# Patient Record
Sex: FEMALE | Race: WHITE | NOT HISPANIC OR LATINO | Employment: OTHER | URBAN - METROPOLITAN AREA
[De-identification: names, ages, dates, MRNs, and addresses within clinical notes are randomized per-mention and may not be internally consistent; named-entity substitution may affect disease eponyms.]

---

## 2017-01-12 ENCOUNTER — COMMUNICATION - HEALTHEAST (OUTPATIENT)
Dept: INTERNAL MEDICINE | Facility: CLINIC | Age: 82
End: 2017-01-12

## 2017-01-12 DIAGNOSIS — F41.9 ANXIETY: ICD-10-CM

## 2017-05-19 ENCOUNTER — COMMUNICATION - HEALTHEAST (OUTPATIENT)
Dept: INTERNAL MEDICINE | Facility: CLINIC | Age: 82
End: 2017-05-19

## 2017-05-19 DIAGNOSIS — E03.9 UNSPECIFIED HYPOTHYROIDISM: ICD-10-CM

## 2017-06-30 ENCOUNTER — COMMUNICATION - HEALTHEAST (OUTPATIENT)
Dept: INTERNAL MEDICINE | Facility: CLINIC | Age: 82
End: 2017-06-30

## 2017-06-30 ENCOUNTER — OFFICE VISIT - HEALTHEAST (OUTPATIENT)
Dept: INTERNAL MEDICINE | Facility: CLINIC | Age: 82
End: 2017-06-30

## 2017-06-30 ENCOUNTER — AMBULATORY - HEALTHEAST (OUTPATIENT)
Dept: INTERNAL MEDICINE | Facility: CLINIC | Age: 82
End: 2017-06-30

## 2017-06-30 DIAGNOSIS — E03.9 UNSPECIFIED HYPOTHYROIDISM: ICD-10-CM

## 2017-06-30 DIAGNOSIS — E53.8 VITAMIN B 12 DEFICIENCY: ICD-10-CM

## 2017-06-30 DIAGNOSIS — E55.9 VITAMIN D DEFICIENCY: ICD-10-CM

## 2017-06-30 DIAGNOSIS — E03.9 HYPOTHYROIDISM, UNSPECIFIED TYPE: ICD-10-CM

## 2017-06-30 DIAGNOSIS — I10 ESSENTIAL HYPERTENSION WITH GOAL BLOOD PRESSURE LESS THAN 140/90: ICD-10-CM

## 2017-06-30 DIAGNOSIS — R05.9 COUGH: ICD-10-CM

## 2017-07-10 ENCOUNTER — COMMUNICATION - HEALTHEAST (OUTPATIENT)
Dept: INTERNAL MEDICINE | Facility: CLINIC | Age: 82
End: 2017-07-10

## 2017-07-10 DIAGNOSIS — F41.9 ANXIETY: ICD-10-CM

## 2017-07-13 ENCOUNTER — COMMUNICATION - HEALTHEAST (OUTPATIENT)
Dept: INTERNAL MEDICINE | Facility: CLINIC | Age: 82
End: 2017-07-13

## 2017-07-13 DIAGNOSIS — I10 HTN (HYPERTENSION): ICD-10-CM

## 2018-01-03 ENCOUNTER — OFFICE VISIT - HEALTHEAST (OUTPATIENT)
Dept: INTERNAL MEDICINE | Facility: CLINIC | Age: 83
End: 2018-01-03

## 2018-01-03 DIAGNOSIS — E03.9 HYPOTHYROIDISM, UNSPECIFIED TYPE: ICD-10-CM

## 2018-01-03 DIAGNOSIS — E55.9 VITAMIN D DEFICIENCY: ICD-10-CM

## 2018-01-03 DIAGNOSIS — R10.9 ABDOMINAL PAIN: ICD-10-CM

## 2018-01-03 DIAGNOSIS — Z23 NEED FOR INFLUENZA VACCINATION: ICD-10-CM

## 2018-01-03 DIAGNOSIS — E53.8 VITAMIN B 12 DEFICIENCY: ICD-10-CM

## 2018-01-03 LAB
ANION GAP SERPL CALCULATED.3IONS-SCNC: 9 MMOL/L (ref 5–18)
BUN SERPL-MCNC: 12 MG/DL (ref 8–28)
CALCIUM SERPL-MCNC: 9.1 MG/DL (ref 8.5–10.5)
CHLORIDE BLD-SCNC: 106 MMOL/L (ref 98–107)
CO2 SERPL-SCNC: 25 MMOL/L (ref 22–31)
CREAT SERPL-MCNC: 1.23 MG/DL (ref 0.6–1.1)
ERYTHROCYTE [DISTWIDTH] IN BLOOD BY AUTOMATED COUNT: 12.5 % (ref 11–14.5)
GFR SERPL CREATININE-BSD FRML MDRD: 41 ML/MIN/1.73M2
GLUCOSE BLD-MCNC: 102 MG/DL (ref 70–125)
HCT VFR BLD AUTO: 40.6 % (ref 35–47)
HGB BLD-MCNC: 13.4 G/DL (ref 12–16)
MCH RBC QN AUTO: 29.1 PG (ref 27–34)
MCHC RBC AUTO-ENTMCNC: 33.1 G/DL (ref 32–36)
MCV RBC AUTO: 88 FL (ref 80–100)
PLATELET # BLD AUTO: 258 THOU/UL (ref 140–440)
PMV BLD AUTO: 7.4 FL (ref 7–10)
POTASSIUM BLD-SCNC: 3.9 MMOL/L (ref 3.5–5)
RBC # BLD AUTO: 4.63 MILL/UL (ref 3.8–5.4)
SODIUM SERPL-SCNC: 140 MMOL/L (ref 136–145)
TSH SERPL DL<=0.005 MIU/L-ACNC: 0.67 UIU/ML (ref 0.3–5)
VIT B12 SERPL-MCNC: 575 PG/ML (ref 213–816)
WBC: 5.6 THOU/UL (ref 4–11)

## 2018-01-04 LAB — 25(OH)D3 SERPL-MCNC: 29 NG/ML (ref 30–80)

## 2018-01-05 ENCOUNTER — COMMUNICATION - HEALTHEAST (OUTPATIENT)
Dept: INTERNAL MEDICINE | Facility: CLINIC | Age: 83
End: 2018-01-05

## 2018-01-11 ENCOUNTER — COMMUNICATION - HEALTHEAST (OUTPATIENT)
Dept: SCHEDULING | Facility: CLINIC | Age: 83
End: 2018-01-11

## 2018-03-21 ENCOUNTER — RECORDS - HEALTHEAST (OUTPATIENT)
Dept: ADMINISTRATIVE | Facility: OTHER | Age: 83
End: 2018-03-21

## 2018-03-22 ENCOUNTER — RECORDS - HEALTHEAST (OUTPATIENT)
Dept: ADMINISTRATIVE | Facility: OTHER | Age: 83
End: 2018-03-22

## 2018-04-16 ENCOUNTER — RECORDS - HEALTHEAST (OUTPATIENT)
Dept: ADMINISTRATIVE | Facility: OTHER | Age: 83
End: 2018-04-16

## 2018-06-08 ENCOUNTER — OFFICE VISIT - HEALTHEAST (OUTPATIENT)
Dept: INTERNAL MEDICINE | Facility: CLINIC | Age: 83
End: 2018-06-08

## 2018-06-08 DIAGNOSIS — R05.9 COUGH: ICD-10-CM

## 2018-06-08 DIAGNOSIS — K57.32 DIVERTICULITIS LARGE INTESTINE: ICD-10-CM

## 2018-06-28 ENCOUNTER — COMMUNICATION - HEALTHEAST (OUTPATIENT)
Dept: INTERNAL MEDICINE | Facility: CLINIC | Age: 83
End: 2018-06-28

## 2018-06-28 ENCOUNTER — HOSPITAL ENCOUNTER (OUTPATIENT)
Dept: CT IMAGING | Facility: HOSPITAL | Age: 83
Discharge: HOME | End: 2018-06-28
Attending: INTERNAL MEDICINE

## 2018-06-28 ENCOUNTER — OFFICE VISIT - HEALTHEAST (OUTPATIENT)
Dept: INTERNAL MEDICINE | Facility: CLINIC | Age: 83
End: 2018-06-28

## 2018-06-28 DIAGNOSIS — R10.12 ABDOMINAL PAIN, LEFT UPPER QUADRANT: ICD-10-CM

## 2018-06-28 DIAGNOSIS — R05.9 COUGH: ICD-10-CM

## 2018-06-28 LAB
ALBUMIN SERPL-MCNC: 3.6 G/DL (ref 3.5–5)
ALBUMIN UR-MCNC: NEGATIVE MG/DL
ALP SERPL-CCNC: 78 U/L (ref 45–120)
ALT SERPL W P-5'-P-CCNC: 11 U/L (ref 0–45)
ANION GAP SERPL CALCULATED.3IONS-SCNC: 12 MMOL/L (ref 5–18)
APPEARANCE UR: ABNORMAL
AST SERPL W P-5'-P-CCNC: 21 U/L (ref 0–40)
BILIRUB DIRECT SERPL-MCNC: 0.3 MG/DL
BILIRUB SERPL-MCNC: 0.8 MG/DL (ref 0–1)
BILIRUB UR QL STRIP: NEGATIVE
BUN SERPL-MCNC: 18 MG/DL (ref 8–28)
CALCIUM SERPL-MCNC: 9.3 MG/DL (ref 8.5–10.5)
CHLORIDE BLD-SCNC: 105 MMOL/L (ref 98–107)
CO2 SERPL-SCNC: 22 MMOL/L (ref 22–31)
COLOR UR AUTO: YELLOW
CREAT SERPL-MCNC: 1.45 MG/DL (ref 0.6–1.1)
ERYTHROCYTE [DISTWIDTH] IN BLOOD BY AUTOMATED COUNT: 12.6 % (ref 11–14.5)
GFR SERPL CREATININE-BSD FRML MDRD: 34 ML/MIN/1.73M2
GLUCOSE BLD-MCNC: 108 MG/DL (ref 70–125)
GLUCOSE UR STRIP-MCNC: NEGATIVE MG/DL
HCT VFR BLD AUTO: 39.7 % (ref 35–47)
HGB BLD-MCNC: 13.5 G/DL (ref 12–16)
HGB UR QL STRIP: ABNORMAL
KETONES UR STRIP-MCNC: NEGATIVE MG/DL
LEUKOCYTE ESTERASE UR QL STRIP: ABNORMAL
LIPASE SERPL-CCNC: 33 U/L (ref 0–52)
MCH RBC QN AUTO: 30.2 PG (ref 27–34)
MCHC RBC AUTO-ENTMCNC: 34.1 G/DL (ref 32–36)
MCV RBC AUTO: 89 FL (ref 80–100)
NITRATE UR QL: NEGATIVE
PH UR STRIP: 5.5 [PH] (ref 5–8)
PLATELET # BLD AUTO: 254 THOU/UL (ref 140–440)
PMV BLD AUTO: 7.2 FL (ref 7–10)
POTASSIUM BLD-SCNC: 4.2 MMOL/L (ref 3.5–5)
PROT SERPL-MCNC: 6.9 G/DL (ref 6–8)
RBC # BLD AUTO: 4.48 MILL/UL (ref 3.8–5.4)
SODIUM SERPL-SCNC: 139 MMOL/L (ref 136–145)
SP GR UR STRIP: 1.02 (ref 1–1.03)
UROBILINOGEN UR STRIP-ACNC: ABNORMAL
WBC: 6.3 THOU/UL (ref 4–11)

## 2018-06-30 ENCOUNTER — COMMUNICATION - HEALTHEAST (OUTPATIENT)
Dept: INTERNAL MEDICINE | Facility: CLINIC | Age: 83
End: 2018-06-30

## 2018-06-30 LAB — BACTERIA SPEC CULT: NORMAL

## 2018-09-18 ENCOUNTER — COMMUNICATION - HEALTHEAST (OUTPATIENT)
Dept: INTERNAL MEDICINE | Facility: CLINIC | Age: 83
End: 2018-09-18

## 2018-09-18 DIAGNOSIS — E03.9 HYPOTHYROIDISM: ICD-10-CM

## 2018-09-27 ENCOUNTER — COMMUNICATION - HEALTHEAST (OUTPATIENT)
Dept: INTERNAL MEDICINE | Facility: CLINIC | Age: 83
End: 2018-09-27

## 2018-09-27 DIAGNOSIS — F41.9 ANXIETY: ICD-10-CM

## 2018-10-08 ENCOUNTER — COMMUNICATION - HEALTHEAST (OUTPATIENT)
Dept: INTERNAL MEDICINE | Facility: CLINIC | Age: 83
End: 2018-10-08

## 2018-10-08 DIAGNOSIS — I10 HTN (HYPERTENSION): ICD-10-CM

## 2018-10-09 ENCOUNTER — COMMUNICATION - HEALTHEAST (OUTPATIENT)
Dept: INTERNAL MEDICINE | Facility: CLINIC | Age: 83
End: 2018-10-09

## 2018-10-09 ENCOUNTER — COMMUNICATION - HEALTHEAST (OUTPATIENT)
Dept: SCHEDULING | Facility: CLINIC | Age: 83
End: 2018-10-09

## 2018-10-26 ENCOUNTER — RECORDS - HEALTHEAST (OUTPATIENT)
Dept: ADMINISTRATIVE | Facility: OTHER | Age: 83
End: 2018-10-26

## 2018-11-29 ENCOUNTER — COMMUNICATION - HEALTHEAST (OUTPATIENT)
Dept: INTERNAL MEDICINE | Facility: CLINIC | Age: 83
End: 2018-11-29

## 2018-11-29 DIAGNOSIS — E03.9 HYPOTHYROIDISM: ICD-10-CM

## 2019-01-16 ENCOUNTER — RECORDS - HEALTHEAST (OUTPATIENT)
Dept: ADMINISTRATIVE | Facility: OTHER | Age: 84
End: 2019-01-16

## 2019-01-18 ENCOUNTER — AMBULATORY - HEALTHEAST (OUTPATIENT)
Dept: SCHEDULING | Facility: CLINIC | Age: 84
End: 2019-01-18

## 2019-01-18 ENCOUNTER — OFFICE VISIT - HEALTHEAST (OUTPATIENT)
Dept: INTERNAL MEDICINE | Facility: CLINIC | Age: 84
End: 2019-01-18

## 2019-01-18 DIAGNOSIS — Z78.0 POSTMENOPAUSAL STATUS: ICD-10-CM

## 2019-01-18 DIAGNOSIS — R00.2 PALPITATIONS: ICD-10-CM

## 2019-01-18 DIAGNOSIS — E03.9 HYPOTHYROIDISM, UNSPECIFIED TYPE: ICD-10-CM

## 2019-01-18 DIAGNOSIS — E53.8 VITAMIN B12 DEFICIENCY (NON ANEMIC): ICD-10-CM

## 2019-01-18 DIAGNOSIS — R42 DIZZINESS: ICD-10-CM

## 2019-01-18 DIAGNOSIS — I10 ESSENTIAL HYPERTENSION: ICD-10-CM

## 2019-01-18 LAB
ANION GAP SERPL CALCULATED.3IONS-SCNC: 12 MMOL/L (ref 5–18)
BASOPHILS # BLD AUTO: 0 THOU/UL (ref 0–0.2)
BASOPHILS NFR BLD AUTO: 1 % (ref 0–2)
BUN SERPL-MCNC: 20 MG/DL (ref 8–28)
CALCIUM SERPL-MCNC: 9.6 MG/DL (ref 8.5–10.5)
CHLORIDE BLD-SCNC: 102 MMOL/L (ref 98–107)
CO2 SERPL-SCNC: 25 MMOL/L (ref 22–31)
CREAT SERPL-MCNC: 1.51 MG/DL (ref 0.6–1.1)
EOSINOPHIL # BLD AUTO: 0.3 THOU/UL (ref 0–0.4)
EOSINOPHIL NFR BLD AUTO: 4 % (ref 0–6)
ERYTHROCYTE [DISTWIDTH] IN BLOOD BY AUTOMATED COUNT: 12.3 % (ref 11–14.5)
GFR SERPL CREATININE-BSD FRML MDRD: 32 ML/MIN/1.73M2
GLUCOSE BLD-MCNC: 98 MG/DL (ref 70–125)
HCT VFR BLD AUTO: 40.4 % (ref 35–47)
HGB BLD-MCNC: 13.3 G/DL (ref 12–16)
LYMPHOCYTES # BLD AUTO: 1.7 THOU/UL (ref 0.8–4.4)
LYMPHOCYTES NFR BLD AUTO: 23 % (ref 20–40)
MCH RBC QN AUTO: 29.5 PG (ref 27–34)
MCHC RBC AUTO-ENTMCNC: 33 G/DL (ref 32–36)
MCV RBC AUTO: 90 FL (ref 80–100)
MONOCYTES # BLD AUTO: 0.4 THOU/UL (ref 0–0.9)
MONOCYTES NFR BLD AUTO: 6 % (ref 2–10)
NEUTROPHILS # BLD AUTO: 5 THOU/UL (ref 2–7.7)
NEUTROPHILS NFR BLD AUTO: 67 % (ref 50–70)
PLATELET # BLD AUTO: 268 THOU/UL (ref 140–440)
PMV BLD AUTO: 6.9 FL (ref 7–10)
POTASSIUM BLD-SCNC: 4.4 MMOL/L (ref 3.5–5)
RBC # BLD AUTO: 4.51 MILL/UL (ref 3.8–5.4)
SODIUM SERPL-SCNC: 139 MMOL/L (ref 136–145)
TSH SERPL DL<=0.005 MIU/L-ACNC: 0.42 UIU/ML (ref 0.3–5)
VIT B12 SERPL-MCNC: 282 PG/ML (ref 213–816)
WBC: 7.4 THOU/UL (ref 4–11)

## 2019-01-21 LAB
ATRIAL RATE - MUSE: 64 BPM
DIASTOLIC BLOOD PRESSURE - MUSE: NORMAL MMHG
INTERPRETATION ECG - MUSE: NORMAL
P AXIS - MUSE: 35 DEGREES
PR INTERVAL - MUSE: 130 MS
QRS DURATION - MUSE: 110 MS
QT - MUSE: 422 MS
QTC - MUSE: 435 MS
R AXIS - MUSE: -58 DEGREES
SYSTOLIC BLOOD PRESSURE - MUSE: NORMAL MMHG
T AXIS - MUSE: 30 DEGREES
VENTRICULAR RATE- MUSE: 64 BPM

## 2019-01-22 ENCOUNTER — COMMUNICATION - HEALTHEAST (OUTPATIENT)
Dept: INTERNAL MEDICINE | Facility: CLINIC | Age: 84
End: 2019-01-22

## 2019-01-22 DIAGNOSIS — E03.9 HYPOTHYROIDISM, UNSPECIFIED TYPE: ICD-10-CM

## 2019-02-11 ENCOUNTER — COMMUNICATION - HEALTHEAST (OUTPATIENT)
Dept: INTERNAL MEDICINE | Facility: CLINIC | Age: 84
End: 2019-02-11

## 2019-02-12 ENCOUNTER — COMMUNICATION - HEALTHEAST (OUTPATIENT)
Dept: INTERNAL MEDICINE | Facility: CLINIC | Age: 84
End: 2019-02-12

## 2019-02-12 DIAGNOSIS — M81.0 SENILE OSTEOPOROSIS: ICD-10-CM

## 2019-04-15 ENCOUNTER — COMMUNICATION - HEALTHEAST (OUTPATIENT)
Dept: CARE COORDINATION | Facility: CLINIC | Age: 84
End: 2019-04-15

## 2019-04-19 ENCOUNTER — OFFICE VISIT - HEALTHEAST (OUTPATIENT)
Dept: PHARMACY | Facility: CLINIC | Age: 84
End: 2019-04-19

## 2019-04-19 ENCOUNTER — COMMUNICATION - HEALTHEAST (OUTPATIENT)
Dept: INTERNAL MEDICINE | Facility: CLINIC | Age: 84
End: 2019-04-19

## 2019-04-19 ENCOUNTER — OFFICE VISIT - HEALTHEAST (OUTPATIENT)
Dept: INTERNAL MEDICINE | Facility: CLINIC | Age: 84
End: 2019-04-19

## 2019-04-19 DIAGNOSIS — F41.9 ANXIETY: ICD-10-CM

## 2019-04-19 DIAGNOSIS — E03.9 HYPOTHYROIDISM, UNSPECIFIED TYPE: ICD-10-CM

## 2019-04-19 DIAGNOSIS — K44.9 DIAPHRAGMATIC HERNIA WITHOUT OBSTRUCTION AND WITHOUT GANGRENE: ICD-10-CM

## 2019-04-19 DIAGNOSIS — K21.9 GASTROESOPHAGEAL REFLUX DISEASE WITHOUT ESOPHAGITIS: ICD-10-CM

## 2019-04-19 DIAGNOSIS — E53.8 VITAMIN B12 DEFICIENCY (NON ANEMIC): ICD-10-CM

## 2019-04-19 DIAGNOSIS — I63.81 RIGHT THALAMIC STROKE (H): ICD-10-CM

## 2019-04-19 DIAGNOSIS — K21.9 GASTROESOPHAGEAL REFLUX DISEASE, ESOPHAGITIS PRESENCE NOT SPECIFIED: ICD-10-CM

## 2019-04-19 DIAGNOSIS — I63.81 ACUTE ISCHEMIC VBA THALAMIC STROKE, LEFT (H): ICD-10-CM

## 2019-04-19 DIAGNOSIS — R00.2 PALPITATIONS: ICD-10-CM

## 2019-04-19 DIAGNOSIS — R06.83 SNORING: ICD-10-CM

## 2019-04-19 LAB
TSH SERPL DL<=0.005 MIU/L-ACNC: 0.81 UIU/ML (ref 0.3–5)
VIT B12 SERPL-MCNC: 984 PG/ML (ref 213–816)

## 2019-04-19 ASSESSMENT — MIFFLIN-ST. JEOR: SCORE: 1207.13

## 2019-04-21 ENCOUNTER — COMMUNICATION - HEALTHEAST (OUTPATIENT)
Dept: INTERNAL MEDICINE | Facility: CLINIC | Age: 84
End: 2019-04-21

## 2019-05-23 ENCOUNTER — OFFICE VISIT - HEALTHEAST (OUTPATIENT)
Dept: INTERNAL MEDICINE | Facility: CLINIC | Age: 84
End: 2019-05-23

## 2019-05-23 DIAGNOSIS — I63.81 RIGHT THALAMIC STROKE (H): ICD-10-CM

## 2019-05-23 DIAGNOSIS — E55.9 VITAMIN D DEFICIENCY: ICD-10-CM

## 2019-05-23 DIAGNOSIS — E03.9 HYPOTHYROIDISM, UNSPECIFIED TYPE: ICD-10-CM

## 2019-05-23 DIAGNOSIS — M35.3 POLYMYALGIA (H): ICD-10-CM

## 2019-05-23 DIAGNOSIS — M25.50 MULTIPLE JOINT PAIN: ICD-10-CM

## 2019-05-23 LAB
BASOPHILS # BLD AUTO: 0 THOU/UL (ref 0–0.2)
BASOPHILS NFR BLD AUTO: 0 % (ref 0–2)
EOSINOPHIL # BLD AUTO: 0.2 THOU/UL (ref 0–0.4)
EOSINOPHIL NFR BLD AUTO: 2 % (ref 0–6)
ERYTHROCYTE [DISTWIDTH] IN BLOOD BY AUTOMATED COUNT: 11.9 % (ref 11–14.5)
HCT VFR BLD AUTO: 40 % (ref 35–47)
HGB BLD-MCNC: 13.2 G/DL (ref 12–16)
LYMPHOCYTES # BLD AUTO: 1.3 THOU/UL (ref 0.8–4.4)
LYMPHOCYTES NFR BLD AUTO: 15 % (ref 20–40)
MCH RBC QN AUTO: 29.5 PG (ref 27–34)
MCHC RBC AUTO-ENTMCNC: 33.1 G/DL (ref 32–36)
MCV RBC AUTO: 89 FL (ref 80–100)
MONOCYTES # BLD AUTO: 0.5 THOU/UL (ref 0–0.9)
MONOCYTES NFR BLD AUTO: 6 % (ref 2–10)
NEUTROPHILS # BLD AUTO: 6.6 THOU/UL (ref 2–7.7)
NEUTROPHILS NFR BLD AUTO: 76 % (ref 50–70)
PLATELET # BLD AUTO: 333 THOU/UL (ref 140–440)
PMV BLD AUTO: 7 FL (ref 7–10)
RBC # BLD AUTO: 4.48 MILL/UL (ref 3.8–5.4)
WBC: 8.7 THOU/UL (ref 4–11)

## 2019-05-23 ASSESSMENT — MIFFLIN-ST. JEOR: SCORE: 1188.36

## 2019-05-24 LAB
ALBUMIN SERPL-MCNC: 3.3 G/DL (ref 3.5–5)
ALP SERPL-CCNC: 84 U/L (ref 45–120)
ALT SERPL W P-5'-P-CCNC: <9 U/L (ref 0–45)
ANION GAP SERPL CALCULATED.3IONS-SCNC: 14 MMOL/L (ref 5–18)
AST SERPL W P-5'-P-CCNC: 15 U/L (ref 0–40)
BILIRUB SERPL-MCNC: 1 MG/DL (ref 0–1)
BUN SERPL-MCNC: 24 MG/DL (ref 8–28)
CALCIUM SERPL-MCNC: 9.5 MG/DL (ref 8.5–10.5)
CHLORIDE BLD-SCNC: 100 MMOL/L (ref 98–107)
CK SERPL-CCNC: 64 U/L (ref 30–190)
CO2 SERPL-SCNC: 21 MMOL/L (ref 22–31)
CREAT SERPL-MCNC: 1.69 MG/DL (ref 0.6–1.1)
ERYTHROCYTE [SEDIMENTATION RATE] IN BLOOD BY WESTERGREN METHOD: 55 MM/HR (ref 0–20)
GFR SERPL CREATININE-BSD FRML MDRD: 28 ML/MIN/1.73M2
GLUCOSE BLD-MCNC: 187 MG/DL (ref 70–125)
POTASSIUM BLD-SCNC: 4 MMOL/L (ref 3.5–5)
PROT SERPL-MCNC: 7 G/DL (ref 6–8)
RHEUMATOID FACT SERPL-ACNC: <15 IU/ML (ref 0–30)
SODIUM SERPL-SCNC: 135 MMOL/L (ref 136–145)
TSH SERPL DL<=0.005 MIU/L-ACNC: 1.82 UIU/ML (ref 0.3–5)

## 2019-05-27 LAB
25(OH)D3 SERPL-MCNC: 46.2 NG/ML (ref 30–80)
B BURGDOR IGG+IGM SER QL: 0.25 INDEX VALUE

## 2019-05-28 LAB
ANA SER QL: 0.6 U
CCP AB SER IA-ACNC: <0.5 U/ML

## 2019-05-29 ENCOUNTER — COMMUNICATION - HEALTHEAST (OUTPATIENT)
Dept: INTERNAL MEDICINE | Facility: CLINIC | Age: 84
End: 2019-05-29

## 2019-06-10 ENCOUNTER — COMMUNICATION - HEALTHEAST (OUTPATIENT)
Dept: INTERNAL MEDICINE | Facility: CLINIC | Age: 84
End: 2019-06-10

## 2019-06-10 ENCOUNTER — OFFICE VISIT - HEALTHEAST (OUTPATIENT)
Dept: INTERNAL MEDICINE | Facility: CLINIC | Age: 84
End: 2019-06-10

## 2019-06-10 DIAGNOSIS — I10 ESSENTIAL HYPERTENSION: ICD-10-CM

## 2019-06-10 DIAGNOSIS — N28.9 RENAL INSUFFICIENCY: ICD-10-CM

## 2019-06-10 DIAGNOSIS — F41.9 ANXIETY: ICD-10-CM

## 2019-06-10 DIAGNOSIS — R53.83 FATIGUE, UNSPECIFIED TYPE: ICD-10-CM

## 2019-06-10 DIAGNOSIS — F32.A DEPRESSION, UNSPECIFIED DEPRESSION TYPE: ICD-10-CM

## 2019-06-10 DIAGNOSIS — I95.1 ORTHOSTASIS: ICD-10-CM

## 2019-06-10 DIAGNOSIS — G47.00 INSOMNIA, UNSPECIFIED TYPE: ICD-10-CM

## 2019-06-10 LAB
ANION GAP SERPL CALCULATED.3IONS-SCNC: 11 MMOL/L (ref 5–18)
BUN SERPL-MCNC: 20 MG/DL (ref 8–28)
CALCIUM SERPL-MCNC: 9.7 MG/DL (ref 8.5–10.5)
CHLORIDE BLD-SCNC: 102 MMOL/L (ref 98–107)
CO2 SERPL-SCNC: 26 MMOL/L (ref 22–31)
CREAT SERPL-MCNC: 1.38 MG/DL (ref 0.6–1.1)
GFR SERPL CREATININE-BSD FRML MDRD: 36 ML/MIN/1.73M2
GLUCOSE BLD-MCNC: 103 MG/DL (ref 70–125)
POTASSIUM BLD-SCNC: 4.4 MMOL/L (ref 3.5–5)
SODIUM SERPL-SCNC: 139 MMOL/L (ref 136–145)

## 2019-06-10 ASSESSMENT — MIFFLIN-ST. JEOR: SCORE: 1197.49

## 2019-06-12 ENCOUNTER — COMMUNICATION - HEALTHEAST (OUTPATIENT)
Dept: INTERNAL MEDICINE | Facility: CLINIC | Age: 84
End: 2019-06-12

## 2019-06-12 DIAGNOSIS — I10 HTN (HYPERTENSION): ICD-10-CM

## 2019-06-24 ENCOUNTER — COMMUNICATION - HEALTHEAST (OUTPATIENT)
Dept: INTERNAL MEDICINE | Facility: CLINIC | Age: 84
End: 2019-06-24

## 2019-06-24 DIAGNOSIS — E03.9 HYPOTHYROIDISM: ICD-10-CM

## 2019-07-10 ENCOUNTER — COMMUNICATION - HEALTHEAST (OUTPATIENT)
Dept: SCHEDULING | Facility: CLINIC | Age: 84
End: 2019-07-10

## 2019-07-10 DIAGNOSIS — M25.50 MULTIPLE JOINT PAIN: ICD-10-CM

## 2019-07-10 DIAGNOSIS — M35.3 POLYMYALGIA (H): ICD-10-CM

## 2019-07-11 ENCOUNTER — COMMUNICATION - HEALTHEAST (OUTPATIENT)
Dept: INTERNAL MEDICINE | Facility: CLINIC | Age: 84
End: 2019-07-11

## 2019-07-23 ENCOUNTER — OFFICE VISIT - HEALTHEAST (OUTPATIENT)
Dept: INTERNAL MEDICINE | Facility: CLINIC | Age: 84
End: 2019-07-23

## 2019-07-23 DIAGNOSIS — I10 HTN (HYPERTENSION): ICD-10-CM

## 2019-07-23 DIAGNOSIS — M19.041 PRIMARY OSTEOARTHRITIS OF BOTH HANDS: ICD-10-CM

## 2019-07-23 DIAGNOSIS — M19.042 PRIMARY OSTEOARTHRITIS OF BOTH HANDS: ICD-10-CM

## 2019-07-23 ASSESSMENT — MIFFLIN-ST. JEOR: SCORE: 1201.46

## 2019-07-24 ENCOUNTER — COMMUNICATION - HEALTHEAST (OUTPATIENT)
Dept: INTERNAL MEDICINE | Facility: CLINIC | Age: 84
End: 2019-07-24

## 2019-07-25 ENCOUNTER — COMMUNICATION - HEALTHEAST (OUTPATIENT)
Dept: SCHEDULING | Facility: CLINIC | Age: 84
End: 2019-07-25

## 2019-07-25 ENCOUNTER — COMMUNICATION - HEALTHEAST (OUTPATIENT)
Dept: INTERNAL MEDICINE | Facility: CLINIC | Age: 84
End: 2019-07-25

## 2019-07-25 DIAGNOSIS — M19.91 PRIMARY OSTEOARTHRITIS, UNSPECIFIED SITE: ICD-10-CM

## 2019-08-06 ENCOUNTER — COMMUNICATION - HEALTHEAST (OUTPATIENT)
Dept: RHEUMATOLOGY | Facility: CLINIC | Age: 84
End: 2019-08-06

## 2019-08-06 ENCOUNTER — OFFICE VISIT - HEALTHEAST (OUTPATIENT)
Dept: RHEUMATOLOGY | Facility: CLINIC | Age: 84
End: 2019-08-06

## 2019-08-06 DIAGNOSIS — R70.0 ELEVATED SED RATE: ICD-10-CM

## 2019-08-06 DIAGNOSIS — M12.39 PALINDROMIC RHEUMATISM INVOLVING MULTIPLE SITES: ICD-10-CM

## 2019-08-06 DIAGNOSIS — N18.30 CHRONIC RENAL FAILURE, STAGE 3 (MODERATE) (H): ICD-10-CM

## 2019-08-06 DIAGNOSIS — M15.0 PRIMARY OSTEOARTHRITIS INVOLVING MULTIPLE JOINTS: ICD-10-CM

## 2019-08-06 DIAGNOSIS — M25.50 POLYARTHRALGIA: ICD-10-CM

## 2019-08-06 LAB
C REACTIVE PROTEIN LHE: 1.1 MG/DL (ref 0–0.8)
ERYTHROCYTE [SEDIMENTATION RATE] IN BLOOD BY WESTERGREN METHOD: 25 MM/HR (ref 0–20)

## 2019-08-06 ASSESSMENT — MIFFLIN-ST. JEOR: SCORE: 1200.6

## 2019-08-07 LAB — HCV AB SERPL QL IA: NEGATIVE

## 2019-08-08 LAB — ANCA IGG TITR SER IF: NORMAL {TITER}

## 2019-10-15 ENCOUNTER — COMMUNICATION - HEALTHEAST (OUTPATIENT)
Dept: INTERNAL MEDICINE | Facility: CLINIC | Age: 84
End: 2019-10-15

## 2019-10-15 ENCOUNTER — OFFICE VISIT - HEALTHEAST (OUTPATIENT)
Dept: RHEUMATOLOGY | Facility: CLINIC | Age: 84
End: 2019-10-15

## 2019-10-15 ENCOUNTER — COMMUNICATION - HEALTHEAST (OUTPATIENT)
Dept: SCHEDULING | Facility: CLINIC | Age: 84
End: 2019-10-15

## 2019-10-15 DIAGNOSIS — M12.30 PALINDROMIC RHEUMATISM: ICD-10-CM

## 2019-10-15 DIAGNOSIS — M15.0 PRIMARY OSTEOARTHRITIS INVOLVING MULTIPLE JOINTS: ICD-10-CM

## 2019-10-15 DIAGNOSIS — I10 ESSENTIAL HYPERTENSION: ICD-10-CM

## 2019-10-15 DIAGNOSIS — N18.30 CHRONIC RENAL FAILURE, STAGE 3 (MODERATE) (H): ICD-10-CM

## 2019-10-16 ENCOUNTER — OFFICE VISIT - HEALTHEAST (OUTPATIENT)
Dept: INTERNAL MEDICINE | Facility: CLINIC | Age: 84
End: 2019-10-16

## 2019-10-16 DIAGNOSIS — I10 ESSENTIAL HYPERTENSION: ICD-10-CM

## 2019-10-16 DIAGNOSIS — I63.81 RIGHT THALAMIC STROKE (H): ICD-10-CM

## 2019-10-16 DIAGNOSIS — R00.2 PALPITATIONS: ICD-10-CM

## 2019-10-16 DIAGNOSIS — R73.09 ABNORMAL GLUCOSE: ICD-10-CM

## 2019-10-16 DIAGNOSIS — F43.22 ADJUSTMENT DISORDER WITH ANXIETY: ICD-10-CM

## 2019-10-16 DIAGNOSIS — K21.9 GASTROESOPHAGEAL REFLUX DISEASE WITHOUT ESOPHAGITIS: ICD-10-CM

## 2019-10-16 DIAGNOSIS — E03.9 HYPOTHYROIDISM, UNSPECIFIED TYPE: ICD-10-CM

## 2019-10-16 DIAGNOSIS — N18.30 CHRONIC RENAL FAILURE, STAGE 3 (MODERATE) (H): ICD-10-CM

## 2019-10-16 DIAGNOSIS — R21 RASH: ICD-10-CM

## 2019-10-16 LAB
BASOPHILS # BLD AUTO: 0.1 THOU/UL (ref 0–0.2)
BASOPHILS NFR BLD AUTO: 1 % (ref 0–2)
EOSINOPHIL # BLD AUTO: 0.3 THOU/UL (ref 0–0.4)
EOSINOPHIL NFR BLD AUTO: 4 % (ref 0–6)
ERYTHROCYTE [DISTWIDTH] IN BLOOD BY AUTOMATED COUNT: 12.2 % (ref 11–14.5)
HBA1C MFR BLD: 6.1 % (ref 3.5–6)
HCT VFR BLD AUTO: 39.2 % (ref 35–47)
HGB BLD-MCNC: 13 G/DL (ref 12–16)
LYMPHOCYTES # BLD AUTO: 1.6 THOU/UL (ref 0.8–4.4)
LYMPHOCYTES NFR BLD AUTO: 20 % (ref 20–40)
MCH RBC QN AUTO: 30.3 PG (ref 27–34)
MCHC RBC AUTO-ENTMCNC: 33.3 G/DL (ref 32–36)
MCV RBC AUTO: 91 FL (ref 80–100)
MONOCYTES # BLD AUTO: 0.4 THOU/UL (ref 0–0.9)
MONOCYTES NFR BLD AUTO: 6 % (ref 2–10)
NEUTROPHILS # BLD AUTO: 5.4 THOU/UL (ref 2–7.7)
NEUTROPHILS NFR BLD AUTO: 70 % (ref 50–70)
PLATELET # BLD AUTO: 309 THOU/UL (ref 140–440)
PMV BLD AUTO: 7.2 FL (ref 7–10)
RBC # BLD AUTO: 4.31 MILL/UL (ref 3.8–5.4)
WBC: 7.7 THOU/UL (ref 4–11)

## 2019-10-16 RX ORDER — TRIAMCINOLONE ACETONIDE 1 MG/G
CREAM TOPICAL
Qty: 80 G | Refills: 1 | Status: SHIPPED | OUTPATIENT
Start: 2019-10-16 | End: 2022-10-05

## 2019-10-16 ASSESSMENT — MIFFLIN-ST. JEOR: SCORE: 1232.36

## 2019-10-17 ENCOUNTER — COMMUNICATION - HEALTHEAST (OUTPATIENT)
Dept: INTERNAL MEDICINE | Facility: CLINIC | Age: 84
End: 2019-10-17

## 2019-10-17 DIAGNOSIS — K21.9 GASTROESOPHAGEAL REFLUX DISEASE WITHOUT ESOPHAGITIS: ICD-10-CM

## 2019-10-17 LAB
ALBUMIN SERPL-MCNC: 3.7 G/DL (ref 3.5–5)
ALP SERPL-CCNC: 82 U/L (ref 45–120)
ALT SERPL W P-5'-P-CCNC: 10 U/L (ref 0–45)
ANION GAP SERPL CALCULATED.3IONS-SCNC: 7 MMOL/L (ref 5–18)
AST SERPL W P-5'-P-CCNC: 18 U/L (ref 0–40)
BILIRUB SERPL-MCNC: 0.6 MG/DL (ref 0–1)
BUN SERPL-MCNC: 16 MG/DL (ref 8–28)
CALCIUM SERPL-MCNC: 9.8 MG/DL (ref 8.5–10.5)
CHLORIDE BLD-SCNC: 103 MMOL/L (ref 98–107)
CO2 SERPL-SCNC: 28 MMOL/L (ref 22–31)
CREAT SERPL-MCNC: 1.31 MG/DL (ref 0.6–1.1)
GFR SERPL CREATININE-BSD FRML MDRD: 38 ML/MIN/1.73M2
GLUCOSE BLD-MCNC: 89 MG/DL (ref 70–125)
LDLC SERPL CALC-MCNC: 61 MG/DL
POTASSIUM BLD-SCNC: 4.3 MMOL/L (ref 3.5–5)
PROT SERPL-MCNC: 6.8 G/DL (ref 6–8)
SODIUM SERPL-SCNC: 138 MMOL/L (ref 136–145)
TSH SERPL DL<=0.005 MIU/L-ACNC: 1.19 UIU/ML (ref 0.3–5)

## 2019-11-13 ENCOUNTER — HOSPITAL ENCOUNTER (OUTPATIENT)
Dept: CARDIOLOGY | Facility: CLINIC | Age: 84
Discharge: HOME | End: 2019-11-13
Attending: INTERNAL MEDICINE

## 2019-11-13 DIAGNOSIS — R00.2 PALPITATIONS: ICD-10-CM

## 2019-11-13 DIAGNOSIS — I63.81 RIGHT THALAMIC STROKE (H): ICD-10-CM

## 2019-12-04 ENCOUNTER — COMMUNICATION - HEALTHEAST (OUTPATIENT)
Dept: INTERNAL MEDICINE | Facility: CLINIC | Age: 84
End: 2019-12-04

## 2020-01-21 ENCOUNTER — OFFICE VISIT - HEALTHEAST (OUTPATIENT)
Dept: RHEUMATOLOGY | Facility: CLINIC | Age: 85
End: 2020-01-21

## 2020-01-21 DIAGNOSIS — G89.29 CHRONIC RIGHT SHOULDER PAIN: ICD-10-CM

## 2020-01-21 DIAGNOSIS — M12.39 PALINDROMIC RHEUMATISM INVOLVING MULTIPLE SITES: ICD-10-CM

## 2020-01-21 DIAGNOSIS — N18.30 CHRONIC RENAL FAILURE, STAGE 3 (MODERATE) (H): ICD-10-CM

## 2020-01-21 DIAGNOSIS — M15.0 PRIMARY OSTEOARTHRITIS INVOLVING MULTIPLE JOINTS: ICD-10-CM

## 2020-01-21 DIAGNOSIS — M25.511 CHRONIC RIGHT SHOULDER PAIN: ICD-10-CM

## 2020-01-21 ASSESSMENT — MIFFLIN-ST. JEOR: SCORE: 1232.36

## 2020-01-23 ENCOUNTER — COMMUNICATION - HEALTHEAST (OUTPATIENT)
Dept: INTERNAL MEDICINE | Facility: CLINIC | Age: 85
End: 2020-01-23

## 2020-01-23 ENCOUNTER — OFFICE VISIT - HEALTHEAST (OUTPATIENT)
Dept: INTERNAL MEDICINE | Facility: CLINIC | Age: 85
End: 2020-01-23

## 2020-01-23 DIAGNOSIS — E03.9 HYPOTHYROIDISM, UNSPECIFIED TYPE: ICD-10-CM

## 2020-01-23 DIAGNOSIS — M81.0 SENILE OSTEOPOROSIS: ICD-10-CM

## 2020-01-23 DIAGNOSIS — Z92.29 PERSONAL HISTORY OF OTHER DRUG THERAPY: ICD-10-CM

## 2020-01-23 DIAGNOSIS — N28.9 RENAL INSUFFICIENCY: ICD-10-CM

## 2020-01-23 DIAGNOSIS — M81.0 OSTEOPOROSIS: ICD-10-CM

## 2020-01-23 DIAGNOSIS — I63.81 ACUTE ISCHEMIC VBA THALAMIC STROKE, LEFT (H): ICD-10-CM

## 2020-01-23 DIAGNOSIS — F43.22 ADJUSTMENT DISORDER WITH ANXIETY: ICD-10-CM

## 2020-01-23 DIAGNOSIS — I10 ESSENTIAL HYPERTENSION: ICD-10-CM

## 2020-01-23 LAB
ANION GAP SERPL CALCULATED.3IONS-SCNC: 10 MMOL/L (ref 5–18)
BUN SERPL-MCNC: 20 MG/DL (ref 8–28)
CALCIUM SERPL-MCNC: 9.2 MG/DL (ref 8.5–10.5)
CHLORIDE BLD-SCNC: 101 MMOL/L (ref 98–107)
CO2 SERPL-SCNC: 27 MMOL/L (ref 22–31)
CREAT SERPL-MCNC: 1.33 MG/DL (ref 0.6–1.1)
GFR SERPL CREATININE-BSD FRML MDRD: 37 ML/MIN/1.73M2
GLUCOSE BLD-MCNC: 98 MG/DL (ref 70–125)
POTASSIUM BLD-SCNC: 4.3 MMOL/L (ref 3.5–5)
SODIUM SERPL-SCNC: 138 MMOL/L (ref 136–145)
TSH SERPL DL<=0.005 MIU/L-ACNC: 1.16 UIU/ML (ref 0.3–5)

## 2020-01-23 ASSESSMENT — MIFFLIN-ST. JEOR: SCORE: 1236.89

## 2020-01-23 ASSESSMENT — PATIENT HEALTH QUESTIONNAIRE - PHQ9: SUM OF ALL RESPONSES TO PHQ QUESTIONS 1-9: 0

## 2020-01-24 ENCOUNTER — COMMUNICATION - HEALTHEAST (OUTPATIENT)
Dept: INTERNAL MEDICINE | Facility: CLINIC | Age: 85
End: 2020-01-24

## 2020-01-24 LAB — 25(OH)D3 SERPL-MCNC: 44.4 NG/ML (ref 30–80)

## 2020-02-12 RX ORDER — MECOBALAMIN 5000 MCG
15 TABLET,DISINTEGRATING ORAL EVERY EVENING
Status: SHIPPED | COMMUNITY
Start: 2020-02-12 | End: 2024-01-01

## 2020-04-21 ENCOUNTER — OFFICE VISIT - HEALTHEAST (OUTPATIENT)
Dept: RHEUMATOLOGY | Facility: CLINIC | Age: 85
End: 2020-04-21

## 2020-04-21 DIAGNOSIS — M12.39 PALINDROMIC RHEUMATISM INVOLVING MULTIPLE SITES: ICD-10-CM

## 2020-04-21 DIAGNOSIS — M15.0 PRIMARY OSTEOARTHRITIS INVOLVING MULTIPLE JOINTS: ICD-10-CM

## 2020-04-21 DIAGNOSIS — M12.30 PALINDROMIC RHEUMATISM: ICD-10-CM

## 2020-04-23 ENCOUNTER — COMMUNICATION - HEALTHEAST (OUTPATIENT)
Dept: INTERNAL MEDICINE | Facility: CLINIC | Age: 85
End: 2020-04-23

## 2020-06-23 ENCOUNTER — OFFICE VISIT - HEALTHEAST (OUTPATIENT)
Dept: RHEUMATOLOGY | Facility: CLINIC | Age: 85
End: 2020-06-23

## 2020-06-23 DIAGNOSIS — M15.0 PRIMARY OSTEOARTHRITIS INVOLVING MULTIPLE JOINTS: ICD-10-CM

## 2020-06-23 DIAGNOSIS — N18.30 CHRONIC RENAL FAILURE, STAGE 3 (MODERATE) (H): ICD-10-CM

## 2020-06-23 DIAGNOSIS — M12.30 PALINDROMIC RHEUMATISM: ICD-10-CM

## 2020-07-09 ENCOUNTER — COMMUNICATION - HEALTHEAST (OUTPATIENT)
Dept: INTERNAL MEDICINE | Facility: CLINIC | Age: 85
End: 2020-07-09

## 2020-07-09 DIAGNOSIS — I63.81 RIGHT THALAMIC STROKE (H): ICD-10-CM

## 2020-07-29 ENCOUNTER — COMMUNICATION - HEALTHEAST (OUTPATIENT)
Dept: INTERNAL MEDICINE | Facility: CLINIC | Age: 85
End: 2020-07-29

## 2020-07-29 ENCOUNTER — OFFICE VISIT - HEALTHEAST (OUTPATIENT)
Dept: INTERNAL MEDICINE | Facility: CLINIC | Age: 85
End: 2020-07-29

## 2020-07-29 DIAGNOSIS — M81.0 SENILE OSTEOPOROSIS: ICD-10-CM

## 2020-07-29 DIAGNOSIS — R73.09 ABNORMAL GLUCOSE: ICD-10-CM

## 2020-07-29 DIAGNOSIS — R53.83 FATIGUE, UNSPECIFIED TYPE: ICD-10-CM

## 2020-07-29 DIAGNOSIS — I10 ESSENTIAL HYPERTENSION: ICD-10-CM

## 2020-07-29 DIAGNOSIS — I47.29 VENTRICULAR TACHYCARDIA, NON-SUSTAINED (H): ICD-10-CM

## 2020-07-29 DIAGNOSIS — N18.30 CHRONIC RENAL FAILURE, STAGE 3 (MODERATE) (H): ICD-10-CM

## 2020-07-29 DIAGNOSIS — R29.6 FALLS FREQUENTLY: ICD-10-CM

## 2020-07-29 DIAGNOSIS — R00.2 PALPITATIONS: ICD-10-CM

## 2020-07-29 DIAGNOSIS — M12.30 PALINDROMIC RHEUMATISM: ICD-10-CM

## 2020-07-29 LAB
ANION GAP SERPL CALCULATED.3IONS-SCNC: 10 MMOL/L (ref 5–18)
BASOPHILS # BLD AUTO: 0.1 THOU/UL (ref 0–0.2)
BASOPHILS NFR BLD AUTO: 1 % (ref 0–2)
BUN SERPL-MCNC: 17 MG/DL (ref 8–28)
CALCIUM SERPL-MCNC: 9.1 MG/DL (ref 8.5–10.5)
CHLORIDE BLD-SCNC: 103 MMOL/L (ref 98–107)
CO2 SERPL-SCNC: 24 MMOL/L (ref 22–31)
CREAT SERPL-MCNC: 1.36 MG/DL (ref 0.6–1.1)
EOSINOPHIL # BLD AUTO: 0.2 THOU/UL (ref 0–0.4)
EOSINOPHIL NFR BLD AUTO: 4 % (ref 0–6)
ERYTHROCYTE [DISTWIDTH] IN BLOOD BY AUTOMATED COUNT: 14.4 % (ref 11–14.5)
GFR SERPL CREATININE-BSD FRML MDRD: 37 ML/MIN/1.73M2
GLUCOSE BLD-MCNC: 96 MG/DL (ref 70–125)
HBA1C MFR BLD: 6.1 % (ref 3.5–6)
HCT VFR BLD AUTO: 39.4 % (ref 35–47)
HGB BLD-MCNC: 12.4 G/DL (ref 12–16)
LYMPHOCYTES # BLD AUTO: 1.1 THOU/UL (ref 0.8–4.4)
LYMPHOCYTES NFR BLD AUTO: 17 % (ref 20–40)
MCH RBC QN AUTO: 28.6 PG (ref 27–34)
MCHC RBC AUTO-ENTMCNC: 31.5 G/DL (ref 32–36)
MCV RBC AUTO: 91 FL (ref 80–100)
MONOCYTES # BLD AUTO: 0.4 THOU/UL (ref 0–0.9)
MONOCYTES NFR BLD AUTO: 7 % (ref 2–10)
NEUTROPHILS # BLD AUTO: 4.6 THOU/UL (ref 2–7.7)
NEUTROPHILS NFR BLD AUTO: 72 % (ref 50–70)
PLATELET # BLD AUTO: 250 THOU/UL (ref 140–440)
PMV BLD AUTO: 10.6 FL (ref 8.5–12.5)
POTASSIUM BLD-SCNC: 4.2 MMOL/L (ref 3.5–5)
RBC # BLD AUTO: 4.33 MILL/UL (ref 3.8–5.4)
SODIUM SERPL-SCNC: 137 MMOL/L (ref 136–145)
TSH SERPL DL<=0.005 MIU/L-ACNC: 0.77 UIU/ML (ref 0.3–5)
WBC: 6.4 THOU/UL (ref 4–11)

## 2020-07-30 ENCOUNTER — COMMUNICATION - HEALTHEAST (OUTPATIENT)
Dept: INTERNAL MEDICINE | Facility: CLINIC | Age: 85
End: 2020-07-30

## 2020-07-30 DIAGNOSIS — I63.81 THALAMIC STROKE (H): ICD-10-CM

## 2020-07-30 DIAGNOSIS — E03.9 ACQUIRED HYPOTHYROIDISM: ICD-10-CM

## 2020-07-31 ENCOUNTER — COMMUNICATION - HEALTHEAST (OUTPATIENT)
Dept: INTERNAL MEDICINE | Facility: CLINIC | Age: 85
End: 2020-07-31

## 2020-09-09 ENCOUNTER — COMMUNICATION - HEALTHEAST (OUTPATIENT)
Dept: INTERNAL MEDICINE | Facility: CLINIC | Age: 85
End: 2020-09-09

## 2020-09-09 DIAGNOSIS — E03.9 ACQUIRED HYPOTHYROIDISM: ICD-10-CM

## 2020-09-09 DIAGNOSIS — F43.22 ADJUSTMENT DISORDER WITH ANXIETY: ICD-10-CM

## 2020-09-09 RX ORDER — ESCITALOPRAM OXALATE 5 MG/1
5 TABLET ORAL DAILY
Qty: 90 TABLET | Refills: 3 | Status: SHIPPED | OUTPATIENT
Start: 2020-09-09 | End: 2022-05-20

## 2020-09-09 RX ORDER — LEVOTHYROXINE SODIUM 75 UG/1
TABLET ORAL
Qty: 90 TABLET | Refills: 3 | Status: SHIPPED | OUTPATIENT
Start: 2020-09-09 | End: 2021-09-07

## 2020-09-14 ENCOUNTER — COMMUNICATION - HEALTHEAST (OUTPATIENT)
Dept: CARDIOLOGY | Facility: CLINIC | Age: 85
End: 2020-09-14

## 2020-09-15 ENCOUNTER — OFFICE VISIT - HEALTHEAST (OUTPATIENT)
Dept: CARDIOLOGY | Facility: CLINIC | Age: 85
End: 2020-09-15

## 2020-09-15 DIAGNOSIS — R00.2 PALPITATIONS: ICD-10-CM

## 2020-09-15 ASSESSMENT — MIFFLIN-ST. JEOR: SCORE: 1264.11

## 2020-11-04 ENCOUNTER — OFFICE VISIT - HEALTHEAST (OUTPATIENT)
Dept: INTERNAL MEDICINE | Facility: CLINIC | Age: 85
End: 2020-11-04

## 2020-11-04 DIAGNOSIS — Z13.220 LIPID SCREENING: ICD-10-CM

## 2020-11-04 DIAGNOSIS — I10 ESSENTIAL HYPERTENSION: ICD-10-CM

## 2020-11-04 DIAGNOSIS — E55.9 VITAMIN D DEFICIENCY: ICD-10-CM

## 2020-11-04 DIAGNOSIS — R35.1 NOCTURIA: ICD-10-CM

## 2020-11-04 DIAGNOSIS — R73.09 ABNORMAL GLUCOSE: ICD-10-CM

## 2020-11-04 DIAGNOSIS — M17.0 OSTEOARTHRITIS OF BOTH KNEES, UNSPECIFIED OSTEOARTHRITIS TYPE: ICD-10-CM

## 2020-11-04 DIAGNOSIS — E03.9 HYPOTHYROIDISM, UNSPECIFIED TYPE: ICD-10-CM

## 2020-11-04 DIAGNOSIS — L90.0 LICHEN SCLEROSUS: ICD-10-CM

## 2020-11-04 DIAGNOSIS — E78.5 HYPERLIPIDEMIA LDL GOAL <130: ICD-10-CM

## 2020-11-04 DIAGNOSIS — I63.81 RIGHT THALAMIC STROKE (H): ICD-10-CM

## 2020-11-04 LAB
ALBUMIN SERPL-MCNC: 3.7 G/DL (ref 3.5–5)
ALP SERPL-CCNC: 99 U/L (ref 45–120)
ALT SERPL W P-5'-P-CCNC: 10 U/L (ref 0–45)
ANION GAP SERPL CALCULATED.3IONS-SCNC: 11 MMOL/L (ref 5–18)
AST SERPL W P-5'-P-CCNC: 18 U/L (ref 0–40)
BILIRUB DIRECT SERPL-MCNC: 0.3 MG/DL
BILIRUB SERPL-MCNC: 0.7 MG/DL (ref 0–1)
BUN SERPL-MCNC: 17 MG/DL (ref 8–28)
CALCIUM SERPL-MCNC: 9.2 MG/DL (ref 8.5–10.5)
CHLORIDE BLD-SCNC: 104 MMOL/L (ref 98–107)
CO2 SERPL-SCNC: 25 MMOL/L (ref 22–31)
CREAT SERPL-MCNC: 1.41 MG/DL (ref 0.6–1.1)
GFR SERPL CREATININE-BSD FRML MDRD: 35 ML/MIN/1.73M2
GLUCOSE BLD-MCNC: 93 MG/DL (ref 70–125)
HBA1C MFR BLD: 6 %
POTASSIUM BLD-SCNC: 4.7 MMOL/L (ref 3.5–5)
PROT SERPL-MCNC: 7 G/DL (ref 6–8)
SODIUM SERPL-SCNC: 140 MMOL/L (ref 136–145)
TSH SERPL DL<=0.005 MIU/L-ACNC: 2.73 UIU/ML (ref 0.3–5)

## 2020-11-04 RX ORDER — CLOBETASOL PROPIONATE 0.5 MG/G
OINTMENT TOPICAL
Qty: 60 G | Refills: 4 | Status: SHIPPED | OUTPATIENT
Start: 2020-11-04 | End: 2022-08-25

## 2020-11-05 ENCOUNTER — COMMUNICATION - HEALTHEAST (OUTPATIENT)
Dept: INTERNAL MEDICINE | Facility: CLINIC | Age: 85
End: 2020-11-05

## 2020-11-05 LAB — 25(OH)D3 SERPL-MCNC: 36.3 NG/ML (ref 30–80)

## 2020-11-06 ENCOUNTER — COMMUNICATION - HEALTHEAST (OUTPATIENT)
Dept: INTERNAL MEDICINE | Facility: CLINIC | Age: 85
End: 2020-11-06

## 2020-11-06 DIAGNOSIS — R35.1 NOCTURIA: ICD-10-CM

## 2020-11-24 ENCOUNTER — OFFICE VISIT - HEALTHEAST (OUTPATIENT)
Dept: RHEUMATOLOGY | Facility: CLINIC | Age: 85
End: 2020-11-24

## 2020-11-24 DIAGNOSIS — M15.0 PRIMARY OSTEOARTHRITIS INVOLVING MULTIPLE JOINTS: ICD-10-CM

## 2020-11-24 DIAGNOSIS — M12.39 PALINDROMIC RHEUMATISM INVOLVING MULTIPLE SITES: ICD-10-CM

## 2020-11-24 DIAGNOSIS — M12.30 PALINDROMIC RHEUMATISM: ICD-10-CM

## 2020-11-24 RX ORDER — HYDROXYCHLOROQUINE SULFATE 200 MG/1
TABLET, FILM COATED ORAL
Qty: 90 TABLET | Refills: 1 | Status: SHIPPED | OUTPATIENT
Start: 2020-11-24 | End: 2021-10-21

## 2021-01-06 ENCOUNTER — COMMUNICATION - HEALTHEAST (OUTPATIENT)
Dept: INTERNAL MEDICINE | Facility: CLINIC | Age: 86
End: 2021-01-06

## 2021-01-06 DIAGNOSIS — I63.81 RIGHT THALAMIC STROKE (H): ICD-10-CM

## 2021-01-07 RX ORDER — ATORVASTATIN CALCIUM 40 MG/1
TABLET, FILM COATED ORAL
Qty: 90 TABLET | Refills: 2 | Status: SHIPPED | OUTPATIENT
Start: 2021-01-07 | End: 2022-03-04

## 2021-03-12 ENCOUNTER — RECORDS - HEALTHEAST (OUTPATIENT)
Dept: ADMINISTRATIVE | Facility: OTHER | Age: 86
End: 2021-03-12

## 2021-03-12 LAB
ALT SERPL W/O P-5'-P-CCNC: 8 U/L (ref 6–29)
AST SERPL-CCNC: 19 U/L (ref 10–35)
CREAT SERPL-MCNC: 1.67 MG/DL (ref 0.6–0.88)
GFR ESTIMATE EXT - HISTORICAL: 26 ML/MIN/1.73M2
GFR ESTIMATE, IF BLACK EXT - HISTORICAL: 31 ML/MIN/1.73M2

## 2021-03-26 ENCOUNTER — COMMUNICATION - HEALTHEAST (OUTPATIENT)
Dept: INTERNAL MEDICINE | Facility: CLINIC | Age: 86
End: 2021-03-26

## 2021-03-27 ENCOUNTER — AMBULATORY - HEALTHEAST (OUTPATIENT)
Dept: NURSING | Facility: CLINIC | Age: 86
End: 2021-03-27

## 2021-03-29 ENCOUNTER — RECORDS - HEALTHEAST (OUTPATIENT)
Dept: ADMINISTRATIVE | Facility: OTHER | Age: 86
End: 2021-03-29

## 2021-03-29 ENCOUNTER — RECORDS - HEALTHEAST (OUTPATIENT)
Dept: HEALTH INFORMATION MANAGEMENT | Facility: CLINIC | Age: 86
End: 2021-03-29

## 2021-04-02 ENCOUNTER — COMMUNICATION - HEALTHEAST (OUTPATIENT)
Dept: INTERNAL MEDICINE | Facility: CLINIC | Age: 86
End: 2021-04-02

## 2021-04-02 DIAGNOSIS — I10 ESSENTIAL HYPERTENSION: ICD-10-CM

## 2021-04-03 RX ORDER — LOSARTAN POTASSIUM 50 MG/1
TABLET ORAL
Qty: 180 TABLET | Refills: 1 | Status: SHIPPED | OUTPATIENT
Start: 2021-04-03 | End: 2021-09-07

## 2021-04-17 ENCOUNTER — AMBULATORY - HEALTHEAST (OUTPATIENT)
Dept: NURSING | Facility: CLINIC | Age: 86
End: 2021-04-17

## 2021-04-23 ENCOUNTER — OFFICE VISIT - HEALTHEAST (OUTPATIENT)
Dept: INTERNAL MEDICINE | Facility: CLINIC | Age: 86
End: 2021-04-23

## 2021-04-23 DIAGNOSIS — N28.9 RENAL INSUFFICIENCY: ICD-10-CM

## 2021-04-23 DIAGNOSIS — L30.9 DERMATITIS: ICD-10-CM

## 2021-04-23 DIAGNOSIS — E03.9 HYPOTHYROIDISM, UNSPECIFIED TYPE: ICD-10-CM

## 2021-04-23 DIAGNOSIS — F43.21 GRIEVING: ICD-10-CM

## 2021-04-23 DIAGNOSIS — R73.09 ABNORMAL GLUCOSE: ICD-10-CM

## 2021-04-23 DIAGNOSIS — I10 ESSENTIAL HYPERTENSION: ICD-10-CM

## 2021-04-23 LAB
ANION GAP SERPL CALCULATED.3IONS-SCNC: 10 MMOL/L (ref 5–18)
BASOPHILS # BLD AUTO: 0.1 THOU/UL (ref 0–0.2)
BASOPHILS NFR BLD AUTO: 1 % (ref 0–2)
BUN SERPL-MCNC: 20 MG/DL (ref 8–28)
CALCIUM SERPL-MCNC: 8.5 MG/DL (ref 8.5–10.5)
CHLORIDE BLD-SCNC: 105 MMOL/L (ref 98–107)
CO2 SERPL-SCNC: 25 MMOL/L (ref 22–31)
CREAT SERPL-MCNC: 1.31 MG/DL (ref 0.6–1.1)
EOSINOPHIL # BLD AUTO: 0.4 THOU/UL (ref 0–0.4)
EOSINOPHIL NFR BLD AUTO: 5 % (ref 0–6)
ERYTHROCYTE [DISTWIDTH] IN BLOOD BY AUTOMATED COUNT: 14.2 % (ref 11–14.5)
GFR SERPL CREATININE-BSD FRML MDRD: 38 ML/MIN/1.73M2
GLUCOSE BLD-MCNC: 101 MG/DL (ref 70–125)
HBA1C MFR BLD: 5.8 %
HCT VFR BLD AUTO: 38.8 % (ref 35–47)
HGB BLD-MCNC: 12.1 G/DL (ref 12–16)
IMM GRANULOCYTES # BLD: 0 THOU/UL
IMM GRANULOCYTES NFR BLD: 0 %
LYMPHOCYTES # BLD AUTO: 1.1 THOU/UL (ref 0.8–4.4)
LYMPHOCYTES NFR BLD AUTO: 13 % (ref 20–40)
MCH RBC QN AUTO: 29.1 PG (ref 27–34)
MCHC RBC AUTO-ENTMCNC: 31.2 G/DL (ref 32–36)
MCV RBC AUTO: 93 FL (ref 80–100)
MONOCYTES # BLD AUTO: 0.5 THOU/UL (ref 0–0.9)
MONOCYTES NFR BLD AUTO: 6 % (ref 2–10)
NEUTROPHILS # BLD AUTO: 6.1 THOU/UL (ref 2–7.7)
NEUTROPHILS NFR BLD AUTO: 75 % (ref 50–70)
PLATELET # BLD AUTO: 255 THOU/UL (ref 140–440)
PMV BLD AUTO: 9.6 FL (ref 7–10)
POTASSIUM BLD-SCNC: 4 MMOL/L (ref 3.5–5)
RBC # BLD AUTO: 4.16 MILL/UL (ref 3.8–5.4)
SODIUM SERPL-SCNC: 140 MMOL/L (ref 136–145)
TSH SERPL DL<=0.005 MIU/L-ACNC: 1.85 UIU/ML (ref 0.3–5)
WBC: 8.1 THOU/UL (ref 4–11)

## 2021-04-23 ASSESSMENT — PATIENT HEALTH QUESTIONNAIRE - PHQ9: SUM OF ALL RESPONSES TO PHQ QUESTIONS 1-9: 9

## 2021-04-23 ASSESSMENT — MIFFLIN-ST. JEOR: SCORE: 1255.04

## 2021-04-25 ENCOUNTER — COMMUNICATION - HEALTHEAST (OUTPATIENT)
Dept: INTERNAL MEDICINE | Facility: CLINIC | Age: 86
End: 2021-04-25

## 2021-05-25 ENCOUNTER — RECORDS - HEALTHEAST (OUTPATIENT)
Dept: ADMINISTRATIVE | Facility: CLINIC | Age: 86
End: 2021-05-25

## 2021-05-26 ENCOUNTER — RECORDS - HEALTHEAST (OUTPATIENT)
Dept: ADMINISTRATIVE | Facility: CLINIC | Age: 86
End: 2021-05-26

## 2021-05-27 ENCOUNTER — RECORDS - HEALTHEAST (OUTPATIENT)
Dept: ADMINISTRATIVE | Facility: CLINIC | Age: 86
End: 2021-05-27

## 2021-05-27 ASSESSMENT — PATIENT HEALTH QUESTIONNAIRE - PHQ9
SUM OF ALL RESPONSES TO PHQ QUESTIONS 1-9: 9
SUM OF ALL RESPONSES TO PHQ QUESTIONS 1-9: 0

## 2021-05-27 NOTE — PROGRESS NOTES
"TCM DISCHARGE FOLLOW UP CALL    Discharge Date:  4/13/2019  Reason for hospital stay (discharge diagnosis)::  Right thalamic stroke  Are you feeling better, the same or worse since your discharge?:  Patient is feeling the same (Tired.  States she's had \"a couple episodes\" of numbness in her cheek & lip, lasts about \"10 seconds,\" then goes away.)  Do you feel like you have a plan in the event of a health emergency?: Yes (Adult children, and this week her cousin is staying w/her)    As part of your discharge plan, were  home care services ordered for you?: No    Did you receive any new medications, or was there a change to your medications?: Yes    Are you taking those medications, or do you have any established regiment?:  RN reviewed discharge medications w/pt.  Pt states she started new medications \"right away,\" taking atorvastatin 40 mg once a day & clopidogrel 75 mg once a day as prescribed.  Do you have any follow up visits scheduled with your PCP or Specialist?:  Yes, with PCP (Dr. Meneses 4/19/19)  (RN) Is PCP appt scheduled soon enough (within 14 days of discharge date)?: Yes        Delia Borrego RN Care Manager, Population Health      "

## 2021-05-28 ENCOUNTER — RECORDS - HEALTHEAST (OUTPATIENT)
Dept: ADMINISTRATIVE | Facility: CLINIC | Age: 86
End: 2021-05-28

## 2021-05-28 NOTE — PROGRESS NOTES
MTM Transition of Care Encounter  Assessment & Plan                                                     Post Discharge Medication Reconciliation Status: discharge medications reconciled, continue medications without change    1. Acute ischemic VBA thalamic stroke, left (H)  Reviewed discharge summary and neurology note from hospitalization, she is appropriately taking aspirin plus Plavix for 21 more days, and statin.  Tolerating without adverse effects.  She will follow-up with neurology in approximately 1 month we will schedule this appointment today.  Already seen by PCP.  No further questions at this time.    2. Gastroesophageal reflux disease, esophagitis presence not specified  Previously treated with intermittent PPI, discussed alternative regimens which may work faster and with less potential adverse effects with Plavix.  Additionally per chart review she does have a hiatal hernia.  Reviewed current labs, due to CKD recommend trial of renally adjusted Zantac, 75 mg as needed for symptoms.  Also discussed that she could use Tums as needed at home.  If these are poorly effective may consider once daily pantoprazole, however she previously was only using PPI on an as-needed basis.  -Initiate ranitidine at 75 mg as needed    Follow Up  Return in about 1 month (around 5/19/2019) for Neurology.    Subjective & Objective                                                       Ruthy Man is a 89 y.o. female coming in for a transitions of care visit. she was discharged from [facility] on April 13, 2019 for right thalamic stroke. she just saw her PCP immediately before this visit.  Her son is accompanying her today.    Chief Complaint: Hospital follow-up    Medication Adherence/Access: Stable, no issues identified.    Acute right thalamic stroke: She has appropriately continued aspirin, and added a statin and Plavix.  She does have questions as to why her Plavix will only be for 21 more days.  She did  well with physical therapy and occupational therapy during the hospital, does not have any residual deficits that she knows of.  Denies signs or symptoms of adverse effects such as excessive bruising or bleeding, or myalgias.  Her main symptom is fatigue.    Acid reflux: She had previously used Prevacid as needed for burning in her chest, for acid.  Found that it worked well enough however it is no longer on the market where she is able to get it.  She is wondering what she could take instead.    PMH: reviewed in EPIC   Allergies/ADRs: reviewed in EPIC   Alcohol: reviewed in EPIC   Tobacco:   Social History     Tobacco Use   Smoking Status Never Smoker   Smokeless Tobacco Never Used     Recent Vitals:   BP Readings from Last 3 Encounters:   04/19/19 114/66   04/13/19 117/60   01/18/19 132/80      Wt Readings from Last 3 Encounters:   04/19/19 181 lb 7 oz (82.3 kg)   04/13/19 171 lb 12.8 oz (77.9 kg)   01/18/19 184 lb 9.6 oz (83.7 kg)     ----------------    Much or all of the text in this note was generated through the use of Dragon Dictate voice-to-text software. Errors in spelling or words which seem out of context are unintentional. Sound alike errors, in particular, may have escaped editing.    The patient declined an after visit summary    I spent 15 minutes with this patient today; An extra 15 minutes was spent creating the Medication Action Plan.. All changes were made via collaborative practice agreement with Selma Meneses MD. A copy of the visit note was provided to the patient's provider.     Ronald Loyd, PharmD, BCACP  Medication Management (MTM) Pharmacist  [facility]      Current Outpatient Medications   Medication Sig Dispense Refill     ranitidine (ZANTAC) 75 MG tablet Take  mg by mouth 2 (two) times a day as needed.       aspirin 81 mg chewable tablet Chew 81 mg daily.       atorvastatin (LIPITOR) 40 MG tablet Take 1 tablet (40 mg total) by mouth daily.  30 tablet 0     cholecalciferol, vitamin D3, (VITAMIN D3) 1,000 unit capsule Take 1,000 Units by mouth daily.       clopidogrel (PLAVIX) 75 mg tablet Take 1 tablet (75 mg total) by mouth daily for 21 days. 21 tablet 0     cyanocobalamin (VITAMIN B-12) 500 MCG tablet Take 500 mcg by mouth daily.       DOCOSAHEXANOIC ACID/EPA (FISH OIL ORAL) Take 1,200 mg by mouth daily.       escitalopram oxalate (LEXAPRO) 10 MG tablet Take 1 tablet (10 mg total) by mouth daily. 30 tablet 3     levothyroxine (SYNTHROID, LEVOTHROID) 75 MCG tablet Take 1 tablet (75 mcg total) by mouth daily. 90 tablet 1     losartan (COZAAR) 100 MG tablet Take 1 tablet (100 mg total) by mouth daily. 90 tablet 2     No current facility-administered medications for this visit.

## 2021-05-28 NOTE — PATIENT INSTRUCTIONS - HE
1. See Neurology in 1 month to discuss ongoing neurological deficits (tongue numbness, face numbness. Why stroke happen while on ASA. Duration of Plavix therapy. Also about driving.     2. I will ask our pharmacist about acid reflux medications for you since it can interact with Plavix.      3. Consider heart monitor for 2-4 weeks to rule our arrhythmia that can cause strokes.     4. Will check thyroid levels today    5. Sleep apnea can cause fatigue, arrhythmia

## 2021-05-28 NOTE — PROGRESS NOTES
[facility] Follow Up Note    Assessment/Plan:    1. Right thalamic stroke (H)  Has happened while patient was on 81 mg of aspirin.  Currently she was started on Plavix but was instructed to only take for 21 days.  I recommended that she follows up with neurologist for further recommendations whether she needs dual antiplatelet treatment lifelong or once she stops Plavix with a high dose of aspirin would be indicated.  Her blood pressure is currently at goal on losartan.  Lipitor was added to her regimen and will check her LDL on follow-up.  Prior to stroke patient has not had any elevated cholesterol, no diabetes, no smoking and her blood pressure is always been well controlled.  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  Currently her echocardiogram and carotid ultrasound are all normal.  Discussed that I am concerned was that she might have had embolic stroke although it was very small and consideration should be given to cardiac Holter monitor for 2-4 weeks.  Patient wants to defer for now.  Currently her only mild neurological residual symptom is numbness on the left side of her chin and left side of her tongue.  She did see speech pathology in the hospital and swallowing was normal.  - Ambulatory referral to Neurology    2. Gastroesophageal reflux disease without esophagitis and history of hiatal hernia  He has hiatal hernia and acid reflux.  Yesterday she has worsening symptoms of acid reflux and Prevacid has not helped.  We will have to see a pharmacist for recommendations since PPIs can interact with his Plavix    3. Palpitations  Also not frequent.  There were no reported arrhythmias while she was on cardiac monitor in the hospital.  However since all of her tests came out normal still discussed that I would like to test for arrhythmia with a heart monitor for 2-4 weeks.  Patient wants to wait for now.  Previously her TSH was borderline low and we decreased her Synthroid dose.  We  will check her TSH today    5. Hypothyroidism, unspecified type  Goal TSH would be around 3 given her age.  Previous TSH was 0.4.  Currently she takes 1 tablet of Synthroid 6 days a week and we will repeat levels.  - Thyroid Stimulating Hormone (TSH)    6. Anxiety and depression  Symptoms of fatigue and anhedonia.  There is a lot of stress at home because she also has daughter with chronic progressive muscular condition which requires a lot of help.  We will increase her Lexapro to 10 mg  - escitalopram oxalate (LEXAPRO) 10 MG tablet; Take 1 tablet (10 mg total) by mouth daily.  Dispense: 30 tablet; Refill: 3    7. Vitamin B12 deficiency (non anemic)  She is taking a supplement  - Vitamin B12    8. Snoring and fatigue  Oropharynx is very crowded.  She does snore.  Her son was diagnosed with apnea.  Currently discussed that if she has sleep apnea treating it can help with fatigue, palpitations, anxiety and depression and prevent further strokes.  Patient was not confident she wants to go through with testing but I placed the order anyway.  - Ambulatory referral to Sleep Medicine    Fill out paperwork for her daughter for intermittent FMLA and FMLA due to recent hospitalization of the patient.    Selma Meneses MD    Chief Complaint:  Chief Complaint   Patient presents with     Hospital Visit Follow Up     [facility], discharged 4/13/19       History of Present Illness:    [place] is a 89 y.o. female  history of lichen sclerosis, status post cholecystectomy, umbilical hernia, hysterectomy, diverticulosis, cystocele, spine DJD, hypothyroidism, vitamin D deficiency, ischemic optic neuropathy, anxiety, high blood pressure, hiatal hernia and chronic renal insufficiency.  Most recently she was hospitalized last month with acute right thalamic stroke and is currently here for hospital follow-up.    Patient developed acute left-sided chin numbness, left thumb numbness and left leg numbness and her son brought  her to the emergency room.  MRI and MRA showed acute right thalamic stroke which was 2 mm, no aneurysms and no carotid stenosis.  Patient was on baby aspirin at the time of the stroke and she was started on Plavix and Lipitor.  Echocardiogram showed normal ejection fraction and no valvular problems.  Carotid ultrasound was normal as well.  She was on cardiac monitor and no arrhythmias were reported.  Currently she reports that she still has some numbness in the left side of her chin and her tongue but denies any swallowing problems.  She did see speech pathologist in the hospital.  Her left upper and lower extremities are currently normal in strength and sensation.    Patient only previous ischemic events was ischemic optic neuropathy.  Overall she has no history of risk factors for stroke: Her blood pressure is well controlled, her cholesterol was not elevated, she is not a smoker or diabetic.  In the past she has complained about palpitations and EKG did not show arrhythmias however we discussed that he might be prudent to get cardiac monitor done for 2-4 weeks to make sure she does not have atrial fibrillation although usually with left hip strokes in March larger.  Recommend that she follows up with neurologist to see how long she should be on Plavix given the fact that stroke happened while she was on aspirin and if they stop Plavix for that her aspirin dose needs to be increased, if she can be driving.  Patient does request that I sign a intermittent medical leave for her daughter who would be taking her to doctor's appointments.    Patient also has history of hiatal hernia and acid reflux.  In the past she has taken Prevacid as needed.  She did have episode of severe acid reflux yesterday and took Prevacid but it did not help.  She is on aspirin and Plavix.  Discussed that if she should use PPI for symptoms.  Patient did see our pharmacist today.    Patient does have history of falls and fell in the hospital.   This most likely happened due to stroke.  She did see physical therapy there.  Currently she denies any physical therapy.  Discussed that it might be a good idea for her to get a walker to use it for longer walks but she refused.  She does have history of osteoporosis.  Due to her renal insufficiency and her acid reflux she is not a candidate for Fosamax.  We discussed Prolia shot briefly.  She would like to have it done possibly on her next follow-up but not today.    Per her son her blood pressure in the hospital was high but medication was not changed currently currently on losartan 100 mg her blood pressure is perfect.  Lipitor is a new medication and she takes 40 mg of it a day with no problems.    Because her TSH was slightly low at 0.4 and previous testing we did decrease her Synthroid dose and currently she only takes her Synthroid 6 days out of the week.  Will repeat TSH today.  Given her age and symptoms we would like her TSH to be closer to 3.    Patient also complains about fatigue.  She does have history of snoring and very crowded oropharynx.  Discussed that it could be related to sleep apnea.  Her son was diagnosed with that.  Currently she does not want to be tested.      Review of Systems:  A comprehensive review of systems was performed and was otherwise negative, no recurrent recent falls except for one in the hospital    PFSH:  Social History: Reviewed  Social History     Tobacco Use   Smoking Status Never Smoker   Smokeless Tobacco Never Used     Social History     Social History Narrative    She is .  Her   in  suddenly, but he also had dementia. They were  for 60 years. She has 3 children, 4 grandchildren and 1 great grandchild. 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  One daughter lives with her and she has corticobasal degeneration.     Occasions reviewed and reconciled  Past  "History: Viewed  Current Outpatient Medications   Medication Sig Dispense Refill     aspirin 81 mg chewable tablet Chew 81 mg daily.       atorvastatin (LIPITOR) 40 MG tablet Take 1 tablet (40 mg total) by mouth daily. 30 tablet 0     cholecalciferol, vitamin D3, (VITAMIN D3) 1,000 unit capsule Take 1,000 Units by mouth daily.       clopidogrel (PLAVIX) 75 mg tablet Take 1 tablet (75 mg total) by mouth daily for 21 days. 21 tablet 0     cyanocobalamin (VITAMIN B-12) 500 MCG tablet Take 500 mcg by mouth daily.       DOCOSAHEXANOIC ACID/EPA (FISH OIL ORAL) Take 1,200 mg by mouth daily.       levothyroxine (SYNTHROID, LEVOTHROID) 75 MCG tablet Take 1 tablet (75 mcg total) by mouth daily. 90 tablet 1     losartan (COZAAR) 100 MG tablet Take 1 tablet (100 mg total) by mouth daily. 90 tablet 2     escitalopram oxalate (LEXAPRO) 10 MG tablet Take 1 tablet (10 mg total) by mouth daily. 30 tablet 3     ranitidine (ZANTAC) 75 MG tablet Take  mg by mouth 2 (two) times a day as needed.       No current facility-administered medications for this visit.        Family History: Viewed    Physical Exam:    Vitals:    04/19/19 1054   BP: 114/66   Patient Site: Left Arm   Patient Position: Sitting   Cuff Size: Adult Regular   Pulse: 92   SpO2: 97%   Weight: 181 lb 7 oz (82.3 kg)   Height: 5' 3\" (1.6 m)     Wt Readings from Last 3 Encounters:   04/19/19 181 lb 7 oz (82.3 kg)   04/13/19 171 lb 12.8 oz (77.9 kg)   01/18/19 184 lb 9.6 oz (83.7 kg)     Body mass index is 32.14 kg/m .    Constitutional:  Reveals a pleasant elderly female here with a son.  Vitals:  Per nursing notes.  HEENT:No cervical LAD, no thyromegaly,  conjunctiva is pink, no scleral icterus, TMs are visualized and normal bl, oropharynx is clear, no exudates, very crowded  Cardiac:  Regular rate and rhythm,no murmurs, rubs, or gallops. Carotids without bruits. Legs with trace edema. Palpation of the radial pulse regular.  Lungs: Clear to auscultation bl.  " Respiratory effort normal.  No wheezes or rales.  Abdomen:positive BS, soft, nontender, nondistended.  No hepato-splenomagaly  Skin:   Without rash, bruise, or palpable lesions.  Rheumatologic: Normal joints and nails of the hands.  Neurologic:  Cranial nerves II-XII intact.   Upper and lower extremities are normal in sensation and strength.  She does have residual mild numbness in the left lower chin and left side of her tongue, uvula is midline  Psychiatric: affect appropriate, memory intact.  Mild anxiety is noted      Data Review:    Analysis and Summary of Old Records (2): yes      Records Requested (1): no      Other History Summarized (from other people in the room) (2): son    Radiology Tests Summarized (XRAY/CT/MRI/DXA) (1): mri, us    Labs Reviewed (1): yes    Medicine Tests Reviewed (EKG/ECHO/COLONOSCOPY/EGD) (1): echo    Independent Review of EKG or X-RAY (2): no

## 2021-05-29 NOTE — TELEPHONE ENCOUNTER
Refill refused. Filled on 6/10/19  #30 R-0. Receipt confirmed by pharmacy @ [phone].     Emely Martinez RN Triage Nurse Advisor [facility]

## 2021-05-29 NOTE — TELEPHONE ENCOUNTER
Please let pt know results ( if unable to reach, I also composed a letter):    Rheumatological markers are not elevated.  Thyroid levels are good.  Lime disease screen is negative.   Vitamin D level is good, continue current supplement.   Muscle enzymes are normal, no muscle inflammation.     Kidney function is slightly worse: make sure to stay hydrated.    If continue to have joint pains, let me know.

## 2021-05-29 NOTE — TELEPHONE ENCOUNTER
Refill Approved    Rx renewed per Medication Renewal Policy. Medication was last renewed on 10/9/18.    Last office visit 6/10/19    Ladarius Lo, [facility] [place] [facility] Triage/Med Refill 6/13/2019     Requested Prescriptions   Pending Prescriptions Disp Refills     losartan (COZAAR) 100 MG tablet [Pharmacy Med Name: LOSARTAN 100MG TABLETS] 90 tablet 0     Sig: TAKE 1 TABLET(100 MG) BY MOUTH DAILY       Angiotensin Receptor Blocker Protocol Passed - 6/12/2019  8:28 AM        Passed - PCP or prescribing provider visit in past 12 months       Last office visit with prescriber/PCP: 6/28/2018 Leeann Enriquez MD OR same dept: 6/10/2019 Selma Meneses MD OR same specialty: 6/10/2019 Selma Meneses MD  Last physical: Visit date not found Last MTM visit: Visit date not found   Next visit within 3 mo: Visit date not found  Next physical within 3 mo: Visit date not found  Prescriber OR PCP: Leeann Enriquez MD  Last diagnosis associated with med order: 1. HTN (hypertension)  - losartan (COZAAR) 100 MG tablet [Pharmacy Med Name: LOSARTAN 100MG TABLETS]; TAKE 1 TABLET(100 MG) BY MOUTH DAILY  Dispense: 90 tablet; Refill: 0    If protocol passes may refill for 12 months if within 3 months of last provider visit (or a total of 15 months).             Passed - Serum potassium within the past 12 months     Lab Results   Component Value Date    Potassium 4.4 06/10/2019             Passed - Blood pressure filed in past 12 months     BP Readings from Last 1 Encounters:   06/10/19 119/60             Passed - Serum creatinine within the past 12 months     Creatinine   Date Value Ref Range Status   06/10/2019 1.38 (H) 0.60 - 1.10 mg/dL Final

## 2021-05-29 NOTE — PROGRESS NOTES
[facility] Follow Up Note    Assessment/Plan:    1. Depression, unspecified depression type  PHQ 9 and briana 7 currently show worsening symptoms.  She also has had some insomnia.  We will increase her Lexapro to 10 mg a day and try a small dose of trazodone.  I encouraged her to see psychologist  - Ambulatory referral to Psychology    2. Fatigue, unspecified type  This is multifactorial.  She did have a stroke in April, her depressive symptoms have been getting worse.  Recent blood work her which showed normal CBC and thyroid function.  We will repeat her renal function today.  She denies any history of snoring but we can explore sleep apnea and if she is not feeling better on a higher dose of antidepressant    3. Insomnia, unspecified type  See #1  - traZODone (DESYREL) 50 MG tablet; Take 0.5 tablets (25 mg total) by mouth at bedtime.  Dispense: 30 tablet; Refill: 0    4. Anxiety  This is moderately severe.  She occasionally has panic attacks.  She does have a daughter with neurodegenerative chronic condition which has been failing.  - escitalopram oxalate (LEXAPRO) 10 MG tablet; Take 1 tablet (10 mg total) by mouth daily.  Dispense: 90 tablet; Refill: 2  - Ambulatory referral to Psychology    5. Renal insufficiency  Patient has increase hydration and will check her renal function today.  She is currently on losartan 100 mg a day  - Basic Metabolic Panel    6.  High blood pressure.  Patient complains of orthostasis.  She is on losartan 100 mg a day.  Discussed splitting it to 50 mg twice a day and if still feeling orthostatic then she can decrease her dose to 50 mg once a day    Selma Meneses MD    Chief Complaint:  Chief Complaint   Patient presents with     Dizziness     upon rising from the sitting position     Fatigue       History of Present Illness:  Virginia is a 89 y.o. female with  history of lichen sclerosis, status post cholecystectomy, umbilical hernia, hysterectomy, diverticulosis,  cystocele, spine DJD, hypothyroidism, vitamin D deficiency, ischemic optic neuropathy, anxiety, high blood pressure, hiatal hernia and chronic renal insufficiency, right thalamic stroke in April 2019.  She is currently here for acute visit due not feeling well and anhedonia.    I saw the patient not to long ago with complaints about joint pains.  At that time rheumatological work-up was negative, Lyme screen was negative and CPK was normal.  Currently upper extremity joint pains have all resolved after a burst of prednisone.  At that time her creatinine was slightly worse at 1.69 (baseline is 1.4).  Patient did endorse that she might of been dehydrated at that time and we will repeat it again today.    Patient did develop acute thalamic stroke in April and was hospitalized for that.  She had symptoms of numbness in her left jaw and thumb and weakness in her left leg which caused a fall.  Currently symptoms resolved except for chronic left face lower numbness.  Patient is on aspirin and Plavix.  After she stopped Plavix she developed worsening numbness and neurology recommended to go back on it.    Patient blood pressure is well controlled on losartan 100 mg a day although she complains about orthostasis.  Generally does not lost 4 to long and is better after waiting a few minutes.  Currently discussed that she could split her losartan and take half tablet in the morning and half in the evening.  If orthostasis persists that she can decrease her dose to half tablet once a day.    Patient also complains about increased anhedonia and not feeling like doing anything.  She does have history of depression and anxiety.  She has a lot of situational factors including sickness of her daughter which is chronic.  She is currently on Lexapro 5 mg a day.  Discussed increasing it to 10 mg a day.  Patient also have significant insomnia and will try trazodone small dose.  I also encouraged her to see a psychologist.    She denies  any chest pains palpitations or symptoms which would be exertional in nature.      Review of Systems:  A comprehensive review of systems was performed and was otherwise negative    PFSH:  Social History: Reviewed  Social History     Tobacco Use   Smoking Status Never Smoker   Smokeless Tobacco Never Used     Social History     Social History Narrative    She is .  Her   in  suddenly, but he also had dementia. They were  for 60 years. She has 3 children, 4 grandchildren and 1 great grandchild. 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  One daughter lives with her and she has corticobasal degeneration.       Past History: Reviewed  Current Outpatient Medications   Medication Sig Dispense Refill     aspirin 81 mg chewable tablet Chew 81 mg daily.       atorvastatin (LIPITOR) 40 MG tablet Take 1 tablet (40 mg total) by mouth daily. 90 tablet 3     cholecalciferol, vitamin D3, (VITAMIN D3) 1,000 unit capsule Take 1,000 Units by mouth daily.       cyanocobalamin (VITAMIN B-12) 500 MCG tablet Take 500 mcg by mouth daily.       DOCOSAHEXANOIC ACID/EPA (FISH OIL ORAL) Take 1,200 mg by mouth daily.       escitalopram oxalate (LEXAPRO) 10 MG tablet Take 1 tablet (10 mg total) by mouth daily. 90 tablet 2     levothyroxine (SYNTHROID, LEVOTHROID) 75 MCG tablet Take 1 tablet (75 mcg total) by mouth daily. 90 tablet 1     losartan (COZAAR) 100 MG tablet Take 1 tablet (100 mg total) by mouth daily. 90 tablet 2     ranitidine (ZANTAC) 75 MG tablet Take  mg by mouth 2 (two) times a day as needed.       clopidogrel (PLAVIX) 75 mg tablet Take 1 tablet (75 mg total) by mouth daily for 21 days. 21 tablet 0     traZODone (DESYREL) 50 MG tablet Take 0.5 tablets (25 mg total) by mouth at bedtime. 30 tablet 0     No current facility-administered medications for this visit.        Family History: Reviewed    Physical  "Exam:    Vitals:    06/10/19 1539   BP: 122/74   Patient Site: Left Arm   Patient Position: Sitting   Cuff Size: Adult Regular   Pulse: 86   Temp: 98  F (36.7  C)   TempSrc: Tympanic   SpO2: 96%   Weight: 179 lb 5 oz (81.3 kg)   Height: 5' 3\" (1.6 m)     Wt Readings from Last 3 Encounters:   06/10/19 179 lb 5 oz (81.3 kg)   05/23/19 177 lb 4.8 oz (80.4 kg)   04/19/19 181 lb 7 oz (82.3 kg)     Body mass index is 31.76 kg/m .    Constitutional:  Reveals a pleasant female.  Vitals:  Per nursing notes.  HEENT:No cervical LAD, no thyromegaly,  conjunctiva is pink, no scleral icterus, TMs are visualized and normal bl, oropharynx is clear, no exudates, somewhat crowded  Cardiac:  Regular rate and rhythm,no murmurs, rubs, or gallops.. Legs without edema. Palpation of the radial pulse regular.  Lungs: Clear to auscultation bl.  Respiratory effort normal.  Abdomen:positive BS, soft, nontender, nondistended.  No hepato-splenomagaly  Rheumatologic: Normal joints and nails of the hands.  Neurologic:  Cranial nerves II-XII intact.     Psychiatric: affect appropriate, memory intact.  Anxiety  noted    Data Review:    Analysis and Summary of Old Records (2): yes      Records Requested (1): no      Other History Summarized (from other people in the room) (2): no    Radiology Tests Summarized (XRAY/CT/MRI/DXA) (1): no    Labs Reviewed (1): yes    Medicine Tests Reviewed (EKG/ECHO/COLONOSCOPY/EGD) (1): no    Independent Review of EKG or X-RAY (2): no      "

## 2021-05-29 NOTE — PATIENT INSTRUCTIONS - HE
1. O'K to take tylenol for pain 1000 mg 2-3 times a day. If experiencing severe pain, take Prednisone.     2. We will check rheumatoid blood work today, it its abnormal, we will send you to see a rheumatologist.

## 2021-05-29 NOTE — PROGRESS NOTES
Count includes the [facility] Follow Up Note    Assessment/Plan:  1. Multiple joint muscle pains pain  Currently no synovitis on exam except for mild swelling in her right knee.  She has some migratory pains and we will screen her for Lyme disease also she denies any recent tick bites.  She also complains about pain with raising out of chair which could be seen in polymyalgia rheumatica although currently there is no neck pain and muscles are not tender to palpation.  She may have ligamental inflammation since its painful with certain movement.  We will check for rheumatoid conditions.  She should not take any NSAIDs due to renal insufficiency.  Discussed to take Tylenol and a taper of prednisone.  Rheumatological things are abnormal we will send her to see a specialist.  - Erythrocyte Sedimentation Rate  - Rheumatoid Factor Quant  - Antinuclear Antibody (GEORGI) Cascade  - Lyme Antibody Melba  - CCP Antibodies  - Thyroid Stimulating Hormone (TSH)  - HM1(CBC and Differential)  - Comprehensive Metabolic Panel  - HM1 (CBC with Diff)  - predniSONE (DELTASONE) 10 mg tablet; Take 2 tabs for 4 days, then 1 tab for 4 days, then stop.  Dispense: 12 tablet; Refill: 0    2. Right thalamic stroke (H)  Currently she is on aspirin and Plavix.  She ran out of Lipitor and that is when they pain started.  We will check CPK today.  Lipitor has been refilled.  Her blood pressure is well controlled  - atorvastatin (LIPITOR) 40 MG tablet; Take 1 tablet (40 mg total) by mouth daily.  Dispense: 90 tablet; Refill: 3    3. Polymyalgia (H)  See #1  - CK Total  - Erythrocyte Sedimentation Rate  - Rheumatoid Factor Quant  - Antinuclear Antibody (GEORGI) Cascade  - Lyme Antibody Melba  - CCP Antibodies  - Thyroid Stimulating Hormone (TSH)  - HM1(CBC and Differential)  - Comprehensive Metabolic Panel  - HM1 (CBC with Diff)  - predniSONE (DELTASONE) 10 mg tablet; Take 2 tabs for 4 days, then 1 tab for 4 days, then stop.  Dispense: 12 tablet; Refill:  0    4. Hypothyroidism, unspecified type  We will check her thyroid levels and adjust to TSH closer to 30 based on her age  - Thyroid Stimulating Hormone (TSH)    5. Vitamin D deficiency  - Vitamin D, Total (25-Hydroxy)      Selma Meneses MD    Chief Complaint:  Chief Complaint   Patient presents with     Arm Pain     Bilateral; x 4 days     Knee Pain     Right; x 4 days       History of Present Illness:    Virginia is a 89 y.o. female with  history of lichen sclerosis, status post cholecystectomy, umbilical hernia, hysterectomy, diverticulosis, cystocele, spine DJD, hypothyroidism, vitamin D deficiency, ischemic optic neuropathy, anxiety, high blood pressure, hiatal hernia and chronic renal insufficiency, right thalamic stroke in April 2019.  She is currently here for acute visit due to acute onset of upper extremity pain.    Patient denies any injury or prior similar episodes.  Symptoms started on Sunday when she was sitting down.  She experienced sharp shooting pain in her right forearm.  Then pains moved into her left base of the thumb and left forearm and left shoulder.  She also had mild swelling and pain in her right knee but not as bad as in her arms.  There was no swelling in the hand joints or elbow joints but patient continues to have pain in the muscles and sometimes difficulty getting out of a chair.  On exam her strength is fairly preserved.  She denies any neck pain.    Of note patient has chronic rash for the last several years.  She reports that she was seen for it at [facility] and by dermatology and there is no diagnosis but dermatitis.  Its EEG and she has excoriations but no evidence of cellulitis.    Due to history of right thalamic stroke in April she is currently on aspirin.  Initially her Plavix was started for a month and then she stopped it but developed recurrent numbness on the left side and neurologist recommend that she restarts it.  She was also put on Lipitor and ran out of it  couple of days ago and right at that time is when the pain in her arm started.    She is on Synthroid supplement and currently takes 75 MCG's 6 days a week and skips dose one day a week.    She also has some acid reflux which is well controlled with Zantac.    She has chronic renal insufficiency so she has not taking any ibuprofen.    She has experienced some weight loss without trying.      Review of Systems:  A comprehensive review of systems was performed and was otherwise negative    PFSH:  Social History: Reviewed  Social History     Tobacco Use   Smoking Status Never Smoker   Smokeless Tobacco Never Used     Social History     Social History Narrative    She is .  Her   in  suddenly, but he also had dementia. They were  for 60 years. She has 3 children, 4 grandchildren and 1 great grandchild. 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  One daughter lives with her and she has corticobasal degeneration.       Past History: Reviewed  Current Outpatient Medications   Medication Sig Dispense Refill     aspirin 81 mg chewable tablet Chew 81 mg daily.       cholecalciferol, vitamin D3, (VITAMIN D3) 1,000 unit capsule Take 1,000 Units by mouth daily.       cyanocobalamin (VITAMIN B-12) 500 MCG tablet Take 500 mcg by mouth daily.       DOCOSAHEXANOIC ACID/EPA (FISH OIL ORAL) Take 1,200 mg by mouth daily.       escitalopram oxalate (LEXAPRO) 10 MG tablet TAKE 1 TABLET(10 MG) BY MOUTH DAILY 90 tablet 0     levothyroxine (SYNTHROID, LEVOTHROID) 75 MCG tablet Take 1 tablet (75 mcg total) by mouth daily. 90 tablet 1     losartan (COZAAR) 100 MG tablet Take 1 tablet (100 mg total) by mouth daily. 90 tablet 2     ranitidine (ZANTAC) 75 MG tablet Take  mg by mouth 2 (two) times a day as needed.       atorvastatin (LIPITOR) 40 MG tablet Take 1 tablet (40 mg total) by mouth daily. 90 tablet 3     clopidogrel  "(PLAVIX) 75 mg tablet Take 1 tablet (75 mg total) by mouth daily for 21 days. 21 tablet 0     predniSONE (DELTASONE) 10 mg tablet Take 2 tabs for 4 days, then 1 tab for 4 days, then stop. 12 tablet 0     No current facility-administered medications for this visit.        Family History: Reviewed    Physical Exam:    Vitals:    05/23/19 1606   BP: 120/60   Patient Site: Left Arm   Patient Position: Sitting   Cuff Size: Adult Regular   Pulse: (!) 104   SpO2: 94%   Weight: 177 lb 4.8 oz (80.4 kg)   Height: 5' 3\" (1.6 m)     Wt Readings from Last 3 Encounters:   05/23/19 177 lb 4.8 oz (80.4 kg)   04/19/19 181 lb 7 oz (82.3 kg)   04/13/19 171 lb 12.8 oz (77.9 kg)     Body mass index is 31.41 kg/m .    Constitutional:  Reveals a pleasant elderly female.  Vitals:  Per nursing notes.  HEENT:No cervical LAD, no thyromegaly,  conjunctiva is pink, no scleral icterus, TMs are visualized and normal bl, oropharynx is clear, no exudates,   Cardiac:  Regular rate and rhythm,no murmurs, rubs, or gallops.  Legs without edema. Palpation of the radial pulse regular.  Lungs: Clear to auscultation bl.  Respiratory effort normal.  Abdomen:positive BS, soft, nontender, nondistended.  No hepato-splenomagaly  Skin: Diffuse dermatitis in the upper and lower extremities with excoriations but no cellulitis.  Rheumatologic: No synovitis in her hands.  She has some shooting pains in her forearm all the muscles are not tender to palpation.  I am not sure if she experiencing pain in her bone.  She experienced discomfort with lifting her arms up but still able to do that.  Deltoid strength is normal.  She denies any numbness.  No tenderness to palpation of her cervical spine.  No paraspinal muscle tenderness.  Neurologic:  Cranial nerves II-XII intact.     Psychiatric: affect appropriate, memory intact.       Data Review:    Analysis and Summary of Old Records (2): yes      Records Requested (1): no      Other History Summarized (from other people " in the room) (2): no    Radiology Tests Summarized (XRAY/CT/MRI/DXA) (1): cr brain    Labs Reviewed (1): yes    Medicine Tests Reviewed (EKG/ECHO/COLONOSCOPY/EGD) (1): no    Independent Review of EKG or X-RAY (2): no

## 2021-05-30 NOTE — TELEPHONE ENCOUNTER
Spoke with pt and relayed PCP message.  Pt understanding.  Pt c/o severe joint pain and wanted an appt.  Pt scheduled with Dr. Edmond for 7/23/19.

## 2021-05-30 NOTE — TELEPHONE ENCOUNTER
"Pt calls with status update.  Conveyed Dr Jad Edomnd's phone note as follows:     ----- Message from Jad Edmond DO sent at 7/24/2019  4:54 PM CDT -----  Please call Virginia, she has moderate to severe findings of osteoarthritis, which is the most common kind of arthritis which involves wearing away of the cartilage over time.  I suspect that this is what is causing the joint pain she has been experiencing.  If the diclofenac gel is not helping, I would suggest considering Celebrex, which is a good anti-inflammatory      _______________________________    Pt verbalizes understanding Dr Edmond's above explanation.  Would like to try the Celebrex.    Today states \"can hardly walk.\"  \"The pain moves around, and today it's in my L knee.\"  \"I can also feel it coming into my R hand.\"    Pt asks Celebrex Rx be transmitted to her pharmacy, please.  Thanks so much-    Pharmacy is verified in chart.  Detailed voicemail message okay.     Selene Hinkle RN BSBA  [facility] RN Triage     Reason for Disposition    [1] Follow-up call from patient regarding patient's clinical status AND [2] information urgent     Pt looks forward to trying Celebrex; today stating \"can hardly walk due to the arthritis pain.\"    Protocols used: PCP CALL - NO TRIAGE-A-AH      "

## 2021-05-30 NOTE — TELEPHONE ENCOUNTER
Let her know, prednisone refilled. It can make her anxiety worse.  I also ordered Voltaren gel, which she can use for hand arthritis topically if insurance covers it.    DR NIXON

## 2021-05-30 NOTE — TELEPHONE ENCOUNTER
MEDICATION APPEAL INITIATED     Faxed the letter of medical necessity along with the original denial letter, request for redetermination along with chart notes to [employer] Appeals Department. Faxed to [phone], phone [phone]

## 2021-05-30 NOTE — TELEPHONE ENCOUNTER
MEDICATION APPEAL APPROVED    Start date: 04/20/2019  End date: 07/19/2020    Reference number: [id number]    Received a phone call from Cristina from [facility] to let me know that the appeal was approved. I will update the documentation once the approval letter has been received. I have called the pharmacy and notified them of the approval and will get the medication ready for the patient.

## 2021-05-30 NOTE — PROGRESS NOTES
[facility] Note    Ruthy Man   89 y.o. female    Date of Visit: 7/23/2019  Chief Complaint   Patient presents with     Joint Pain     random joints - moves around off and on x 2 mos     Dizziness       ASSESSMENT/PLAN  1. Primary osteoarthritis of both hands  XR Hands Bilateral 2 VWS    Ambulatory referral to Rheumatology   2. HTN (hypertension)  losartan (COZAAR) 50 MG tablet     ---------------------------------------------    1.  [place] is an 89-year-old woman who presents with a description of a migratory arthritis, unclear if this is inflammatory or not, as she did not have a definite history of this improving over the course of the day.  She has not been dealing with this pattern for more than a month or 2, and she appropriately had work-up already in screening for elevated markers of inflammation (they were negative), rheumatoid arthritis (-), Lyme disease (also negative).  Osteoarthritis is as likely of an explanation as any.  She has some DIP and PIP joint deformity in both hands.  I recommended checking x-ray of the hands, and getting some additional help from the rheumatologist.  She was advised not to use NSAIDs, but Celebrex may be an option for her since she gets such a good response to prednisone, and should not be on prednisone long-term.    2.  Blood pressure adequately controlled.  She had somewhat low blood pressure this morning, but tends to be significantly higher at other times of the day.  I agree with Dr. Sharron Meneses's previous recommendation to consider losartan 50 mg twice a day, she is up for trying this, new prescription was sent in.  I did see that I saw her about 5 years ago, she had a similar complaint at that time as well.  This might not be the only thing that alleviates the problem.  I told her that if feeling lightheaded when you first stand up is a, experience with older adults and that she should certainly take her time when standing.    Return in about  1 month (around 8/23/2019) for Recheck.      SUBJECTIVE    Ruthy Man is an 89-year-old woman with history of arthritis, hypertension, who usually follows with Dr. Sharron Meneses, who I am seeing in her absence today.    She has migraiting joint paint.  This wekend she had left foot pain and could not step on it, also swelling.  Yesterday she had right hand swelling.  She has some in her left shoulder now and back.  Some days lasted all day.  Some days improved with time.  Screening for RA and lupus were negative.  Lyme was also negative.  No history of psoriasis.  She gets a papular rash on her arms, legs and they can't figure out what it is (per her account).      She was on prednisone for 8 days and improved but came back right away.     She was prescribed diclofenac gel which was $150. She is not supposed to take NSAIDs.  She has only taken 3 tylenol in her life.      She has had this pattern going on 2 months.      She had a minor stroke in April.            ROS:   Per HPI, all other systems negative     Medications, allergies, and problem list were reviewed and updated    Patient Active Problem List   Diagnosis     Ischemic optic neuropathy     Impaired Fasting Glucose     Hypothyroidism     Premature Ventricular Contractions     Vitamin D deficiency     Skin Cancer     Adjustment disorder with anxiety     Hypertension     Hiatal Hernia     Colon polyps     Senile osteoporosis     Right thalamic stroke (H)     Left knee pain     Acute ischemic VBA thalamic stroke, left (H)     Past Medical History:   Diagnosis Date     Hypertension      Senile osteoporosis 2/21/2019     Current Outpatient Medications   Medication Sig Dispense Refill     aspirin 81 mg chewable tablet Chew 81 mg daily.       atorvastatin (LIPITOR) 40 MG tablet Take 1 tablet (40 mg total) by mouth daily. 90 tablet 3     cholecalciferol, vitamin D3, (VITAMIN D3) 1,000 unit capsule Take 1,000 Units by mouth daily.       cyanocobalamin  "(VITAMIN B-12) 500 MCG tablet Take 500 mcg by mouth daily.       diclofenac sodium (VOLTAREN) 1 % Gel Apply 3 times a day 100 g 11     DOCOSAHEXANOIC ACID/EPA (FISH OIL ORAL) Take 1,200 mg by mouth daily.       escitalopram oxalate (LEXAPRO) 10 MG tablet Take 1 tablet (10 mg total) by mouth daily. 90 tablet 2     levothyroxine (SYNTHROID, LEVOTHROID) 75 MCG tablet TAKE 1 TABLET(75 MCG) BY MOUTH DAILY 90 tablet 3     ranitidine (ZANTAC) 75 MG tablet Take  mg by mouth 2 (two) times a day as needed.       traZODone (DESYREL) 50 MG tablet Take 0.5 tablets (25 mg total) by mouth at bedtime. 30 tablet 0     clopidogrel (PLAVIX) 75 mg tablet Take 1 tablet (75 mg total) by mouth daily for 21 days. 21 tablet 0     losartan (COZAAR) 50 MG tablet Take 1 tablet (50 mg total) by mouth 2 (two) times a day. 180 tablet 3     No current facility-administered medications for this visit.      Allergies   Allergen Reactions     Ace Inhibitors Cough     Amlodipine      Edema       Clarithromycin Nausea Only     Diltiazem Hcl Itching     Doxazosin Nausea Only     Penicillins Rash     Edema         EXAM  Vitals:    07/23/19 1150   BP: 108/64   Patient Site: Left Arm   Patient Position: Sitting   Cuff Size: Adult Regular   Pulse: 94   Resp: 16   SpO2: 95%   Weight: 180 lb 3 oz (81.7 kg)   Height: 5' 3\" (1.6 m)         General: Alert, no distress  Musculoskeletal: Full range of motion of shoulders in abduction, flexion, elbows with flexion and extension.  She has mild ulnar or radial deviation at the PIP and DIP joints, not involving the MCP joints on both hands.  She has a mild swelling in the left ankle.  Skin: There is a papular rash on her legs and arms with areas of excoriated skin and surrounding erythema, no drainage.  She is wearing heavy make-up on her face, so I could not adequately assess for facial rash.    Results reviewed: Reviewed last clinic note by Dr. Sharron Meneses, recommended splitting the losartan.  Reviewed my " note with her in 2014 where she talked about lightheadedness.    Negative GEORGI, rheumatoid factor, anti-CCP, inflammatory markers.    Data points: 3    Jad Edmond,   Internal Medicine  [facility]

## 2021-05-30 NOTE — TELEPHONE ENCOUNTER
Clinic team to please notify pt of Dr Edmond's Rx transmittal.  (No further triage required.)  Thank you-

## 2021-05-30 NOTE — TELEPHONE ENCOUNTER
Fax received from [facility] pharmacy requesting Prior Authorization    Medication Name:   diclofenac sodium (VOLTAREN) 1 % Gel 100 g 11 7/10/2019     Sig: Apply 3 times a day    Sent to pharmacy as: diclofenac sodium (VOLTAREN) 1 % Gel    E-Prescribing Status: Receipt confirmed by pharmacy (7/10/2019 12:30 PM CDT)          Insurance Plan: [employer] Part D   PBM:    Patient ID Number: [id number]    Please start PA process

## 2021-05-30 NOTE — PATIENT INSTRUCTIONS - HE
Change blood pressure medicine-- change to losartan 50 mg two times a day    X-ray hands    Use diclofenac gel for the hands, can also use for shoulder or knee pain    Referral to rheumatology-- joint specialist

## 2021-05-30 NOTE — TELEPHONE ENCOUNTER
Central PA team  [phone]  Pool: HA PA MED ([id number])          PA has been initiated.       PA form completed and faxed insurance via Cover My Meds     Key:  HARDIK VENEGAS Taylor: [id number]        Medication:  Diclofenac Sodium 1% gel      Insurance:  [employer] [place] Medicare Electronic PA Form (CB)          Response will be received via fax and may take up to 5-10 business days depending on plan

## 2021-05-30 NOTE — TELEPHONE ENCOUNTER
Refill Approved    Rx renewed per Medication Renewal Policy. Medication was last renewed on 5.23.19.    Irma Padron, [facility] Triage/Med Refill 6/25/2019     Requested Prescriptions   Pending Prescriptions Disp Refills     levothyroxine (SYNTHROID, LEVOTHROID) 75 MCG tablet [Pharmacy Med Name: LEVOTHYROXINE 0.075MG (75MCG) TABS] 90 tablet 0     Sig: TAKE 1 TABLET(75 MCG) BY MOUTH DAILY       Thyroid Hormones Protocol Passed - 6/24/2019  5:11 AM        Passed - Provider visit in past 12 months or next 3 months     Last office visit with prescriber/PCP: 6/28/2018 Leeann Enriquez MD OR same dept: 6/10/2019 Selma Meneses MD OR same specialty: 6/10/2019 Selma Meneses MD  Last physical: Visit date not found Last MTM visit: Visit date not found   Next visit within 3 mo: Visit date not found  Next physical within 3 mo: Visit date not found  Prescriber OR PCP: Leeann Enriquez MD  Last diagnosis associated with med order: 1. Hypothyroidism  - levothyroxine (SYNTHROID, LEVOTHROID) 75 MCG tablet [Pharmacy Med Name: LEVOTHYROXINE 0.075MG (75MCG) TABS]; TAKE 1 TABLET(75 MCG) BY MOUTH DAILY  Dispense: 90 tablet; Refill: 0    If protocol passes may refill for 12 months if within 3 months of last provider visit (or a total of 15 months).             Passed - TSH on file in past 12 months for patient age 12 & older     TSH   Date Value Ref Range Status   05/23/2019 1.82 0.30 - 5.00 uIU/mL Final

## 2021-05-30 NOTE — TELEPHONE ENCOUNTER
Pt has been informed through nurse triage   Not sure why another encounter was made, however I informed pt that we will have Dr Edmond send in a prescription

## 2021-05-30 NOTE — TELEPHONE ENCOUNTER
Was in before for this.  Arthritis? In arm and both hands.  Hands hurt and are so painful.  Patient asking for refill of prednisone like she used last time.    Was seen 5/23/19.  Cannot even turn a door knob.    Declined appointment.    Can prednisone refill be called in for hand arthritis pain?    Pharmacy confirmed.    Ok to leave detailed message      Irma Padron, RN, [facility] Nurse Triage/Med Refills RN     Reason for Disposition    SEVERE pain (e.g., excruciating, unable to use hand at all)    Protocols used: HAND AND WRIST PAIN-A-OH

## 2021-05-31 VITALS — HEIGHT: 65 IN | BODY MASS INDEX: 32.62 KG/M2

## 2021-05-31 NOTE — PROGRESS NOTES
ASSESSMENT AND PLAN:  Ruthy aMn 89 y.o. female is seen here on 08/06/19 for evaluation of polyarthralgias in the background of osteoarthritis.  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  Her serologies for autoimmunity are normal.  Her sed rate is elevated at 55.  We discussed the definition and natural history palindromic rheumatoid arthritis.  I have outlined to her the various types of arthropathies.  We will start off with hydroxychloroquine major side effects were outlined including cutaneous reaction and need for eye examination.  Prednisone 7-1/2 mg daily for the next 60 days.  Major side effects of steroids including ocular metabolic bone related reviewed.  We will meet here in 2 months.  Diagnoses and all orders for this visit:    Palindromic rheumatism involving multiple sites  -     predniSONE (DELTASONE) 2.5 MG tablet; 7.5 mg/d prednisone PO.  Dispense: 90 tablet; Refill: 1  -     Discontinue: hydroxychloroquine (PLAQUENIL) 200 mg tablet; Take 1 tablet (200 mg total) by mouth 2 (two) times a day.  Dispense: 60 tablet; Refill: 6  -     Hepatitis C Antibody (Anti-HCV)  -     Anti-Neutrophil Cytoplasmic Antibody, IgG (ANCA IFA)  -     C-Reactive Protein  -     Erythrocyte Sedimentation Rate    Primary osteoarthritis involving multiple joints    Polyarthralgia  -     Hepatitis C Antibody (Anti-HCV)  -     Anti-Neutrophil Cytoplasmic Antibody, IgG (ANCA IFA)  -     C-Reactive Protein    Elevated sed rate  -     Erythrocyte Sedimentation Rate    Chronic renal failure, stage 3 (moderate) (H)      HISTORY OF PRESENTING ILLNESS:  Ruthy Man, 89 y.o., female is here for evaluation of painful joints.  She reports that these started in May.  She first had pain and swelling in her hand and the wrist, then the opposite side was affected than the shoulder, than the knees.  This migratory pattern has repeated itself several times.  Each individual site gets to  be affected for about 2 days or so.  The whole cycle can get done in about a week or 10 days.  In between the episodes she has very little pain.  She has noted swelling.  This affects her wrists, dorsum of the hands, and the knees.  She was given prednisone that helped.  There is no personal family history of psoriasis ulcerative colitis or Crohn's disease.  Her work-up at her primary physician's office shows elevated sedimentation rate at 55, negative rheumatoid factor and anti-CCP antibody and GEORGI.  X-rays of the hands which are personally reviewed show degenerative arthropathy affecting multiple joints in the hands especially the IP joints but also in particular to be noted is the involvement of the second right MCP with destructive arthropathy.  She does not have chondrocalcinosis.  She describes herself otherwise in good health.  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  She lives by herself.  They have 2 children live on the same [address].  She does not smoke and does not take alcohol. 0 Further historical information and ADL limitations as noted in the multidimensional health assessment questionnaire attached in the EMR. Rest of the 13 system ROS is negative.     ALLERGIES:Ace inhibitors; Amlodipine; Clarithromycin; Diltiazem hcl; Doxazosin; and Penicillins    PAST MEDICAL/ACTIVE PROBLEMS/MEDICATION/ FAMILY HISTORY/SOCIAL DATA:  The patient has a family history of  Past Medical History:   Diagnosis Date     Hypertension      Senile osteoporosis 2/21/2019     Social History     Tobacco Use   Smoking Status Never Smoker   Smokeless Tobacco Never Used     Patient Active Problem List   Diagnosis     Ischemic optic neuropathy     Impaired Fasting Glucose     Hypothyroidism     Premature Ventricular Contractions     Vitamin D deficiency     Skin Cancer     Adjustment disorder with anxiety     Hypertension     Hiatal Hernia     Colon polyps     Senile osteoporosis     Right  "thalamic stroke (H)     Left knee pain     Acute ischemic VBA thalamic stroke, left (H)     Current Outpatient Medications   Medication Sig Dispense Refill     aspirin 81 mg chewable tablet Chew 81 mg daily.       atorvastatin (LIPITOR) 40 MG tablet Take 1 tablet (40 mg total) by mouth daily. 90 tablet 3     celecoxib (CELEBREX) 100 MG capsule Take 1 capsule (100 mg total) by mouth 2 (two) times a day as needed for pain. 60 capsule 1     cholecalciferol, vitamin D3, (VITAMIN D3) 1,000 unit capsule Take 1,000 Units by mouth daily.       cyanocobalamin (VITAMIN B-12) 500 MCG tablet Take 500 mcg by mouth daily.       diclofenac sodium (VOLTAREN) 1 % Gel Apply 3 times a day 100 g 11     DOCOSAHEXANOIC ACID/EPA (FISH OIL ORAL) Take 1,200 mg by mouth daily.       escitalopram oxalate (LEXAPRO) 10 MG tablet Take 1 tablet (10 mg total) by mouth daily. 90 tablet 2     levothyroxine (SYNTHROID, LEVOTHROID) 75 MCG tablet TAKE 1 TABLET(75 MCG) BY MOUTH DAILY 90 tablet 3     losartan (COZAAR) 50 MG tablet Take 1 tablet (50 mg total) by mouth 2 (two) times a day. 180 tablet 3     ranitidine (ZANTAC) 75 MG tablet Take  mg by mouth 2 (two) times a day as needed.       traZODone (DESYREL) 50 MG tablet Take 0.5 tablets (25 mg total) by mouth at bedtime. 30 tablet 0     clopidogrel (PLAVIX) 75 mg tablet Take 1 tablet (75 mg total) by mouth daily for 21 days. 21 tablet 0     No current facility-administered medications for this visit.        COMPREHENSIVE EXAMINATION:  Vitals:    08/06/19 1546   BP: 110/60   Patient Site: Right Arm   Patient Position: Sitting   Cuff Size: Adult Large   Pulse: 92   Weight: 180 lb (81.6 kg)   Height: 5' 3\" (1.6 m)     A well appearing alert oriented female. Vital data as noted above. Her eyes without inflammation/scleromalacia. ENTwithout oral mucositis, thrush, nasal deformity, external ear redness, deformity. Her neck is without lymphadenopathy and supple. Lungs normal sounds, no pleural rub. " Heart auscultation normal rate, rhythm; no pericardial rub and murmurs. Abdomen soft, non tender, no organomegaly. Skin examined for heliotrope, malar area eruption, lupus pernio, periungual erythema, sclerodactyly, papules, erythema nodosum, purpura, nail pitting, onycholysis, and obvious psoriasis lesion. Neurological examination shows normal alertness, speech, facial symmetry, tone and power in upper and lower extremities. The joint examination is performed for swelling, tenderness, warmth, erythema, and range of motion in the following joints: DIPs, PIPs, MCPs, wrists, first CMC's, elbows, shoulders, hips, knees, ankles, feet; spine for range of motion and paraspinal muscles for tenderness. The salient  findings are: There is no synovitis in any of the palpable joints of upper and lower extremities.  She has mild JLT of the knees.  She has no dactylitis of digits or toes.  She does not have tenderness across the trapezius from the paraspinal region.  She has Heberden's, Kalia's.  There is no nail pitting onycholysis.    LAB / IMAGING DATA:  ALT   Date Value Ref Range Status   05/23/2019 <9 0 - 45 U/L Final   06/28/2018 11 0 - 45 U/L Final   06/13/2016 16 12 - 78 U/L Final     Albumin   Date Value Ref Range Status   05/23/2019 3.3 (L) 3.5 - 5.0 g/dL Final   06/28/2018 3.6 3.5 - 5.0 g/dL Final   06/13/2016 3.8 3.5 - 5.0 g/dL Final     Creatinine   Date Value Ref Range Status   06/10/2019 1.38 (H) 0.60 - 1.10 mg/dL Final   05/23/2019 1.69 (H) 0.60 - 1.10 mg/dL Final   04/11/2019 1.39 (H) 0.60 - 1.10 mg/dL Final       WBC   Date Value Ref Range Status   05/23/2019 8.7 4.0 - 11.0 thou/uL Final   04/11/2019 6.8 4.0 - 11.0 thou/uL Final   02/25/2015 6.8 4.0 - 11.0 thou/uL Final   08/13/2014 5.7 4.0 - 11.0 thou/uL Final     Hemoglobin   Date Value Ref Range Status   05/23/2019 13.2 12.0 - 16.0 g/dL Final   04/11/2019 13.4 12.0 - 16.0 g/dL Final   01/18/2019 13.3 12.0 - 16.0 g/dL Final     Platelets   Date Value Ref  Range Status   05/23/2019 333 140 - 440 thou/uL Final   04/11/2019 254 140 - 440 thou/uL Final   01/18/2019 268 140 - 440 thou/uL Final       Lab Results   Component Value Date    RF <15.0 05/23/2019    SEDRATE 55 (H) 05/23/2019

## 2021-06-01 VITALS — BODY MASS INDEX: 30.95 KG/M2 | WEIGHT: 186 LBS

## 2021-06-02 VITALS — HEIGHT: 63 IN | BODY MASS INDEX: 32.15 KG/M2 | WEIGHT: 181.44 LBS

## 2021-06-02 VITALS — BODY MASS INDEX: 30.72 KG/M2 | WEIGHT: 184.6 LBS

## 2021-06-02 NOTE — PROGRESS NOTES
ASSESSMENT AND PLAN:  Ruthy Man 89 y.o. female is seen here on 10/15/19 for follow-up of palindromic rheumatoid arthritis responded very nicely to hydroxychloroquine low-dose prednisone her only concern is the cost of hydroxychloroquine, instead of 2 times daily she is going to try 1 time daily and see if that works for her.  She is no longer on prednisone for the past 2 weeks.  The management principles of osteoarthritis are reviewed particularly given the renal impairment nonsteroidals will be relatively contraindicated for her.  We will meet here in 3 months.                Diagnoses and all orders for this visit:    Palindromic rheumatism    Chronic renal failure, stage 3 (moderate) (H)    Primary osteoarthritis involving multiple joints      HISTORY OF PRESENTING ILLNESS:  Ruthy Man, 89 y.o., female is here for follow-up of palindromic rheumatism, osteoarthritis, and the background of renal impairment doing great on hydroxychloroquine, had prednisone which she finished course of 2 weeks ago.  She noted pain level to be 0.5/10, mild.  She lives by herself her family is close by in the block.  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  .  She reports that these started in May 2019.  She first had pain and swelling in her hand and the wrist, then the opposite side was affected than the shoulder, than the knees.  This migratory pattern has repeated itself several times.  Each individual site gets to be affected for about 2 days or so.  The whole cycle can get done in about a week or 10 days.  In between the episodes she has very little pain.  She has noted swelling.  This affects her wrists, dorsum of the hands, and the knees.  She was given prednisone that helped.  There is no personal family history of psoriasis ulcerative colitis or Crohn's disease.  Her work-up at her primary physician's office shows elevated sedimentation rate at 55, negative rheumatoid factor and anti-CCP antibody and GEORGI.   X-rays of the hands which are personally reviewed show degenerative arthropathy affecting multiple joints in the hands especially the IP joints but also in particular to be noted is the involvement of the second right MCP with destructive arthropathy.  She does not have chondrocalcinosis.  She describes herself otherwise in good health.  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  She lives by herself.  They have 2 children live on the same street.  She does not smoke and does not take alcohol. 0 Further historical information and ADL limitations as noted in the multidimensional health assessment questionnaire attached in the EMR.     ALLERGIES:Ace inhibitors; Amlodipine; Clarithromycin; Diltiazem hcl; Doxazosin; and Penicillins    PAST MEDICAL/ACTIVE PROBLEMS/MEDICATION/ FAMILY HISTORY/SOCIAL DATA:  The patient has a family history of  Past Medical History:   Diagnosis Date     Hypertension      Senile osteoporosis 2/21/2019     Social History     Tobacco Use   Smoking Status Never Smoker   Smokeless Tobacco Never Used     Patient Active Problem List   Diagnosis     Ischemic optic neuropathy     Impaired Fasting Glucose     Hypothyroidism     Premature Ventricular Contractions     Vitamin D deficiency     Skin Cancer     Adjustment disorder with anxiety     Hypertension     Hiatal Hernia     Colon polyps     Senile osteoporosis     Right thalamic stroke (H)     Left knee pain     Acute ischemic VBA thalamic stroke, left (H)     Chronic renal failure, stage 3 (moderate) (H)     Current Outpatient Medications   Medication Sig Dispense Refill     aspirin 81 mg chewable tablet Chew 81 mg daily.       atorvastatin (LIPITOR) 40 MG tablet Take 1 tablet (40 mg total) by mouth daily. 90 tablet 3     celecoxib (CELEBREX) 100 MG capsule Take 1 capsule (100 mg total) by mouth 2 (two) times a day as needed for pain. 60 capsule 1     cholecalciferol, vitamin D3, (VITAMIN D3) 1,000 unit  capsule Take 1,000 Units by mouth daily.       cyanocobalamin (VITAMIN B-12) 500 MCG tablet Take 500 mcg by mouth daily.       diclofenac sodium (VOLTAREN) 1 % Gel Apply 3 times a day 100 g 11     DOCOSAHEXANOIC ACID/EPA (FISH OIL ORAL) Take 1,200 mg by mouth daily.       escitalopram oxalate (LEXAPRO) 10 MG tablet Take 1 tablet (10 mg total) by mouth daily. 90 tablet 2     hydroxychloroquine (PLAQUENIL) 200 mg tablet TAKE 1 TABLET(200 MG) BY MOUTH TWICE DAILY 180 tablet 1     levothyroxine (SYNTHROID, LEVOTHROID) 75 MCG tablet TAKE 1 TABLET(75 MCG) BY MOUTH DAILY 90 tablet 3     losartan (COZAAR) 50 MG tablet Take 1 tablet (50 mg total) by mouth daily. 90 tablet 3     predniSONE (DELTASONE) 2.5 MG tablet 7.5 mg/d prednisone PO. 90 tablet 1     traZODone (DESYREL) 50 MG tablet Take 0.5 tablets (25 mg total) by mouth at bedtime. 30 tablet 0     clopidogrel (PLAVIX) 75 mg tablet Take 1 tablet (75 mg total) by mouth daily for 21 days. 21 tablet 0     ranitidine (ZANTAC) 75 MG tablet Take  mg by mouth 2 (two) times a day as needed.       No current facility-administered medications for this visit.      DETAILED EXAMINATION  10/15/19  :  Vitals:    10/15/19 1623   BP: 128/70   Patient Site: Right Arm   Patient Position: Sitting   Cuff Size: Adult Large   Pulse: 84   Weight: 188 lb (85.3 kg)     Alert oriented. Head including the face is examined for malar rash, heliotropes, scarring, lupus pernio. Eyes examined for redness such as in episcleritis/scleritis, periorbital lesions.   Neck/ Face examined for parotid gland swelling, range of motion of neck.  Left upper and lower and right upper and lower extremities examined for tenderness, swelling, warmth of the appendicular joints, range of motion, edema, rash.  Some of the important findings included: she has marked Heberden's, Kalia's.  Does not have evidence of synovitis in the palpable joints of the upper extremities.  No JLT effusion or warmth of the knees.             LAB / IMAGING DATA:  ALT   Date Value Ref Range Status   05/23/2019 <9 0 - 45 U/L Final   06/28/2018 11 0 - 45 U/L Final   06/13/2016 16 12 - 78 U/L Final     Albumin   Date Value Ref Range Status   05/23/2019 3.3 (L) 3.5 - 5.0 g/dL Final   06/28/2018 3.6 3.5 - 5.0 g/dL Final   06/13/2016 3.8 3.5 - 5.0 g/dL Final     Creatinine   Date Value Ref Range Status   06/10/2019 1.38 (H) 0.60 - 1.10 mg/dL Final   05/23/2019 1.69 (H) 0.60 - 1.10 mg/dL Final   04/11/2019 1.39 (H) 0.60 - 1.10 mg/dL Final       WBC   Date Value Ref Range Status   05/23/2019 8.7 4.0 - 11.0 thou/uL Final   04/11/2019 6.8 4.0 - 11.0 thou/uL Final   02/25/2015 6.8 4.0 - 11.0 thou/uL Final   08/13/2014 5.7 4.0 - 11.0 thou/uL Final     Hemoglobin   Date Value Ref Range Status   05/23/2019 13.2 12.0 - 16.0 g/dL Final   04/11/2019 13.4 12.0 - 16.0 g/dL Final   01/18/2019 13.3 12.0 - 16.0 g/dL Final     Platelets   Date Value Ref Range Status   05/23/2019 333 140 - 440 thou/uL Final   04/11/2019 254 140 - 440 thou/uL Final   01/18/2019 268 140 - 440 thou/uL Final       Lab Results   Component Value Date    RF <15.0 05/23/2019    SEDRATE 25 (H) 08/06/2019

## 2021-06-02 NOTE — TELEPHONE ENCOUNTER
Spoke with pt and relayed PCP message.  Pt understanding.  Pt has appt with PCP scheduled for tomorrow.

## 2021-06-02 NOTE — TELEPHONE ENCOUNTER
Medication Question or Clarification  Who is calling: Pharmacy: [facility].  What medication are you calling about? (include dose and sig)  (COZAAR) 50 MG tablet  50 mg, Oral, 2 times daily         Summary: Take 1 tablet (50 mg total) by mouth 2 (two) times a day., Starting Tue 7/23/2019      Patient asking for refill of 50 mg daily .  Who prescribed the medication?: Dr Meneses  What is your question/concern?: The patient has changed the dose of losartan to one tab daily vs twice daily reports pharmacy tech.  Patient reported to pharmacy she was having dizziness.  Writer contacted patient and transferred her to RN for assessment.  Patient also is aware she needs an appointment soon. FYI.  Pharmacy: [facility]  Okay to leave a detailed message?: No [phone]      Site CMT - Please call the pharmacy to obtain any additional needed information.

## 2021-06-02 NOTE — TELEPHONE ENCOUNTER
Please let pt know results:    Liver, kidney  function and cholesterol levels are good.  Red cell count is normal.  Kidney function is stable.  Sugars are slightly above normal: please limit sweets.

## 2021-06-02 NOTE — TELEPHONE ENCOUNTER
Was called and was told to talk to pcp nurse. Call was transferred to Triage.    Says she cut her losartan down to taking 50mg of BP medication because BP has been low.  Was on 100mg.  Pcp called in lower 50mg dose.    PCP Message given to patient:  Refilled Losartan 50 mg for once a day dosing.     Please let pt know, O'K to take once a day instead of twice a day. She can take it in the evening to minimize day time dizziness. Follow up in the office to check on b/p and wellness exam.    Transferred to scheduling for an appointment.    Irma Padron, RN, [facility] Nurse Triage/Med Refills RN

## 2021-06-02 NOTE — TELEPHONE ENCOUNTER
Refilled Losartan 50 mg for once a day dosing.    Please let pt know, O'K to take once a day instead of twice a day. She can take it in the evening to minimize day time dizziness. Follow up in the office to check on b/p and wellness exam.

## 2021-06-02 NOTE — PROGRESS NOTES
[facility] Follow Up Note    Assessment/Plan:    1. Adjustment disorder with anxiety  Mood is currently good on 5 mg of Lexapro.  Increasing it to 10 mg gave her side effects.  She will continue the lower dose  - escitalopram oxalate (LEXAPRO) 10 MG tablet; Take 0.5 tablets (5 mg total) by mouth daily.  Dispense: 90 tablet; Refill: 2    2. Hypertension  Has decreased her losartan from 100mg to 50 mg a day.  Currently her blood pressure is at goal.  Given her history of stroke I would like to remain at less than 130 systolically.  We will continue to follow.  - Comprehensive Metabolic Panel  - LDL Cholesterol, Direct    3. Gastroesophageal reflux disease without esophagitis  Patient reports that Zantac is no longer available over-the-counter.  Previously her symptoms are well controlled on 75 mg a day of it.  Her son is on omeprazole and she is wondering if she should switch.  Currently will try prescription Zantac and if she does not get relief from it we can order 20 mg of omeprazole  - ranitidine (ZANTAC) 15 mg/mL syrup; Take 5 mL (75 mg total) by mouth daily.  Dispense: 150 mL; Refill: 11    4. Chronic renal failure, stage 3 (moderate) (H)  This is chronic.  Will refresh blood work again today.  In the past she has had numerous abdominal CT scan with contrast.  In the future would like to avoid it is possible.  She is off Celebrex and at the lower dose of losartan.  I anticipate that renal function will be better  - Comprehensive Metabolic Panel  - LDL Cholesterol, Direct    5. Right thalamic stroke (H)  Etiology is unclear.  She had no arrhythmias in the hospital.  She was started on high-dose Lipitor.  Currently she is on aspirin and Plavix.  Discussed that she could stop Plavix and see how she does just on aspirin.  Because she does have intermittent palpitation I recommended cardiac monitor to make sure she does not have atrophy.  - atorvastatin (LIPITOR) 40 MG tablet; Take 1 tablet (40 mg  total) by mouth daily.  Dispense: 90 tablet; Refill: 3  - Comprehensive Metabolic Panel  - CHHAYA Monitor Hook-Up; Future    6. Hypothyroidism, unspecified type  - Thyroid Stimulating Hormone (TSH)    7. Rash  Looks like nummular dermatitis.  Will try topical steroid and if it is not better we will see dermatology  - HM1(CBC and Differential)  - HM1 (CBC with Diff)  - triamcinolone (KENALOG) 0.1 % cream; Use twice a day for 2 weeks, then take 1 week off. May repeat.  Dispense: 80 g; Refill: 1    8. Abnormal glucose  Screen for diabetes  - Glycosylated Hemoglobin A1c    9. Palpitations  I would like to evaluate for atrial fibrillation.  - CHHAYA Monitor Hook-Up; Future    In the future consider sleep apnea testing due to very crowded oropharynx and increasing weight.    Selma Meneses MD    Chief Complaint:  Chief Complaint   Patient presents with     Follow-up     Discuss med       History of Present Illness:  [place]ia is a 89 y.o. female with  history of lichen sclerosis, status post cholecystectomy, umbilical hernia, hysterectomy, diverticulosis, cystocele, spine DJD, hypothyroidism, vitamin D deficiency, ischemic optic neuropathy, anxiety, high blood pressure, hiatal hernia and chronic renal insufficiency, right thalamic stroke in April 2019.  She is currently here for for follow-up.    I last saw her in June.  At that time she has had more anhedonia and we increased her citalopram from 5 mg to 10 mg.  She reports that she did not tolerate 10 mg well and currently back to 5 mg a day.  Her mood has been good.    She also has been having more dizziness recently.  She has been on losartan 100 mg a day and has been cutting it in half.  She decreased the dose 2 weeks ago.  Blood pressure in the office today is normal.  Discussed that she can continue on this dose.    She does have significant renal insufficiency.  Recently she stopped Celebrex.  She does have palindromic rheumatism and was seen by rheumatologist.  She  has been on a prednisone taper and currently is off it.  His symptoms are very well controlled on Plaquenil she is aware that she needs to do eye exams yearly.    She did have thalamic stroke in April and was hospitalized for that.  She has been on aspirin and Plavix for 3 weeks.  After Plavix was stopped she developed worsening numbness on the left of her face and neurology recommended to go back on it.  Currently she has been on dual therapy.  She is going to run out of Plavix and is requesting whether she should stay on both of those medications.  Discussed that we can try coming off Plavix.  She still has intermittent palpitations and I recommended that she gets a heart monitor for 2 weeks to rule out atrial fibrillation.    She also has developed a rash on her chest.  It has been spreading a little bit and is intensely e.g.  Overall she has a rash all over her body and has seen dermatologist in the past with no clearing diagnosis.  Normally rash is papular and each she all over.  Current rashes on her chest and looks like a nummular dermatitis.  Will try steroids.        Review of Systems:  A comprehensive review of systems was performed and was otherwise negative.  No recent falls    PFSH:  Social History: Reviewed  Social History     Tobacco Use   Smoking Status Never Smoker   Smokeless Tobacco Never Used     Social History     Patient does not qualify to have social determinant information on file (likely too young).   Social History Narrative    She is .  Her   in  suddenly, but he also had dementia. They were  for 60 years. She has 3 children, 4 grandchildren and 1 great grandchild. 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  One daughter lives with her and she has corticobasal degeneration.       Past History: Reviewed  Current Outpatient Medications   Medication Sig Dispense Refill     aspirin 81  "mg chewable tablet Chew 81 mg daily.       atorvastatin (LIPITOR) 40 MG tablet Take 1 tablet (40 mg total) by mouth daily. 90 tablet 3     cholecalciferol, vitamin D3, (VITAMIN D3) 1,000 unit capsule Take 1,000 Units by mouth daily.       cyanocobalamin (VITAMIN B-12) 500 MCG tablet Take 500 mcg by mouth daily.       diclofenac sodium (VOLTAREN) 1 % Gel Apply 3 times a day 100 g 11     DOCOSAHEXANOIC ACID/EPA (FISH OIL ORAL) Take 1,200 mg by mouth daily.       escitalopram oxalate (LEXAPRO) 10 MG tablet Take 0.5 tablets (5 mg total) by mouth daily. 90 tablet 2     hydroxychloroquine (PLAQUENIL) 200 mg tablet TAKE 1 TABLET(200 MG) BY MOUTH TWICE DAILY 180 tablet 1     levothyroxine (SYNTHROID, LEVOTHROID) 75 MCG tablet TAKE 1 TABLET(75 MCG) BY MOUTH DAILY 90 tablet 3     losartan (COZAAR) 50 MG tablet Take 1 tablet (50 mg total) by mouth daily. 90 tablet 3     ranitidine (ZANTAC) 15 mg/mL syrup Take 5 mL (75 mg total) by mouth daily. 150 mL 11     triamcinolone (KENALOG) 0.1 % cream Use twice a day for 2 weeks, then take 1 week off. May repeat. 80 g 1     No current facility-administered medications for this visit.        Family History: Reviewed    Physical Exam:    Vitals:    10/16/19 1525   BP: 122/70   Patient Site: Left Arm   Patient Position: Sitting   Cuff Size: Adult Large   Pulse: 82   SpO2: 97%   Weight: 187 lb (84.8 kg)   Height: 5' 3\" (1.6 m)     Wt Readings from Last 3 Encounters:   10/16/19 187 lb (84.8 kg)   10/15/19 188 lb (85.3 kg)   08/06/19 180 lb (81.6 kg)     Body mass index is 33.13 kg/m .    Constitutional:  Reveals a pleasant elderly female.  Vitals:  Per nursing notes.  HEENT:No cervical LAD, no thyromegaly,  conjunctiva is pink, no scleral icterus, TMs are visualized and normal bl, oropharynx is clear, no exudates, very crowded  Cardiac:  Regular rate and rhythm,no murmurs, rubs, or gallops. Carotids without bruits. Legs without edema. Palpation of the radial pulse regular.  Lungs: Clear to " auscultation bl.  Respiratory effort normal.  Abdomen:positive BS, soft, nontender, nondistended.  No hepato-splenomagaly  Skin:   Patient has chronic macular rash throughout her body with excoriations.  On her chest however she has no malar-looking dermatitis.  Rheumatologic: Normal joints and nails of the hands.  Neurologic:  Cranial nerves II-XII intact.     Psychiatric: affect appropriate, memory intact.     Data Review:    Analysis and Summary of Old Records (2): yes      Records Requested (1): no      Other History Summarized (from other people in the room) (2): no    Radiology Tests Summarized (XRAY/CT/MRI/DXA) (1): no    Labs Reviewed (1): yes    Medicine Tests Reviewed (EKG/ECHO/COLONOSCOPY/EGD) (1): no    Independent Review of EKG or X-RAY (2): no

## 2021-06-03 VITALS
OXYGEN SATURATION: 97 % | WEIGHT: 187 LBS | BODY MASS INDEX: 33.13 KG/M2 | DIASTOLIC BLOOD PRESSURE: 70 MMHG | HEART RATE: 82 BPM | HEIGHT: 63 IN | SYSTOLIC BLOOD PRESSURE: 122 MMHG

## 2021-06-03 VITALS — WEIGHT: 177.3 LBS | BODY MASS INDEX: 31.41 KG/M2 | HEIGHT: 63 IN

## 2021-06-03 VITALS
BODY MASS INDEX: 33.3 KG/M2 | DIASTOLIC BLOOD PRESSURE: 70 MMHG | WEIGHT: 188 LBS | HEART RATE: 84 BPM | SYSTOLIC BLOOD PRESSURE: 128 MMHG

## 2021-06-03 VITALS — BODY MASS INDEX: 31.77 KG/M2 | WEIGHT: 179.31 LBS | HEIGHT: 63 IN

## 2021-06-03 VITALS — BODY MASS INDEX: 31.89 KG/M2 | HEIGHT: 63 IN | WEIGHT: 180 LBS

## 2021-06-03 VITALS — BODY MASS INDEX: 31.93 KG/M2 | HEIGHT: 63 IN | WEIGHT: 180.19 LBS

## 2021-06-04 VITALS
SYSTOLIC BLOOD PRESSURE: 122 MMHG | BODY MASS INDEX: 33.13 KG/M2 | DIASTOLIC BLOOD PRESSURE: 60 MMHG | HEIGHT: 63 IN | WEIGHT: 187 LBS

## 2021-06-04 VITALS
WEIGHT: 188 LBS | OXYGEN SATURATION: 97 % | SYSTOLIC BLOOD PRESSURE: 136 MMHG | DIASTOLIC BLOOD PRESSURE: 70 MMHG | HEIGHT: 63 IN | BODY MASS INDEX: 33.31 KG/M2 | HEART RATE: 74 BPM

## 2021-06-04 VITALS
OXYGEN SATURATION: 94 % | DIASTOLIC BLOOD PRESSURE: 74 MMHG | BODY MASS INDEX: 34.19 KG/M2 | SYSTOLIC BLOOD PRESSURE: 138 MMHG | HEART RATE: 72 BPM | WEIGHT: 193 LBS | TEMPERATURE: 98.8 F

## 2021-06-04 VITALS
WEIGHT: 194 LBS | OXYGEN SATURATION: 94 % | BODY MASS INDEX: 34.38 KG/M2 | RESPIRATION RATE: 16 BRPM | DIASTOLIC BLOOD PRESSURE: 78 MMHG | SYSTOLIC BLOOD PRESSURE: 138 MMHG | HEIGHT: 63 IN | HEART RATE: 83 BPM

## 2021-06-05 VITALS
HEART RATE: 72 BPM | HEIGHT: 63 IN | BODY MASS INDEX: 34.37 KG/M2 | DIASTOLIC BLOOD PRESSURE: 80 MMHG | SYSTOLIC BLOOD PRESSURE: 134 MMHG | OXYGEN SATURATION: 96 %

## 2021-06-05 VITALS
SYSTOLIC BLOOD PRESSURE: 130 MMHG | DIASTOLIC BLOOD PRESSURE: 72 MMHG | WEIGHT: 196 LBS | HEART RATE: 92 BPM | BODY MASS INDEX: 34.72 KG/M2

## 2021-06-05 VITALS
HEART RATE: 86 BPM | SYSTOLIC BLOOD PRESSURE: 134 MMHG | WEIGHT: 192 LBS | HEIGHT: 63 IN | OXYGEN SATURATION: 95 % | DIASTOLIC BLOOD PRESSURE: 76 MMHG | BODY MASS INDEX: 34.02 KG/M2

## 2021-06-05 NOTE — PROGRESS NOTES
"ASSESSMENT AND PLAN:  Ruthy Man 89 y.o. female is seen here on 01/21/20 for follow-up.  She has palindromic rheumatoid arthritis doing great with reduced dose of hydroxychloroquine that was on account of significant expense for her.  She wonders if she can \"cut the dose back even further such as every other day.  She is aware of the risks here.  She gets her eyes examined regularly.  She is noted discomfort in her right arm consistent with tendinopathy of the rotator cuff management principles were reviewed.  Management principles of OA were reviewed and given the past history of renal impairment nonsteroidals to be avoided.  She is to follow-up here in 3 months.               Diagnoses and all orders for this visit:    Palindromic rheumatism involving multiple sites  -     hydroxychloroquine (PLAQUENIL) 200 mg tablet; TAKE 1 TABLET(200 MG) BY MOUTH DAILY  Dispense: 90 tablet; Refill: 1    Primary osteoarthritis involving multiple joints    Chronic right shoulder pain    Chronic renal failure, stage 3 (moderate) (H)      HISTORY OF PRESENTING ILLNESS:  Ruthy Man, 89 y.o., female is here for follow-up of palindromic rheumatism, osteoarthritis, and the background of renal impairment doing great on hydroxychloroquine, at a reduced dose of 200 mg daily.  She has not had a flareup.  She has noted some discomfort in her right shoulder, upper arm.  This is worse with certain movements.  This does not stop her from day-to-day activities.  This does not wake her up from sleep at night.  She is able to do most of her day-to-day activities without difficulty there is no history of trauma.  She noted pain level to be 0.5/10, mild.  She lives by herself her family is close by in the block.  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  .  She reports that these started in May 2019.  She first had pain and swelling in her hand and the wrist, then the opposite side was affected than the shoulder, than the knees.  This " migratory pattern has repeated itself several times.  Each individual site gets to be affected for about 2 days or so.  The whole cycle can get done in about a week or 10 days.  In between the episodes she has very little pain.  She has noted swelling.  This affects her wrists, dorsum of the hands, and the knees.  She was given prednisone that helped.  There is no personal family history of psoriasis ulcerative colitis or Crohn's disease.  Her work-up at her primary physician's office shows elevated sedimentation rate at 55, negative rheumatoid factor and anti-CCP antibody and GEORGI.  X-rays of the hands which are personally reviewed show degenerative arthropathy affecting multiple joints in the hands especially the IP joints but also in particular to be noted is the involvement of the second right MCP with destructive arthropathy.  She does not have chondrocalcinosis.  She describes herself otherwise in good health.  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  She lives by herself.  They have 2 children live on the same street.  She does not smoke and does not take alcohol. 0 Further historical information and ADL limitations as noted in the multidimensional health assessment questionnaire attached in the EMR.     ALLERGIES:Ace inhibitors; Amlodipine; Clarithromycin; Diltiazem hcl; Doxazosin; and Penicillins    PAST MEDICAL/ACTIVE PROBLEMS/MEDICATION/ FAMILY HISTORY/SOCIAL DATA:  The patient has a family history of  Past Medical History:   Diagnosis Date     Hypertension      Senile osteoporosis 2/21/2019     Social History     Tobacco Use   Smoking Status Never Smoker   Smokeless Tobacco Never Used     Patient Active Problem List   Diagnosis     Ischemic optic neuropathy     Impaired Fasting Glucose     Hypothyroidism     Premature Ventricular Contractions     Vitamin D deficiency     Skin Cancer     Adjustment disorder with anxiety     Hypertension     Hiatal Hernia     Colon  "polyps     Senile osteoporosis     Right thalamic stroke (H)     Left knee pain     Acute ischemic VBA thalamic stroke, left (H)     Chronic renal failure, stage 3 (moderate) (H)     Palindromic rheumatism     Primary osteoarthritis involving multiple joints     Current Outpatient Medications   Medication Sig Dispense Refill     aspirin 81 mg chewable tablet Chew 81 mg daily.       atorvastatin (LIPITOR) 40 MG tablet Take 1 tablet (40 mg total) by mouth daily. 90 tablet 3     cholecalciferol, vitamin D3, (VITAMIN D3) 1,000 unit capsule Take 1,000 Units by mouth daily.       cyanocobalamin (VITAMIN B-12) 500 MCG tablet Take 500 mcg by mouth daily.       diclofenac sodium (VOLTAREN) 1 % Gel Apply 3 times a day 100 g 11     DOCOSAHEXANOIC ACID/EPA (FISH OIL ORAL) Take 1,200 mg by mouth daily.       escitalopram oxalate (LEXAPRO) 10 MG tablet Take 0.5 tablets (5 mg total) by mouth daily. 90 tablet 2     hydroxychloroquine (PLAQUENIL) 200 mg tablet TAKE 1 TABLET(200 MG) BY MOUTH TWICE DAILY 180 tablet 1     levothyroxine (SYNTHROID, LEVOTHROID) 75 MCG tablet TAKE 1 TABLET(75 MCG) BY MOUTH DAILY 90 tablet 3     losartan (COZAAR) 50 MG tablet Take 1 tablet (50 mg total) by mouth daily. 90 tablet 3     ranitidine (ZANTAC) 15 mg/mL syrup TAKE 5 ML(75 MG) BY MOUTH DAILY 450 mL 3     triamcinolone (KENALOG) 0.1 % cream Use twice a day for 2 weeks, then take 1 week off. May repeat. 80 g 1     No current facility-administered medications for this visit.      DETAILED EXAMINATION  01/21/20  :  Vitals:    01/21/20 1533   BP: 122/60   Patient Site: Right Arm   Patient Position: Sitting   Cuff Size: Adult Regular   Weight: 187 lb (84.8 kg)   Height: 5' 3\" (1.6 m)     Alert oriented. Head including the face is examined for malar rash, heliotropes, scarring, lupus pernio. Eyes examined for redness such as in episcleritis/scleritis, periorbital lesions.   Neck/ Face examined for parotid gland swelling, range of motion of neck.  Left " upper and lower and right upper and lower extremities examined for tenderness, swelling, warmth of the appendicular joints, range of motion, edema, rash.  Some of the important findings included: she has mild impingement of the right shoulder on abduction and internal rotation.  Marked Heberden's, Kalia's.  Does not have evidence of synovitis in the palpable joints of the upper extremities.  No JLT effusion or warmth of the knees.            LAB / IMAGING DATA:  ALT   Date Value Ref Range Status   10/16/2019 10 0 - 45 U/L Final   05/23/2019 <9 0 - 45 U/L Final   06/28/2018 11 0 - 45 U/L Final     Albumin   Date Value Ref Range Status   10/16/2019 3.7 3.5 - 5.0 g/dL Final   05/23/2019 3.3 (L) 3.5 - 5.0 g/dL Final   06/28/2018 3.6 3.5 - 5.0 g/dL Final     Creatinine   Date Value Ref Range Status   10/16/2019 1.31 (H) 0.60 - 1.10 mg/dL Final   06/10/2019 1.38 (H) 0.60 - 1.10 mg/dL Final   05/23/2019 1.69 (H) 0.60 - 1.10 mg/dL Final       WBC   Date Value Ref Range Status   10/16/2019 7.7 4.0 - 11.0 thou/uL Final   05/23/2019 8.7 4.0 - 11.0 thou/uL Final   02/25/2015 6.8 4.0 - 11.0 thou/uL Final   08/13/2014 5.7 4.0 - 11.0 thou/uL Final     Hemoglobin   Date Value Ref Range Status   10/16/2019 13.0 12.0 - 16.0 g/dL Final   05/23/2019 13.2 12.0 - 16.0 g/dL Final   04/11/2019 13.4 12.0 - 16.0 g/dL Final     Platelets   Date Value Ref Range Status   10/16/2019 309 140 - 440 thou/uL Final   05/23/2019 333 140 - 440 thou/uL Final   04/11/2019 254 140 - 440 thou/uL Final       Lab Results   Component Value Date    RF <15.0 05/23/2019    SEDRATE 25 (H) 08/06/2019

## 2021-06-05 NOTE — PROGRESS NOTES
[facility] Follow Up Note    Assessment/Plan:    1. Essential hypertension  Blood pressure today slightly above goal given recent stroke in 2019 however she is 89.  She does report that at home it fluctuates between 112-150.  When it is 150s she does take losartan extra tablet.  I asked her to record her blood pressures on a piece of paper and bring it in with her to next appointment.  We did discuss cutting back on salt.  She is overweight but overall weight has been stable  - losartan (COZAAR) 50 MG tablet; 1-2 tabs a day based on b/p readings  Dispense: 180 tablet; Refill: 3  - Thyroid Stimulating Hormone (TSH)  - Basic Metabolic Panel    2. Adjustment disorder with anxiety  She is currently on Lexapro 5 mg a day.  Intermittently she does have panic attacks when she shops and needs to get out of the store.  We discussed possibly adding BuSpar to her regimen but she wants to wait for now.  - Thyroid Stimulating Hormone (TSH)    3. Acute ischemic VBA thalamic stroke, left (H)  She had stroke in April 2019.  Since then she has not had any recurrent neurological symptoms.  She is currently on aspirin and Lipitor.  We did do Holter monitor for 2 weeks which did not show any atrial fibrillation but some premature beats and occasional short runs of V. tach.  Discussed possibility of adding beta-blocker but in the past to cause significant fatigue so we will admit for now.  I did ask you to cut back on Coke which is high on coughing and sugar and can be responsible for palpitations also she reports in the past when she created palpitations have not changed.    4. Hypothyroidism, unspecified type  We will check her levels today  - Thyroid Stimulating Hormone (TSH)    5. Senile osteoporosis  DEXA scan did show osteoporosis in her hips.  She is at a high risk for fracture.  She did have a mechanical fall 2 months ago but did not sustain any trauma.  Will check vitamin D levels.  Ask you to start taking calcium  supplement.  Due to renal insufficiency Fosamax would not be appropriate and she is interested in Prolia shot, will start preauthorization process.  - Vitamin D, Total (25-Hydroxy)    Rui to get new shingles vaccine at the pharmacy.    Selma Meneses MD    Chief Complaint:  Chief Complaint   Patient presents with     Follow-up     routine and discuss sinus issues       History of Present Illness:  [place] is a 89 y.o. female  with  history of lichen sclerosis, status post cholecystectomy, umbilical hernia, hysterectomy, diverticulosis, cystocele, spine DJD, hypothyroidism, vitamin D deficiency, ischemic optic neuropathy, anxiety, high blood pressure, hiatal hernia and chronic renal insufficiency, right thalamic stroke in April 2019.  She is currently here for for follow-up.    Since I last saw her she did have 2-week heart monitor done in follow-up of thalamic stroke that she had in April.  It did not show any atrial fibrillation but occasional PVC and 2 short runs of V. tach.  Patient was asymptomatic.  Currently she does have occasional palpitations more so at night.  In the past she was on a beta-blocker made her very tired.  Blood pressure today is 136 systolically but she reports that at home it was as low as 112-116 and when it so low it makes her dizzy and she often eats a peak goal.  She also reports sometimes when blood pressure is 150 she takes 2 of her losartan medications and would like to have enough on hand.  She does drink Coke.    She did have a fall 2 months ago because she was walking in the dark and tripped on something.  She did not sustain any significant injury.  She did have osteoporosis on bone density test last year.  She does have mild renal insufficiency and we discussed that Prolia shot would be the best treatment for her.  She is interested in that and will start preauthorization.  She does not eat much of dairy except for cheese.  We discussed starting on a calcium  supplement.    Review of Systems:  A comprehensive review of systems was performed and was otherwise negative    PFSH:  Social History: Viewed  Social History     Tobacco Use   Smoking Status Never Smoker   Smokeless Tobacco Never Used     Social History     Social History Narrative    She is .  Her   in  suddenly, but he also had dementia. They were  for 60 years. She has 3 children, 4 grandchildren and 1 great grandchild. 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  One daughter lives with her and she has corticobasal degeneration.       Past History: Reviewed  Current Outpatient Medications   Medication Sig Dispense Refill     aspirin 81 mg chewable tablet Chew 81 mg daily.       atorvastatin (LIPITOR) 40 MG tablet Take 1 tablet (40 mg total) by mouth daily. 90 tablet 3     cholecalciferol, vitamin D3, (VITAMIN D3) 1,000 unit capsule Take 1,000 Units by mouth daily.       cyanocobalamin (VITAMIN B-12) 500 MCG tablet Take 500 mcg by mouth daily.       DOCOSAHEXANOIC ACID/EPA (FISH OIL ORAL) Take 1,200 mg by mouth daily.       escitalopram oxalate (LEXAPRO) 10 MG tablet Take 0.5 tablets (5 mg total) by mouth daily. 90 tablet 2     hydroxychloroquine (PLAQUENIL) 200 mg tablet TAKE 1 TABLET(200 MG) BY MOUTH DAILY 90 tablet 1     levothyroxine (SYNTHROID, LEVOTHROID) 75 MCG tablet TAKE 1 TABLET(75 MCG) BY MOUTH DAILY 90 tablet 3     losartan (COZAAR) 50 MG tablet 1-2 tabs a day based on b/p readings 180 tablet 3     triamcinolone (KENALOG) 0.1 % cream Use twice a day for 2 weeks, then take 1 week off. May repeat. 80 g 1     diclofenac sodium (VOLTAREN) 1 % Gel Apply 3 times a day 100 g 11     No current facility-administered medications for this visit.        Family History: Reviewed    Physical Exam:    Vitals:    20 1154   BP: 136/70   Patient Site: Left Arm   Patient Position: Sitting   Cuff Size:  "Adult Large   Pulse: 74   SpO2: 97%   Weight: 188 lb (85.3 kg)   Height: 5' 3\" (1.6 m)     Wt Readings from Last 3 Encounters:   01/23/20 188 lb (85.3 kg)   01/21/20 187 lb (84.8 kg)   10/16/19 187 lb (84.8 kg)     Body mass index is 33.3 kg/m .    Constitutional:  Reveals a pleasant female.  Vitals:  Per nursing notes.  HEENT:No cervical LAD, no thyromegaly,  conjunctiva is pink, no scleral icterus, TMs are visualized and normal bl, oropharynx is clear, no exudates, crowded  Cardiac:  Regular rate and rhythm,no murmurs, rubs, or gallops. Legs without edema. Palpation of the radial pulse regular.  Lungs: Clear to auscultation bl.  Respiratory effort normal.  Abdomen:positive BS, soft, nontender, nondistended.  No hepato-splenomagaly  Skin:   Without rash, bruise, or palpable lesions.  Rheumatologic: Normal joints and nails of the hands.  Neurologic:  Cranial nerves II-XII intact.     Psychiatric: affect appropriate, memory intact.     Data Review:    Analysis and Summary of Old Records (2): yes      Records Requested (1): no      Other History Summarized (from other people in the room) (2): no    Radiology Tests Summarized (XRAY/CT/MRI/DXA) (1): dexa    Labs Reviewed (1): yes    Medicine Tests Reviewed (EKG/ECHO/COLONOSCOPY/EGD) (1): holter    Independent Review of EKG or X-RAY (2): no      "

## 2021-06-05 NOTE — PATIENT INSTRUCTIONS - HE
1. Get new shingles vaccine    2. Write b/p readings down and bring to next appointment. Goal b/p 110-130. If feeling dizzy, check b/p first , if it is <110 can take a pickle.     3. Need more calcium for bone health: start taking Calcium Citrate 600 mg a day. Try to eat more dairy in the diet.     4. Due to osteoporosis, I would treat with medication for it. Due to Renal insufficiency shot would be best: Prolia. We can do it in the office. Will start pre-authorization  for it today.    5. If having more anxiety, let me know and we can add Buspirone to Escitalopram.

## 2021-06-06 NOTE — TELEPHONE ENCOUNTER
Called pt Virginia to schedule Prolia inj. She wants to know about side affects. Told her our pharmacist can call to discuss.   She'll be gone 12:30-3 today or call her tomorrow.

## 2021-06-06 NOTE — TELEPHONE ENCOUNTER
I spoke with Virginia.  Reviewed her most recent DEXA scan from 2019.  Reviewed benefits versus risks of starting medication for osteoporosis.  Reviewed renal function.  Discussed benefits of Prolia and risks of Prolia.  Identified that she is not taking calcium on a regular basis nor getting regular calcium in her diet.  She is agreeable to initiating 5 to 600 mg of calcium twice daily.  Normal vitamin D level with daily supplementation.  She is planning to have an implant placed soon, and reviewed that we would prefer that she completes her dental work and has this heal for about 2 months prior to initiating Prolia.  She plans to have this dental work completed and will let us know when she is ready for her injection.  Will notify her PCP.    Patient was educated on safety of Prolia utilizing Patient Counseling Chart for Healthcare Providers, as outlined by the Prolia REM[facility] progam.

## 2021-06-07 NOTE — PROGRESS NOTES
"Ruthy Man is a 89 y.o. female who is being evaluated via a billable telephone visit.      The patient has been notified of following:     \"This telephone visit will be conducted via a call between you and your physician/provider. We have found that certain health care needs can be provided without the need for a physical exam.  This service lets us provide the care you need with a short phone conversation.  If a prescription is necessary we can send it directly to your pharmacy.  If lab work is needed we can place an order for that and you can then stop by our lab to have the test done at a later time.    Telephone visits are billed at different rates depending on your insurance coverage. During this emergency period, for some insurers they may be billed the same as an in-person visit.  Please reach out to your insurance provider with any questions.    If during the course of the call the physician/provider feels a telephone visit is not appropriate, you will not be charged for this service.\"    Patient has given verbal consent to a Telephone visit? Yes    Patient would like to receive their AVS by AVS Preference: Valentina. declined     Additional provider notes:     Daisy Rollins CMA    ASSESSMENT AND PLAN:    Diagnoses and all orders for this visit:    Palindromic rheumatism    Primary osteoarthritis involving multiple joints    Palindromic rheumatism involving multiple sites  -     hydroxychloroquine (PLAQUENIL) 200 mg tablet; TAKE 1 TABLET(200 MG) BY MOUTH DAILY  Dispense: 90 tablet; Refill: 1          HISTORY OF PRESENTING ILLNESS:  Ruthy Man 89 y.o. is evaluated here via phone  link.  This patient has palindromic rheumatoid arthritis, osteoarthritis, doing very well with hydroxychloroquine 200 mg once daily.  She has noted no flareups of the palindrome's.  She gets occasional joint symptoms for which she takes Tylenol.  She had eye examination last fall.  We will meet here in 2 months.  Today " we also discussed the issues related to the current pandemic, the pros and cons of the current treatment plan, the CDC guidelines such as social distancing washing the hands covering the cough.  ALLERGIES:Ace inhibitors; Amlodipine; Clarithromycin; Diltiazem hcl; Doxazosin; and Penicillins    PAST MEDICAL/ACTIVE PROBLEMS/MEDICATION/SOCIAL DATA  Past Medical History:   Diagnosis Date     Hypertension      Senile osteoporosis 2/21/2019     Social History     Tobacco Use   Smoking Status Never Smoker   Smokeless Tobacco Never Used     Patient Active Problem List   Diagnosis     Ischemic optic neuropathy     Impaired Fasting Glucose     Hypothyroidism     Premature Ventricular Contractions     Vitamin D deficiency     Skin Cancer     Adjustment disorder with anxiety     Hypertension     Hiatal Hernia     Colon polyps     Senile osteoporosis     Right thalamic stroke (H)     Left knee pain     Acute ischemic VBA thalamic stroke, left (H)     Chronic renal failure, stage 3 (moderate) (H)     Palindromic rheumatism     Primary osteoarthritis involving multiple joints     Chronic right shoulder pain     Current Outpatient Medications   Medication Sig Dispense Refill     aspirin 81 mg chewable tablet Chew 81 mg daily.       atorvastatin (LIPITOR) 40 MG tablet Take 1 tablet (40 mg total) by mouth daily. 90 tablet 3     calcium carbonate (OS-SUE) 600 mg calcium (1,500 mg) tablet Take 600 mg by mouth 2 (two) times a day with meals.       cholecalciferol, vitamin D3, (VITAMIN D3) 1,000 unit capsule Take 1,000 Units by mouth daily.       cyanocobalamin (VITAMIN B-12) 500 MCG tablet Take 500 mcg by mouth daily.       diclofenac sodium (VOLTAREN) 1 % Gel Apply 3 times a day 100 g 11     DOCOSAHEXANOIC ACID/EPA (FISH OIL ORAL) Take 1,200 mg by mouth daily.       escitalopram oxalate (LEXAPRO) 10 MG tablet Take 0.5 tablets (5 mg total) by mouth daily. 90 tablet 2     hydroxychloroquine (PLAQUENIL) 200 mg tablet TAKE 1 TABLET(200 MG)  BY MOUTH DAILY 90 tablet 1     lansoprazole (PREVACID) 15 MG capsule Take 15 mg by mouth as needed. 3-4 times per week       levothyroxine (SYNTHROID, LEVOTHROID) 75 MCG tablet TAKE 1 TABLET(75 MCG) BY MOUTH DAILY 90 tablet 3     losartan (COZAAR) 50 MG tablet 1-2 tabs a day based on b/p readings 180 tablet 3     triamcinolone (KENALOG) 0.1 % cream Use twice a day for 2 weeks, then take 1 week off. May repeat. 80 g 1     Current Facility-Administered Medications   Medication Dose Route Frequency Provider Last Rate Last Dose     denosumab 60 mg (PROLIA 60 mg/ml)  60 mg Subcutaneous Once Selma Meneses MD             EXAMINATION:  Phone visit      LAB / IMAGING DATA:  ALT   Date Value Ref Range Status   10/16/2019 10 0 - 45 U/L Final   05/23/2019 <9 0 - 45 U/L Final   06/28/2018 11 0 - 45 U/L Final     Albumin   Date Value Ref Range Status   10/16/2019 3.7 3.5 - 5.0 g/dL Final   05/23/2019 3.3 (L) 3.5 - 5.0 g/dL Final   06/28/2018 3.6 3.5 - 5.0 g/dL Final     Creatinine   Date Value Ref Range Status   01/23/2020 1.33 (H) 0.60 - 1.10 mg/dL Final   10/16/2019 1.31 (H) 0.60 - 1.10 mg/dL Final   06/10/2019 1.38 (H) 0.60 - 1.10 mg/dL Final       WBC   Date Value Ref Range Status   10/16/2019 7.7 4.0 - 11.0 thou/uL Final   05/23/2019 8.7 4.0 - 11.0 thou/uL Final   02/25/2015 6.8 4.0 - 11.0 thou/uL Final   08/13/2014 5.7 4.0 - 11.0 thou/uL Final     Hemoglobin   Date Value Ref Range Status   10/16/2019 13.0 12.0 - 16.0 g/dL Final   05/23/2019 13.2 12.0 - 16.0 g/dL Final   04/11/2019 13.4 12.0 - 16.0 g/dL Final     Platelets   Date Value Ref Range Status   10/16/2019 309 140 - 440 thou/uL Final   05/23/2019 333 140 - 440 thou/uL Final   04/11/2019 254 140 - 440 thou/uL Final       Lab Results   Component Value Date    RF <15.0 05/23/2019    SEDRATE 25 (H) 08/06/2019     Duration of the call:5  Minutes  Call start: 2.59  pm  Call end:   3.04 pm

## 2021-06-07 NOTE — TELEPHONE ENCOUNTER
"Per Marisela Loyd 1/23/20 notes:  \"Pt is planning to have an implant placed soon, and reviewed that we would prefer that she completes her dental work and has this heal for about 2 months prior to initiating Prolia.  She plans to have this dental work completed and will let us know when she is ready for her injection.  Will notify her PCP.\"    Can you call patient to see if she is ready for a Prolia injection?     Thanks!  "

## 2021-06-08 NOTE — TELEPHONE ENCOUNTER
Virginia is still waiting to have the dental work done. She will contact us when she is able to have it.

## 2021-06-09 NOTE — TELEPHONE ENCOUNTER
Refill Approved    Rx renewed per Medication Renewal Policy. Medication was last renewed on 10/16/19, last OV 1/23/20.    Viviana Mcmillan, [facility] Triage/Med Refill 7/11/2020     Requested Prescriptions   Pending Prescriptions Disp Refills     atorvastatin (LIPITOR) 40 MG tablet [Pharmacy Med Name: ATORVASTATIN 40MG TABLETS] 90 tablet 3     Sig: TAKE 1 TABLET(40 MG) BY MOUTH DAILY       Statins Refill Protocol (Hmg CoA Reductase Inhibitors) Passed - 7/9/2020  3:16 AM        Passed - PCP or prescribing provider visit in past 12 months      Last office visit with prescriber/PCP: 1/23/2020 Selma Meneses MD OR same dept: 1/23/2020 Selma Meneses MD OR same specialty: 1/23/2020 Selma Meneses MD  Last physical: Visit date not found Last MTM visit: Visit date not found   Next visit within 3 mo: Visit date not found  Next physical within 3 mo: Visit date not found  Prescriber OR PCP: Selma Meneses MD  Last diagnosis associated with med order: 1. Right thalamic stroke (H)  - atorvastatin (LIPITOR) 40 MG tablet [Pharmacy Med Name: ATORVASTATIN 40MG TABLETS]; TAKE 1 TABLET(40 MG) BY MOUTH DAILY  Dispense: 90 tablet; Refill: 3    If protocol passes may refill for 12 months if within 3 months of last provider visit (or a total of 15 months).

## 2021-06-09 NOTE — PROGRESS NOTES
"Ruthy Man is a 89 y.o. female who is being evaluated via a billable telephone visit.      The patient has been notified of following:     \"This telephone visit will be conducted via a call between you and your physician/provider. We have found that certain health care needs can be provided without the need for a physical exam.  This service lets us provide the care you need with a short phone conversation.  If a prescription is necessary we can send it directly to your pharmacy.  If lab work is needed we can place an order for that and you can then stop by our lab to have the test done at a later time.    Telephone visits are billed at different rates depending on your insurance coverage. During this emergency period, for some insurers they may be billed the same as an in-person visit.  Please reach out to your insurance provider with any questions.    If during the course of the call the physician/provider feels a telephone visit is not appropriate, you will not be charged for this service.\"    Patient has given verbal consent to a Telephone visit? Yes    What phone number would you like to be contacted at? [phone]     Patient would like to receive their AVS by AVS Preference: Valentina. declined       ASSESSMENT AND PLAN:    Diagnoses and all orders for this visit:    Primary osteoarthritis involving multiple joints    Palindromic rheumatism    Chronic renal failure, stage 3 (moderate) (H)          HISTORY OF PRESENTING ILLNESS:  Ruthy Man 89 y.o. is evaluated here via audio link.  She is doing great with the current regimen of hydroxychloroquine every other day at 200 mg had her eyes examined.  There is been no flareup.  With the background of renal impairment grade 3 she is aware not to take nonsteroidals.  She reported no pain in her knees, shoulders or swelling of the small joints of the hands.  There is no history of rash such as psoriasis.  She takes Tylenol only occasionally.  We will meet " here in the next 3 to 4 months or sooner if needed.   ROS enquiry held for fever, ocular symptoms, rash, headache,  GI issues.  Today we also discussed the issues related to the current pandemic, the pros and cons of the current treatment plan, the CDC guidelines such as social distancing washing the hands covering the cough.  ALLERGIES:Ace inhibitors; Amlodipine; Clarithromycin; Diltiazem hcl; Doxazosin; and Penicillins    PAST MEDICAL/ACTIVE PROBLEMS/MEDICATION/SOCIAL DATA  Past Medical History:   Diagnosis Date     Hypertension      Senile osteoporosis 2/21/2019     Social History     Tobacco Use   Smoking Status Never Smoker   Smokeless Tobacco Never Used     Patient Active Problem List   Diagnosis     Ischemic optic neuropathy     Impaired Fasting Glucose     Hypothyroidism     Premature Ventricular Contractions     Vitamin D deficiency     Skin Cancer     Adjustment disorder with anxiety     Hypertension     Hiatal Hernia     Colon polyps     Senile osteoporosis     Right thalamic stroke (H)     Left knee pain     Acute ischemic VBA thalamic stroke, left (H)     Chronic renal failure, stage 3 (moderate) (H)     Palindromic rheumatism     Primary osteoarthritis involving multiple joints     Chronic right shoulder pain     Current Outpatient Medications   Medication Sig Dispense Refill     aspirin 81 mg chewable tablet Chew 81 mg daily.       atorvastatin (LIPITOR) 40 MG tablet Take 1 tablet (40 mg total) by mouth daily. 90 tablet 3     calcium carbonate (OS-SUE) 600 mg calcium (1,500 mg) tablet Take 600 mg by mouth 2 (two) times a day with meals.       cholecalciferol, vitamin D3, (VITAMIN D3) 1,000 unit capsule Take 1,000 Units by mouth daily.       cyanocobalamin (VITAMIN B-12) 500 MCG tablet Take 500 mcg by mouth daily.       diclofenac sodium (VOLTAREN) 1 % Gel Apply 3 times a day 100 g 11     DOCOSAHEXANOIC ACID/EPA (FISH OIL ORAL) Take 1,200 mg by mouth daily.       escitalopram oxalate (LEXAPRO) 10 MG  tablet Take 0.5 tablets (5 mg total) by mouth daily. 90 tablet 2     hydroxychloroquine (PLAQUENIL) 200 mg tablet TAKE 1 TABLET(200 MG) BY MOUTH DAILY 90 tablet 1     lansoprazole (PREVACID) 15 MG capsule Take 15 mg by mouth as needed. 3-4 times per week       levothyroxine (SYNTHROID, LEVOTHROID) 75 MCG tablet TAKE 1 TABLET(75 MCG) BY MOUTH DAILY 90 tablet 3     losartan (COZAAR) 50 MG tablet 1-2 tabs a day based on b/p readings 180 tablet 3     triamcinolone (KENALOG) 0.1 % cream Use twice a day for 2 weeks, then take 1 week off. May repeat. 80 g 1     Current Facility-Administered Medications   Medication Dose Route Frequency Provider Last Rate Last Dose     denosumab 60 mg (PROLIA 60 mg/ml)  60 mg Subcutaneous Once Selma Meneses MD             EXAMINATION: She is alert oriented sounded comfortable, normal speech  LAB / IMAGING DATA:  ALT   Date Value Ref Range Status   10/16/2019 10 0 - 45 U/L Final   05/23/2019 <9 0 - 45 U/L Final   06/28/2018 11 0 - 45 U/L Final     Albumin   Date Value Ref Range Status   10/16/2019 3.7 3.5 - 5.0 g/dL Final   05/23/2019 3.3 (L) 3.5 - 5.0 g/dL Final   06/28/2018 3.6 3.5 - 5.0 g/dL Final     Creatinine   Date Value Ref Range Status   01/23/2020 1.33 (H) 0.60 - 1.10 mg/dL Final   10/16/2019 1.31 (H) 0.60 - 1.10 mg/dL Final   06/10/2019 1.38 (H) 0.60 - 1.10 mg/dL Final       WBC   Date Value Ref Range Status   10/16/2019 7.7 4.0 - 11.0 thou/uL Final   05/23/2019 8.7 4.0 - 11.0 thou/uL Final   02/25/2015 6.8 4.0 - 11.0 thou/uL Final   08/13/2014 5.7 4.0 - 11.0 thou/uL Final     Hemoglobin   Date Value Ref Range Status   10/16/2019 13.0 12.0 - 16.0 g/dL Final   05/23/2019 13.2 12.0 - 16.0 g/dL Final   04/11/2019 13.4 12.0 - 16.0 g/dL Final     Platelets   Date Value Ref Range Status   10/16/2019 309 140 - 440 thou/uL Final   05/23/2019 333 140 - 440 thou/uL Final   04/11/2019 254 140 - 440 thou/uL Final       Lab Results   Component Value Date    RF <15.0 05/23/2019    SEDRATE  25 (H) 08/06/2019     Duration of the call:5  Minutes  Call start: 1113  am  Call end:   1118 am

## 2021-06-10 NOTE — TELEPHONE ENCOUNTER
Please let pt know results:  Blood work looks good: sugars are pre-diabetic but not worse, Kidney function is stable, red cell count is good. Thyroid level is in good range, but for her age I still want to decrease dose of Synthroid slightly: She can take synthroid  Pill daily 6 days a week and skip 1 day a week. Repeat blood work in 2 months.    I still would like her to see cardiology to follow up on palpitations and abnormal hear monitor.

## 2021-06-10 NOTE — PATIENT INSTRUCTIONS - HE
1. Continue Losartan. I would like you to try beta blocker to help decrease arrhythmias seen on heart monitor, but would wait since you did not tolerate Atenolol (it's in the same class).    2. See cardiology( Dr Ley) to consider stress test due to arrhythmia on heart monitor.    3.  Get new shingles vaccine and tetanus booster at the pharmacy.    4. Check oxygen level at night (at home).

## 2021-06-10 NOTE — TELEPHONE ENCOUNTER
Spoke with pt and relayed pcp message.  Pt understanding.  Pt reports she has been taking 75 mcg of levothyroxine 6 days a week for the past year.

## 2021-06-10 NOTE — PROGRESS NOTES
[facility] Follow Up Note    Assessment/Plan:    1. Fatigue, unspecified type  Could be multifactorial.  In January her hemoglobin level and thyroid level was normal but will repeat.  Suspect that she might have a degree of obstructive sleep apnea.  She is amicable to try nocturnal pulse oximetry test.  - Ambulatory referral to Cardiology    2. Palpitations  This happen more frequently at night, not so I would like to check nocturnal pulse oximetry.  She had heart monitor for a week last year which showed no atrial fibrillation but several runs of nonsustained V. tach.  Discussed that I would like her to start on a beta-blocker but in the past patient has been given atenolol and had a lot of side effects from it.  Currently she is not experiencing exertional chest pain but I wonder if her fatigue could be heart related as well.  Echocardiogram last year was fairly normal.  I recommended that she sees cardiology to help manage palpitations and abnormal finding on heart monitor, question whether she might benefit from a stress test given runs of nonsustained V. tach.  Overall she is a very functional 89-year-old.  - Basic Metabolic Panel  - Thyroid Stimulating Hormone (TSH)  - Ambulatory referral to Cardiology    3. Ventricular tachycardia, non-sustained (H)  See #2  - HM1(CBC and Differential)  - Basic Metabolic Panel  - Thyroid Stimulating Hormone (TSH)  - Ambulatory referral to Cardiology  - HM1 (CBC with Diff)    4. Essential hypertension  Controlled for her age on losartan.  She will continue on it.  - HM1(CBC and Differential)  - Basic Metabolic Panel  - Thyroid Stimulating Hormone (TSH)  - Ambulatory referral to Cardiology  - HM1 (CBC with Diff)  - losartan (COZAAR) 50 MG tablet; One tab a day  Dispense: 180 tablet; Refill: 3    5. Abnormal glucose  Previous A1c was 6.1, we are monitoring it for now  - Glycosylated Hemoglobin A1c    6. Senile osteoporosis  She does have frequent falls.  She has  renal insufficiency and currently is planning to have a dental implant placed.  All that was delayed due to coronavirus.  I did order Prolia injection for her which was preauthorized.  Discussed to let us know when she is ready for it, generally 2 months after implant has healed.    7. Falls frequently  Patient declined prescription for a walker    8. Palindromic rheumatism  She is followed by rheumatologist and currently on Plaquenil.  She does have regular eye checks    9. Chronic renal failure, stage 3 (moderate) (H)  Creatinine around 1.3 and has been stable.      Selma Meneses MD    Chief Complaint:  Chief Complaint   Patient presents with     Fatigue     tired all the time- lack of energy     Irregular Heart Beat     Hypertension     blood pressure seems low lately       History of Present Illness:  Virginia is a 89 y.o. female with  history of lichen sclerosis, status post cholecystectomy, umbilical hernia, hysterectomy, diverticulosis, cystocele, spine DJD, hypothyroidism, vitamin D deficiency, ischemic optic neuropathy, anxiety, high blood pressure, hiatal hernia and chronic renal insufficiency, right thalamic stroke in April 2019.  She is currently here for for follow-up.    She is 19 now but very functional and still does a lot of her own housework but she complains about fatigue especially in the morning and palpitations at night.  She has had history of right thalamic stroke in 2019 and previously had ischemic optic neuropathy.  Previously we did do heart monitor in October of last year for 1 week, it did not show atrial fibrillation but did show short runs of nonsustained V. tach.  Patient denies any chest pains or palpitations.  In the past she has been on beta-blocker and it gave her side effects so she is hesitant to try it again.  For hypertension she is on losartan 50 mg a day and blood pressure usually at home is in 130s.  Currently she does a lot of gardening and cooks for 3 people but just  feels tired.    She does have propensity to falls.  She is followed by ophthalmologist.  She had osteoporosis on recent bone density test.  Due to renal insufficiency we did not start her on biphosphonate and Prolia is currently delayed because she still unable to finish work on her tooth implant.  She is on vitamin D supplements.    Review of Systems:  A comprehensive review of systems was performed and was otherwise negative.  She does get up several times at night to urinate.  She denies any significant lower extremity edema.  During the day she does not need to urinate as much.    PFSH:  Social History: Reviewed  Social History     Tobacco Use   Smoking Status Never Smoker   Smokeless Tobacco Never Used     Social History     Social History Narrative    She is .  Her   in  suddenly, but he also had dementia. They were  for 60 years. She has 3 children, 4 grandchildren and 1 great grandchild. 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  One daughter lives with her and she has corticobasal degeneration.       Past History: Reviewed  Current Outpatient Medications   Medication Sig Dispense Refill     aspirin 81 mg chewable tablet Chew 81 mg daily.       atorvastatin (LIPITOR) 40 MG tablet Take 1 tablet (40 mg total) by mouth daily. 90 tablet 1     cholecalciferol, vitamin D3, (VITAMIN D3) 1,000 unit capsule Take 1,000 Units by mouth daily.       cyanocobalamin (VITAMIN B-12) 500 MCG tablet Take 500 mcg by mouth daily.       DOCOSAHEXANOIC ACID/EPA (FISH OIL ORAL) Take 1,200 mg by mouth daily.       escitalopram oxalate (LEXAPRO) 10 MG tablet Take 0.5 tablets (5 mg total) by mouth daily. 90 tablet 2     hydroxychloroquine (PLAQUENIL) 200 mg tablet TAKE 1 TABLET(200 MG) BY MOUTH DAILY 90 tablet 1     lansoprazole (PREVACID) 15 MG capsule Take 15 mg by mouth as needed. 3-4 times per week       levothyroxine  (SYNTHROID, LEVOTHROID) 75 MCG tablet TAKE 1 TABLET(75 MCG) BY MOUTH DAILY 90 tablet 3     losartan (COZAAR) 50 MG tablet One tab a day 180 tablet 3     triamcinolone (KENALOG) 0.1 % cream Use twice a day for 2 weeks, then take 1 week off. May repeat. 80 g 1     calcium carbonate (OS-SUE) 600 mg calcium (1,500 mg) tablet Take 600 mg by mouth 2 (two) times a day with meals.       diclofenac sodium (VOLTAREN) 1 % Gel Apply 3 times a day 100 g 11     Current Facility-Administered Medications   Medication Dose Route Frequency Provider Last Rate Last Dose     denosumab 60 mg (PROLIA 60 mg/ml)  60 mg Subcutaneous Once Selma Meneses MD           Family History: Reviewed    Physical Exam:    Vitals:    07/29/20 1130   BP: 138/74   Patient Site: Right Arm   Patient Position: Sitting   Pulse: 72   Temp: 98.8  F (37.1  C)   SpO2: 94%   Weight: 193 lb (87.5 kg)     Wt Readings from Last 3 Encounters:   07/29/20 193 lb (87.5 kg)   01/23/20 188 lb (85.3 kg)   01/21/20 187 lb (84.8 kg)     Body mass index is 34.19 kg/m .    Constitutional:  Reveals a pleasant elderly female overall appears younger stated age.  Vitals:  Per nursing notes.  HEENT:No cervical LAD, no thyromegaly,  conjunctiva is pink, no scleral icterus, TMs are visualized and normal bl, oropharynx is clear, no exudates, but very crowded  Cardiac:  Regular rate and rhythm,no murmurs, rubs, or gallops. Legs without edema. Palpation of the radial pulse regular.  Lungs: Clear to auscultation bl.  Respiratory effort normal.  Abdomen:positive BS, soft, nontender, nondistended.  No hepato-splenomagaly  Skin:   Without rash, bruise, or palpable lesions.  Rheumatologic: Moderate arthritic changes noted in her hands  Neurologic:  Cranial nerves II-XII intact.     Psychiatric: affect appropriate, memory intact.       Data Review:    Analysis and Summary of Old Records (2): yes      Records Requested (1): no      Other History Summarized (from other people in the room)  (2): no    Radiology Tests Summarized (XRAY/CT/MRI/DXA) (1): dexa    Labs Reviewed (1): yes    Medicine Tests Reviewed (EKG/ECHO/COLONOSCOPY/EGD) (1): no    Independent Review of EKG or X-RAY (2): no

## 2021-06-11 NOTE — PROGRESS NOTES
"[facility]    Ruthy Man   87 y.o. female    Date of Visit: 2017    Chief Complaint   Patient presents with     Cough     Subjective  Virginia comes in today as she has been having a cough productive of white yellowish phlegm.  She has been having it for the past 3 months.  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  She denies any other problems but wonders why she is fatigued during the day.  She only sleeps about 4-5 hours a night and rarely takes a nap.    ROS A comprehensive review of systems was performed and was otherwise negative    Social History:   Social History     Social History Narrative    She is .  Her   in  suddenly, but he also had dementia. They were  for 60 years. She has 3 children, 4 grandchildren and 1 great grandchild. 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  One daughter lives with her and she has corticobasal degeneration.       Medications were reconciled.  Allergies, social and family history, and the problem list were all reviewed and updated.      Exam  General Appearance: Pleasant and alert  Vitals:    17 0719   BP: 138/80   Pulse: 90   Resp: 12   Height: 5' 5\" (1.651 m)      There is no height or weight on file to calculate BMI.  Wt Readings from Last 3 Encounters:   11/15/16 196 lb (88.9 kg)   10/18/16 196 lb 3.2 oz (89 kg)   02/25/15 196 lb 9.6 oz (89.2 kg)     HEENT: Sclera are clear.   Lungs: Normal respirations, upper airway congestion  Cardiac: Regular rate and rhythm.  Abdomen: Soft and nondistended  Extremities: No edema  Skin: No rashes  Neuro: Moves all extremities and has facial symmetry  Gait: Ambulates with a normal gait    Assessment/Plan  1. Cough  We will give her some a azithromycin.  Call if not improving.    2. Vitamin B 12 deficiency  She has had lower end of normal B12 levels in the past.  She " is not consistent in taking her B12 supplement.  Will check a level today.  - Vitamin B12    3. Vitamin D deficiency  Admits that she is not consistent in taking her vitamin D.  - Vitamin D, Total (25-Hydroxy)    4. Hypothyroidism, unspecified type  Check a level today.  - Thyroid Stimulating Hormone (TSH)    5. Essential hypertension with goal blood pressure less than 140/90  Blood pressures currently stable today.  - Basic Metabolic Panel  - HM2(CBC w/o Differential)  - Urinalysis-UC if Indicated          April D MD Zoe  6/30/2017    Much or all of the text in this note was generated through the use of Dragon Dictate voice-to-text software. Errors in spelling or words which seem out of context are unintentional. Sound alike errors, in particular, may have escaped editing.

## 2021-06-11 NOTE — TELEPHONE ENCOUNTER
Medication Question or Clarification  Who is calling: Patient  What medication are you calling about (include dose and sig)?:   escitalopram oxalate (LEXAPRO) 10 MG tablet  90 tablet  2  10/16/2019      Sig - Route: Take 0.5 tablets (5 mg total) by mouth daily. - Oral     Class: No Print     Notes to Pharmacy: **Patient requests 90 days supply**       levothyroxine (SYNTHROID, LEVOTHROID) 75 MCG tablet  90 tablet  3  7/31/2020      Sig: TAKE 1 TABLET(75 MCG) BY MOUTH DAILY 5 days a week, skip dose 2 days a week     Class: No Print         Who prescribed the medication?:   Selma Meneses MD    What is your question/concern?:   Please re-send above scripts to Pharmacy as they went to: No Print.  Thank you.    [facility]ed [facility]: [id number]    Okay to leave a detailed message?: Yes

## 2021-06-11 NOTE — PATIENT INSTRUCTIONS - HE
Ruthy Man,    It was a pleasure to see you today at the [facility].     My recommendations after this visit include:    Call if you develop chest pains    INESSA Lopez MD, FACC, CORY

## 2021-06-11 NOTE — TELEPHONE ENCOUNTER
Wellness Screening Tool  Symptom Screening:  Do you have one of the following NEW symptoms:    Fever (subjective or >100.0)?  No    A new cough?  No    Shortness of breath?  No     Chills? No     New loss of taste or smell? No     Generalized body aches? No     New persistent headache? No     New sore throat? No     Nausea, vomiting, or diarrhea?  No    Within the past 2 weeks, have you been exposed to someone with a known positive illness below:    COVID-19 (known or suspected)?  No    Chicken pox?  No    Mealses?  No    Pertussis?  No    Patient notified of visitor policy- They may have one person accompany them to their appointment, but they will need to wear a mask and will be screened upon arrival for symptoms:  Yes  Pt informed to wear a mask: Yes  Pt notified if they develop any symptoms listed above, prior to their appointment, they are to call the clinic directly at [phone] for further instructions.  Yes  Patient's appointment status: Patient will be seen in clinic as scheduled on tomorrow with SUMMER          56y Female presents with LEFT comminuted patella fracture s/p mechanical fall on 4/19 with dysuria, urine retention, Aerococcus uti, multiple drug allergies     Arash Duarte  Attending Physician   Division of Infectious Disease  Pager #[phone]  Available on Microsoft Teams also  After 5pm/weekend or no response, call #[phone]    D/w Dr. Duckworth

## 2021-06-12 NOTE — PATIENT INSTRUCTIONS - HE
1. Can try medication for frequent urination to help with sleep and fatigue    2. If mood is getting worse during winter, let me know and we can try higher dose of Lexapro    3. Ronnie shingles vaccine at the pharmacy    4. Let me know if palpitations happen more often and we can repeat heart monitor.

## 2021-06-12 NOTE — PROGRESS NOTES
"Prolia Injection Phone Screen      Screening questions have been asked 2-3 days prior to administration visit for Prolia. If any questions are answered with \"Yes,\" this phone encounter were will routed to ordering provider for further evaluation.     1.  When was the last injection?  NEW    2.  Has insurance for this injection been verified?  Yes    3.  Did you experience any new onset achiness or rashes that lasted for over a month with your previous Prolia injection?   NEW    4.  Do you have a fever over 101?F or a new deep cough that is unusual for you today? No    5.  Have you started any new medications in the last 6 months that you were told could affect your immune system? These may have been prescribed by oncologist, transplant, rheumatology, or dermatology.   No    6.  In the last 6 months have you have gastric bypass or parathyroid surgery?   No    7.  Do you plan dental work requiring drilling into the bone such as implants/extractions or oral surgery in the next 2-3 months?   No    8. Do you have new insurance since the last injection?    Patient informed if symptoms discussed above present prior to their administration appointment, they are to notify clinic immediately.     Georgie Ruggiero              "

## 2021-06-13 NOTE — PROGRESS NOTES
ASSESSMENT AND PLAN:  Ruthy Man 89 y.o. female is seen here on 11/24/20 for follow-up.  This is for palindromic, rheumatoid arthritis, doing great on hydroxychloroquine only.  She had eye exam recently.  She has noted pain in her knees, right side more likely osteoarthritis management principles were reviewed.  Follow-up here in 6 months.               Diagnoses and all orders for this visit:    Palindromic rheumatism    Palindromic rheumatism involving multiple sites  -     hydrOXYchloroQUINE (PLAQUENIL) 200 mg tablet; TAKE 1 TABLET(200 MG) BY MOUTH DAILY  Dispense: 90 tablet; Refill: 1    Primary osteoarthritis involving multiple joints      HISTORY OF PRESENTING ILLNESS:  Ruthy Man, 89 y.o., female is here for follow-up of palindromic rheumatism, osteoarthritis, and the background of renal impairment doing great on hydroxychloroquine, at a reduced dose of 200 mg daily.  She reports excellent control with hydroxychloroquine as far as her RA symptoms are concerned she has not had any palindrome's.  She has noted pain in the knee.  This is the right side.  Worse with activity.  On the lateral aspect.  She does not think there is swelling.  Does not wake her up from sleep.  Given the past history of renal impairment not a candidate for any nonsteroidals.  We discussed options  Following is the excerpt from a recent note: .  There is no personal family history of psoriasis ulcerative colitis or Crohn's disease.  Her work-up at her primary physician's office shows elevated sedimentation rate at 55, negative rheumatoid factor and anti-CCP antibody and GEORGI.  X-rays of the hands which are personally reviewed show degenerative arthropathy affecting multiple joints in the hands especially the IP joints but also in particular to be noted is the involvement of the second right MCP with destructive arthropathy.  She does not have chondrocalcinosis.  She describes herself otherwise in good health.  Stroke left her  left optic nerve damage to she has vision in the right eye only.  She lives by herself.  They have 2 children live on the same street.  She does not smoke and does not take alcohol. 0 Further historical information and ADL limitations as noted in the multidimensional health assessment questionnaire attached in the EMR.     ALLERGIES:Ace inhibitors, Amlodipine, Clarithromycin, Diltiazem hcl, Doxazosin, and Penicillins    PAST MEDICAL/ACTIVE PROBLEMS/MEDICATION/ FAMILY HISTORY/SOCIAL DATA:  The patient has a family history of  Past Medical History:   Diagnosis Date     Hypertension      Senile osteoporosis 2/21/2019     Social History     Tobacco Use   Smoking Status Never Smoker   Smokeless Tobacco Never Used     Patient Active Problem List   Diagnosis     Ischemic optic neuropathy     Impaired Fasting Glucose     Hypothyroidism     Premature Ventricular Contractions     Vitamin D deficiency     Skin Cancer     Adjustment disorder with anxiety     Hypertension     Hiatal Hernia     Colon polyps     Senile osteoporosis     Right thalamic stroke (H)     Left knee pain     Acute ischemic VBA thalamic stroke, left (H)     Chronic renal failure, stage 3 (moderate)     Palindromic rheumatism     Primary osteoarthritis involving multiple joints     Chronic right shoulder pain     Palpitations     Current Outpatient Medications   Medication Sig Dispense Refill     aspirin 81 mg chewable tablet Chew 81 mg daily.       atorvastatin (LIPITOR) 40 MG tablet Take 1 tablet (40 mg total) by mouth daily. 90 tablet 1     calcium carbonate (OS-SUE) 600 mg calcium (1,500 mg) tablet Take 600 mg by mouth 2 (two) times a day with meals.       cholecalciferol, vitamin D3, (VITAMIN D3) 1,000 unit capsule Take 1,000 Units by mouth daily.       clobetasoL (TEMOVATE) 0.05 % ointment Apply 0.5 mg daily as needed 60 g 4     cyanocobalamin (VITAMIN B-12) 500 MCG tablet Take 500 mcg by mouth daily.       diclofenac sodium (VOLTAREN) 1 % Gel Apply 3  times a day 100 g 11     DOCOSAHEXANOIC ACID/EPA (FISH OIL ORAL) Take 1,200 mg by mouth daily.       escitalopram oxalate (LEXAPRO) 5 MG tablet Take 1 tablet (5 mg total) by mouth daily. 90 tablet 3     hydroxychloroquine (PLAQUENIL) 200 mg tablet TAKE 1 TABLET(200 MG) BY MOUTH DAILY 90 tablet 1     lansoprazole (PREVACID) 15 MG capsule Take 15 mg by mouth as needed. 3-4 times per week       levothyroxine (SYNTHROID, LEVOTHROID) 75 MCG tablet TAKE 1 TABLET(75 MCG) BY MOUTH DAILY 5 days a week, skip dose 2 days a week 90 tablet 3     losartan (COZAAR) 50 MG tablet One tab a day 180 tablet 3     oxybutynin (DITROPAN) 5 MG tablet Take on tab by mouth at bed time 90 tablet 3     triamcinolone (KENALOG) 0.1 % cream Use twice a day for 2 weeks, then take 1 week off. May repeat. 80 g 1     No current facility-administered medications for this visit.      DETAILED EXAMINATION  11/24/20  :  Vitals:    11/24/20 1457   BP: 130/72   Patient Site: Right Arm   Patient Position: Sitting   Cuff Size: Adult Large   Pulse: 92   Weight: 196 lb (88.9 kg)     Alert oriented. Head including the face is examined for malar rash, heliotropes, scarring, lupus pernio. Eyes examined for redness such as in episcleritis/scleritis, periorbital lesions.   Neck/ Face examined for parotid gland swelling, range of motion of neck.  Left upper and lower and right upper and lower extremities examined for tenderness, swelling, warmth of the appendicular joints, range of motion, edema, rash.  Some of the important findings included: she no synovitis of palpable joints of upper extremity minimal impingement of the right shoulder.  Joint line tenderness of the right knee.      LAB / IMAGING DATA:  ALT   Date Value Ref Range Status   11/04/2020 10 0 - 45 U/L Final   10/16/2019 10 0 - 45 U/L Final   05/23/2019 <9 0 - 45 U/L Final     Albumin   Date Value Ref Range Status   11/04/2020 3.7 3.5 - 5.0 g/dL Final   10/16/2019 3.7 3.5 - 5.0 g/dL Final   05/23/2019  3.3 (L) 3.5 - 5.0 g/dL Final     Creatinine   Date Value Ref Range Status   11/04/2020 1.41 (H) 0.60 - 1.10 mg/dL Final   07/29/2020 1.36 (H) 0.60 - 1.10 mg/dL Final   01/23/2020 1.33 (H) 0.60 - 1.10 mg/dL Final       WBC   Date Value Ref Range Status   07/29/2020 6.4 4.0 - 11.0 thou/uL Final   10/16/2019 7.7 4.0 - 11.0 thou/uL Final   02/25/2015 6.8 4.0 - 11.0 thou/uL Final   08/13/2014 5.7 4.0 - 11.0 thou/uL Final     Hemoglobin   Date Value Ref Range Status   07/29/2020 12.4 12.0 - 16.0 g/dL Final   10/16/2019 13.0 12.0 - 16.0 g/dL Final   05/23/2019 13.2 12.0 - 16.0 g/dL Final     Platelets   Date Value Ref Range Status   07/29/2020 250 140 - 440 thou/uL Final   10/16/2019 309 140 - 440 thou/uL Final   05/23/2019 333 140 - 440 thou/uL Final       Lab Results   Component Value Date    RF <15.0 05/23/2019    SEDRATE 25 (H) 08/06/2019

## 2021-06-14 NOTE — TELEPHONE ENCOUNTER
Refill Approved    Rx renewed per Medication Renewal Policy. Medication was last renewed on 7/11/20.    Ana Llanes, [facility] Triage/Med Refill 1/7/2021     Requested Prescriptions   Pending Prescriptions Disp Refills     atorvastatin (LIPITOR) 40 MG tablet [Pharmacy Med Name: ATORVASTATIN 40MG TABLETS] 90 tablet 1     Sig: TAKE 1 TABLET(40 MG) BY MOUTH DAILY       Statins Refill Protocol (Hmg CoA Reductase Inhibitors) Passed - 1/6/2021  1:22 PM        Passed - PCP or prescribing provider visit in past 12 months      Last office visit with prescriber/PCP: 11/4/2020 Selma Meneses MD OR same dept: 1/23/2020 Selma Meneses MD OR same specialty: 11/4/2020 Selma Meneses MD  Last physical: Visit date not found Last MTM visit: Visit date not found   Next visit within 3 mo: Visit date not found  Next physical within 3 mo: Visit date not found  Prescriber OR PCP: Selma Mneeses MD  Last diagnosis associated with med order: 1. Right thalamic stroke (H)  - atorvastatin (LIPITOR) 40 MG tablet [Pharmacy Med Name: ATORVASTATIN 40MG TABLETS]; TAKE 1 TABLET(40 MG) BY MOUTH DAILY  Dispense: 90 tablet; Refill: 1    If protocol passes may refill for 12 months if within 3 months of last provider visit (or a total of 15 months).

## 2021-06-15 NOTE — PROGRESS NOTES
"[facility] Follow Up Note    Ruthy Man   88 y.o. female    Date of Visit: 1/3/2018    Chief Complaint   Patient presents with     Abdominal Pain     pt has pain in stomach area from right under chest to belly-button. pt says it is more of a \"funny feeling\" not pain really. pt says she thinks it is a digestion issue.      Laurie Bliss comes in today due to some abdominal discomfort and changes in her bowel habits.  She has had this off and on in the past but currently has been lasting about a month.  She feels as though she is not digesting her food properly.  She takes 2 senna a day and sometimes her food comes out undigested in the loose stool.  Sometimes she has mucousy blood mixed in with her stool.  Done a CT scan in the last couple years which was normal.  She has had a colonoscopy which was normal.  She realizes she worries a lot as her daughter lives with her and has a degenerative neurological problem.    ROS A comprehensive review of systems was performed and was otherwise negative    Social History:   Social History     Social History Narrative    She is .  Her   in  suddenly, but he also had dementia. They were  for 60 years. She has 3 children, 4 grandchildren and 1 great grandchild. 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  One daughter lives with her and she has corticobasal degeneration.       Medications were reconciled.  Allergies, social and family history, and the problem list were all reviewed and updated.      Exam  General Appearance: Pleasant and alert  Vitals:    18 1223   BP: 128/72   Patient Site: Left Arm   Patient Position: Sitting   Cuff Size: Adult Regular   Pulse: 71      There is no height or weight on file to calculate BMI.  Wt Readings from Last 3 Encounters:   11/15/16 196 lb (88.9 kg)   10/18/16 196 lb 3.2 oz (89 kg)   02/25/15 196 lb " 9.6 oz (89.2 kg)     HEENT: Sclera are clear.   Lungs: Normal respirations  Cardiac: Regular rate and rhythm  Abdomen: Soft and nondistended  Extremities: No edema  Skin: No rashes  Neuro: Moves all extremities and has facial symmetry  Gait: Ambulates with a normal gait    Assessment/Plan  1. Abdominal pain  The irritable bowel versus some microscopic colitis.  We will start her on some Pepto-Bismol to take for the next 4 weeks.  Check some labs.  - Basic Metabolic Panel  - HM2(CBC w/o Differential)    2. Need for influenza vaccination  - Influenza High Dose, Seasonal 65+ yrs; Standing    3. Vitamin D deficiency  - Vitamin D, Total (25-Hydroxy)    4. Vitamin B 12 deficiency  Tries to remember to take a supplement.  - Vitamin B12    5. Hypothyroidism, unspecified type  Has been borderline in the past order to increase her dose.  - Thyroid Stimulating Hormone (TSH)          April D MD Zoe  1/3/2018    Much or all of the text in this note was generated through the use of Dragon Dictate voice-to-text software. Errors in spelling or words which seem out of context are unintentional. Sound alike errors, in particular, may have escaped editing.

## 2021-06-16 PROBLEM — M25.511 CHRONIC RIGHT SHOULDER PAIN: Chronic | Status: ACTIVE | Noted: 2020-01-21

## 2021-06-16 PROBLEM — I63.81 RIGHT THALAMIC STROKE (H): Status: ACTIVE | Noted: 2019-04-11

## 2021-06-16 PROBLEM — G89.29 CHRONIC RIGHT SHOULDER PAIN: Chronic | Status: ACTIVE | Noted: 2020-01-21

## 2021-06-16 PROBLEM — L30.9 DERMATITIS: Status: ACTIVE | Noted: 2021-04-23

## 2021-06-16 PROBLEM — M12.30 PALINDROMIC RHEUMATISM: Status: ACTIVE | Noted: 2019-10-15

## 2021-06-16 PROBLEM — N18.30 CHRONIC RENAL FAILURE, STAGE 3 (MODERATE) (H): Status: ACTIVE | Noted: 2019-08-06

## 2021-06-16 PROBLEM — N28.9 RENAL INSUFFICIENCY: Status: ACTIVE | Noted: 2021-04-23

## 2021-06-16 PROBLEM — M81.0 SENILE OSTEOPOROSIS: Status: ACTIVE | Noted: 2019-02-21

## 2021-06-16 PROBLEM — R00.2 PALPITATIONS: Status: ACTIVE | Noted: 2020-09-15

## 2021-06-16 PROBLEM — I63.81: Status: ACTIVE | Noted: 2019-04-12

## 2021-06-16 PROBLEM — M15.0 PRIMARY OSTEOARTHRITIS INVOLVING MULTIPLE JOINTS: Status: ACTIVE | Noted: 2019-10-15

## 2021-06-16 PROBLEM — M25.562 LEFT KNEE PAIN: Status: ACTIVE | Noted: 2019-04-11

## 2021-06-16 NOTE — TELEPHONE ENCOUNTER
Okay to follow-up on April 14 as scheduled.  It looks like her last kidney function check was on March 12 so it will be a month since then and will repeat blood work.

## 2021-06-16 NOTE — TELEPHONE ENCOUNTER
Telephone Encounter by Shaun Gonzales at 7/15/2019 11:16 AM     Author: Shaun Gonzales Service: -- Author Type: --    Filed: 7/15/2019 11:22 AM Encounter Date: 7/11/2019 Status: Signed    : Shaun Gonzales       PRIOR AUTHORIZATION DENIED    Denial Rational: In order for the medication to be covered, the patient must have an FDA approved indication. The patient also has to have had a history of prescription oral NSAID, unless the patient has a documented intolerance, FDA-labeled contraindication, hypersensitivity to a prescription or NSAID.          Appeal Information: If the provider would like to appeal this denial, please provide a letter of medical necessity and once it has been completed and placed in the patient's chart, notify the [facility] ([facility] MED [id number]) and the appeal can be initiated on behalf of the patient and provider.  Please also include any therapies that the patient has tried and their outcomes.

## 2021-06-16 NOTE — PROGRESS NOTES
[facility] Follow Up Note    Assessment/Plan:     1. Grieving  Her daughter reveals neurodegenerative disease passed away suddenly 10 days ago.  Currently patient is leaning on her son and has a large family.  She denied need for psychology since she has a lot of family members she can talk to.  She does have history of depression and currently is on Lexapro 5 mg a day.  Discussed to follow-up in 6 weeks to see how she is doing.    2. Renal insufficiency  Baseline creatinine is 1.36.  In March her creatinine was 1.67 which was a jump for her.  She also increase her losartan from 50 to 75 mg a day and I wonder if that caused the change in GFR.  She plans to increase her fluid intake.  We will repeat BMP today.  - Basic Metabolic Panel  - HM1(CBC and Differential)    3. Hypertension  Appropriate for her age.  She is on losartan per dermatologist there is 1% chance that losartan can cause itchiness.  We could consider switching her to a different ARB or spironolactone.  - Basic Metabolic Panel    4. Abnormal glucose  Previous A1c was prediabetic  - Glycosylated Hemoglobin A1c    5. Hypothyroidism, unspecified type  Is on Synthroid  - Thyroid Stimulating Hormone (TSH)    6.  Excoriative dermatitis.  She has chronic itchiness and a lot of scabs on her body.  Dermatology treated with good days doxycycline recently due in with staff.  BC showed high eosinophil count.  They did mention that there is 2% of Walker due to atorvastatin, 1% with losartan and potentially with as citalopram.  Discussed that we can start by stopping atorvastatin for several weeks.  If her itchiness is better we can switch her to something else.  In the future we can switch up her losartan to another blood pressure medication to see if it helps.  She will follow-up with me in 6 weeks.    Selma Meneses MD    Chief Complaint:  Chief Complaint   Patient presents with     Follow-up       History of Present Illness:  [place]ia is a 89  y.o. female with  history of lichen sclerosis, status post cholecystectomy, umbilical hernia, hysterectomy, diverticulosis, cystocele, spine DJD, hypothyroidism, vitamin D deficiency, ischemic optic neuropathy, anxiety, high blood pressure, hiatal hernia and chronic renal insufficiency, right thalamic stroke in 2019.  She is currently here for for follow-up.    Unfortunately since I last saw her her daughter passed away a week ago suddenly.  She did have degenerative brain disorder and did require extra help but was not acutely ill at the time of death.  They had a  a week ago.  Currently patient reports that she has large family and has somebody to talk to.  She has a very supportive son.  She is on Lexapro 5 mg a day.    She is also followed by dermatology due to chronic itchiness and dermatitis due to excoriations.  She was treated with doxycycline due to colonization with staph on top of her scabs.  Dermatology also checked her blood work which showed worsening creatinine at 1.67 and eosinophils at 613.  They did discussed that on Lipitor, losartan, and Lexapro can cause itchiness and they wanted her to try to stay off of those medications one at a time to see if that helps.    For hypertension patient is currently taking 75 mg of losartan.  Unclear when she increase the dose but I suspect worsening renal function could be due to that.  In 2020 her creatinine was 1.36 and in  0.67.  She only drinks 2 glasses of water a day.    Review of Systems:  A comprehensive review of systems was performed and was otherwise negative    PFSH:  Social History: Reviewed  Social History     Tobacco Use   Smoking Status Never Smoker   Smokeless Tobacco Never Used     Social History     Social History Narrative    She is .  Her   in  suddenly, but he also had dementia. They were  for 60 years. She has 3 children, 4 grandchildren and 1 great grandchild. 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  One daughter lives with her and she has corticobasal degeneration.       Past History: Reviewed  Current Outpatient Medications   Medication Sig Dispense Refill     aspirin 81 mg chewable tablet Chew 81 mg daily.       atorvastatin (LIPITOR) 40 MG tablet TAKE 1 TABLET(40 MG) BY MOUTH DAILY 90 tablet 2     calcium carbonate (OS-SUE) 600 mg calcium (1,500 mg) tablet Take 600 mg by mouth 2 (two) times a day with meals.       cholecalciferol, vitamin D3, (VITAMIN D3) 1,000 unit capsule Take 1,000 Units by mouth daily.       clobetasoL (TEMOVATE) 0.05 % ointment Apply 0.5 mg daily as needed 60 g 4     cyanocobalamin (VITAMIN B-12) 500 MCG tablet Take 500 mcg by mouth daily.       diclofenac sodium (VOLTAREN) 1 % Gel Apply 3 times a day 100 g 11     DOCOSAHEXANOIC ACID/EPA (FISH OIL ORAL) Take 1,200 mg by mouth daily.       escitalopram oxalate (LEXAPRO) 5 MG tablet Take 1 tablet (5 mg total) by mouth daily. 90 tablet 3     hydrOXYchloroQUINE (PLAQUENIL) 200 mg tablet TAKE 1 TABLET(200 MG) BY MOUTH DAILY 90 tablet 1     lansoprazole (PREVACID) 15 MG capsule Take 15 mg by mouth as needed. 3-4 times per week       levothyroxine (SYNTHROID, LEVOTHROID) 75 MCG tablet TAKE 1 TABLET(75 MCG) BY MOUTH DAILY 5 days a week, skip dose 2 days a week 90 tablet 3     losartan (COZAAR) 50 MG tablet TAKE 1-2 TABLETS BY MOUTH DAILY BASED ON BLOOD PRESSURE READING 180 tablet 1     oxybutynin (DITROPAN) 5 MG tablet Take on tab by mouth at bed time 90 tablet 3     triamcinolone (KENALOG) 0.1 % cream Use twice a day for 2 weeks, then take 1 week off. May repeat. 80 g 1     No current facility-administered medications for this visit.        Family History: Reviewed    Physical Exam:    Vitals:    04/23/21 1237   BP: 134/76   Patient Site: Left Arm   Patient Position: Sitting   Cuff Size: Adult Regular   Pulse: 86   SpO2: 95%   Weight: 192 lb  "(87.1 kg)   Height: 5' 3\" (1.6 m)     Wt Readings from Last 3 Encounters:   04/23/21 192 lb (87.1 kg)   11/24/20 196 lb (88.9 kg)   09/15/20 194 lb (88 kg)     Body mass index is 34.01 kg/m .    Constitutional:  Reveals a pleasant elderly female.  Vitals:  Per nursing notes.  HEENT:No cervical LAD, no thyromegaly,  conjunctiva is pink, no scleral icterus, TMs are visualized and normal bl, oropharynx is clear, no exudates, oropharynx is crowded  Cardiac:  Regular rate and rhythm,no murmurs, rubs, or gallops.. Legs without edema. Palpation of the radial pulse regular.  Lungs: Clear to auscultation bl.  Respiratory effort normal.  Abdomen:positive BS, soft, nontender, nondistended.  No hepato-splenomagaly  Skin: She has severe diffuse excoriations in different stages of healing due to itchiness and dermatitis.  Rheumatologic: Arthritic changes in her hands noted  Neurologic:  Cranial nerves II-XII intact.     Psychiatric: affect is slightly down, memory intact.     Data Review:    Analysis and Summary of Old Records (2): yes      Records Requested (1): no      Other History Summarized (from other people in the room) (2): no    Radiology Tests Summarized (XRAY/CT/MRI/DXA) (1): no    Labs Reviewed (1): yes    Medicine Tests Reviewed (EKG/ECHO/COLONOSCOPY/EGD) (1): no    Independent Review of EKG or X-RAY (2): no      "

## 2021-06-16 NOTE — TELEPHONE ENCOUNTER
Refill Approved    Rx renewed per Medication Renewal Policy. Medication was last renewed on 7/29/20, last OV 11/4/20    Viviana Mcmillan, [facility] Triage/Med Refill 4/3/2021     Requested Prescriptions   Pending Prescriptions Disp Refills     losartan (COZAAR) 50 MG tablet [Pharmacy Med Name: LOSARTAN 50MG TABLETS] 180 tablet 3     Sig: TAKE 1-2 TABLETS BY MOUTH DAILY BASED ON BLOOD PRESSURE READING       Angiotensin Receptor Blocker Protocol Passed - 4/2/2021  7:04 AM        Passed - PCP or prescribing provider visit in past 12 months       Last office visit with prescriber/PCP: 11/4/2020 Selma Meneses MD OR same dept: Visit date not found OR same specialty: 11/4/2020 Selma Meneses MD  Last physical: Visit date not found Last MTM visit: Visit date not found   Next visit within 3 mo: Visit date not found  Next physical within 3 mo: Visit date not found  Prescriber OR PCP: Selma Meneses MD  Last diagnosis associated with med order: 1. Essential hypertension  - losartan (COZAAR) 50 MG tablet [Pharmacy Med Name: LOSARTAN 50MG TABLETS]; TAKE 1-2 TABLETS BY MOUTH DAILY BASED ON BLOOD PRESSURE READING  Dispense: 180 tablet; Refill: 3    If protocol passes may refill for 12 months if within 3 months of last provider visit (or a total of 15 months).             Passed - Serum potassium within the past 12 months     Lab Results   Component Value Date    Potassium 4.7 11/04/2020             Passed - Blood pressure filed in past 12 months     BP Readings from Last 1 Encounters:   11/24/20 130/72             Passed - Serum creatinine within the past 12 months     Creatinine   Date Value Ref Range Status   03/12/2021 1.67 (H) 0.60 - 0.88 mg/dL Final

## 2021-06-16 NOTE — PATIENT INSTRUCTIONS - HE
1. Change soap from dial to dove soap, no frangrance    2. Will check lab work today for kidney function.    3. Get shingles vaccine through pharmacy.    4. Can stop Atorvastatin for 2 weeks and see if itchiness is better, if it is, we can try a different medication for cholesterol.

## 2021-06-16 NOTE — TELEPHONE ENCOUNTER
Pt has a 6 month med check scheduled 4/14/21. Do you want to see her sooner regarding her bone density?

## 2021-06-16 NOTE — TELEPHONE ENCOUNTER
Please let pt know,   I received a copy of the blood work that was done through dermatology office and I am concerned about her worsening kidney function.  I would like to follow-up with her in the office, will need to repeat blood work in order renal ultrasound.

## 2021-06-18 ENCOUNTER — OFFICE VISIT - HEALTHEAST (OUTPATIENT)
Dept: INTERNAL MEDICINE | Facility: CLINIC | Age: 86
End: 2021-06-18

## 2021-06-18 DIAGNOSIS — F43.22 ADJUSTMENT DISORDER WITH ANXIETY: ICD-10-CM

## 2021-06-18 RX ORDER — ESCITALOPRAM OXALATE 5 MG/1
10 TABLET ORAL DAILY
Qty: 90 TABLET | Refills: 3 | Status: SHIPPED
Start: 2021-06-18 | End: 2022-05-20

## 2021-06-18 ASSESSMENT — MIFFLIN-ST. JEOR: SCORE: 1236.89

## 2021-06-18 ASSESSMENT — PATIENT HEALTH QUESTIONNAIRE - PHQ9: SUM OF ALL RESPONSES TO PHQ QUESTIONS 1-9: 9

## 2021-06-18 NOTE — PROGRESS NOTES
"[facility] Follow Up Note    Ruthy Man   88 y.o. female    Date of Visit: 2018    Chief Complaint   Patient presents with     Pain     X 3 weeks of pain on left side \" when i marvin din, jake bonilla bit better\"     Subjective  [place]ia comes in today due to pain on her left side off and on for the past 3 weeks.  It seems to be a little bit better today.  She tells me she ate 12 half-pints of raspberries in under a week and her symptoms seemed to start thereafter.  She also had a cough productive of some yellowish phlegm.    ROS A comprehensive review of systems was performed and was otherwise negative.    Social History     Social History Narrative    She is .  Her   in  suddenly, but he also had dementia. They were  for 60 years. She has 3 children, 4 grandchildren and 1 great grandchild. 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  One daughter lives with her and she has corticobasal degeneration.       Medications were reconciled.  Allergies, social and family history, and the problem list were all reviewed and updated.    Exam  General Appearance: Pleasant and alert   Vitals:    18 1320   BP: 120/70   Patient Site: Left Arm   Patient Position: Sitting   Cuff Size: Adult Large   Pulse: 78   Temp: 98.8  F (37.1  C)   TempSrc: Tympanic   SpO2: 97%   Weight: 186 lb (84.4 kg)      Body mass index is 30.95 kg/(m^2).  Wt Readings from Last 3 Encounters:   18 186 lb (84.4 kg)   11/15/16 196 lb (88.9 kg)   10/18/16 196 lb 3.2 oz (89 kg)     HEENT: Sclera are clear.   Lungs: Normal respirations  Abdomen: Soft and nondistended, left lower quadrant pain to palpation  Extremities: No edema  Skin: No rashes  Neuro: Moves all extremities and has facial symmetry  Gait: Ambulates with a normal gait    Assessment/Plan  1. Diverticulitis large intestine  We will treated with levofloxacin " which should also cover bronchitis which she is prone to.  She will let us know if things are not improving soon.    2. Cough  As above.          Leeann Enriquez MD  Internal and Geriatric Medicine  [facility]    Much or all of the text in this note was generated through the use of Dragon Dictate voice-to-text software. Errors in spelling or words which seem out of context are unintentional. Sound alike errors, in particular, may have escaped editing.

## 2021-06-18 NOTE — LETTER
Letter by Selma Meneses MD at      Author: Selma Meneses MD Service: -- Author Type: --    Filed:  Encounter Date: 1/22/2019 Status: (Other)       Ruthy Man  [address] [place] [id number]             January 22, 2019         Dear Ms. Man,    Below are the results from your recent visit:    Kidney function is slow, but stable, no change. Potassium level is good.    Sugar and red cell count are normal.    B12 level is on the low normal range. You should start B12 supplement 500 mcg a day (can get it over the counter). It's important for brain function.    Due to palpitations, we can decrease thyroid medication: Take Synthroid 75 mcg 6 days a week and skip dose once a week. We can repeat thyroid levels in 2 months.    Resulted Orders   Basic Metabolic Panel   Result Value Ref Range    Sodium 139 136 - 145 mmol/L    Potassium 4.4 3.5 - 5.0 mmol/L    Chloride 102 98 - 107 mmol/L    CO2 25 22 - 31 mmol/L    Anion Gap, Calculation 12 5 - 18 mmol/L    Glucose 98 70 - 125 mg/dL    Calcium 9.6 8.5 - 10.5 mg/dL    BUN 20 8 - 28 mg/dL    Creatinine 1.51 (H) 0.60 - 1.10 mg/dL    GFR MDRD Af Amer 39 (L) >60 mL/min/1.73m2    GFR MDRD Non Af Amer 32 (L) >60 mL/min/1.73m2    Narrative    Fasting Glucose reference range is 70-99 mg/dL per  American Diabetes Association (ADA) guidelines.   Thyroid Stimulating Hormone (TSH)   Result Value Ref Range    TSH 0.42 0.30 - 5.00 uIU/mL   Vitamin B12   Result Value Ref Range    Vitamin B-12 282 213 - 816 pg/mL   HM1 (CBC with Diff)   Result Value Ref Range    WBC 7.4 4.0 - 11.0 thou/uL    RBC 4.51 3.80 - 5.40 mill/uL    Hemoglobin 13.3 12.0 - 16.0 g/dL    Hematocrit 40.4 35.0 - 47.0 %    MCV 90 80 - 100 fL    MCH 29.5 27.0 - 34.0 pg    MCHC 33.0 32.0 - 36.0 g/dL    RDW 12.3 11.0 - 14.5 %    Platelets 268 140 - 440 thou/uL    MPV 6.9 (L) 7.0 - 10.0 fL    Neutrophils % 67 50 - 70 %    Lymphocytes % 23 20 - 40 %    Monocytes % 6 2 - 10 %    Eosinophils % 4 0 - 6 %     Basophils % 1 0 - 2 %    Neutrophils Absolute 5.0 2.0 - 7.7 thou/uL    Lymphocytes Absolute 1.7 0.8 - 4.4 thou/uL    Monocytes Absolute 0.4 0.0 - 0.9 thou/uL    Eosinophils Absolute 0.3 0.0 - 0.4 thou/uL    Basophils Absolute 0.0 0.0 - 0.2 thou/uL       Please call with questions or contact us using [employer].    Sincerely,        Electronically signed by Selma Meneses MD

## 2021-06-19 NOTE — LETTER
Letter by Selma Meneses MD at      Author: Selma Meneses MD Service: -- Author Type: --    Filed:  Encounter Date: 7/11/2019 Status: (Other)       To whom it may concern,    Ruthy Man has medical history of diffuse osteoarthritis, most severe at her knees. She also suffers from renal insufficiency stage IV, HTN and h/o stroke. Since oral NSAIDs can make those conditions worse, I recommended that she does not use them. It is my medical opinion that topical NSAID therapy would be most beneficial for her pain relief with fewest side effects then other oral medications.    Sincerely,  Dr Meneses

## 2021-06-19 NOTE — LETTER
Letter by Selma Meneses MD at      Author: Selma Meneses MD Service: -- Author Type: --    Filed:  Encounter Date: 4/21/2019 Status: (Other)         Ruthy Man  [address] [place] [id number]             April 21, 2019         Dear Ms. Man,    Below are the results from your recent visit:    B12 levels are good now. Continue current b12 supplement.    Thyroid levels are good, continue current dose of synthroid.    Dr NIXON    Resulted Orders   Thyroid Stimulating Hormone (TSH)   Result Value Ref Range    TSH 0.81 0.30 - 5.00 uIU/mL   Vitamin B12   Result Value Ref Range    Vitamin B-12 984 (H) 213 - 816 pg/mL       Please call with questions or contact us using [employer].    Sincerely,        Electronically signed by Selma Meneses MD

## 2021-06-19 NOTE — LETTER
Letter by Ronald Loyd, Gonzalo at      Author: Ronald Loyd, Gonzalo Service: -- Author Type: --    Filed:  Encounter Date: 2019 Status: (Other)             2019      Ruthy Man  [address] [place] [id number]            Dear Ruthy Man     Thank you for talking with me on 2019 about your health and medications. [facility] MTM (Medication Therapy Management) program helps you understand your medications and use them safely.    Along with this letter are an action plan (Medication Action Plan) and a medication list (Personal Medication List). The action plan has steps you should take to help you get the best results from your medications. The medication list will help you keep track of your medications and how to use them the right way.       Have your action plan and medication list with you when you talk with your doctors, pharmacists, and other health care providers.    Ask your doctors, pharmacists, and other healthcare providers to update them at every visit.     Take your medication list with you if you go to the hospital or emergency room.     Give a copy of the action plan and medication list to your family or caregivers.     If you want to talk about this letter or any of the papers with it, please call [phone]. We look forward to working with you, your doctors, and other healthcare providers to help you stay healthy.    Sincerely,     Ronald Loyd    _  Medication Action Plan For Ruthy Man, : 1930     This action plan will help you get the best results from your medications if you:     1. Read What we talked about.   2. Take the steps listed in the What I need to do boxes.   3. Fill in What I did and when I did it.   4. Fill in My follow-up plan and Questions I want to ask.     Have this action plan with you when you talk with your doctors, pharmacists, and other healthcare providers in your care team. Share this with your family or caregivers too.     Date Prepared: 4/19/2019      What we talked about:  Thalamic stroke     What I need to do:  Continue current regimen of aspirin, Plavix, statin and follow-up with neurology in 1 month     What I did and when I did it:          What we talked about:  Acid reflux     What I need to do:  Switch pantoprazole to as needed ranitidine and Tums     What I did and when I did it:          What we talked about:       What I need to do:       What I did and when I did it:         My follow-up plan (add notes about next steps):            Questions I want to ask (include topics about medications or therapy):          If you have any questions about your action plan, call Ronald JESSIKA Evert at [phone], Monday-Friday 8:00-4:30pm  _  This medication list was made for you after we talked. We also used information from your doctors chart.      Use blank rows to add new medications. Then fill in the dates you started using them.     Cross out medications when you no longer use them. Then write the date and why you stopped using them.     Ask your doctors, pharmacists, and other healthcare providers to update this list at every visit.  Keep this list up-to-date with:  ? prescription medications  ? over the counter drugs  ? herbals  ? vitamins  ? minerals          If you go to the hospital or emergency room, take this list with you. Share this with your family or caregivers too.    Date Prepared: 4/19/2019      Allergies or side effects: ace inhibitors; amlodipine; clarithromycin; diltiazem hcl; doxazosin; penicillins      Medication: aspirin 81 mg chewable tablet      How I use it: Chew 81 mg daily.      Why I use it:  Right thalamic stroke    Prescriber: Selma Meneses MD      Date I started using it:     Date I stopped using it:       Why I stopped using it:          Medication: atorvastatin (LIPITOR) 40 MG tablet      How I use it: Take 1 tablet (40 mg total) by mouth daily.      Why I use it: Right thalamic stroke (H)     Prescriber: TAMMI Williamson      Date I started using it:     Date I stopped using it:       Why I stopped using it:          Medication: cholecalciferol, vitamin D3, (VITAMIN D3) 1,000 unit capsule      How I use it: Take 1,000 Units by mouth daily.      Why I use it:  Vitamin D deficiency    Prescriber: Leeann Enriquez MD      Date I started using it:     Date I stopped using it:       Why I stopped using it:          Medication: clopidogrel (PLAVIX) 75 mg tablet      How I use it: Take 1 tablet (75 mg total) by mouth daily for 21 days.      Why I use it: Right thalamic stroke (H)    Prescriber: TAMMI Williamson      Date I started using it:     Date I stopped using it:       Why I stopped using it:          Medication: cyanocobalamin (VITAMIN B-12) 500 MCG tablet      How I use it: Take 500 mcg by mouth daily.      Why I use it:  B12 deficiency    Prescriber: Selma Meneses MD      Date I started using it:     Date I stopped using it:       Why I stopped using it:          Medication: DOCOSAHEXANOIC ACID/EPA (FISH OIL ORAL)      How I use it: Take 1,200 mg by mouth daily.      Why I use it:  Primary prevention    Prescriber: Selma Meneses MD      Date I started using it:     Date I stopped using it:       Why I stopped using it:          Medication: escitalopram oxalate (LEXAPRO) 10 MG tablet      How I use it: Take 1 tablet (10 mg total) by mouth daily.      Why I use it: Anxiety    Prescriber: Selma eMneses MD      Date I started using it:     Date I stopped using it:       Why I stopped using it:          Medication: levothyroxine (SYNTHROID, LEVOTHROID) 75 MCG tablet      How I use it: Take 1 tablet (75 mcg total) by mouth daily.      Why I use it: Hypothyroidism    Prescriber: Leeann Enriquez MD      Date I started using it:     Date I stopped using it:       Why I stopped using it:          Medication: losartan (COZAAR) 100 MG tablet      How I use it: Take 1 tablet (100  mg total) by mouth daily.      Why I use it: HTN (Hypertension)    Prescriber: Leeann Enriquez MD      Date I started using it:     Date I stopped using it:       Why I stopped using it:          Medication: ranitidine (ZANTAC) 75 MG tablet      How I use it: Take  mg by mouth 2 (two) times a day as needed.      Why I use it:  Esophageal reflux    Prescriber: Selma Meneses MD      Date I started using it:     Date I stopped using it:       Why I stopped using it:          Medication:       How I use it:       Why I use it:     Prescriber:       Date I started using it:     Date I stopped using it:       Why I stopped using it:          Medication:       How I use it:       Why I use it:     Prescriber:       Date I started using it:     Date I stopped using it:       Why I stopped using it:          Medication:       How I use it:       Why I use it:     Prescriber:       Date I started using it:     Date I stopped using it:       Why I stopped using it:          Other Information:      If you have any questions about your medication list, call [phone]    According to the Paperwork Reduction Act of 1995, no persons are required to respond to a collection of information unless it displays a valid OMB control number. The valid OMB number for this information collection is [id number]. The time required to complete this information collection is estimated to average 37.76 minutes per response, including the time to review instructions, searching existing data resources, gather the data needed, and complete and review the information collection. If you have any comments concerning the accuracy of the time estimate(s) or suggestions for improving this form, please write to: CMS, Attn: NAHOMY Reports Clearance Officer, [address] [place] [phone].

## 2021-06-19 NOTE — LETTER
Letter by Jad Edmond DO at      Author: Jad Edmond DO Service: -- Author Type: --    Filed:  Encounter Date: 7/24/2019 Status: (Other)         Ruthy Man  [address] [facility] [id number]             July 24, 2019         Dear Ms. Man,    Below are the results from your recent visit:    Resulted Orders   XR Hands Bilateral 2 VWS    Narrative    EXAM: XR HANDS BILATERAL 2 VWS  LOCATION: [facility]  DATE/TIME: 7/23/2019 12:54 PM    INDICATION: arthritis hands, pain.  COMPARISON: None.    FINDINGS: Bones are demineralized. Radiocarpal and intercarpal joint spaces are relatively well preserved given age except for moderate narrowing at the STT bilaterally.    At the MCP joints, moderate degenerative narrowing of the right second MCP joint with minimal spur formation mild narrowing at the elbow joint spaces.    In the fingers, moderate to severe degenerative osteoarthritic changes are noted. These are greatest involving the IP joint of the right, right and left DIP joint of the middle fingers and the DIP joint of the left ring finger. Seagull deformities are   seen at the sites.    The tuft of the left index finger has been remodeled suggestive of prior injury.           We will call to discuss results of this.  You have moderate to severe arthritis in the fingers of your hands.  It looks like it is from the common wear and tear sort of arthritis we call osteoarthritis.  Diclofenac can be very helpful for this (the gel you are prescribed), but if it is not, we can find an alternative for you.    Please call with questions or contact us using [employer].    Sincerely,        Electronically signed by Jad Edmond DO

## 2021-06-19 NOTE — LETTER
Letter by Selma Meneses MD at      Author: Selma Meneses MD Service: -- Author Type: --    Filed:  Encounter Date: 5/29/2019 Status: (Other)         Ruthy Man  [address] [place] [id number]             May 29, 2019    Vj Bliss,     Rheumatological markers are not elevated.    Thyroid levels are good.    Lime disease screen is negative.     Vitamin D level is good, continue current supplement.     Muscle enzymes are normal, no muscle inflammation. O'k to restart Lipitor.     Kidney function is slightly worse: make sure to stay hydrated.    Liver functions and red cell count is normal.    If you continue to have joint pains, let me know.       Resulted Orders   CK Total   Result Value Ref Range    CK, Total 64 30 - 190 U/L   Erythrocyte Sedimentation Rate   Result Value Ref Range    Sed Rate 55 (H) 0 - 20 mm/hr   Rheumatoid Factor Quant   Result Value Ref Range    Rheumatoid Factor Quantitative <15.0 0 - 30 IU/mL   Antinuclear Antibody (GEORGI) Cascade   Result Value Ref Range    GEORGI Screen Cascade 0.6 <=2.9 U    Narrative    <1.0 negative  1.1-2.9 weakly positive  3.0-5.9 positive ( reflex)  > or=6.0 strongly positive   Lyme Antibody Cascade   Result Value Ref Range    Lyme Antibody Cascade 0.25 <0.90 Index Value    Narrative    Interpretation of Lyme Disease Total Antibody (IgG/IgM)  <0.90 Test Value=Negative  No detectable antibodies to B. burgdorferi. Patients  in early stages of infection may not produce  detectable levels of antibody. Antibiotic therapy  in early disease may prevent antibody production  from reaching detectable levels. Patients with  clinical history and/or symptoms suggestive of Lyme  disease but with negative test results should be  retested in 2-4 weeks.  0.90-<1.10 Test Value=Borderline  Suggests the presence of antibodies to B.  burgdorferi. Recommend repeat collection in 2-4  weeks.  >=1.10 Test Value=Positive  Indicates the presence of antibodies to  B.  burgdorferi. False positive results can occur with  sera from syphilis patients. Cross-reactivity may  occur with relapsing fever, [place] Spotted  fever, other spirochetal diseases, erythematosus,  EBV infection, or CMV infection. Clinical symptoms,  epidemiology of the case and other laboratory tests  should allow for distinction of these conditions from  Lyme disease.   CCP Antibodies   Result Value Ref Range    CCP IgG Antibodies <0.5 <=4.9 U/mL   Thyroid Stimulating Hormone (TSH)   Result Value Ref Range    TSH 1.82 0.30 - 5.00 uIU/mL   Comprehensive Metabolic Panel   Result Value Ref Range    Sodium 135 (L) 136 - 145 mmol/L    Potassium 4.0 3.5 - 5.0 mmol/L    Chloride 100 98 - 107 mmol/L    CO2 21 (L) 22 - 31 mmol/L    Anion Gap, Calculation 14 5 - 18 mmol/L    Glucose 187 (H) 70 - 125 mg/dL    BUN 24 8 - 28 mg/dL    Creatinine 1.69 (H) 0.60 - 1.10 mg/dL    GFR MDRD Af Amer 35 (L) >60 mL/min/1.73m2    GFR MDRD Non Af Amer 28 (L) >60 mL/min/1.73m2    Bilirubin, Total 1.0 0.0 - 1.0 mg/dL    Calcium 9.5 8.5 - 10.5 mg/dL    Protein, Total 7.0 6.0 - 8.0 g/dL    Albumin 3.3 (L) 3.5 - 5.0 g/dL    Alkaline Phosphatase 84 45 - 120 U/L    AST 15 0 - 40 U/L    ALT <9 0 - 45 U/L    Narrative    Fasting Glucose reference range is 70-99 mg/dL per  American Diabetes Association (ADA) guidelines.   HM1 (CBC with Diff)   Result Value Ref Range    WBC 8.7 4.0 - 11.0 thou/uL    RBC 4.48 3.80 - 5.40 mill/uL    Hemoglobin 13.2 12.0 - 16.0 g/dL    Hematocrit 40.0 35.0 - 47.0 %    MCV 89 80 - 100 fL    MCH 29.5 27.0 - 34.0 pg    MCHC 33.1 32.0 - 36.0 g/dL    RDW 11.9 11.0 - 14.5 %    Platelets 333 140 - 440 thou/uL    MPV 7.0 7.0 - 10.0 fL    Neutrophils % 76 (H) 50 - 70 %    Lymphocytes % 15 (L) 20 - 40 %    Monocytes % 6 2 - 10 %    Eosinophils % 2 0 - 6 %    Basophils % 0 0 - 2 %    Neutrophils Absolute 6.6 2.0 - 7.7 thou/uL    Lymphocytes Absolute 1.3 0.8 - 4.4 thou/uL    Monocytes Absolute 0.5 0.0 - 0.9 thou/uL     Eosinophils Absolute 0.2 0.0 - 0.4 thou/uL    Basophils Absolute 0.0 0.0 - 0.2 thou/uL   Vitamin D, Total (25-Hydroxy)   Result Value Ref Range    Vitamin D, Total (25-Hydroxy) 46.2 30.0 - 80.0 ng/mL    Narrative    Deficiency <10.0 ng/mL  Insufficiency 10.0-29.9 ng/mL  Sufficiency 30.0-80.0 ng/mL  Toxicity (possible) >100.0 ng/mL       Please call with questions or contact us using [email or web address].    Sincerely,        Electronically signed by Selma Meneses MD

## 2021-06-19 NOTE — PROGRESS NOTES
[facility] Follow Up Note    Ruthy Man   88 y.o. female    Date of Visit: 2018    Chief Complaint   Patient presents with     Pain     rib pain under left breast     Subjective  Virginia comes in today she has been having pain underneath her left breast which is constant.  She is also been feeling nauseated off and on.  I saw her at the beginning of the month and diagnosed her with probable bronchitis but also diverticulitis and she was given Levaquin to try to treat both problems and she took 1 dose and felt irritable and could not sleep and she called her pharmacist and he told her to stop taking the medication but she did not call in here to get an alternative.  She still is coughing up yellow phlegm.  She has heartburn and takes Prevacid 15 mg once in a while and has not been taking it regularly.  Her cough seems to be worse at night.  She does not have the sensation that food gets stuck when she eats.    ROS A comprehensive review of systems was performed and was otherwise negative.    Social History     Social History Narrative    She is .  Her   in  suddenly, but he also had dementia. They were  for 60 years. She has 3 children, 4 grandchildren and 1 great grandchild. 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  One daughter lives with her and she has corticobasal degeneration.       Medications were reconciled.  Allergies, social and family history, and the problem list were all reviewed and updated.      Exam  General Appearance: Pleasant and alert   Vitals:    18 1139   BP: 136/74   Patient Site: Left Arm   Patient Position: Sitting   Cuff Size: Adult Regular   Pulse: 78   SpO2: 96%      There is no height or weight on file to calculate BMI.  Wt Readings from Last 3 Encounters:   18 186 lb (84.4 kg)   11/15/16 196 lb (88.9 kg)   10/18/16 196 lb 3.2 oz (89 kg)      HEENT: Sclera are clear.   Lungs: Normal respirations, upper airway noise  Cardiac: Regular rate and rhythm   Abdomen: Soft and nondistended, tenderness in the left upper quadrant to palpation  Extremities: No edema  Skin: No rashes  Neuro: Moves all extremities and has facial symmetry  Gait: Ambulates with a normal gait    Assessment/Plan  1. Abdominal pain, left upper quadrant  Concerned she could have nephrolithiasis versus a pancreatic lesion versus hiatal hernia that may be incarcerated or something more ominous causing her symptoms.  Will check a CT scan and labs as noted below.  - Hepatic Profile  - Basic Metabolic Panel  - HM2(CBC w/o Differential)  - Lipase  - CT Abdomen Pelvis With Oral With IV Contrast; Future  - Urinalysis-UC if Indicated    2. Cough  We will treat with azithromycin.          Leeann Enriquez MD  Internal and Geriatric Medicine  [facility]    Much or all of the text in this note was generated through the use of Dragon Dictate voice-to-text software. Errors in spelling or words which seem out of context are unintentional. Sound alike errors, in particular, may have escaped editing.

## 2021-06-20 NOTE — LETTER
Letter by Selma Meneses MD at      Author: Selma Meneses MD Service: -- Author Type: --    Filed:  Encounter Date: 1/24/2020 Status: (Other)         Ruthy Man  [address] [place] [id number]             January 24, 2020         Dear Ms. Man,    Below are the results from your recent visit:    Thyroid level is good.    Vitamin d level is good, continue current supplement. Add calcium supplement (calcium Citrate 600 mg). Let me know if it causes constipation.    We will start working on pre-authorization of the Prolia shot with your insurance. It is to help slow down bone thinning/osteoporosis worsening.    Kidney function is stable.    Resulted Orders   Thyroid Stimulating Hormone (TSH)   Result Value Ref Range    TSH 1.16 0.30 - 5.00 uIU/mL   Basic Metabolic Panel   Result Value Ref Range    Sodium 138 136 - 145 mmol/L    Potassium 4.3 3.5 - 5.0 mmol/L    Chloride 101 98 - 107 mmol/L    CO2 27 22 - 31 mmol/L    Anion Gap, Calculation 10 5 - 18 mmol/L    Glucose 98 70 - 125 mg/dL    Calcium 9.2 8.5 - 10.5 mg/dL    BUN 20 8 - 28 mg/dL    Creatinine 1.33 (H) 0.60 - 1.10 mg/dL    GFR MDRD Af Amer 45 (L) >60 mL/min/1.73m2    GFR MDRD Non Af Amer 37 (L) >60 mL/min/1.73m2    Narrative    Fasting Glucose reference range is 70-99 mg/dL per  American Diabetes Association (ADA) guidelines.   Vitamin D, Total (25-Hydroxy)   Result Value Ref Range    Vitamin D, Total (25-Hydroxy) 44.4 30.0 - 80.0 ng/mL    Narrative    Deficiency <10.0 ng/mL  Insufficiency 10.0-29.9 ng/mL  Sufficiency 30.0-80.0 ng/mL  Toxicity (possible) >100.0 ng/mL     Please call with questions or contact us using [employer].    Sincerely,        Electronically signed by Selma Meneses MD

## 2021-06-21 NOTE — LETTER
Letter by Selma Meneses MD at      Author: Selma Meneses MD Service: -- Author Type: --    Filed:  Encounter Date: 11/5/2020 Status: (Other)         Ruthy Man  [address] [place] [id number]             November 5, 2020         Dear Ms. Man,    Below are the results from your recent visit:    Thyroid level is good. Continue current Synthroid dose.  Liver functions are normal.  Vitamin D level is good, continue current supplement.  Kidney function is normal.  Sugars are slightly pre-diabetic, not worse.    Resulted Orders   Thyroid Stimulating Hormone (TSH)   Result Value Ref Range    TSH 2.73 0.30 - 5.00 uIU/mL   Basic Metabolic Panel   Result Value Ref Range    Sodium 140 136 - 145 mmol/L    Potassium 4.7 3.5 - 5.0 mmol/L    Chloride 104 98 - 107 mmol/L    CO2 25 22 - 31 mmol/L    Anion Gap, Calculation 11 5 - 18 mmol/L    Glucose 93 70 - 125 mg/dL    Calcium 9.2 8.5 - 10.5 mg/dL    BUN 17 8 - 28 mg/dL    Creatinine 1.41 (H) 0.60 - 1.10 mg/dL    GFR MDRD Af Amer 42 (L) >60 mL/min/1.73m2    GFR MDRD Non Af Amer 35 (L) >60 mL/min/1.73m2    Narrative    Fasting Glucose reference range is 70-99 mg/dL per  American Diabetes Association (ADA) guidelines.   Glycosylated Hemoglobin A1c   Result Value Ref Range    Hemoglobin A1c 6.0 (H) <=5.6 %      Comment:      Normal <5.7% Prediabete 5.7-6.4% Diabletes 6.5% or higher - adopted from ADA consensus guidelines   Hepatic Profile   Result Value Ref Range    Bilirubin, Total 0.7 0.0 - 1.0 mg/dL    Bilirubin, Direct 0.3 <=0.5 mg/dL    Protein, Total 7.0 6.0 - 8.0 g/dL    Albumin 3.7 3.5 - 5.0 g/dL    Alkaline Phosphatase 99 45 - 120 U/L    AST 18 0 - 40 U/L    ALT 10 0 - 45 U/L   Vitamin D, Total (25-Hydroxy)   Result Value Ref Range    Vitamin D, Total (25-Hydroxy) 36.3 30.0 - 80.0 ng/mL    Narrative    Deficiency <10.0 ng/mL  Insufficiency 10.0-29.9 ng/mL  Sufficiency 30.0-80.0 ng/mL  Toxicity (possible) >100.0 ng/mL       Please call with  questions or contact us using [employer].    Sincerely,        Electronically signed by Selma Meneses MD

## 2021-06-21 NOTE — LETTER
Letter by Selma Meneses MD at      Author: Selma Meneses MD Service: -- Author Type: --    Filed:  Encounter Date: 4/25/2021 Status: (Other)         Ruthy Man  [address] [place] [id number]             April 25, 2021         Dear Ms. Man,    Below are the results from your recent visit:    Kidney function is at baseline and improved since March. Continue with hydration.    Sugars are slightly above normal, but improved. Continueto  avoid sweets.    Red cell count and thyroid levels are normal.    Resulted Orders   Basic Metabolic Panel   Result Value Ref Range    Sodium 140 136 - 145 mmol/L    Potassium 4.0 3.5 - 5.0 mmol/L    Chloride 105 98 - 107 mmol/L    CO2 25 22 - 31 mmol/L    Anion Gap, Calculation 10 5 - 18 mmol/L    Glucose 101 70 - 125 mg/dL    Calcium 8.5 8.5 - 10.5 mg/dL    BUN 20 8 - 28 mg/dL    Creatinine 1.31 (H) 0.60 - 1.10 mg/dL    GFR MDRD Af Amer 46 (L) >60 mL/min/1.73m2    GFR MDRD Non Af Amer 38 (L) >60 mL/min/1.73m2    Narrative    Fasting Glucose reference range is 70-99 mg/dL per  American Diabetes Association (ADA) guidelines.   Glycosylated Hemoglobin A1c   Result Value Ref Range    Hemoglobin A1c 5.8 (H) <=5.6 %   HM1 (CBC with Diff)   Result Value Ref Range    WBC 8.1 4.0 - 11.0 thou/uL    RBC 4.16 3.80 - 5.40 mill/uL    Hemoglobin 12.1 12.0 - 16.0 g/dL    Hematocrit 38.8 35.0 - 47.0 %    MCV 93 80 - 100 fL    MCH 29.1 27.0 - 34.0 pg    MCHC 31.2 (L) 32.0 - 36.0 g/dL    RDW 14.2 11.0 - 14.5 %    Platelets 255 140 - 440 thou/uL    MPV 9.6 7.0 - 10.0 fL    Neutrophils % 75 (H) 50 - 70 %    Lymphocytes % 13 (L) 20 - 40 %    Monocytes % 6 2 - 10 %    Eosinophils % 5 0 - 6 %    Basophils % 1 0 - 2 %    Immature Granulocyte % 0 <=0 %    Neutrophils Absolute 6.1 2.0 - 7.7 thou/uL    Lymphocytes Absolute 1.1 0.8 - 4.4 thou/uL    Monocytes Absolute 0.5 0.0 - 0.9 thou/uL    Eosinophils Absolute 0.4 0.0 - 0.4 thou/uL    Basophils Absolute 0.1 0.0 - 0.2 thou/uL     Immature Granulocyte Absolute 0.0 <=0.0 thou/uL   Thyroid Stimulating Hormone (TSH)   Result Value Ref Range    TSH 1.85 0.30 - 5.00 uIU/mL       Please call with questions or contact us using [employer].    Sincerely,        Electronically signed by Selma Meneses MD

## 2021-06-23 NOTE — PROGRESS NOTES
[facility] Follow Up Note    Assessment/Plan:    1. Dizziness  On exam today she is not orthostatic.  Symptoms are infrequent and happens once to twice a month.  She also complains about intermittent palpitations.  Unclear if both happened together.  There is no murmurs on exam.  EKG did not show any arrhythmia.  Discussed that if symptoms are happening more often I would consider doing a cardiac monitor to rule out arrhythmia.  She will continue on losartan for blood pressure control.  We will check for anemia.  - HM1(CBC and Differential)  - HM1 (CBC with Diff)    2. Vitamin B12 deficiency (non anemic)  Quit B12 supplements a year ago  - Vitamin B12    3. Hypertension  Currently is doing well on losartan 100 mg a day.  Of note she does have chronic renal insufficiency with creatinine between 1.2 and 1.4  - Basic Metabolic Panel    4. Hypothyroidism, unspecified type  Is on Synthroid.  Recent TSH was 0.6.  We can consider decreasing her Synthroid replacement given her age and palpitations.  - Thyroid Stimulating Hormone (TSH)    5. Palpitations  Also very intermittent.  Will check thyroid function.  In the future can consider Holter monitor.  She does have very crowded oropharynx and has history of snoring.  Discussed sleep apnea but patient refused  - Electrocardiogram Perform - Clinic    6. Postmenopausal status  - DXA Bone Density Scan; Future    Selma Meneses MD    Chief Complaint:  Chief Complaint   Patient presents with     Dizziness     Fatigue       History of Present Illness:  [place] is a 89 y.o. female, prior patient of Dr. Enriquez's who is currently here to establish care and meet me for the first time.  She has history of lichen sclerosis, status post cholecystectomy, umbilical hernia, hysterectomy, diverticulosis, cystocele, spine DJD, hypothyroidism, vitamin D deficiency, ischemic optic neuropathy, anxiety, high blood pressure, hiatal hernia and chronic renal  insufficiency.    Today patient's biggest concern is intermittent dizziness and orthostatic.  She denies any vertigo.  Sometimes she feels dizzy when she walks and needs to sit down.  She does drink a lot of water.  Her blood pressure today is good.  She is on losartan 100 mg a day.  There is no orthostatic changes with checking her blood pressure sitting and standing.  She does report that occasionally has extra beats.  That does not happen very often.  Overall she gets dizzy maybe once or twice a months and not more often.  Discussed that I would be concerned about arrhythmia causing dizziness and extra beats.  EKG today was obtained for the baseline it was a sinus rhythm.  Discussed if symptoms become more frequent we can do cardiac monitor.  Patient denies any lower extremity swelling.  No murmurs on exam.  She denies any chest pains.    Patient is on Synthroid medication and takes 75 MCG's a day.  Previous TSH was 0.65.  If she is having premature beats discussed that it might be helpful to decrease Synthroid dose a little bit.  We will check a baseline TSH today.    She has not had any bone density test done recently and we discussed it would be good thing for her to do.    Patient is very anxious individual.  She does admit to a lot of anxiety, she has a daughter who has Parkinson's type disease for which she cares.  She is a .  She does have other children in the area who can help.    For constipation she takes MiraLAX every other day.    Review of Systems:  A comprehensive review of systems was performed and was otherwise negative.  No recent falls    PFSH:  Social History: Reviewed  Social History     Tobacco Use   Smoking Status Never Smoker   Smokeless Tobacco Never Used     Social History     Social History Narrative    She is .  Her   in  suddenly, but he also had dementia. They were  for 60 years. She has 3 children, 4 grandchildren and 1 great grandchild. 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  One daughter lives with her and she has corticobasal degeneration.       Past History: Reviewed  Current Outpatient Medications   Medication Sig Dispense Refill     aspirin 81 mg chewable tablet Chew 81 mg daily.       cholecalciferol, vitamin D3, (VITAMIN D3) 1,000 unit capsule Take 1,000 Units by mouth daily.       clobetasol (TEMOVATE) 0.05 % ointment Apply topically to vulva in a thin layer every other day.       DOCOSAHEXANOIC ACID/EPA (FISH OIL ORAL) Take 1,200 mg by mouth daily.       escitalopram oxalate (LEXAPRO) 5 MG tablet TAKE 1 TABLET BY MOUTH EVERY DAY 90 tablet 2     lansoprazole (PREVACID) 15 MG capsule Take 15 mg by mouth 2 (two) times a day.       levothyroxine (SYNTHROID, LEVOTHROID) 75 MCG tablet Take 1 tablet (75 mcg total) by mouth daily. 90 tablet 1     losartan (COZAAR) 100 MG tablet Take 1 tablet (100 mg total) by mouth daily. 90 tablet 2     No current facility-administered medications for this visit.        Family History: Reviewed    Physical Exam:    Vitals:    01/18/19 1314   BP: 132/80   Patient Site: Left Arm   Patient Position: Sitting   Cuff Size: Adult Regular   Pulse: 68   SpO2: 95%   Weight: 184 lb 9.6 oz (83.7 kg)     Wt Readings from Last 3 Encounters:   01/18/19 184 lb 9.6 oz (83.7 kg)   06/08/18 186 lb (84.4 kg)   11/15/16 196 lb (88.9 kg)     Body mass index is 30.72 kg/m .    Constitutional:  Reveals a pleasant elderly female, moderately anxious .  Vitals:  Per nursing notes.  HEENT:No cervical LAD, no thyromegaly,  conjunctiva is pink, no scleral icterus, TMs are visualized and normal bl, oropharynx is clear, no exudates, very crowded oropharynx  Cardiac:  Regular rate and rhythm,no murmurs, rubs, or gallops.  Legs without edema. Palpation of the radial pulse regular.  Lungs: Clear to auscultation bl.  Respiratory effort normal.  Abdomen:positive BS, soft,  nontender, nondistended.  No hepato-splenomagaly  Skin: Patient has scabs all over her lower and upper extremities.  She complains of chronic itchiness.  She sees a dermatologist and had biopsy done recently  Rheumatologic: Mild hand arthritis noted  Neurologic:  Cranial nerves II-XII intact.     Psychiatric: affect appropriate, memory intact.  Moderate anxiety noted    Data Review:    Analysis and Summary of Old Records (2): yes      Records Requested (1): no      Other History Summarized (from other people in the room) (2): no    Radiology Tests Summarized (XRAY/CT/MRI/DXA) (1): ct    Labs Reviewed (1): yes    Medicine Tests Reviewed (EKG/ECHO/COLONOSCOPY/EGD) (1): ekg    Independent Review of EKG or X-RAY (2): no

## 2021-06-23 NOTE — TELEPHONE ENCOUNTER
Please let pt know results:    B12 level is on the low normal range. You should start B12 supplement 500 mcg a day (can get it over the counter). It's important for brain function.    Due to palpitations, we can decrease thyroid medication: Take Synthroid 75 mcg 6 days a week and skip dose one day a week. We can repeat thyroid levels in 2 months.      (I also composed a letter with results, please mail it to her as well).

## 2021-06-23 NOTE — TELEPHONE ENCOUNTER
Please let pt know,Bone density test showed osteoporosis.     Due to her h/o renal insufficiency and acid reflux, I would recommend that we terat her with Prolia to help decrease bone loss.   It is a simple SQ injection in the office every 6 months. If she is interested we can pre-authorize it.    Dr NIXON

## 2021-06-23 NOTE — TELEPHONE ENCOUNTER
Please let her know,  EKG showed no arrhythmia, but possible problem with conduction. If dizziness episodes are getting worse, I would recommend that we get her to wear heart monitor for 2 weeks. (can order now, if she is in agreement).    Dr NIXON

## 2021-06-24 NOTE — TELEPHONE ENCOUNTER
Spoke with pt and relayed PCP message.  Pt understanding and agreeable to Prolia. Will create separate encounter to send to registrar to check insurance coverage of Prolia.

## 2021-06-24 NOTE — TELEPHONE ENCOUNTER
Current diagnosis: M81.0  Last Prolia Injection Date: New  Requested action for [place]:  Insurance verification and schedule appt

## 2021-06-26 NOTE — PROGRESS NOTES
Office Visit - Follow Up   Ruthy Man   89 y.o. female    Date of Visit: 2021    Chief Complaint   Patient presents with     Fatigue     fatigue & lack energy - possibly due to grieving/depression         Assessment and Plan   1. Adjustment disorder with anxiety  We discussed that her symptoms are most consistent with grief reaction/adjustment disorder/depression.  I recommended support groups and she has previously done this and will consider it.  She did not want individual cognitive behavioral therapy.  Increase Lexapro to 10 mg and follow-up in the next 2 to 4 weeks.  - escitalopram oxalate (LEXAPRO) 5 MG tablet; Take 2 tablets (10 mg total) by mouth daily.  Dispense: 90 tablet; Refill: 3    Return in about 4 weeks (around 2021) for recheck.     History of Present Illness   This 89 y.o. old woman who looks much younger than her stated age, comes in because she is feeling down.  Her daughter  recently.  She had a neuromuscular disorder.  She had lived with her daughter.  Her son also has been having some cardiovascular issues.  She finds that she does not want to get out of bed in the morning.  She will get up and have lunch with her son at 1230 who comes to visit.  She is not motivated to make lunch but usually does.  She has a garden and does not want to go out and work in the garden although she usually loves to do this.  She finds that she just wants to lie in bed.  She does feel sad.  No significant anxiety.  She will get together with her son again at 630 and her daughter also frequently comes over.  She recently saw her primary internist and numerous labs were checked and all looked okay.  She really is not having any other symptoms including no chest pain shortness of breath fever chills night sweats weight changes nausea vomiting diarrhea etc.    Review of Systems: A comprehensive review of systems was negative except as noted.     Medications, Allergies and Problem List   Reviewed,  "reconciled and updated  Post Discharge Medication Reconciliation Status:      Physical Exam   General Appearance:   Younger than stated age    /82 (Patient Site: Left Arm, Patient Position: Sitting, Cuff Size: Adult Regular)   Pulse 82   Temp 97.7  F (36.5  C)   Ht 5' 3\" (1.6 m)   Wt 188 lb (85.3 kg)   SpO2 95%   BMI 33.30 kg/m      HEENT exam is unremarkable  Neck supple no thyromegaly or nodule palpable  Lymphatic no cervical lymphadenopathy  Cardiovascular regular rate and rhythm no murmur gallop or rub  Pulmonary lungs are clear to auscultation bilaterally  Gastrointestinal abdomen soft nontender nondistended no organomegaly  Neurologic exam is non focal  Psychiatric pleasant, no confusion or agitation        Additional Information   Current Outpatient Medications   Medication Sig Dispense Refill     aspirin 81 mg chewable tablet Chew 81 mg daily.       atorvastatin (LIPITOR) 40 MG tablet TAKE 1 TABLET(40 MG) BY MOUTH DAILY 90 tablet 2     calcium carbonate (OS-SUE) 600 mg calcium (1,500 mg) tablet Take 600 mg by mouth 2 (two) times a day with meals.       cholecalciferol, vitamin D3, (VITAMIN D3) 1,000 unit capsule Take 1,000 Units by mouth daily.       clobetasoL (TEMOVATE) 0.05 % ointment Apply 0.5 mg daily as needed 60 g 4     cyanocobalamin (VITAMIN B-12) 500 MCG tablet Take 500 mcg by mouth daily.       diclofenac sodium (VOLTAREN) 1 % Gel Apply 3 times a day 100 g 11     DOCOSAHEXANOIC ACID/EPA (FISH OIL ORAL) Take 1,200 mg by mouth daily.       escitalopram oxalate (LEXAPRO) 5 MG tablet Take 2 tablets (10 mg total) by mouth daily. 90 tablet 3     hydrOXYchloroQUINE (PLAQUENIL) 200 mg tablet TAKE 1 TABLET(200 MG) BY MOUTH DAILY 90 tablet 1     lansoprazole (PREVACID) 15 MG capsule Take 15 mg by mouth as needed. 3-4 times per week       levothyroxine (SYNTHROID, LEVOTHROID) 75 MCG tablet TAKE 1 TABLET(75 MCG) BY MOUTH DAILY 5 days a week, skip dose 2 days a week 90 tablet 3     losartan " (COZAAR) 50 MG tablet TAKE 1-2 TABLETS BY MOUTH DAILY BASED ON BLOOD PRESSURE READING 180 tablet 1     oxybutynin (DITROPAN) 5 MG tablet Take on tab by mouth at bed time 90 tablet 3     triamcinolone (KENALOG) 0.1 % cream Use twice a day for 2 weeks, then take 1 week off. May repeat. 80 g 1     No current facility-administered medications for this visit.      Allergies   Allergen Reactions     Ace Inhibitors Cough     Amlodipine      Edema       Clarithromycin Nausea Only     Diltiazem Hcl Itching     Doxazosin Nausea Only     Penicillins Rash     Edema       Social History     Tobacco Use     Smoking status: Never Smoker     Smokeless tobacco: Never Used   Substance Use Topics     Alcohol use: Never     Frequency: Never     Drug use: Never       Review and/or order of clinical lab tests:  Review and/or order of radiology tests:  Review and/or order of medicine tests:  Discussion of test results with performing physician:  Decision to obtain old records and/or obtain history from someone other than the patient:  Review and summarization of old records and/or obtaining history from someone other than the patient and.or discussion of case with another health care provider:  Independent visualization of image, tracing or specimen itself:    Time:      Wojciech Lincoln MD

## 2021-06-29 NOTE — PROGRESS NOTES
"Progress Notes by Darien Lopez MD (Ted) at 9/15/2020  3:30 PM     Author: Darien Lopez MD (Ted) Service: -- Author Type: Physician    Filed: 9/15/2020  4:11 PM Encounter Date: 9/15/2020 Status: Signed    : Darien Lopez MD (Ted) (Physician)             Thank you, Dr. Meneses, for asking [facility] to evaluate Ms. Ruthy aMn.      Assessment/Recommendations   Assessment:    Heart palpitations due to isolated atrial ectopies and nonsustained ventricular dysrhythmias, chronic, minimally symptomatic  Hypertension, controlled  Chronic kidney disease  Arthritis    Plan:  I do not think that we need to treat atrial or ventricular ectopies at this point time.  In the presence of normal LV systolic function the risk of sustained ventricular arrhythmias is very low.  She does have a history of intolerance of beta-blocker and does not want to try it again.  It appears that she has had some form of palpitations for at least 30 years.  Reassuring factors that she has not sustained syncope or near syncope.    Her exercise tolerance is remarkably good for lady her age.  She does not give any symptoms to suggest classical angina.  I offered her a stress test but she was not enthusiastic about  doing \"unnecessary \"tests.         History of Present Illness    Ms. Ruthy Man is a 89 y.o. female who comes in for cardiac evaluation.  She was seen by her primary physician earlier this summer.  She was complaining of fatigue.  Because of history of abnormal event monitor she was referred for cardiology evaluation.  The patient denies exertional chest pain.  She does admit that she has gained some weight lately.  She blames it on different diet.  She has not had PND, orthopnea, pedal edema.  She says she can walk 2 city blocks without stopping.  She has 3 flights of stairs at home that she climbs every day.  She has not noticed any particular change to her " exercise tolerance.  She reports that she has had irregular heart beating since 1988.  This was originally detected during routine screening before colonoscopy.  Last year she underwent event monitor.  She had rare ectopies and 2 brief runs of ventricular tachycardia which were asymptomatic.  She does experience irregular heart beating which consist primarily of heart skipping.  She has never had prolonged heart racing or syncope.  She did have a echocardiogram in 2019.  She had normal LV systolic function and no significant valvular heart abnormalities.  She does not have a history of coronary artery disease or valvular heart disease.    ECG: Personally reviewed.  2019 normal sinus rhythm right bundle branch block    Echocardiogram: 2019    Normal left ventricular size and systolic function.    Left ventricle ejection fraction is normal. The estimated left ventricular ejection fraction is 60%.    Normal right ventricular size and systolic function.    No hemodynamically significant valvular heart abnormalities.    Event monitor: 2019  No symptomatic episodes were reported and no atrial fibrillation  detected.  Patient has an underlying IVCD present throughout the recording period  with occasional atrial premature beats commonly noted.  Two short runs of  nonsustained VT were noted on auto transmissions       Physical Examination Review of Systems   Vitals:    09/15/20 1541   BP: 138/78   Pulse: 83   Resp: 16   SpO2: 94%     Body mass index is 34.37 kg/m .  Wt Readings from Last 3 Encounters:   09/15/20 194 lb (88 kg)   07/29/20 193 lb (87.5 kg)   01/23/20 188 lb (85.3 kg)     General Appearance:   Alert, cooperative, no distress, appears stated age   Head/ENT: Normocephalic, without obvious abnormality. Membranes moist      EYES:  no scleral icterus, normal conjunctivae   Neck: Supple, symmetrical, trachea midline, no adenopathy, thyroid: not enlarged, symmetric, no carotid bruit or JVD   Chest/Lungs:   Lungs are  clear to auscultation, respirations unlabored. No tenderness or deformity    Cardiovascular:   Regular rhythm, S1, S2 normal, no murmur, rub or gallop.   Abdomen:  Soft, non-tender, bowel sounds active all four quadrants,  no masses, no organomegaly   Extremities: no cyanosis or clubbing. No edema   Skin: Skin color, texture, turgor normal, no rashes or lesions.    Psychiatric: Normal affect, calm   Neurologic: Alert and oriented x 3, moving all four extremities.     General: Weight Gain  Eyes: WNL  Ears/Nose/Throat: Hearing Loss  Lungs: WNL  Heart: Shortness of Breath with activity, Irregular Heartbeat  Stomach: Constipation, Heartburn  Bladder: Frequent Urination at Night  Muscle/Joints: WNL  Skin: Rash  Nervous System: WNL  Mental Health: Anxiety     Blood: WNL     Medical History  Surgical History Family History Social History   Past Medical History:   Diagnosis Date   ? Hypertension    ? 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 [place] REMOVAL GALLBLADDER      Description: Cholecystectomy;  Recorded: 11/30/2008;  Comments: stone   ? WI TOTAL ABDOM HYSTERECTOMY      Description: Hysterectomy;  Recorded: 11/30/2008;  Comments: benign disease    no family history of premature coronary artery disease or sudden cardiac death Social History     Socioeconomic History   ? Marital status:      Spouse name: Not on file   ? Number of children: Not on file   ? Years of education: Not on file   ? Highest education level: Not on file   Occupational History   ? Not on file   Social Needs   ? Financial resource strain: Not on file   ? Food insecurity     Worry: Not on file     Inability: Not on file   ? Transportation needs     Medical: Not on file     Non-medical: Not on file   Tobacco Use   ? Smoking status: Never Smoker   ? Smokeless tobacco: Never Used   Substance and Sexual Activity   ? Alcohol use: Never     Frequency: Never   ? Drug use: Never   ? Sexual activity: Not on  file   Lifestyle   ? Physical activity     Days per week: Not on file     Minutes per session: Not on file   ? Stress: Not on file   Relationships   ? Social connections     Talks on phone: Not on file     Gets together: Not on file     Attends Taoism service: Not on file     Active member of club or organization: Not on file     Attends meetings of clubs or organizations: Not on file     Relationship status: Not on file   ? Intimate partner violence     Fear of current or ex partner: Not on file     Emotionally abused: Not on file     Physically abused: Not on file     Forced sexual activity: Not on file   Other Topics Concern   ? Not on file   Social History Narrative    She is .  Her   in  suddenly, but he also had dementia. They were  for 60 years. She has 3 children, 4 grandchildren and 1 great grandchild. 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  One daughter lives with her and she has corticobasal degeneration.          Medications  Allergies   Current Outpatient Medications   Medication Sig Dispense Refill   ? aspirin 81 mg chewable tablet Chew 81 mg daily.     ? atorvastatin (LIPITOR) 40 MG tablet Take 1 tablet (40 mg total) by mouth daily. 90 tablet 1   ? cholecalciferol, vitamin D3, (VITAMIN D3) 1,000 unit capsule Take 1,000 Units by mouth daily.     ? cyanocobalamin (VITAMIN B-12) 500 MCG tablet Take 500 mcg by mouth daily.     ? diclofenac sodium (VOLTAREN) 1 % Gel Apply 3 times a day 100 g 11   ? DOCOSAHEXANOIC ACID/EPA (FISH OIL ORAL) Take 1,200 mg by mouth daily.     ? escitalopram oxalate (LEXAPRO) 5 MG tablet Take 1 tablet (5 mg total) by mouth daily. 90 tablet 3   ? hydroxychloroquine (PLAQUENIL) 200 mg tablet TAKE 1 TABLET(200 MG) BY MOUTH DAILY 90 tablet 1   ? lansoprazole (PREVACID) 15 MG capsule Take 15 mg by mouth as needed. 3-4 times per week     ? levothyroxine (SYNTHROID,  LEVOTHROID) 75 MCG tablet TAKE 1 TABLET(75 MCG) BY MOUTH DAILY 5 days a week, skip dose 2 days a week 90 tablet 3   ? losartan (COZAAR) 50 MG tablet One tab a day 180 tablet 3   ? triamcinolone (KENALOG) 0.1 % cream Use twice a day for 2 weeks, then take 1 week off. May repeat. 80 g 1   ? calcium carbonate (OS-SUE) 600 mg calcium (1,500 mg) tablet Take 600 mg by mouth 2 (two) times a day with meals.       Current Facility-Administered Medications   Medication Dose Route Frequency Provider Last Rate Last Dose   ? denosumab 60 mg (PROLIA 60 mg/ml)  60 mg Subcutaneous Once Selma Meneses MD          Allergies   Allergen Reactions   ? Ace Inhibitors Cough   ? Amlodipine      Edema     ? Clarithromycin Nausea Only   ? Diltiazem Hcl Itching   ? Doxazosin Nausea Only   ? Penicillins Rash     Edema           Lab Results    Chemistry/lipid CBC Cardiac Enzymes/BNP/TSH/INR   Lab Results   Component Value Date    CHOL 166 04/12/2019    HDL 49 (L) 04/12/2019    LDLCALC 102 04/12/2019    TRIG 77 04/12/2019    CREATININE 1.36 (H) 07/29/2020    BUN 17 07/29/2020    K 4.2 07/29/2020     07/29/2020     07/29/2020    CO2 24 07/29/2020    Lab Results   Component Value Date    WBC 6.4 07/29/2020    HGB 12.4 07/29/2020    HCT 39.4 07/29/2020    MCV 91 07/29/2020     07/29/2020    Lab Results   Component Value Date    CKTOTAL 64 05/23/2019    TROPONINI <0.01 04/11/2019    TSH 0.77 07/29/2020

## 2021-07-03 NOTE — ADDENDUM NOTE
Addendum Note by Chuckie Curry CMA at 1/3/2018 12:52 PM     Author: Chuckie Curry CMA Service: -- Author Type: Certified Medical Assistant    Filed: 1/3/2018 12:52 PM Encounter Date: 1/3/2018 Status: Signed    : Chuckie Curry CMA (Certified Medical Assistant)    Addended by: CHUCKIE CURRY on: 1/3/2018 12:52 PM        Modules accepted: Orders

## 2021-07-03 NOTE — ADDENDUM NOTE
Addendum Note by Devon Meneses MD at 1/24/2020  8:53 AM     Author: Devon Meneses MD Service: -- Author Type: Physician    Filed: 1/24/2020  8:53 AM Encounter Date: 1/23/2020 Status: Signed    : Devon Meneses MD (Physician)    Addended by: DEVON MENESES on: 1/24/2020 08:53 AM        Modules accepted: Orders

## 2021-07-03 NOTE — ADDENDUM NOTE
Addendum Note by Devon Meneses MD at 7/31/2020  9:26 AM     Author: Devon Meneses MD Service: -- Author Type: Physician    Filed: 7/31/2020  9:26 AM Encounter Date: 7/30/2020 Status: Signed    : Devon Meneses MD (Physician)    Addended by: DEVON MENESES on: 7/31/2020 09:26 AM        Modules accepted: Orders

## 2021-07-03 NOTE — ADDENDUM NOTE
Addendum Note by Alexander Ventura LPN at 7/31/2020  9:08 AM     Author: Alexander Ventura LPN Service: -- Author Type: Licensed Nurse    Filed: 7/31/2020  9:08 AM Encounter Date: 7/30/2020 Status: Signed    : Alexander Ventura LPN (Licensed Nurse)    Addended by: ALEXANDER VENTURA on: 7/31/2020 09:08 AM        Modules accepted: Orders

## 2021-07-03 NOTE — ADDENDUM NOTE
Addendum Note by Devon Meneses MD at 7/31/2020 12:42 PM     Author: Devon Meneses MD Service: -- Author Type: Physician    Filed: 7/31/2020 12:42 PM Encounter Date: 7/30/2020 Status: Signed    : Devon Meneses MD (Physician)    Addended by: DEVON MENESES on: 7/31/2020 12:42 PM        Modules accepted: Orders

## 2021-07-03 NOTE — ADDENDUM NOTE
Addendum Note by Chuckie Curry CMA at 1/24/2020  6:51 AM     Author: Chuckie Curry CMA Service: -- Author Type: Certified Medical Assistant    Filed: 1/24/2020  6:51 AM Encounter Date: 1/23/2020 Status: Signed    : Chuckie Curry CMA (Certified Medical Assistant)    Addended by: CHUCKIE CURRY on: 1/24/2020 06:51 AM        Modules accepted: Orders

## 2021-07-06 VITALS
HEART RATE: 82 BPM | WEIGHT: 188 LBS | OXYGEN SATURATION: 95 % | BODY MASS INDEX: 33.31 KG/M2 | DIASTOLIC BLOOD PRESSURE: 82 MMHG | TEMPERATURE: 97.7 F | HEIGHT: 63 IN | SYSTOLIC BLOOD PRESSURE: 130 MMHG

## 2021-07-06 ASSESSMENT — PATIENT HEALTH QUESTIONNAIRE - PHQ9: SUM OF ALL RESPONSES TO PHQ QUESTIONS 1-9: 9

## 2021-08-03 PROBLEM — M35.3 POLYMYALGIA (H): Status: RESOLVED | Noted: 2020-11-04 | Resolved: 2020-11-04

## 2021-09-04 DIAGNOSIS — I10 ESSENTIAL HYPERTENSION: ICD-10-CM

## 2021-09-04 DIAGNOSIS — E03.9 ACQUIRED HYPOTHYROIDISM: ICD-10-CM

## 2021-09-04 DIAGNOSIS — F32.9 REACTIVE DEPRESSION: Primary | ICD-10-CM

## 2021-09-05 NOTE — TELEPHONE ENCOUNTER
"Routing refill request to provider for review/approval because:  Labs not current:  Cr    Last Written Prescription Date:  9/9/20  Last Fill Quantity: 90,  # refills: 3   Last office visit provider:  6/18/21     Requested Prescriptions   Pending Prescriptions Disp Refills     levothyroxine (SYNTHROID/LEVOTHROID) 75 MCG tablet [Pharmacy Med Name: LEVOTHYROXINE 0.075MG (75MCG) TABS] 90 tablet 3     Sig: TAKE 1 TABLET BY MOUTH DAILY 5 DAYS A WEEK, SKIP DOSE 2 DAYS PER WEEK       Thyroid Protocol Passed - 9/4/2021  9:12 AM        Passed - Patient is 12 years or older        Passed - Recent (12 mo) or future (30 days) visit within the authorizing provider's specialty     Patient has had an office visit with the authorizing provider or a provider within the authorizing providers department within the previous 12 mos or has a future within next 30 days. See \"Patient Info\" tab in inbasket, or \"Choose Columns\" in Meds & Orders section of the refill encounter.              Passed - Medication is active on med list        Passed - Normal TSH on file in past 12 months     Recent Labs   Lab Test 04/23/21  1319   TSH 1.85              Passed - No active pregnancy on record     If patient is pregnant or has had a positive pregnancy test, please check TSH.          Passed - No positive pregnancy test in past 12 months     If patient is pregnant or has had a positive pregnancy test, please check TSH.             losartan (COZAAR) 50 MG tablet [Pharmacy Med Name: LOSARTAN 50MG TABLETS] 180 tablet 1     Sig: TAKE 1-2 TABLETS BY MOUTH DAILY BASED ON BLOOD PRESSURE READING       Angiotensin-II Receptors Failed - 9/4/2021  9:12 AM        Failed - Normal serum creatinine on file in past 12 months     Recent Labs   Lab Test 04/23/21  1319   CR 1.31*       Ok to refill medication if creatinine is low          Passed - Last blood pressure under 140/90 in past 12 months     BP Readings from Last 3 Encounters:   06/18/21 130/82   04/23/21 " "134/76   11/24/20 130/72                 Passed - Recent (12 mo) or future (30 days) visit within the authorizing provider's specialty     Patient has had an office visit with the authorizing provider or a provider within the authorizing providers department within the previous 12 mos or has a future within next 30 days. See \"Patient Info\" tab in inbasket, or \"Choose Columns\" in Meds & Orders section of the refill encounter.              Passed - Medication is active on med list        Passed - Patient is age 18 or older        Passed - No active pregnancy on record        Passed - Normal serum potassium on file in past 12 months     Recent Labs   Lab Test 04/23/21  1319   POTASSIUM 4.0                    Passed - No positive pregnancy test in past 12 months           escitalopram (LEXAPRO) 5 MG tablet [Pharmacy Med Name: ESCITALOPRAM 5MG TABLETS] 90 tablet      Sig: TAKE 1 TABLET(5 MG) BY MOUTH DAILY       SSRIs Protocol Passed - 9/4/2021  9:12 AM        Passed - Recent (12 mo) or future (30 days) visit within the authorizing provider's specialty     Patient has had an office visit with the authorizing provider or a provider within the authorizing providers department within the previous 12 mos or has a future within next 30 days. See \"Patient Info\" tab in in[employer], or \"Choose Columns\" in Meds & Orders section of the refill encounter.              Passed - Medication is active on med list        Passed - Patient is age 18 or older        Passed - No active pregnancy on record        Passed - No positive pregnancy test in last 12 months             ben ignacio RN 09/04/21 9:17 PM  "

## 2021-09-07 RX ORDER — LOSARTAN POTASSIUM 50 MG/1
TABLET ORAL
Qty: 180 TABLET | Refills: 1 | Status: SHIPPED | OUTPATIENT
Start: 2021-09-07 | End: 2022-03-04

## 2021-09-07 RX ORDER — ESCITALOPRAM OXALATE 5 MG/1
10 TABLET ORAL DAILY
Qty: 180 TABLET | Refills: 1 | Status: SHIPPED | OUTPATIENT
Start: 2021-09-07 | End: 2022-05-20

## 2021-09-07 RX ORDER — LEVOTHYROXINE SODIUM 75 UG/1
TABLET ORAL
Qty: 90 TABLET | Refills: 3 | Status: SHIPPED | OUTPATIENT
Start: 2021-09-07 | End: 2022-12-27

## 2021-09-09 ENCOUNTER — NURSE TRIAGE (OUTPATIENT)
Dept: NURSING | Facility: CLINIC | Age: 86
End: 2021-09-09

## 2021-09-09 DIAGNOSIS — F43.22 ADJUSTMENT DISORDER WITH ANXIETY: ICD-10-CM

## 2021-09-09 DIAGNOSIS — F32.A DEPRESSION: Primary | ICD-10-CM

## 2021-09-09 RX ORDER — ESCITALOPRAM OXALATE 5 MG/1
5 TABLET ORAL DAILY
Qty: 90 TABLET | Refills: 3 | Status: SHIPPED | OUTPATIENT
Start: 2021-09-09 | End: 2022-05-20

## 2021-09-09 NOTE — TELEPHONE ENCOUNTER
Virginia is calling and is requesting a new prescription be sent to [facility] for her Escitalopram Oxalate 5 mg tablet.  Her last prescription reads to take 1 5mg tablet daily.  The new prescription sent on 09/07/2021 reads to take 2 5mg tablets daily.  Her insurance will not ok this prescription.  Please send new prescription to [facility] on [address] as patient does not have may tablets left.    COVID 19 Nurse Triage Plan/Patient Instructions    Please be aware that novel coronavirus (COVID-19) may be circulating in the community. If you develop symptoms such as fever, cough, or SOB or if you have concerns about the presence of another infection including coronavirus (COVID-19), please contact your health care provider or visit [email or web address].     Disposition/Instructions    Home care recommended. Follow home care protocol based instructions.    Thank you for taking steps to prevent the spread of this virus.  o Limit your contact with others.  o Wear a simple mask to cover your cough.  o Wash your hands well and often.    Resources    [facility]: About COVID-19: [email or web address]    CDC: What to Do If You're Sick: www.cdc.gov/coronavirus/2019-ncov/about/steps-when-sick.html    CDC: Ending Home Isolation: www.cdc.gov/coronavirus/2019-ncov/hcp/disposition-in-home-patients.html     CDC: Caring for Someone: www.cdc.gov/coronavirus/2019-ncov/if-you-are-sick/care-for-someone.html     [facility]: Interim Guidance for Hospital Discharge to Home: www.health.[facility].mn.us/diseases/coronavirus/hcp/hospdischarge.pdf    [facility] clinical trials (COVID-19 research studies): clinicalaffairs.[facility].edu/umn-clinical-trials     Below are the COVID-19 hotlines at the [place] Department of [facility] ([facility]). Interpreters are available.   o For health questions: Call 285-545-6108 or 1-684.642.1371 (7 a.m. to 7 p.m.)  o For questions about schools and childcare: Call  277.740.6039 or 1-181.950.9993 (7 a.m. to 7 p.m.)

## 2021-09-09 NOTE — TELEPHONE ENCOUNTER
Virginia does not want to take the higher dose.  She states she feels good on escitalopram 5 mg, one tablet daily.     The pharmacy needs a corrected prescription.

## 2021-09-09 NOTE — TELEPHONE ENCOUNTER
I sent script for lexapro 5 mg 2 tabs a day on 09.07.21.  In the computer it states that RX was received and  confirmed by pharmacy: [facility].    I am confused, patient does not want to take 2 tablets a day?  Does she still need a refill?

## 2021-09-09 NOTE — TELEPHONE ENCOUNTER
Patient was seen by Dr. Lincoln on 6/18/21.  Patient expressed a fair amount of grieving as her daughter passed away suddenly in April.  Patient also expressed having no interest in doing things even the things she normally enjoys. She prefers to lay in bed most of the day but will make herself get up to visit with other family members and eat.    Dr. Lincoln increased escitalopram 5 mg, one daily to two tablets daily.      Writer called Virginia to see how this dose was working for her.  She said that the pharmacy did not get Dr. Lincoln's increased prescription.  (Review of the chart indicates medication was not sent to pharmacy.) She continued taking only one tablet daily.    Abhay states she is doing better now in the grieving the loss of her daughter and does not want the increased dose.      Please send escitalopram 5 mg, one tablet daily, #90 and 3 refills to [id number]

## 2021-10-18 ENCOUNTER — NURSE TRIAGE (OUTPATIENT)
Dept: NURSING | Facility: CLINIC | Age: 86
End: 2021-10-18

## 2021-10-18 NOTE — TELEPHONE ENCOUNTER
"Pt reports a BP of 102/62 with lightheadedness.  She tells me her BP has been decreasing over the past 6 months, had to cut her Losartan down from 100mg to 50mg daily.  Pt states her BP used to be as high as 169/85, has struggled with HTN her whole life.  The last time she checked her BP, prior to today, was 2 months ago, BP was 130/75 at that time.      Pt felt the need to check her BP today because she felt lightheaded.  She feels \"funny\" in her head.  She does have a history of palpitations but states these are no different than her normal, not feeling palpitations during triage assessment.      Triage disposition:  ED or PCP Triage.  Second Level Triage required for ED disposition:  RN will route this encounter to PCP care team to address within 30 minutes.  RN will call the clinic to confirm they received this message.      Pt can be reached at # on file.        Alice Wharton RN/NNEKA    Reason for Disposition    [1] Fall in systolic BP > 20 mm Hg from normal AND [2] dizzy, lightheaded, or weak    Additional Information    Negative: Started suddenly after an allergic medicine, an allergic food, or bee sting    Negative: Shock suspected (e.g., cold/pale/clammy skin, too weak to stand, low BP, rapid pulse)    Negative: Difficult to awaken or acting confused (e.g., disoriented, slurred speech)    Negative: Fainted    Negative: [1] Systolic BP < 90 AND [2] dizzy, lightheaded, or weak    Negative: Chest pain    Negative: Bleeding (e.g., vomiting blood, rectal bleeding or tarry stools, severe vaginal bleeding)(Exception: fainted from sight of small amount of blood; small cut or abrasion)    Negative: Extra heart beats or heart is beating fast  (i.e., \"palpitations\")    Negative: Sounds like a life-threatening emergency to the triager    Negative: [1] Systolic BP < 80 AND [2] NOT dizzy, lightheaded or weak    Negative: Abdominal pain    Negative: Fever > 100.4 F (38.0 C)    Negative: Major surgery in the past month    " Negative: [1] Drinking very little AND [2] dehydration suspected (e.g., no urine > 12 hours, very dry mouth, very lightheaded)    Protocols used: LOW BLOOD PRESSURE-A-AH

## 2021-10-18 NOTE — TELEPHONE ENCOUNTER
Please let her know, goal b/p for her is 120-130. She can decrease losartan to 25 mg a day and continue monitoring b/p at home. Follow up in the office.

## 2021-10-18 NOTE — TELEPHONE ENCOUNTER
This RN called patient back and gave her the information from Dr. Meneses below.  Patient verbalized understanding and had no further questions.  She has an appointment on Friday.        COVID 19 Nurse Triage Plan/Patient Instructions    Please be aware that novel coronavirus (COVID-19) may be circulating in the community. If you develop symptoms such as fever, cough, or SOB or if you have concerns about the presence of another infection including coronavirus (COVID-19), please contact your health care provider or visit [email or web address].     Disposition/Instructions    In-Person Visit with provider recommended. Reference Visit Selection Guide.    Thank you for taking steps to prevent the spread of this virus.  o Limit your contact with others.  o Wear a simple mask to cover your cough.  o Wash your hands well and often.    Resources    [facility]: About COVID-19: [email or web address]    CDC: What to Do If You're Sick: www.cdc.gov/coronavirus/2019-ncov/about/steps-when-sick.html    CDC: Ending Home Isolation: www.cdc.gov/coronavirus/2019-ncov/hcp/disposition-in-home-patients.html     CDC: Caring for Someone: www.cdc.gov/coronavirus/2019-ncov/if-you-are-sick/care-for-someone.html     [facility]: Interim Guidance for Hospital Discharge to Home: www.health.[facility].mn.us/diseases/coronavirus/hcp/hospdischarge.pdf    [facility] clinical trials (COVID-19 research studies): clinicalaffairs.[facility]-clinical-trials     Below are the COVID-19 hotlines at the [facility] of Health ([facility]). Interpreters are available.   o For health questions: Call 095-769-0960 or 1-789.234.2951 (7 a.m. to 7 p.m.)  o For questions about schools and childcare: Call 771-253-7262 or 1-633.311.6626 (7 a.m. to 7 p.m.)             Alice Wharton RN/NNEKA

## 2021-10-21 ENCOUNTER — OFFICE VISIT (OUTPATIENT)
Dept: RHEUMATOLOGY | Facility: CLINIC | Age: 86
End: 2021-10-21
Payer: COMMERCIAL

## 2021-10-21 VITALS — HEART RATE: 94 BPM | SYSTOLIC BLOOD PRESSURE: 122 MMHG | DIASTOLIC BLOOD PRESSURE: 64 MMHG

## 2021-10-21 DIAGNOSIS — N18.32 CHRONIC RENAL FAILURE, STAGE 3B (H): ICD-10-CM

## 2021-10-21 DIAGNOSIS — M15.0 PRIMARY OSTEOARTHRITIS INVOLVING MULTIPLE JOINTS: ICD-10-CM

## 2021-10-21 DIAGNOSIS — M12.39 PALINDROMIC RHEUMATISM INVOLVING MULTIPLE SITES: Primary | ICD-10-CM

## 2021-10-21 PROCEDURE — 99214 OFFICE O/P EST MOD 30 MIN: CPT | Mod: 25 | Performed by: INTERNAL MEDICINE

## 2021-10-21 PROCEDURE — 20610 DRAIN/INJ JOINT/BURSA W/O US: CPT | Mod: RT | Performed by: INTERNAL MEDICINE

## 2021-10-21 RX ORDER — TRIAMCINOLONE ACETONIDE 40 MG/ML
40 INJECTION, SUSPENSION INTRA-ARTICULAR; INTRAMUSCULAR ONCE
Status: COMPLETED | OUTPATIENT
Start: 2021-10-21 | End: 2021-10-21

## 2021-10-21 RX ORDER — HYDROXYCHLOROQUINE SULFATE 200 MG/1
TABLET, FILM COATED ORAL
Qty: 90 TABLET | Refills: 1 | Status: SHIPPED | OUTPATIENT
Start: 2021-10-21 | End: 2022-01-14

## 2021-10-21 RX ADMIN — TRIAMCINOLONE ACETONIDE 40 MG: 40 INJECTION, SUSPENSION INTRA-ARTICULAR; INTRAMUSCULAR at 14:44

## 2021-10-21 NOTE — PROGRESS NOTES
Rheumatology follow-up visit note     Virginia is a 89 year old female presents today for follow-up.    Virginia was seen today for recheck.    Diagnoses and all orders for this visit:    Palindromic rheumatism involving multiple sites  -     hydroxychloroquine (PLAQUENIL) 200 MG tablet; [HYDROXYCHLOROQUINE (PLAQUENIL) 200 MG TABLET] TAKE 1 TABLET(200 MG) BY MOUTH DAILY  -     ASPIRATION/INJECTION MAJOR JOINT    Primary osteoarthritis involving multiple joints  -     ASPIRATION/INJECTION MAJOR JOINT  -     triamcinolone (KENALOG-40) injection 40 mg    Chronic renal failure, stage 3b (H)        This patient with a palindromic rheumatism was done well as well as the RA is concerned with hydroxychloroquine however worsening pain in her right knee interfering with day-to-day activities, on a split-level she is finding it hard to go up steps.  Try corticosteroid injections.  Pros and cons are outlined 40 mg of Kenalog injected into the right knee anterolateral approach she tolerated the procedure well.    Follow up in 6 months.    HPI    Ruthy Man is a 89 year old female is here for follow-up of palindromic rheumatism, osteoarthritis, and the background of renal impairment doing great on hydroxychloroquine, at a reduced dose of 200 mg daily.  She reports excellent control with hydroxychloroquine as far as her RA symptoms are concerned she has not had any palindrome's.  She has noted pain in the knee.  This is the right side.  Worse with activity.  On the lateral aspect.  She does not think there is swelling.  Does not wake her up from sleep.  Given the past history of renal impairment not a candidate for any nonsteroidals.  We discussed options  Following is the excerpt from a recent note: .  There is no personal family history of psoriasis ulcerative colitis or Crohn's disease.  Her work-up at her primary physician's office shows elevated sedimentation rate at 55, negative rheumatoid factor and anti-CCP  antibody and GEORGI.  X-rays of the hands which are personally reviewed show degenerative arthropathy affecting multiple joints in the hands especially the IP joints but also in particular to be noted is the involvement of the second right MCP with destructive arthropathy.  She does not have chondrocalcinosis.  She describes herself otherwise in good health.  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  She lives by herself.  They have 2 children live on the same street.  She does not smoke and does not take alcohol. 0 Further historical information and ADL limitations as noted in the multidimensional health assessment questionnaire attached in the EMR.       DETAILED EXAMINATION  10/21/21  :    Vitals:    10/21/21 1353   BP: 122/64   Pulse: 94     Alert oriented. Head including the face is examined for malar rash, heliotropes, scarring, lupus pernio. Eyes examined for redness such as in episcleritis/scleritis, periorbital lesions.   Neck/ Face examined for parotid gland swelling, range of motion of neck.  Left upper and lower and right upper and lower extremities examined for tenderness, swelling, warmth of the appendicular joints, range of motion, edema, rash.  Some of the important findings included: she does not have evidence of synovitis in the palpable joints of the upper extremities.  No significant deformities of the digits.  + Heberden nodes.  Range of motion of the shoulders  show full abduction.  JLT bilaterally however no effusion or warmth of the knees.  she does not have dactylitis of the digits.     Patient Active Problem List    Diagnosis Date Noted     Dermatitis 04/23/2021     Priority: Medium     Renal insufficiency 04/23/2021     Priority: Medium     Palpitations 09/15/2020     Priority: Medium     Chronic right shoulder pain 01/21/2020     Priority: Medium     Palindromic rheumatism 10/15/2019     Priority: Medium     Primary osteoarthritis involving multiple  joints 10/15/2019     Priority: Medium     Chronic renal failure, stage 3 (moderate) (H) 08/06/2019     Priority: Medium     Acute ischemic VBA thalamic stroke, left (H) 04/12/2019     Priority: Medium     Right thalamic stroke (H) 04/11/2019     Priority: Medium     Left knee pain 04/11/2019     Priority: Medium     Ischemic optic neuropathy      Priority: Medium     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         Hypothyroidism      Priority: Medium     Created by Conversion  Replacement Utility updated for latest IMO load         Adjustment disorder with anxiety      Priority: Medium     Created by Conversion         Hypertension      Priority: Medium     Created by Conversion  Replacement Utility updated for latest IMO load         Senile osteoporosis 02/21/2019     Priority: Medium     Vitamin D deficiency      Priority: Medium     Created by Conversion  [facility] Annotation: May 18 2013  3:04PM - Jad Edmond: Vit D = 10  Replacement Utility updated for latest IMO load         Hiatal Hernia      Priority: Medium     Created by Conversion  [facility] Annotation: Nov 30 2008  4:27PM - Luke Cannon:   Gastroesophageal   reflux  Replacement Utility updated for latest IMO load         Skin Cancer      Priority: Medium     Created by Conversion  Replacement Utility updated for latest IMO load         Colon polyps 02/25/2015     Priority: Medium     Impaired Fasting Glucose      Priority: Medium     Created by Conversion         Premature Ventricular Contractions      Priority: Medium     Created by [facility] Annotation: Nov 30 2008  4:41PM - Luke Cannon: frequent,   benign         Past Surgical History:   Procedure Laterality Date     C TOTAL ABDOM HYSTERECTOMY      Description: Hysterectomy;  Recorded: 11/30/2008;  Comments: benign disease     HC REMOVAL GALLBLADDER      Description: Cholecystectomy;  Recorded: 11/30/2008;  Comments: stone      Past Medical History:   Diagnosis Date     Hypertension       Senile osteoporosis 2/21/2019     Allergies   Allergen Reactions     Ace Inhibitors Cough     Amlodipine Unknown     Edema       Clarithromycin Nausea     Diltiazem Hcl [Diltiazem] Itching     Doxazosin Nausea     Penicillins Rash     Edema       Current Outpatient Medications   Medication Sig Dispense Refill     aspirin 81 mg chewable tablet [ASPIRIN 81 MG CHEWABLE TABLET] Chew 81 mg daily.       atorvastatin (LIPITOR) 40 MG tablet [ATORVASTATIN (LIPITOR) 40 MG TABLET] TAKE 1 TABLET(40 MG) BY MOUTH DAILY 90 tablet 2     calcium carbonate (OS-SUE) 600 mg calcium (1,500 mg) tablet [CALCIUM CARBONATE (OS-SUE) 600 MG CALCIUM (1,500 MG) TABLET] Take 600 mg by mouth 2 (two) times a day with meals.       cholecalciferol, vitamin D3, (VITAMIN D3) 1,000 unit capsule [CHOLECALCIFEROL, VITAMIN D3, (VITAMIN D3) 1,000 UNIT CAPSULE] Take 1,000 Units by mouth daily.       clobetasoL (TEMOVATE) 0.05 % ointment [CLOBETASOL (TEMOVATE) 0.05 % OINTMENT] Apply 0.5 mg daily as needed 60 g 4     cyanocobalamin (VITAMIN B-12) 500 MCG tablet [CYANOCOBALAMIN (VITAMIN B-12) 500 MCG TABLET] Take 500 mcg by mouth daily.       diclofenac sodium (VOLTAREN) 1 % Gel [DICLOFENAC SODIUM (VOLTAREN) 1 % GEL] Apply 3 times a day 100 g 11     DOCOSAHEXANOIC ACID/EPA (FISH OIL ORAL) [DOCOSAHEXANOIC ACID/EPA (FISH OIL ORAL)] Take 1,200 mg by mouth daily.       escitalopram (LEXAPRO) 5 MG tablet Take 1 tablet (5 mg) by mouth daily 90 tablet 3     escitalopram (LEXAPRO) 5 MG tablet Take 2 tablets (10 mg) by mouth daily 180 tablet 1     escitalopram oxalate (LEXAPRO) 5 MG tablet [ESCITALOPRAM OXALATE (LEXAPRO) 5 MG TABLET] Take 2 tablets (10 mg total) by mouth daily. 90 tablet 3     escitalopram oxalate (LEXAPRO) 5 MG tablet [ESCITALOPRAM OXALATE (LEXAPRO) 5 MG TABLET] Take 1 tablet (5 mg total) by mouth daily. 90 tablet 3     hydrOXYchloroQUINE (PLAQUENIL) 200 mg tablet [HYDROXYCHLOROQUINE (PLAQUENIL) 200 MG TABLET] TAKE 1 TABLET(200 MG) BY MOUTH DAILY 90  tablet 1     lansoprazole (PREVACID) 15 MG capsule [LANSOPRAZOLE (PREVACID) 15 MG CAPSULE] Take 15 mg by mouth as needed. 3-4 times per week       levothyroxine (SYNTHROID/LEVOTHROID) 75 MCG tablet TAKE 1 TABLET BY MOUTH DAILY 5 DAYS A WEEK, SKIP DOSE 2 DAYS PER WEEK 90 tablet 3     losartan (COZAAR) 50 MG tablet TAKE 1-2 TABLETS BY MOUTH DAILY BASED ON BLOOD PRESSURE READING 180 tablet 1     oxybutynin (DITROPAN) 5 MG tablet [OXYBUTYNIN (DITROPAN) 5 MG TABLET] Take on tab by mouth at bed time 90 tablet 3     triamcinolone (KENALOG) 0.1 % cream [TRIAMCINOLONE (KENALOG) 0.1 % CREAM] Use twice a day for 2 weeks, then take 1 week off. May repeat. 80 g 1     family history includes ALS in her mother; Other - See Comments in her brother and daughter; Pulmonary Embolism in her father.     Social Connections:      Frequency of Communication with Friends and Family:      Frequency of Social Gatherings with Friends and Family:      Attends [employer] Services:      Active Member of Clubs or Organizations:      Attends Club or Organization Meetings:      Marital Status:           WBC   Date Value Ref Range Status   04/23/2021 8.1 4.0 - 11.0 thou/uL Final     RBC Count   Date Value Ref Range Status   04/23/2021 4.16 3.80 - 5.40 mill/uL Final     Hemoglobin   Date Value Ref Range Status   04/23/2021 12.1 12.0 - 16.0 g/dL Final     Hematocrit   Date Value Ref Range Status   04/23/2021 38.8 35.0 - 47.0 % Final     MCV   Date Value Ref Range Status   04/23/2021 93 80 - 100 fL Final     MCH   Date Value Ref Range Status   04/23/2021 29.1 27.0 - 34.0 pg Final     Platelet Count   Date Value Ref Range Status   04/23/2021 255 140 - 440 thou/uL Final     % Lymphocytes   Date Value Ref Range Status   04/23/2021 13 (L) 20 - 40 % Final     AST   Date Value Ref Range Status   11/04/2020 18 0 - 40 U/L Final     AST_EXT   Date Value Ref Range Status   03/12/2021 19 10 - 35 U/L Final     ALT   Date Value Ref Range Status   11/04/2020 10 0 - 45  U/L Final     ALT_EXT   Date Value Ref Range Status   03/12/2021 8 6 - 29 U/L Final     Albumin   Date Value Ref Range Status   11/04/2020 3.7 3.5 - 5.0 g/dL Final     Alkaline Phosphatase   Date Value Ref Range Status   11/04/2020 99 45 - 120 U/L Final     Creatinine   Date Value Ref Range Status   04/23/2021 1.31 (H) 0.60 - 1.10 mg/dL Final     GFR Estimate   Date Value Ref Range Status   04/23/2021 38 (L) >60 mL/min/1.73m2 Final     GFR Estimate If Black   Date Value Ref Range Status   04/23/2021 46 (L) >60 mL/min/1.73m2 Final     Erythrocyte Sedimentation Rate   Date Value Ref Range Status   08/06/2019 25 (H) 0 - 20 mm/hr Final     CRP   Date Value Ref Range Status   08/06/2019 1.1 (H) 0.0 - 0.8 mg/dL Final

## 2021-10-22 ENCOUNTER — OFFICE VISIT (OUTPATIENT)
Dept: INTERNAL MEDICINE | Facility: CLINIC | Age: 86
End: 2021-10-22
Payer: COMMERCIAL

## 2021-10-22 ENCOUNTER — TELEPHONE (OUTPATIENT)
Dept: INTERNAL MEDICINE | Facility: CLINIC | Age: 86
End: 2021-10-22

## 2021-10-22 VITALS
BODY MASS INDEX: 33.31 KG/M2 | HEIGHT: 63 IN | OXYGEN SATURATION: 94 % | DIASTOLIC BLOOD PRESSURE: 76 MMHG | SYSTOLIC BLOOD PRESSURE: 152 MMHG | HEART RATE: 80 BPM | WEIGHT: 188 LBS

## 2021-10-22 DIAGNOSIS — Z78.0 POST-MENOPAUSAL: ICD-10-CM

## 2021-10-22 DIAGNOSIS — N28.9 RENAL INSUFFICIENCY: ICD-10-CM

## 2021-10-22 DIAGNOSIS — M81.0 AGE-RELATED OSTEOPOROSIS WITHOUT CURRENT PATHOLOGICAL FRACTURE: ICD-10-CM

## 2021-10-22 DIAGNOSIS — I10 ESSENTIAL HYPERTENSION: Primary | ICD-10-CM

## 2021-10-22 DIAGNOSIS — E03.9 ACQUIRED HYPOTHYROIDISM: ICD-10-CM

## 2021-10-22 DIAGNOSIS — M81.0 SENILE OSTEOPOROSIS: Primary | ICD-10-CM

## 2021-10-22 DIAGNOSIS — Z92.29 PERSONAL HISTORY OF OTHER DRUG THERAPY: ICD-10-CM

## 2021-10-22 DIAGNOSIS — R73.09 ABNORMAL GLUCOSE: ICD-10-CM

## 2021-10-22 LAB
ANION GAP SERPL CALCULATED.3IONS-SCNC: 11 MMOL/L (ref 5–18)
BASOPHILS # BLD AUTO: 0 10E3/UL (ref 0–0.2)
BASOPHILS NFR BLD AUTO: 0 %
BUN SERPL-MCNC: 25 MG/DL (ref 8–28)
CALCIUM SERPL-MCNC: 9.6 MG/DL (ref 8.5–10.5)
CHLORIDE BLD-SCNC: 104 MMOL/L (ref 98–107)
CO2 SERPL-SCNC: 23 MMOL/L (ref 22–31)
CREAT SERPL-MCNC: 1.26 MG/DL (ref 0.6–1.1)
EOSINOPHIL # BLD AUTO: 0 10E3/UL (ref 0–0.7)
EOSINOPHIL NFR BLD AUTO: 0 %
ERYTHROCYTE [DISTWIDTH] IN BLOOD BY AUTOMATED COUNT: 13.9 % (ref 10–15)
GFR SERPL CREATININE-BSD FRML MDRD: 37 ML/MIN/1.73M2
GLUCOSE BLD-MCNC: 100 MG/DL (ref 70–125)
HBA1C MFR BLD: 5.8 % (ref 0–5.6)
HCT VFR BLD AUTO: 37.9 % (ref 35–47)
HGB BLD-MCNC: 12.1 G/DL (ref 11.7–15.7)
IMM GRANULOCYTES # BLD: 0 10E3/UL
IMM GRANULOCYTES NFR BLD: 0 %
LYMPHOCYTES # BLD AUTO: 1 10E3/UL (ref 0.8–5.3)
LYMPHOCYTES NFR BLD AUTO: 8 %
MCH RBC QN AUTO: 28.5 PG (ref 26.5–33)
MCHC RBC AUTO-ENTMCNC: 31.9 G/DL (ref 31.5–36.5)
MCV RBC AUTO: 89 FL (ref 78–100)
MONOCYTES # BLD AUTO: 0.5 10E3/UL (ref 0–1.3)
MONOCYTES NFR BLD AUTO: 4 %
NEUTROPHILS # BLD AUTO: 11.3 10E3/UL (ref 1.6–8.3)
NEUTROPHILS NFR BLD AUTO: 88 %
PLATELET # BLD AUTO: 277 10E3/UL (ref 150–450)
POTASSIUM BLD-SCNC: 4 MMOL/L (ref 3.5–5)
RBC # BLD AUTO: 4.24 10E6/UL (ref 3.8–5.2)
SODIUM SERPL-SCNC: 138 MMOL/L (ref 136–145)
TSH SERPL DL<=0.005 MIU/L-ACNC: 0.94 UIU/ML (ref 0.3–5)
WBC # BLD AUTO: 12.8 10E3/UL (ref 4–11)

## 2021-10-22 PROCEDURE — 85025 COMPLETE CBC W/AUTO DIFF WBC: CPT | Performed by: INTERNAL MEDICINE

## 2021-10-22 PROCEDURE — 36415 COLL VENOUS BLD VENIPUNCTURE: CPT | Performed by: INTERNAL MEDICINE

## 2021-10-22 PROCEDURE — 83036 HEMOGLOBIN GLYCOSYLATED A1C: CPT | Performed by: INTERNAL MEDICINE

## 2021-10-22 PROCEDURE — 80048 BASIC METABOLIC PNL TOTAL CA: CPT | Performed by: INTERNAL MEDICINE

## 2021-10-22 PROCEDURE — 99214 OFFICE O/P EST MOD 30 MIN: CPT | Performed by: INTERNAL MEDICINE

## 2021-10-22 PROCEDURE — 84443 ASSAY THYROID STIM HORMONE: CPT | Performed by: INTERNAL MEDICINE

## 2021-10-22 ASSESSMENT — MIFFLIN-ST. JEOR: SCORE: 1236.89

## 2021-10-22 NOTE — PATIENT INSTRUCTIONS
1. Get Covid booster in 2 weeks . Get flu shot in November.    2. Let me know if b/p at home continues to be >140. , we may consider adding metoprolol to Losartan to help decrease b/p and decrease heart rate/extra beats.

## 2021-10-22 NOTE — LETTER
October 23, 2021      Ruthy Man  [address] [place] [id number]        Dear ,    We are writing to inform you of your test results.    Sugar is slightly prediabetic, but not worse from previous. Please avoid concentrated sweets.  Kidney function is good for your age:)  Thyroid levels are good, continue current Synthroid dose.  Red cell count is normal. White cell clount is slightly elevated due to knee steroid injection.    Let me know how your b/p is at home. If it continues to be >140, we can add metoprolol to help decrease it slightly and decrease extra heart beats/palpitations.    Resulted Orders   Hemoglobin A1c   Result Value Ref Range    Hemoglobin A1C 5.8 (H) 0.0 - 5.6 %      Comment:      Normal <5.7%   Prediabetes 5.7-6.4%    Diabetes 6.5% or higher     Note: Adopted from ADA consensus guidelines.   Basic metabolic panel  (Ca, Cl, CO2, Creat, Gluc, K, Na, BUN)   Result Value Ref Range    Sodium 138 136 - 145 mmol/L    Potassium 4.0 3.5 - 5.0 mmol/L    Chloride 104 98 - 107 mmol/L    Carbon Dioxide (CO2) 23 22 - 31 mmol/L    Anion Gap 11 5 - 18 mmol/L    Urea Nitrogen 25 8 - 28 mg/dL    Creatinine 1.26 (H) 0.60 - 1.10 mg/dL    Calcium 9.6 8.5 - 10.5 mg/dL    Glucose 100 70 - 125 mg/dL    GFR Estimate 37 (L) >60 mL/min/1.73m2      Comment:      As of July 11, 2021, eGFR is calculated by the CKD-EPI creatinine equation, without race adjustment. eGFR can be influenced by muscle mass, exercise, and diet. The reported eGFR is an estimation only and is only applicable if the renal function is stable.   TSH   Result Value Ref Range    TSH 0.94 0.30 - 5.00 uIU/mL   CBC with platelets and differential   Result Value Ref Range    WBC Count 12.8 (H) 4.0 - 11.0 10e3/uL    RBC Count 4.24 3.80 - 5.20 10e6/uL    Hemoglobin 12.1 11.7 - 15.7 g/dL    Hematocrit 37.9 35.0 - 47.0 %    MCV 89 78 - 100 fL    MCH 28.5 26.5 - 33.0 pg    MCHC 31.9 31.5 - 36.5 g/dL    RDW 13.9 10.0 - 15.0 %    Platelet Count  277 150 - 450 10e3/uL    % Neutrophils 88 %    % Lymphocytes 8 %    % Monocytes 4 %    % Eosinophils 0 %    % Basophils 0 %    % Immature Granulocytes 0 %    Absolute Neutrophils 11.3 (H) 1.6 - 8.3 10e3/uL    Absolute Lymphocytes 1.0 0.8 - 5.3 10e3/uL    Absolute Monocytes 0.5 0.0 - 1.3 10e3/uL    Absolute Eosinophils 0.0 0.0 - 0.7 10e3/uL    Absolute Basophils 0.0 0.0 - 0.2 10e3/uL    Absolute Immature Granulocytes 0.0 <=0.0 10e3/uL       If you have any questions or concerns, please call the clinic at the number listed above.       Sincerely,      Selma Meneses MD

## 2021-10-23 NOTE — PROGRESS NOTES
[facility] Follow Up Note    Assessment/Plan:    1. Essential hypertension  Blood pressure fluctuates.  She is unable to tolerate 100 mg of losartan due to low blood pressure.  Currently will continue on 50 mg a day and monitor it at home.  I suspect blood pressure is high in the office today due to anxiety and recent knee steroid injection.  Discussed that if in the next 2 weeks blood pressure does not go down to less than 140 to call me and we can add additional blood pressure meds.  Due to her history of frequent PAC would most likely add a small dose of beta-blocker.  - Basic metabolic panel  (Ca, Cl, CO2, Creat, Gluc, K, Na, BUN)  - CBC with platelets and differential    2. Abnormal glucose  Slightly prediabetic A1c in the past, will repeat today  - Hemoglobin A1c    3. Acquired hypothyroidism  She is on Synthroid and will check levels  - TSH    4. Renal insufficiency  Creatinine at 1.36 at baseline  - Basic metabolic panel  (Ca, Cl, CO2, Creat, Gluc, K, Na, BUN)  - CBC with platelets and differential    5.  Osteoporosis  Last bone density test was in 2019 and showed osteoporosis of the hip.  She was started on Prolia but only got 1 shot.  Due to renal insufficiency she may not be a good candidate for biphosphonate's.  Will recommend restarting Prolia.  - DX Hip/Pelvis/Spine; Future      Patient Instructions   1. Get Covid booster in 2 weeks . Get flu shot in November.    2. Let me know if b/p at home continues to be >140. , we may consider adding metoprolol to Losartan to help decrease b/p and decrease heart rate/extra beats.          Selma Meneses MD, MD    Chief Complaint:    Chief Complaint   Patient presents with     RECHECK     low blood pressure/dizzy, see triage note       History of Present Illness:  Virginia is a 89 year old female with  history of lichen sclerosis, status post cholecystectomy, umbilical hernia, hysterectomy, diverticulosis, cystocele, spine DJD, hypothyroidism,  vitamin D deficiency, ischemic optic neuropathy, anxiety, high blood pressure, hiatal hernia and chronic renal insufficiency, right thalamic stroke in 2019.  She is currently here for for follow-up.    She has had some problems recently with fluctuating blood pressures at home.  Initially she made appointment because she felt dizzy and blood pressure at home was 101 systolically.  Currently she takes losartan 50 mg just once a day.  Most recently she saw her rheumatologist yesterday and blood pressure was 124/62.  Today in the office blood pressure is 152/76 but she does admit to mild anxiety and has had right knee steroid injection yesterday.  She denies any NSAID intake.  She has gotten a new blood pressure cuff.  She does have history of frequent PACs.    She has lost her daughter and has been grieving.  She is on Lexapro 5 mg a day and overall mood is stable.  She does not sleep very well.    For osteoporosis she got Prolia shot once last year and has not had any follow-up on that.    Review of Systems:  A comprehensive review of systems was performed and was otherwise negative    PFSH:  Social History: Reviewed  History   Smoking Status     Never Smoker   Smokeless Tobacco     Never Used     Social History     Social History Narrative    She is .  Her   in  suddenly, but he also had dementia. They were  for 60 years. She has 3 children, 4 grandchildren and 1 great grandchild. 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  One daughter lives with her and she has corticobasal degeneration.       Past History: Reviewed  Current Outpatient Medications   Medication Sig Dispense Refill     aspirin 81 mg chewable tablet [ASPIRIN 81 MG CHEWABLE TABLET] Chew 81 mg daily.       atorvastatin (LIPITOR) 40 MG tablet [ATORVASTATIN (LIPITOR) 40 MG TABLET] TAKE 1 TABLET(40 MG) BY MOUTH DAILY 90 tablet 2     calcium  carbonate (OS-SUE) 600 mg calcium (1,500 mg) tablet [CALCIUM CARBONATE (OS-SUE) 600 MG CALCIUM (1,500 MG) TABLET] Take 600 mg by mouth 2 (two) times a day with meals.       cholecalciferol, vitamin D3, (VITAMIN D3) 1,000 unit capsule [CHOLECALCIFEROL, VITAMIN D3, (VITAMIN D3) 1,000 UNIT CAPSULE] Take 1,000 Units by mouth daily.       clobetasoL (TEMOVATE) 0.05 % ointment [CLOBETASOL (TEMOVATE) 0.05 % OINTMENT] Apply 0.5 mg daily as needed 60 g 4     cyanocobalamin (VITAMIN B-12) 500 MCG tablet [CYANOCOBALAMIN (VITAMIN B-12) 500 MCG TABLET] Take 500 mcg by mouth daily.       diclofenac sodium (VOLTAREN) 1 % Gel [DICLOFENAC SODIUM (VOLTAREN) 1 % GEL] Apply 3 times a day 100 g 11     DOCOSAHEXANOIC ACID/EPA (FISH OIL ORAL) [DOCOSAHEXANOIC ACID/EPA (FISH OIL ORAL)] Take 1,200 mg by mouth daily.       escitalopram (LEXAPRO) 5 MG tablet Take 1 tablet (5 mg) by mouth daily 90 tablet 3     escitalopram (LEXAPRO) 5 MG tablet Take 2 tablets (10 mg) by mouth daily 180 tablet 1     escitalopram oxalate (LEXAPRO) 5 MG tablet [ESCITALOPRAM OXALATE (LEXAPRO) 5 MG TABLET] Take 2 tablets (10 mg total) by mouth daily. 90 tablet 3     escitalopram oxalate (LEXAPRO) 5 MG tablet [ESCITALOPRAM OXALATE (LEXAPRO) 5 MG TABLET] Take 1 tablet (5 mg total) by mouth daily. 90 tablet 3     hydroxychloroquine (PLAQUENIL) 200 MG tablet [HYDROXYCHLOROQUINE (PLAQUENIL) 200 MG TABLET] TAKE 1 TABLET(200 MG) BY MOUTH DAILY 90 tablet 1     lansoprazole (PREVACID) 15 MG capsule [LANSOPRAZOLE (PREVACID) 15 MG CAPSULE] Take 15 mg by mouth as needed. 3-4 times per week       levothyroxine (SYNTHROID/LEVOTHROID) 75 MCG tablet TAKE 1 TABLET BY MOUTH DAILY 5 DAYS A WEEK, SKIP DOSE 2 DAYS PER WEEK 90 tablet 3     losartan (COZAAR) 50 MG tablet TAKE 1-2 TABLETS BY MOUTH DAILY BASED ON BLOOD PRESSURE READING 180 tablet 1     oxybutynin (DITROPAN) 5 MG tablet [OXYBUTYNIN (DITROPAN) 5 MG TABLET] Take on tab by mouth at bed time 90 tablet 3     triamcinolone  "(KENALOG) 0.1 % cream [TRIAMCINOLONE (KENALOG) 0.1 % CREAM] Use twice a day for 2 weeks, then take 1 week off. May repeat. 80 g 1       Family History: Reviewed    Physical Exam:    Vitals:    10/22/21 1515 10/22/21 1522   BP: (!) 162/74 (!) 152/76   BP Location: Left arm    Patient Position: Sitting    Cuff Size: Adult Regular    Pulse: 80    SpO2: 94%    Weight: 85.3 kg (188 lb)    Height: 1.6 m (5' 3\")      Wt Readings from Last 3 Encounters:   10/22/21 85.3 kg (188 lb)   06/18/21 85.3 kg (188 lb)   04/23/21 87.1 kg (192 lb)     Body mass index is 33.3 kg/m .    Constitutional:  Reveals a pleasant elderly female.  Vitals:  Per nursing notes.  HEENT:No cervical LAD, no thyromegaly,  conjunctiva is pink, no scleral icterus, TMs are visualized and normal bl, oropharynx is clear, no exudates,   Cardiac:  Regular rate and rhythm,no murmurs, rubs, or gallops.  Occasional extra beats noted but her rhythm is in sinus legs without edema. Palpation of the radial pulse regular.  Lungs: Clear to auscultation bl.  Respiratory effort normal.  Abdomen:positive BS, soft, nontender, nondistended.  No hepato-splenomagaly  Skin: Numerous excoriations noted without cellulitis, she has chronic skin condition  Rheumatologic: Normal joints and nails of the hands.  Neurologic:  Cranial nerves II-XII intact.     Psychiatric: affect appropriate, memory intact.    Data Review:    Analysis and Summary of Old Records (2): yes      Records Requested (1): no      Other History Summarized (from other people in the room) (2): no    Radiology Tests Summarized (XRAY/CT/MRI/DXA) (1): no    Labs Reviewed (1): yes    Medicine Tests Reviewed (EKG/ECHO/COLONOSCOPY/EGD) (1): dexa    Independent Review of EKG or X-RAY (2): no      "

## 2021-10-23 NOTE — TELEPHONE ENCOUNTER
Patient was started on Prolia last year for osteoporosis but was lost to follow-up.  Please see if new Prolia preauthorization needed.  If not then help schedule nurse only appointment for Prolia shot.

## 2021-11-09 ENCOUNTER — ALLIED HEALTH/NURSE VISIT (OUTPATIENT)
Dept: FAMILY MEDICINE | Facility: CLINIC | Age: 86
End: 2021-11-09
Payer: COMMERCIAL

## 2021-11-09 ENCOUNTER — MEDICAL CORRESPONDENCE (OUTPATIENT)
Dept: HEALTH INFORMATION MANAGEMENT | Facility: CLINIC | Age: 86
End: 2021-11-09

## 2021-11-09 DIAGNOSIS — M81.0 SENILE OSTEOPOROSIS: Primary | ICD-10-CM

## 2021-11-09 PROCEDURE — 99207 PR NO CHARGE NURSE ONLY: CPT

## 2021-11-09 PROCEDURE — 96372 THER/PROPH/DIAG INJ SC/IM: CPT | Performed by: INTERNAL MEDICINE

## 2021-11-09 NOTE — PROGRESS NOTES
"Prolia Injection Phone Screen      Screening questions have been asked 2-3 days prior to administration visit for Prolia. If any questions are answered with \"Yes,\" this phone encounter were will routed to ordering provider for further evaluation.     1.  When was the last injection?  11/4/2020    2.  Has insurance for this injection been verified?  Yes    3.  Did you experience any new onset achiness or rashes that lasted for over a month with your previous Prolia injection?   No    4.  Do you have a fever over 101?F or a new deep cough that is unusual for you today? No    5.  Have you started any new medications in the last 6 months that you were told could affect your immune system? These may have been prescribed by oncologist, transplant, rheumatology, or dermatology.   No    6.  In the last 6 months have you have gastric bypass or parathyroid surgery?   No    7.  Do you plan dental work requiring drilling into the bone such as implants/extractions or oral surgery in the next 2-3 months?   No    Patient informed if symptoms discussed above present prior to their administration appointment, they are to notify clinic immediately.     Patient was educated on safety of Prolia utilizing Patient Counseling Chart for Healthcare Providers, as outlined by the Prolia REMS progam.               "

## 2021-11-16 ENCOUNTER — TELEPHONE (OUTPATIENT)
Dept: INTERNAL MEDICINE | Facility: CLINIC | Age: 86
End: 2021-11-16
Payer: COMMERCIAL

## 2021-11-16 DIAGNOSIS — I10 PRIMARY HYPERTENSION: Primary | ICD-10-CM

## 2021-11-17 RX ORDER — METOPROLOL SUCCINATE 25 MG/1
25 TABLET, EXTENDED RELEASE ORAL DAILY
Qty: 90 TABLET | Refills: 1 | Status: SHIPPED | OUTPATIENT
Start: 2021-11-17 | End: 2022-05-20

## 2021-11-17 NOTE — TELEPHONE ENCOUNTER
Please let pt know, I reviewed her recent b/p records at home:  Looks like her morning b/ps are always higher: 140-160 and evening b/p are good 120-130.    I would suggest that we add a small dose of beta blocker medication she can take in the evening: Metoprolol, this can help decrease extra heart betas/palpitations and decrease her b/p slightly.  Monitor b/p and HR at home. Goal b/p 130-140.    Let me know if she is agreeable.  Dr NIXON

## 2022-01-14 DIAGNOSIS — M12.39 PALINDROMIC RHEUMATISM INVOLVING MULTIPLE SITES: ICD-10-CM

## 2022-01-14 RX ORDER — HYDROXYCHLOROQUINE SULFATE 200 MG/1
TABLET, FILM COATED ORAL
Qty: 90 TABLET | Refills: 0 | Status: SHIPPED | OUTPATIENT
Start: 2022-01-14 | End: 2022-04-04

## 2022-02-11 ENCOUNTER — OFFICE VISIT (OUTPATIENT)
Dept: INTERNAL MEDICINE | Facility: CLINIC | Age: 87
End: 2022-02-11
Payer: COMMERCIAL

## 2022-02-11 VITALS — SYSTOLIC BLOOD PRESSURE: 158 MMHG | OXYGEN SATURATION: 93 % | HEART RATE: 94 BPM | DIASTOLIC BLOOD PRESSURE: 88 MMHG

## 2022-02-11 DIAGNOSIS — K44.9 DIAPHRAGMATIC HERNIA WITHOUT OBSTRUCTION AND WITHOUT GANGRENE: ICD-10-CM

## 2022-02-11 DIAGNOSIS — R11.0 NAUSEA: Primary | ICD-10-CM

## 2022-02-11 DIAGNOSIS — R14.0 BLOATING: ICD-10-CM

## 2022-02-11 LAB
ALBUMIN SERPL-MCNC: 3.7 G/DL (ref 3.5–5)
ALP SERPL-CCNC: 74 U/L (ref 45–120)
ALT SERPL W P-5'-P-CCNC: <9 U/L (ref 0–45)
ANION GAP SERPL CALCULATED.3IONS-SCNC: 10 MMOL/L (ref 5–18)
AST SERPL W P-5'-P-CCNC: 16 U/L (ref 0–40)
BILIRUB SERPL-MCNC: 0.9 MG/DL (ref 0–1)
BUN SERPL-MCNC: 19 MG/DL (ref 8–28)
C REACTIVE PROTEIN LHE: 0.4 MG/DL (ref 0–0.8)
CALCIUM SERPL-MCNC: 9.3 MG/DL (ref 8.5–10.5)
CHLORIDE BLD-SCNC: 107 MMOL/L (ref 98–107)
CO2 SERPL-SCNC: 23 MMOL/L (ref 22–31)
CREAT SERPL-MCNC: 1.49 MG/DL (ref 0.6–1.1)
ERYTHROCYTE [DISTWIDTH] IN BLOOD BY AUTOMATED COUNT: 14.2 % (ref 10–15)
GFR SERPL CREATININE-BSD FRML MDRD: 33 ML/MIN/1.73M2
GLUCOSE BLD-MCNC: 110 MG/DL (ref 70–125)
HCT VFR BLD AUTO: 42.2 % (ref 35–47)
HGB BLD-MCNC: 13.2 G/DL (ref 11.7–15.7)
MCH RBC QN AUTO: 28.6 PG (ref 26.5–33)
MCHC RBC AUTO-ENTMCNC: 31.3 G/DL (ref 31.5–36.5)
MCV RBC AUTO: 91 FL (ref 78–100)
PLATELET # BLD AUTO: 263 10E3/UL (ref 150–450)
POTASSIUM BLD-SCNC: 4.2 MMOL/L (ref 3.5–5)
PROT SERPL-MCNC: 6.8 G/DL (ref 6–8)
RBC # BLD AUTO: 4.62 10E6/UL (ref 3.8–5.2)
SODIUM SERPL-SCNC: 140 MMOL/L (ref 136–145)
WBC # BLD AUTO: 8.1 10E3/UL (ref 4–11)

## 2022-02-11 PROCEDURE — 85027 COMPLETE CBC AUTOMATED: CPT | Performed by: INTERNAL MEDICINE

## 2022-02-11 PROCEDURE — 80053 COMPREHEN METABOLIC PANEL: CPT | Performed by: INTERNAL MEDICINE

## 2022-02-11 PROCEDURE — 36415 COLL VENOUS BLD VENIPUNCTURE: CPT | Performed by: INTERNAL MEDICINE

## 2022-02-11 PROCEDURE — 99214 OFFICE O/P EST MOD 30 MIN: CPT | Performed by: INTERNAL MEDICINE

## 2022-02-11 PROCEDURE — 86140 C-REACTIVE PROTEIN: CPT | Performed by: INTERNAL MEDICINE

## 2022-02-11 RX ORDER — ONDANSETRON 4 MG/1
4 TABLET, FILM COATED ORAL EVERY 8 HOURS PRN
Qty: 30 TABLET | Refills: 0 | Status: SHIPPED | OUTPATIENT
Start: 2022-02-11 | End: 2022-05-20

## 2022-02-11 ASSESSMENT — PATIENT HEALTH QUESTIONNAIRE - PHQ9: SUM OF ALL RESPONSES TO PHQ QUESTIONS 1-9: 6

## 2022-02-11 NOTE — PATIENT INSTRUCTIONS
Take zofran for nausea as needed for twice a day   Take the antacid prevacid /lanzoprazole every day   Constipation can be the major trigger Take metamucil or fibre every day    take a laxative as well    you can use a fleets enema

## 2022-02-11 NOTE — LETTER
February 13, 2022      Ruthy Man  [address] [facility] [address]        Dear ,    We are writing to inform you of your test results.      Resulted Orders   CBC with platelets   Result Value Ref Range    WBC Count 8.1 4.0 - 11.0 10e3/uL    RBC Count 4.62 3.80 - 5.20 10e6/uL    Hemoglobin 13.2 11.7 - 15.7 g/dL    Hematocrit 42.2 35.0 - 47.0 %    MCV 91 78 - 100 fL    MCH 28.6 26.5 - 33.0 pg    MCHC 31.3 (L) 31.5 - 36.5 g/dL    RDW 14.2 10.0 - 15.0 %    Platelet Count 263 150 - 450 10e3/uL   Comprehensive metabolic panel   Result Value Ref Range    Sodium 140 136 - 145 mmol/L    Potassium 4.2 3.5 - 5.0 mmol/L    Chloride 107 98 - 107 mmol/L    Carbon Dioxide (CO2) 23 22 - 31 mmol/L    Anion Gap 10 5 - 18 mmol/L    Urea Nitrogen 19 8 - 28 mg/dL    Creatinine 1.49 (H) 0.60 - 1.10 mg/dL    Calcium 9.3 8.5 - 10.5 mg/dL    Glucose 110 70 - 125 mg/dL    Alkaline Phosphatase 74 45 - 120 U/L    AST 16 0 - 40 U/L    ALT <9 0 - 45 U/L    Protein Total 6.8 6.0 - 8.0 g/dL    Albumin 3.7 3.5 - 5.0 g/dL    Bilirubin Total 0.9 0.0 - 1.0 mg/dL    GFR Estimate 33 (L) >60 mL/min/1.73m2      Comment:      Effective December 21, 2021 eGFRcr in adults is calculated using the 2021 CKD-EPI creatinine equation which includes age and gender (Ann et al., NEJM, DOI: 10.1056/FEMUaf8175730)   CRP, inflammation   Result Value Ref Range    CRP 0.4 0.0-<0.8 mg/dL     The blood counts are normal . The inflammatory marker is normal . The kidney function is stable . Electrolytes are normal   If you have any questions or concerns, please call the clinic at the number listed above.       Sincerely,      Cassidy Grimm MD

## 2022-02-11 NOTE — PROGRESS NOTES
"  Assessment & Plan     Nausea    - CBC with platelets  - Comprehensive metabolic panel  - ondansetron (ZOFRAN) 4 MG tablet  Dispense: 30 tablet; Refill: 0  - CT Abdomen Pelvis w/o Contrast  - UA Macro with Reflex to Micro and Culture - lab collect  - Helicobacter pylori Antigen Stool  - CRP, inflammation  - CBC with platelets  - Comprehensive metabolic panel  - CRP, inflammation    Bloating  I told patient that she has no evidence of ascites or obstruction on exam.  She has no red flags for significant disease.  There is no fecal impaction.  There is no evidence of inflammation like diverticulitis or colitis either.  Most cases of nausea and bloating are due to irritable bowel syndrome exacerbated by delayed gastric emptying and slow colon transit constipation..  Occasional acid reflux can do that.  Given her prior history of H. pylori it would be prudent to send a stool test for H. pylori she should not stop her antacid until the stool test is submitted.  She can try Zofran.  She can try using Dulcolax Metamucil and occasional fleets enema to help her constipation.  It does not matter if she has ends up being dependent on laxatives at this stage.  She is not better by Tuesday or Wednesday consider CT scan abdomen which was ordered.  - CBC with platelets  - Comprehensive metabolic panel  - ondansetron (ZOFRAN) 4 MG tablet  Dispense: 30 tablet; Refill: 0  - CT Abdomen Pelvis w/o Contrast  - UA Macro with Reflex to Micro and Culture - lab collect  - Helicobacter pylori Antigen Stool  - CRP, inflammation  - CBC with platelets  - Comprehensive metabolic panel  - CRP, inflammation    Diaphragmatic hernia without obstruction and without gangrene                 BMI:   Estimated body mass index is 33.3 kg/m  as calculated from the following:    Height as of 10/22/21: 1.6 m (5' 3\").    Weight as of 10/22/21: 85.3 kg (188 lb).         Return in about 4 weeks (around 3/11/2022), or if symptoms worsen or fail to improve, for " Follow up.    Cassidy Grimm MD  [facility] is a 89 year old who presents for the following health issues very pleasant patient with a history of known digestive issues    HPI Probable IBS ,hypothyroidism, hypertension osteoporosis ,is here for ongoing issues with significant bloating and nausea for the last 3 to 4 days.  She has no abdominal pain.  She has erratic bowels mostly has constipation when she uses the MiraLAX which is causes a little watery stool but she feels like she has stool retention.  She has not actually thrown up.  Urination is normal she just feels awful and not not feels like doing anything.  She admits that she has had these problems before but this is the worst she has ever been she has not stopped eating but has not not felt like eating for 2 3 days    Patient Active Problem List   Diagnosis     Ischemic optic neuropathy     Impaired Fasting Glucose     Hypothyroidism     Premature Ventricular Contractions     Vitamin D deficiency     Skin Cancer     Adjustment disorder with anxiety     Hypertension     Hiatal Hernia     Colon polyps     Senile osteoporosis     Right thalamic stroke (H)     Left knee pain     Acute ischemic VBA thalamic stroke, left (H)     Chronic renal failure, stage 3 (moderate) (H)     Palindromic rheumatism     Primary osteoarthritis involving multiple joints     Chronic right shoulder pain     Palpitations     Dermatitis     Renal insufficiency     Current Outpatient Medications   Medication     aspirin 81 mg chewable tablet     atorvastatin (LIPITOR) 40 MG tablet     calcium carbonate (OS-SUE) 600 mg calcium (1,500 mg) tablet     cholecalciferol, vitamin D3, (VITAMIN D3) 1,000 unit capsule     clobetasoL (TEMOVATE) 0.05 % ointment     cyanocobalamin (VITAMIN B-12) 500 MCG tablet     diclofenac sodium (VOLTAREN) 1 % Gel     DOCOSAHEXANOIC ACID/EPA (FISH OIL ORAL)     escitalopram (LEXAPRO) 5 MG tablet      escitalopram (LEXAPRO) 5 MG tablet     escitalopram oxalate (LEXAPRO) 5 MG tablet     escitalopram oxalate (LEXAPRO) 5 MG tablet     hydroxychloroquine (PLAQUENIL) 200 MG tablet     lansoprazole (PREVACID) 15 MG capsule     levothyroxine (SYNTHROID/LEVOTHROID) 75 MCG tablet     losartan (COZAAR) 50 MG tablet     metoprolol succinate ER (TOPROL-XL) 25 MG 24 hr tablet     ondansetron (ZOFRAN) 4 MG tablet     oxybutynin (DITROPAN) 5 MG tablet     triamcinolone (KENALOG) 0.1 % cream     Current Facility-Administered Medications   Medication     denosumab (PROLIA) injection 60 mg           Review of Systems   Constitutional, HEENT, cardiovascular, pulmonary, gi and gu systems are negative, except as otherwise noted.      Objective    BP (!) 158/88 (BP Location: Left arm, Patient Position: Sitting, Cuff Size: Adult Regular)   Pulse 94   SpO2 93%   There is no height or weight on file to calculate BMI.  Physical Exam   GENERAL: healthy, alert and no distress  NECK: no adenopathy, no asymmetry, masses, or scars and thyroid normal to palpation  RESP: lungs clear to auscultation - no rales, rhonchi or wheezes  CV: regular rate and rhythm, normal S1 S2, no S3 or S4, no murmur, click or rub, no peripheral edema and peripheral pulses strong  ABDOMEN: soft, nontender, no hepatosplenomegaly, no masses and bowel sounds normal  MS: no gross musculoskeletal defects noted, no edema  Rectal exam showed an empty anal vault with the no impaction.  There was some stool on the tips of the gloved hand which was not melanotic and there is no blood.

## 2022-02-17 ENCOUNTER — TELEPHONE (OUTPATIENT)
Dept: INTERNAL MEDICINE | Facility: CLINIC | Age: 87
End: 2022-02-17
Payer: COMMERCIAL

## 2022-02-22 ENCOUNTER — HOSPITAL ENCOUNTER (OUTPATIENT)
Dept: CT IMAGING | Facility: HOSPITAL | Age: 87
Discharge: HOME | End: 2022-02-22
Attending: INTERNAL MEDICINE | Admitting: INTERNAL MEDICINE
Payer: COMMERCIAL

## 2022-02-22 DIAGNOSIS — R14.0 BLOATING: ICD-10-CM

## 2022-02-22 DIAGNOSIS — R11.0 NAUSEA: ICD-10-CM

## 2022-02-22 PROCEDURE — 74176 CT ABD & PELVIS W/O CONTRAST: CPT

## 2022-03-01 NOTE — TELEPHONE ENCOUNTER
Called [place] about the CT scan results reassured her that it is normal  abnromal findins are chornicand not active

## 2022-03-03 DIAGNOSIS — I63.81 RIGHT THALAMIC STROKE (H): ICD-10-CM

## 2022-03-03 DIAGNOSIS — I10 ESSENTIAL HYPERTENSION: ICD-10-CM

## 2022-03-04 RX ORDER — LOSARTAN POTASSIUM 50 MG/1
TABLET ORAL
Qty: 180 TABLET | Refills: 1 | Status: SHIPPED | OUTPATIENT
Start: 2022-03-04 | End: 2022-10-05

## 2022-03-04 RX ORDER — ATORVASTATIN CALCIUM 40 MG/1
TABLET, FILM COATED ORAL
Qty: 90 TABLET | Refills: 2 | Status: SHIPPED | OUTPATIENT
Start: 2022-03-04 | End: 2023-01-06

## 2022-03-04 NOTE — TELEPHONE ENCOUNTER
"Routing refill request to provider for review/approval because:  Labs not current:  multiple    Last Written Prescription Date:  1/7/21  Last Fill Quantity: 90,  # refills: 2   Last office visit provider:  2/11/22     Last Written Prescription Date:  9/7/21  Last Fill Quantity: 180,  # refills: 1   Last office visit provider:  2/11/22     Requested Prescriptions   Pending Prescriptions Disp Refills     atorvastatin (LIPITOR) 40 MG tablet [Pharmacy Med Name: ATORVASTATIN 40MG TABLETS] 90 tablet 2     Sig: TAKE 1 TABLET(40 MG) BY MOUTH DAILY       Statins Protocol Failed - 3/3/2022  6:13 AM        Failed - LDL on file in past 12 months     Recent Labs   Lab Test 10/16/19  1606   LDL 61             Passed - No abnormal creatine kinase in past 12 months     Recent Labs   Lab Test 05/23/19  1634   CKT 64                Passed - Recent (12 mo) or future (30 days) visit within the authorizing provider's specialty     Patient has had an office visit with the authorizing provider or a provider within the authorizing providers department within the previous 12 mos or has a future within next 30 days. See \"Patient Info\" tab in inbasket, or \"Choose Columns\" in Meds & Orders section of the refill encounter.              Passed - Medication is active on med list        Passed - Patient is age 18 or older        Passed - No active pregnancy on record        Passed - No positive pregnancy test in past 12 months           losartan (COZAAR) 50 MG tablet [Pharmacy Med Name: LOSARTAN 50MG TABLETS] 180 tablet 1     Sig: TAKE 1-2 TABLETS BY MOUTH DAILY BASED ON BLOOD PRESSURE READING       Angiotensin-II Receptors Failed - 3/3/2022  6:13 AM        Failed - Last blood pressure under 140/90 in past 12 months     BP Readings from Last 3 Encounters:   02/11/22 (!) 158/88   10/22/21 (!) 152/76   10/21/21 122/64                 Failed - Normal serum creatinine on file in past 12 months     Recent Labs   Lab Test 02/11/22  1046   CR 1.49* " "      Ok to refill medication if creatinine is low          Passed - Recent (12 mo) or future (30 days) visit within the authorizing provider's specialty     Patient has had an office visit with the authorizing provider or a provider within the authorizing providers department within the previous 12 mos or has a future within next 30 days. See \"Patient Info\" tab in inbasket, or \"Choose Columns\" in Meds & Orders section of the refill encounter.              Passed - Medication is active on med list        Passed - Patient is age 18 or older        Passed - No active pregnancy on record        Passed - Normal serum potassium on file in past 12 months     Recent Labs   Lab Test 02/11/22  1046   POTASSIUM 4.2                    Passed - No positive pregnancy test in past 12 months             Pascale Bell RN 03/04/22 3:06 PM  "

## 2022-03-19 ENCOUNTER — OFFICE VISIT (OUTPATIENT)
Dept: OTHER | Facility: CLINIC | Age: 87
End: 2022-03-19
Payer: COMMERCIAL

## 2022-03-19 VITALS
SYSTOLIC BLOOD PRESSURE: 133 MMHG | HEART RATE: 83 BPM | HEIGHT: 65 IN | TEMPERATURE: 97.8 F | DIASTOLIC BLOOD PRESSURE: 74 MMHG | WEIGHT: 185 LBS | RESPIRATION RATE: 18 BRPM | BODY MASS INDEX: 30.82 KG/M2 | OXYGEN SATURATION: 93 %

## 2022-03-19 DIAGNOSIS — Z00.00 ENCOUNTER FOR MEDICARE ANNUAL WELLNESS EXAM: Primary | ICD-10-CM

## 2022-03-19 PROCEDURE — G0438 PPPS, INITIAL VISIT: HCPCS | Performed by: FAMILY MEDICINE

## 2022-03-19 ASSESSMENT — PAIN SCALES - GENERAL: PAINLEVEL: NO PAIN (0)

## 2022-03-19 ASSESSMENT — ACTIVITIES OF DAILY LIVING (ADL): CURRENT_FUNCTION: NO ASSISTANCE NEEDED

## 2022-03-19 NOTE — PROGRESS NOTES
"Assessment & Plan   No diagnosis found.    Follow Up/Next Steps    No follow-ups on file.  Recommended that patient follow up with PCP to address the following : Patient is overdue for Tdap, zoster vaccine, LIPID. Patient state she was seen by a doctor for constipation recently. Patient states that she has not been taking her metoprolol.Patient agrees to follow up with PCP and discuss it. Continue to follow up with PCP for preventive care and chronic conditions.     Counseling and Education  Reviewed preventive health counseling, as reflected in patient instructions      BMI:   Estimated body mass index is 31.26 kg/m  as calculated from the following:    Height as of this encounter: 1.638 m (5' 4.5\").    Weight as of this encounter: 83.9 kg (185 lb).   Weight management plan: Discussed healthy diet and exercise guidelines      Appropriate preventive services were discussed with this patient, including applicable screening as appropriate for cardiovascular disease, diabetes, osteopenia/osteoporosis, and glaucoma.  As appropriate for age/gender, discussed screening for colorectal cancer, prostate cancer, breast cancer, and cervical cancer. Checklist reviewing preventive services available has been given to the patient.    Reviewed patients plan of care and provided an AVS. The Basic Care Plan (routine screening as documented in Health Maintenance) for [place] meets the Care Plan requirement. This Care Plan has been established and reviewed with the Patient.    Visit Provider: Ericka Wesley RN  Supervising Provider: Korin Holland MD, MD  [facility]   [place] is a 89 year old who is being seen for an Annual Wellness Visit  accompanied by her None.    Healthy Habits:     In general, how would you rate your overall health?  Good    Frequency of exercise:  6-7 days/week    Duration of exercise:  Less than 15 minutes    Do you usually eat at least 4 " "servings of fruit and vegetables a day, include whole grains    & fiber and avoid regularly eating high fat or \"junk\" foods?  Yes    Taking medications regularly:  Yes    Medication side effects:  None    Ability to successfully perform activities of daily living:  No assistance needed    Home Safety:  No safety concerns identified    Hearing Impairment:  Need to ask people to speak up or repeat themselves and difficulty understanding soft or whispered speech    In the past 6 months, have you been bothered by leaking of urine?  No    In general, how would you rate your overall mental or emotional health?  Good      PHQ-2 Total Score: 0    Additional concerns today:  No    Do you feel safe in your environment? Yes    Have you ever done Advance Care Planning? (For example, a Health Directive, POLST, or a discussion with a medical provider or your loved ones about your wishes): No, advance care planning information given to patient to review.  Patient plans to discuss their wishes with loved ones or provider.      Fall risk  Fallen 2 or more times in the past year?: No  Any fall with injury in the past year?: No  Cognitive Screening   1) Repeat 3 items (Leader, Season, Table)    2) Clock draw: NORMAL  3) 3 item recall: Recalls 3 objects  Results: 3 items recalled: COGNITIVE IMPAIRMENT LESS LIKELY    Mini-CogTM Copyright [employer]. Licensed by the author for use in [facility]; reprinted with permission (patrick@.[facility]). All rights reserved.      Do you have sleep apnea, excessive snoring or daytime drowsiness?: no    Reviewed and updated as needed this visit by clinical staff   Tobacco  Allergies  Meds   Med Hx  Surg Hx  Fam Hx  Soc Hx        Social History     Tobacco Use     Smoking status: Never Smoker     Smokeless tobacco: Never Used   Substance Use Topics     Alcohol use: Never       Current providers sharing in care for this patient include:   Patient Care Team:  Selma Meneses MD as PCP - " "Ronald Sevilla, Gonzalo as Pharmacist (Pharmacist)  Marianne Rey MBBS as Assigned Rheumatology Provider  Lul Lopez MD as Assigned Heart and Vascular Provider  Selma Meneses MD as Assigned PCP    The following health maintenance items are reviewed in Epic and correct as of today:  Health Maintenance Due   Topic Date Due     MICROALBUMIN  Never done     ANNUAL REVIEW OF HM ORDERS  Never done     ADVANCE CARE PLANNING  Never done     DTAP/TDAP/TD IMMUNIZATION (1 - Tdap) 01/01/1955     MEDICARE ANNUAL WELLNESS VISIT  Never done     ZOSTER IMMUNIZATION (2 of 3) 06/30/2010     LIPID  04/12/2020       Mammogram Screening - Patient over age 75, has elected to discontinue screenings.  Pertinent mammograms are reviewed under the imaging tab.        Objective    /74 (BP Location: Right arm, Patient Position: Sitting, Cuff Size: Adult Regular)   Pulse 83   Temp 97.8  F (36.6  C) (Temporal)   Resp 18   Ht 1.638 m (5' 4.5\")   Wt 83.9 kg (185 lb)   SpO2 93%   BMI 31.26 kg/m   Estimated body mass index is 31.26 kg/m  as calculated from the following:    Height as of this encounter: 1.638 m (5' 4.5\").    Weight as of this encounter: 83.9 kg (185 lb).  Physical Exam  Patient appears comfortable and in no acute distress.        Identified Health Risks:  "

## 2022-03-19 NOTE — PATIENT INSTRUCTIONS
Patient Education   Personalized Prevention Plan  You are due for the preventive services outlined below.  Your care team is available to assist you in scheduling these services.  If you have already completed any of these items, please share that information with your care team to update in your medical record.  Health Maintenance Due   Topic Date Due     Kidney Microalbumin Urine Test  Never done     ANNUAL REVIEW OF HM ORDERS  Never done     Discuss Advance Care Planning  Never done     Diptheria Tetanus Pertussis (DTAP/TDAP/TD) Vaccine (1 - Tdap) [birth year]01/1955     Zoster (Shingles) Vaccine (2 of 3) 06/30/2010     Cholesterol Lab  04/12/2020

## 2022-04-04 ENCOUNTER — OFFICE VISIT (OUTPATIENT)
Dept: RHEUMATOLOGY | Facility: CLINIC | Age: 87
End: 2022-04-04
Payer: COMMERCIAL

## 2022-04-04 VITALS
HEART RATE: 104 BPM | HEIGHT: 65 IN | SYSTOLIC BLOOD PRESSURE: 152 MMHG | BODY MASS INDEX: 30.82 KG/M2 | DIASTOLIC BLOOD PRESSURE: 80 MMHG | WEIGHT: 185 LBS

## 2022-04-04 DIAGNOSIS — N18.32 CHRONIC RENAL FAILURE, STAGE 3B (H): ICD-10-CM

## 2022-04-04 DIAGNOSIS — M17.0 PRIMARY OSTEOARTHRITIS OF BOTH KNEES: ICD-10-CM

## 2022-04-04 DIAGNOSIS — M15.0 PRIMARY OSTEOARTHRITIS INVOLVING MULTIPLE JOINTS: ICD-10-CM

## 2022-04-04 DIAGNOSIS — M12.39 PALINDROMIC RHEUMATISM INVOLVING MULTIPLE SITES: Primary | ICD-10-CM

## 2022-04-04 PROCEDURE — 99214 OFFICE O/P EST MOD 30 MIN: CPT | Performed by: INTERNAL MEDICINE

## 2022-04-04 RX ORDER — HYDROXYCHLOROQUINE SULFATE 200 MG/1
TABLET, FILM COATED ORAL
Qty: 90 TABLET | Refills: 0 | Status: SHIPPED | OUTPATIENT
Start: 2022-04-04 | End: 2023-03-06

## 2022-04-04 NOTE — PROGRESS NOTES
[facility]      Rheumatology follow-up visit note     Virginia is a 89 year old female presents today for follow-up.    Virginia was seen today for recheck.    Diagnoses and all orders for this visit:    Palindromic rheumatism involving multiple sites  -     hydroxychloroquine (PLAQUENIL) 200 MG tablet; TAKE 1 TABLET(200 MG) BY MOUTH DAILY    Primary osteoarthritis involving multiple joints    Chronic renal failure, stage 3b (H)    Primary osteoarthritis of both knees  -     hylan (SYNVISC ONE) injection 48 mg        This patient with palindromic rheumatoid arthritis is done well with hydroxychloroquine, her residual pain is in the right knee going up the steps, she has a split-level home with 3 stairs.  Going down the steps it does not trouble her.  Corticosteroid injections provided about 48 hours of relief.  We discussed other options.  She would like to try the Synvisc.  This was discussed.  Insurance connected.  She will return for injection once the greenlight from the insurance is obtained.     HPI    Ruthy Man is a 89 year old female is here for follow-up of  palindromic rheumatism, osteoarthritis, and the background of renal impairment doing great on hydroxychloroquine, at a reduced dose of 200 mg daily.  She reports excellent control with hydroxychloroquine as far as her RA symptoms are concerned she has not had any palindrome's.  She has noted pain in the knee.  This is the right side.  Worse with activity.    This is going up the steps not coming down the steps.  On the flat surface she can walk without difficulty.  On the lateral aspect.  She does not think there is swelling.  Does not wake her up from sleep.  Given the past history of renal impairment not a candidate for any nonsteroidals.  We discussed options.  In the past she has had corticosteroid injections that provided about 48 hours of relief.  Following is the excerpt from a recent note: .  There is no personal family history of psoriasis  "ulcerative colitis or Crohn's disease.  Her work-up at her primary physician's office shows elevated sedimentation rate at 55, negative rheumatoid factor and anti-CCP antibody and GEORGI.  In the past x-rays of the knees have confirmed osteoarthritis.  She describes herself otherwise in good health.  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  She lives by herself.  They have 2 children live on the same street.  She does not smoke and does not take alcohol. 0 Further historical information and ADL limitations as noted in the multidimensional health assessment questionnaire attached in the EMR.         DETAILED EXAMINATION  04/04/22  :    Vitals:    04/04/22 1445   BP: (!) 152/80   BP Location: Right arm   Patient Position: Sitting   Cuff Size: Adult Regular   Pulse: 104   Weight: 83.9 kg (185 lb)   Height: 1.638 m (5' 4.5\")     Alert oriented. Head including the face is examined for malar rash, heliotropes, scarring, lupus pernio. Eyes examined for redness such as in episcleritis/scleritis, periorbital lesions.   Neck/ Face examined for parotid gland swelling, range of motion of neck.  Left upper and lower and right upper and lower extremities examined for tenderness, swelling, warmth of the appendicular joints, range of motion, edema, rash.  Some of the important findings included: she does not have evidence of synovitis in the palpable joints of the upper extremities.  No significant deformities of the digits.  + Heberden nodes.  Range of motion of the shoulders  show full abduction.  No significant JLT right knee however no effusion or warmth of the knees.  she does not have dactylitis of the digits.     Patient Active Problem List    Diagnosis Date Noted     Dermatitis 04/23/2021     Priority: Medium     Renal insufficiency 04/23/2021     Priority: Medium     Palpitations 09/15/2020     Priority: Medium     Chronic right shoulder pain 01/21/2020     Priority: Medium     " Palindromic rheumatism 10/15/2019     Priority: Medium     Primary osteoarthritis involving multiple joints 10/15/2019     Priority: Medium     Chronic renal failure, stage 3 (moderate) (H) 08/06/2019     Priority: Medium     Acute ischemic VBA thalamic stroke, left (H) 04/12/2019     Priority: Medium     Right thalamic stroke (H) 04/11/2019     Priority: Medium     Left knee pain 04/11/2019     Priority: Medium     Ischemic optic neuropathy      Priority: Medium     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 Annotation: May 18 2013  3:04PM - Jad Edmond: Vit D = 10  Replacement Utility updated for latest IMO load         Hiatal Hernia      Priority: Medium     Created by Conversion  [facility] Annotation: Nov 30 2008  4:27PM - Luke Cannon:   Gastroesophageal   reflux  Replacement Utility updated for latest IMO load         Skin Cancer      Priority: Medium     Created by Conversion  Replacement Utility updated for latest IMO load         Colon polyps 02/25/2015     Priority: Medium     Impaired Fasting Glucose      Priority: Medium     Created by Conversion         Premature Ventricular Contractions      Priority: Medium     Created by Conversion  [facility] Annotation: Nov 30 2008  4:41PM Luke Bliss: frequent,   benign         Past Surgical History:   Procedure Laterality Date     HC REMOVAL GALLBLADDER      Description: Cholecystectomy;  Recorded: 11/30/2008;  Comments: stone     ZZC TOTAL ABDOM HYSTERECTOMY      Description: Hysterectomy;  Recorded:  11/30/2008;  Comments: benign disease      Past Medical History:   Diagnosis Date     Hypertension      Senile osteoporosis 2/21/2019     Allergies   Allergen Reactions     Ace Inhibitors Cough     Amlodipine Unknown     Edema       Clarithromycin Nausea     Diltiazem Hcl [Diltiazem] Itching     Doxazosin Nausea     Penicillins Rash     Edema       Current Outpatient Medications   Medication Sig Dispense Refill     aspirin 81 mg chewable tablet [ASPIRIN 81 MG CHEWABLE TABLET] Chew 81 mg daily.       atorvastatin (LIPITOR) 40 MG tablet TAKE 1 TABLET(40 MG) BY MOUTH DAILY 90 tablet 2     cholecalciferol, vitamin D3, (VITAMIN D3) 1,000 unit capsule [CHOLECALCIFEROL, VITAMIN D3, (VITAMIN D3) 1,000 UNIT CAPSULE] Take 1,000 Units by mouth daily.       clobetasoL (TEMOVATE) 0.05 % ointment [CLOBETASOL (TEMOVATE) 0.05 % OINTMENT] Apply 0.5 mg daily as needed 60 g 4     cyanocobalamin (VITAMIN B-12) 500 MCG tablet [CYANOCOBALAMIN (VITAMIN B-12) 500 MCG TABLET] Take 500 mcg by mouth daily.       diclofenac sodium (VOLTAREN) 1 % Gel [DICLOFENAC SODIUM (VOLTAREN) 1 % GEL] Apply 3 times a day 100 g 11     DOCOSAHEXANOIC ACID/EPA (FISH OIL ORAL) [DOCOSAHEXANOIC ACID/EPA (FISH OIL ORAL)] Take 1,200 mg by mouth daily.       escitalopram (LEXAPRO) 5 MG tablet Take 1 tablet (5 mg) by mouth daily 90 tablet 3     hydroxychloroquine (PLAQUENIL) 200 MG tablet TAKE 1 TABLET(200 MG) BY MOUTH DAILY 90 tablet 0     levothyroxine (SYNTHROID/LEVOTHROID) 75 MCG tablet TAKE 1 TABLET BY MOUTH DAILY 5 DAYS A WEEK, SKIP DOSE 2 DAYS PER WEEK 90 tablet 3     losartan (COZAAR) 50 MG tablet TAKE 1-2 TABLETS BY MOUTH DAILY BASED ON BLOOD PRESSURE READING 180 tablet 1     metoprolol succinate ER (TOPROL-XL) 25 MG 24 hr tablet Take 1 tablet (25 mg) by mouth daily 90 tablet 1     oxybutynin (DITROPAN) 5 MG tablet [OXYBUTYNIN (DITROPAN) 5 MG TABLET] Take on tab by mouth at bed time 90 tablet 3     triamcinolone (KENALOG) 0.1 % cream [TRIAMCINOLONE (KENALOG)  0.1 % CREAM] Use twice a day for 2 weeks, then take 1 week off. May repeat. 80 g 1     calcium carbonate (OS-SUE) 600 mg calcium (1,500 mg) tablet [CALCIUM CARBONATE (OS-SUE) 600 MG CALCIUM (1,500 MG) TABLET] Take 600 mg by mouth 2 (two) times a day with meals. (Patient not taking: Reported on 3/19/2022)       escitalopram (LEXAPRO) 5 MG tablet Take 2 tablets (10 mg) by mouth daily (Patient not taking: Reported on 3/19/2022) 180 tablet 1     escitalopram oxalate (LEXAPRO) 5 MG tablet [ESCITALOPRAM OXALATE (LEXAPRO) 5 MG TABLET] Take 2 tablets (10 mg total) by mouth daily. (Patient not taking: Reported on 3/19/2022) 90 tablet 3     escitalopram oxalate (LEXAPRO) 5 MG tablet [ESCITALOPRAM OXALATE (LEXAPRO) 5 MG TABLET] Take 1 tablet (5 mg total) by mouth daily. (Patient not taking: Reported on 3/19/2022) 90 tablet 3     lansoprazole (PREVACID) 15 MG capsule [LANSOPRAZOLE (PREVACID) 15 MG CAPSULE] Take 15 mg by mouth as needed. 3-4 times per week (Patient not taking: Reported on 3/19/2022)       ondansetron (ZOFRAN) 4 MG tablet Take 1 tablet (4 mg) by mouth every 8 hours as needed for nausea (Patient not taking: Reported on 3/19/2022) 30 tablet 0     family history includes ALS in her mother; Other - See Comments in her brother and daughter; Pulmonary Embolism in her father.  Social Connections: Not on file          WBC Count   Date Value Ref Range Status   02/11/2022 8.1 4.0 - 11.0 10e3/uL Final     RBC Count   Date Value Ref Range Status   02/11/2022 4.62 3.80 - 5.20 10e6/uL Final     Hemoglobin   Date Value Ref Range Status   02/11/2022 13.2 11.7 - 15.7 g/dL Final     Hematocrit   Date Value Ref Range Status   02/11/2022 42.2 35.0 - 47.0 % Final     MCV   Date Value Ref Range Status   02/11/2022 91 78 - 100 fL Final     MCH   Date Value Ref Range Status   02/11/2022 28.6 26.5 - 33.0 pg Final     Platelet Count   Date Value Ref Range Status   02/11/2022 263 150 - 450 10e3/uL Final     % Lymphocytes   Date Value Ref  Range Status   10/22/2021 8 % Final     AST   Date Value Ref Range Status   02/11/2022 16 0 - 40 U/L Final     ALT   Date Value Ref Range Status   02/11/2022 <9 0 - 45 U/L Final     Albumin   Date Value Ref Range Status   02/11/2022 3.7 3.5 - 5.0 g/dL Final     Alkaline Phosphatase   Date Value Ref Range Status   02/11/2022 74 45 - 120 U/L Final     Creatinine   Date Value Ref Range Status   02/11/2022 1.49 (H) 0.60 - 1.10 mg/dL Final     GFR Estimate   Date Value Ref Range Status   02/11/2022 33 (L) >60 mL/min/1.73m2 Final     Comment:     Effective December 21, 2021 eGFRcr in adults is calculated using the 2021 CKD-EPI creatinine equation which includes age and gender (Ann artis al., [place], DOI: 10.1056/PQAKse3699112)   04/23/2021 38 (L) >60 mL/min/1.73m2 Final     GFR Estimate If Black   Date Value Ref Range Status   04/23/2021 46 (L) >60 mL/min/1.73m2 Final     Erythrocyte Sedimentation Rate   Date Value Ref Range Status   08/06/2019 25 (H) 0 - 20 mm/hr Final     CRP   Date Value Ref Range Status   02/11/2022 0.4 0.0-<0.8 mg/dL Final

## 2022-04-22 ENCOUNTER — OFFICE VISIT (OUTPATIENT)
Dept: RHEUMATOLOGY | Facility: CLINIC | Age: 87
End: 2022-04-22
Payer: COMMERCIAL

## 2022-04-22 VITALS — HEART RATE: 88 BPM | SYSTOLIC BLOOD PRESSURE: 140 MMHG | DIASTOLIC BLOOD PRESSURE: 78 MMHG

## 2022-04-22 DIAGNOSIS — M17.0 PRIMARY OSTEOARTHRITIS OF BOTH KNEES: Primary | ICD-10-CM

## 2022-04-22 DIAGNOSIS — M15.0 PRIMARY OSTEOARTHRITIS INVOLVING MULTIPLE JOINTS: ICD-10-CM

## 2022-04-22 PROCEDURE — 20610 DRAIN/INJ JOINT/BURSA W/O US: CPT | Mod: RT | Performed by: INTERNAL MEDICINE

## 2022-04-22 PROCEDURE — 99213 OFFICE O/P EST LOW 20 MIN: CPT | Mod: 25 | Performed by: INTERNAL MEDICINE

## 2022-04-22 NOTE — PROGRESS NOTES
Rheumatology follow-up visit note     Virginia is a 89 year old female presents today for follow-up.    Virginia was seen today for recheck.    Diagnoses and all orders for this visit:    Primary osteoarthritis of both knees  -     ASPIRATION/INJECTION MAJOR JOINT  -     ASPIRATION/INJECTION MAJOR JOINT    Primary osteoarthritis involving multiple joints            After pros and cons were discussed including risk of infection, bleeding, skin changes including thinning, pigmentary alteration and scarring to name a few, right knee injected with 48 mg of Synvisc 1.  This was done with nontouch technique.  The patient tolerated the procedure well and had a brisk Marcaine effect.  The postinjection care was discussed.      Follow up in 1 month.    HPI    Ruthy Man is a 89 year old female is here for follow-up of worsening pain in her right knee.  She has osteoarthritis of the knees.  She would like to proceed with Synvisc 1 as steroid injections have provided little relief.  This is the background renal impairment therefore not a candidate for nonsteroidals.  She has palindromic rheumatoid arthritis.  Her knee pain is worse with activity.    This is going up the steps not coming down the steps.  On the flat surface she can walk without difficulty.  On the lateral aspect.  She does not think there is swelling.  Does not wake her up from sleep.  Given the past history of renal impairment not a candidate for any nonsteroidals.  We discussed options.  In the past she has had corticosteroid injections that provided about 48 hours of relief.      DETAILED EXAMINATION  04/22/22  :    Vitals:    04/22/22 1328   BP: (!) 140/78   Pulse: 88     Alert oriented. Head including the face is examined for malar rash, heliotropes, scarring, lupus pernio. Eyes examined for redness such as in episcleritis/scleritis, periorbital lesions.   Neck/ Face examined for parotid gland swelling, range of motion of neck.  Left upper and  lower and right upper and lower extremities examined for tenderness, swelling, warmth of the appendicular joints, range of motion, edema, rash.  Some of the important findings included: she does not have evidence of synovitis in the palpable joints of the upper extremities.  No significant deformities of the digits.  + Heberden nodes.  Range of motion of the shoulders  show full abduction.   JLT without effusion or warmth of the knees.  she does not have dactylitis of the digits.     Patient Active Problem List    Diagnosis Date Noted     Dermatitis 04/23/2021     Priority: Medium     Renal insufficiency 04/23/2021     Priority: Medium     Palpitations 09/15/2020     Priority: Medium     Chronic right shoulder pain 01/21/2020     Priority: Medium     Palindromic rheumatism 10/15/2019     Priority: Medium     Primary osteoarthritis involving multiple joints 10/15/2019     Priority: Medium     Chronic renal failure, stage 3 (moderate) (H) 08/06/2019     Priority: Medium     Acute ischemic VBA thalamic stroke, left (H) 04/12/2019     Priority: Medium     Right thalamic stroke (H) 04/11/2019     Priority: Medium     Left knee pain 04/11/2019     Priority: Medium     Ischemic optic neuropathy      Priority: Medium     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         Hypothyroidism      Priority: Medium     Created by Conversion  Replacement Utility updated for latest IMO load         Adjustment disorder with anxiety      Priority: Medium     Created by Conversion         Hypertension      Priority: Medium     Created by Conversion  Replacement Utility updated for latest IMO load         Senile osteoporosis 02/21/2019     Priority: Medium     Vitamin D deficiency      Priority: Medium     Created by Conversion  [facility] Annotation: May 18 2013  3:04PM - Jad Edmond: Vit D = 10  Replacement Utility updated for latest IMO load         Hiatal Hernia      Priority: Medium     Created by [employer] [facility] Annotation: Nov 30 2008   4:27PM - Luke Cannon:   Gastroesophageal   reflux  Replacement Utility updated for latest IMO load         Skin Cancer      Priority: Medium     Created by Conversion  Replacement Utility updated for latest IMO load         Colon polyps 02/25/2015     Priority: Medium     Impaired Fasting Glucose      Priority: Medium     Created by Conversion         Premature Ventricular Contractions      Priority: Medium     Created by Conversion  [facility] Annotation: Nov 30 2008  4:41PM Luke Bliss: frequent,   benign         Past Surgical History:   Procedure Laterality Date     HC REMOVAL GALLBLADDER      Description: Cholecystectomy;  Recorded: 11/30/2008;  Comments: stone     ZZC TOTAL ABDOM HYSTERECTOMY      Description: Hysterectomy;  Recorded: 11/30/2008;  Comments: benign disease      Past Medical History:   Diagnosis Date     Hypertension      Senile osteoporosis 2/21/2019     Allergies   Allergen Reactions     Ace Inhibitors Cough     Amlodipine Unknown     Edema       Clarithromycin Nausea     Diltiazem Hcl [Diltiazem] Itching     Doxazosin Nausea     Penicillins Rash     Edema       Current Outpatient Medications   Medication Sig Dispense Refill     aspirin 81 mg chewable tablet [ASPIRIN 81 MG CHEWABLE TABLET] Chew 81 mg daily.       atorvastatin (LIPITOR) 40 MG tablet TAKE 1 TABLET(40 MG) BY MOUTH DAILY 90 tablet 2     cholecalciferol, vitamin D3, (VITAMIN D3) 1,000 unit capsule [CHOLECALCIFEROL, VITAMIN D3, (VITAMIN D3) 1,000 UNIT CAPSULE] Take 1,000 Units by mouth daily.       clobetasoL (TEMOVATE) 0.05 % ointment [CLOBETASOL (TEMOVATE) 0.05 % OINTMENT] Apply 0.5 mg daily as needed 60 g 4     cyanocobalamin (VITAMIN B-12) 500 MCG tablet [CYANOCOBALAMIN (VITAMIN B-12) 500 MCG TABLET] Take 500 mcg by mouth daily.       diclofenac sodium (VOLTAREN) 1 % Gel [DICLOFENAC SODIUM (VOLTAREN) 1 % GEL] Apply 3 times a day 100 g 11     DOCOSAHEXANOIC ACID/EPA (FISH OIL ORAL) [DOCOSAHEXANOIC ACID/EPA (FISH OIL  ORAL)] Take 1,200 mg by mouth daily.       escitalopram (LEXAPRO) 5 MG tablet Take 1 tablet (5 mg) by mouth daily 90 tablet 3     hydroxychloroquine (PLAQUENIL) 200 MG tablet TAKE 1 TABLET(200 MG) BY MOUTH DAILY 90 tablet 0     lansoprazole (PREVACID) 15 MG capsule Take 15 mg by mouth as needed       levothyroxine (SYNTHROID/LEVOTHROID) 75 MCG tablet TAKE 1 TABLET BY MOUTH DAILY 5 DAYS A WEEK, SKIP DOSE 2 DAYS PER WEEK 90 tablet 3     losartan (COZAAR) 50 MG tablet TAKE 1-2 TABLETS BY MOUTH DAILY BASED ON BLOOD PRESSURE READING 180 tablet 1     metoprolol succinate ER (TOPROL-XL) 25 MG 24 hr tablet Take 1 tablet (25 mg) by mouth daily 90 tablet 1     oxybutynin (DITROPAN) 5 MG tablet [OXYBUTYNIN (DITROPAN) 5 MG TABLET] Take on tab by mouth at bed time 90 tablet 3     triamcinolone (KENALOG) 0.1 % cream [TRIAMCINOLONE (KENALOG) 0.1 % CREAM] Use twice a day for 2 weeks, then take 1 week off. May repeat. 80 g 1     calcium carbonate (OS-SUE) 600 mg calcium (1,500 mg) tablet [CALCIUM CARBONATE (OS-SUE) 600 MG CALCIUM (1,500 MG) TABLET] Take 600 mg by mouth 2 (two) times a day with meals. (Patient not taking: No sig reported)       escitalopram (LEXAPRO) 5 MG tablet Take 2 tablets (10 mg) by mouth daily (Patient not taking: No sig reported) 180 tablet 1     escitalopram oxalate (LEXAPRO) 5 MG tablet [ESCITALOPRAM OXALATE (LEXAPRO) 5 MG TABLET] Take 2 tablets (10 mg total) by mouth daily. (Patient not taking: No sig reported) 90 tablet 3     escitalopram oxalate (LEXAPRO) 5 MG tablet [ESCITALOPRAM OXALATE (LEXAPRO) 5 MG TABLET] Take 1 tablet (5 mg total) by mouth daily. (Patient not taking: No sig reported) 90 tablet 3     ondansetron (ZOFRAN) 4 MG tablet Take 1 tablet (4 mg) by mouth every 8 hours as needed for nausea (Patient not taking: No sig reported) 30 tablet 0     family history includes ALS in her mother; Other - See Comments in her brother and daughter; Pulmonary Embolism in her father.  Social Connections: Not  on file          WBC Count   Date Value Ref Range Status   02/11/2022 8.1 4.0 - 11.0 10e3/uL Final     RBC Count   Date Value Ref Range Status   02/11/2022 4.62 3.80 - 5.20 10e6/uL Final     Hemoglobin   Date Value Ref Range Status   02/11/2022 13.2 11.7 - 15.7 g/dL Final     Hematocrit   Date Value Ref Range Status   02/11/2022 42.2 35.0 - 47.0 % Final     MCV   Date Value Ref Range Status   02/11/2022 91 78 - 100 fL Final     MCH   Date Value Ref Range Status   02/11/2022 28.6 26.5 - 33.0 pg Final     Platelet Count   Date Value Ref Range Status   02/11/2022 263 150 - 450 10e3/uL Final     % Lymphocytes   Date Value Ref Range Status   10/22/2021 8 % Final     AST   Date Value Ref Range Status   02/11/2022 16 0 - 40 U/L Final     ALT   Date Value Ref Range Status   02/11/2022 <9 0 - 45 U/L Final     Albumin   Date Value Ref Range Status   02/11/2022 3.7 3.5 - 5.0 g/dL Final     Alkaline Phosphatase   Date Value Ref Range Status   02/11/2022 74 45 - 120 U/L Final     Creatinine   Date Value Ref Range Status   02/11/2022 1.49 (H) 0.60 - 1.10 mg/dL Final     GFR Estimate   Date Value Ref Range Status   02/11/2022 33 (L) >60 mL/min/1.73m2 Final     Comment:     Effective December 21, 2021 eGFRcr in adults is calculated using the 2021 CKD-EPI creatinine equation which includes age and gender (Ann et al., NEJM, DOI: 10.1056/ACBNen9612401)   04/23/2021 38 (L) >60 mL/min/1.73m2 Final     GFR Estimate If Black   Date Value Ref Range Status   04/23/2021 46 (L) >60 mL/min/1.73m2 Final     Erythrocyte Sedimentation Rate   Date Value Ref Range Status   08/06/2019 25 (H) 0 - 20 mm/hr Final     CRP   Date Value Ref Range Status   02/11/2022 0.4 0.0-<0.8 mg/dL Final

## 2022-05-20 ENCOUNTER — OFFICE VISIT (OUTPATIENT)
Dept: INTERNAL MEDICINE | Facility: CLINIC | Age: 87
End: 2022-05-20
Payer: COMMERCIAL

## 2022-05-20 VITALS
SYSTOLIC BLOOD PRESSURE: 191 MMHG | OXYGEN SATURATION: 95 % | HEART RATE: 69 BPM | DIASTOLIC BLOOD PRESSURE: 86 MMHG | TEMPERATURE: 98.2 F

## 2022-05-20 DIAGNOSIS — M81.0 AGE-RELATED OSTEOPOROSIS WITHOUT CURRENT PATHOLOGICAL FRACTURE: ICD-10-CM

## 2022-05-20 DIAGNOSIS — Z13.220 SCREENING FOR HYPERLIPIDEMIA: Primary | ICD-10-CM

## 2022-05-20 DIAGNOSIS — G25.81 RLS (RESTLESS LEGS SYNDROME): ICD-10-CM

## 2022-05-20 DIAGNOSIS — F41.1 GAD (GENERALIZED ANXIETY DISORDER): ICD-10-CM

## 2022-05-20 DIAGNOSIS — D64.9 ANEMIA, UNSPECIFIED TYPE: ICD-10-CM

## 2022-05-20 DIAGNOSIS — I10 ESSENTIAL HYPERTENSION: ICD-10-CM

## 2022-05-20 DIAGNOSIS — N18.32 STAGE 3B CHRONIC KIDNEY DISEASE (H): ICD-10-CM

## 2022-05-20 PROCEDURE — 99214 OFFICE O/P EST MOD 30 MIN: CPT | Performed by: INTERNAL MEDICINE

## 2022-05-20 RX ORDER — ESCITALOPRAM OXALATE 10 MG/1
10 TABLET ORAL DAILY
Qty: 90 TABLET | Refills: 3 | Status: SHIPPED | OUTPATIENT
Start: 2022-05-20 | End: 2023-01-01

## 2022-05-20 RX ORDER — CARVEDILOL 12.5 MG/1
12.5 TABLET ORAL 2 TIMES DAILY WITH MEALS
Qty: 60 TABLET | Refills: 3 | Status: SHIPPED | OUTPATIENT
Start: 2022-05-20 | End: 2022-10-24 | Stop reason: DRUGHIGH

## 2022-05-20 RX ORDER — GABAPENTIN 600 MG/1
600 TABLET ORAL AT BEDTIME
Qty: 90 TABLET | Refills: 3 | Status: SHIPPED | OUTPATIENT
Start: 2022-05-20 | End: 2022-06-06

## 2022-05-20 ASSESSMENT — PATIENT HEALTH QUESTIONNAIRE - PHQ9
SUM OF ALL RESPONSES TO PHQ QUESTIONS 1-9: 8
10. IF YOU CHECKED OFF ANY PROBLEMS, HOW DIFFICULT HAVE THESE PROBLEMS MADE IT FOR YOU TO DO YOUR WORK, TAKE CARE OF THINGS AT HOME, OR GET ALONG WITH OTHER PEOPLE: NOT DIFFICULT AT ALL
SUM OF ALL RESPONSES TO PHQ QUESTIONS 1-9: 8

## 2022-05-20 NOTE — PATIENT INSTRUCTIONS
Come for a visit in 2 weeks for a blood pressure check and prolia shot   Will hold the metorplolol for now and start a better medication that lowers your blood pressure    A heart rate of 60 is a good target    Continue the losartan unchanged    Take gabapentin for resltless leg at night and it will help your anxiety and sleep    Increase the escitalopram to 10mg , ( ia have changed it to to 10mg at the [facility] _)

## 2022-05-20 NOTE — PROGRESS NOTES
Assessment & Plan     Screening for hyperlipidemia    Age-related osteoporosis without current pathological fracture  She missed her last Prolia shot has really have reordered it she has CKD so she cannot take biphosphonate's.  Explained the risk of missing shots and high risk for fractures when she follows up in 2 weeks for her blood pressure she can get her Prolia shot.  She has stable stage IIIb kidney disease  - denosumab (PROLIA) injection 60 mg  - TSH    Stage 3b chronic kidney disease (H)  We will check labs at her next visit  - denosumab (PROLIA) injection 60 mg  - Basic metabolic panel  (Ca, Cl, CO2, Creat, Gluc, K, Na, BUN)    RLS (restless legs syndrome)    - TSH  - gabapentin (NEURONTIN) 600 MG tablet  Dispense: 90 tablet; Refill: 3    MADDY (generalized anxiety disorder)    - escitalopram (LEXAPRO) 10 MG tablet  Dispense: 90 tablet; Refill: 3  - carvedilol (COREG) 12.5 MG tablet  Dispense: 60 tablet; Refill: 3    Anemia, unspecified type     - TSH    Hypertension        Talked to her at length she has a longstanding history of anxiety.  Her systolic blood pressure is much higher than it has usually been but she has a element of stress peaks she feels upset that she has no reason to be stressed and she keeps focusing on that.  But she feels agitated and distressed.  She has good mobility at home and garden and tries to keep busy but cannot stop feeling like something bad is going to happen she also has struggles with sleep.    Will try gabapentin at night.  For her general anxiety and agitation as an adjunct to Lexapro.  Lexapro will also be increased to 10 mg.  She is very worried about bradycardia with metoprolol.  We will try carvedilol which is a combined alpha and beta-blocker.  I did tell her that the heart rate of 60 is considered good.  She may consider a diuretic at the next visit but she said she urinates a lot and would not want a diuretic she is currently has a history of allergy to  amlodipine and doxazosin.  Next option would be hydralazine if her blood pressure continues to be elevated.             Return in about 2 weeks (around 6/3/2022).    Cassidy Grimm MD  [facility] is a 89 year old who presents for the following health issues     History of Present Illness       Reason for visit:  High bp  Symptom onset:  3-7 days ago  Symptoms include:  High blood presure  Symptom intensity:  Severe  Symptom progression:  Worsening  Had these symptoms before:  No  What makes it worse:  No  What makes it better:  No    She eats 2-3 servings of fruits and vegetables daily.She consumes 1 sweetened beverage(s) daily.She exercises with enough effort to increase her heart rate 9 or less minutes per day.  She exercises with enough effort to increase her heart rate 3 or less days per week.   She is taking medications regularly.    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  But she just feels agitated and jittery and cannot sleep at night her legs are very restless as well and she gets very upset with her high blood pressure    Patient Active Problem List   Diagnosis     Ischemic optic neuropathy     Impaired Fasting Glucose     Hypothyroidism     Premature Ventricular Contractions     Vitamin D deficiency     Skin Cancer     Adjustment disorder with anxiety     Hypertension     Hiatal Hernia     Colon polyps     Senile osteoporosis     Right thalamic stroke (H)     Left knee pain     Acute ischemic VBA thalamic stroke, left (H)     Chronic  renal failure, stage 3 (moderate) (H)     Palindromic rheumatism     Primary osteoarthritis involving multiple joints     Chronic right shoulder pain     Palpitations     Dermatitis     Renal insufficiency     Current Outpatient Medications   Medication     aspirin 81 mg chewable tablet     atorvastatin (LIPITOR) 40 MG tablet     carvedilol (COREG) 12.5 MG tablet     cholecalciferol, vitamin D3, (VITAMIN D3) 1,000 unit capsule     clobetasoL (TEMOVATE) 0.05 % ointment     cyanocobalamin (VITAMIN B-12) 500 MCG tablet     diclofenac sodium (VOLTAREN) 1 % Gel     DOCOSAHEXANOIC ACID/EPA (FISH OIL ORAL)     escitalopram (LEXAPRO) 10 MG tablet     gabapentin (NEURONTIN) 600 MG tablet     hydroxychloroquine (PLAQUENIL) 200 MG tablet     lansoprazole (PREVACID) 15 MG capsule     levothyroxine (SYNTHROID/LEVOTHROID) 75 MCG tablet     losartan (COZAAR) 50 MG tablet     triamcinolone (KENALOG) 0.1 % cream     Current Facility-Administered Medications   Medication     [START ON 6/3/2022] denosumab (PROLIA) injection 60 mg     denosumab (PROLIA) injection 60 mg           Review of Systems   no shortness of breath ,no  chest pain ,no  Falls, no urinary incontinence , no weight changes , sleep and moodhave been good . Independent in ADLS and IADLS         Objective    BP (!) 191/86 (BP Location: Left arm, Patient Position: Sitting, Cuff Size: Adult Large)   Pulse 69   Temp 98.2  F (36.8  C) (Oral)   SpO2 95%   There is no height or weight on file to calculate BMI.  Physical Exam   GENERAL: healthy, alert and no distress  NECK: no adenopathy, no asymmetry, masses, or scars and thyroid normal to palpation  RESP: lungs clear to auscultation - no rales, rhonchi or wheezes  CV: regular rate and rhythm, normal S1 S2, no S3 or S4, no murmur, click or rub, no peripheral edema and peripheral pulses strong  Legs show no edema  MS: no gross musculoskeletal defects noted, no edema

## 2022-05-22 RX ORDER — CARVEDILOL 12.5 MG/1
12.5 TABLET ORAL 2 TIMES DAILY WITH MEALS
Qty: 180 TABLET | Refills: 3 | OUTPATIENT
Start: 2022-05-22

## 2022-06-06 ENCOUNTER — OFFICE VISIT (OUTPATIENT)
Dept: INTERNAL MEDICINE | Facility: CLINIC | Age: 87
End: 2022-06-06
Payer: COMMERCIAL

## 2022-06-06 VITALS
TEMPERATURE: 97.6 F | OXYGEN SATURATION: 96 % | DIASTOLIC BLOOD PRESSURE: 80 MMHG | RESPIRATION RATE: 22 BRPM | HEIGHT: 64 IN | SYSTOLIC BLOOD PRESSURE: 142 MMHG | HEART RATE: 73 BPM | BODY MASS INDEX: 31.76 KG/M2

## 2022-06-06 DIAGNOSIS — Z13.220 SCREENING FOR HYPERLIPIDEMIA: ICD-10-CM

## 2022-06-06 DIAGNOSIS — G25.81 RLS (RESTLESS LEGS SYNDROME): ICD-10-CM

## 2022-06-06 DIAGNOSIS — F41.9 ANXIETY: ICD-10-CM

## 2022-06-06 DIAGNOSIS — N18.32 STAGE 3B CHRONIC KIDNEY DISEASE (H): ICD-10-CM

## 2022-06-06 DIAGNOSIS — M81.0 AGE-RELATED OSTEOPOROSIS WITHOUT CURRENT PATHOLOGICAL FRACTURE: ICD-10-CM

## 2022-06-06 DIAGNOSIS — K21.9 GASTROESOPHAGEAL REFLUX DISEASE WITHOUT ESOPHAGITIS: Primary | ICD-10-CM

## 2022-06-06 DIAGNOSIS — I10 ESSENTIAL HYPERTENSION: ICD-10-CM

## 2022-06-06 DIAGNOSIS — D64.9 ANEMIA, UNSPECIFIED TYPE: ICD-10-CM

## 2022-06-06 LAB
ANION GAP SERPL CALCULATED.3IONS-SCNC: 12 MMOL/L (ref 5–18)
BUN SERPL-MCNC: 26 MG/DL (ref 8–28)
CALCIUM SERPL-MCNC: 9.1 MG/DL (ref 8.5–10.5)
CHLORIDE BLD-SCNC: 103 MMOL/L (ref 98–107)
CO2 SERPL-SCNC: 25 MMOL/L (ref 22–31)
CREAT SERPL-MCNC: 1.44 MG/DL (ref 0.6–1.1)
GFR SERPL CREATININE-BSD FRML MDRD: 34 ML/MIN/1.73M2
GLUCOSE BLD-MCNC: 99 MG/DL (ref 70–125)
POTASSIUM BLD-SCNC: 4.4 MMOL/L (ref 3.5–5)
SODIUM SERPL-SCNC: 140 MMOL/L (ref 136–145)
TSH SERPL DL<=0.005 MIU/L-ACNC: 2.21 UIU/ML (ref 0.3–5)

## 2022-06-06 PROCEDURE — 80048 BASIC METABOLIC PNL TOTAL CA: CPT | Performed by: INTERNAL MEDICINE

## 2022-06-06 PROCEDURE — 36415 COLL VENOUS BLD VENIPUNCTURE: CPT | Performed by: INTERNAL MEDICINE

## 2022-06-06 PROCEDURE — 84443 ASSAY THYROID STIM HORMONE: CPT | Performed by: INTERNAL MEDICINE

## 2022-06-06 PROCEDURE — 99214 OFFICE O/P EST MOD 30 MIN: CPT | Performed by: INTERNAL MEDICINE

## 2022-06-06 PROCEDURE — 87338 HPYLORI STOOL AG IA: CPT | Performed by: INTERNAL MEDICINE

## 2022-06-06 NOTE — PATIENT INSTRUCTIONS
[facility] Come for a prolia shot a month after the dental extractions ' you have to make a nurse only visit   continue carcidilol for blood pressure you can take it once daily

## 2022-06-06 NOTE — LETTER
June 7, 2022      Ruthy Man  [address] [facility] [address] [facility] [address]        Dear ,    We are writing to inform you of your test results.    Resulted Orders   Helicobacter pylori Antigen Stool   Result Value Ref Range    Helicobacter pylori Antigen Stool Negative Negative      Comment:      Negative for Helicobacter pylori antigen by enzyme immunoassay. A negative result indicates the absence of H. pylori antigen or that the level of antigen is below the level of detection.   TSH   Result Value Ref Range    TSH 2.21 0.30 - 5.00 uIU/mL   Basic metabolic panel  (Ca, Cl, CO2, Creat, Gluc, K, Na, BUN)   Result Value Ref Range    Sodium 140 136 - 145 mmol/L    Potassium 4.4 3.5 - 5.0 mmol/L    Chloride 103 98 - 107 mmol/L    Carbon Dioxide (CO2) 25 22 - 31 mmol/L    Anion Gap 12 5 - 18 mmol/L    Urea Nitrogen 26 8 - 28 mg/dL    Creatinine 1.44 (H) 0.60 - 1.10 mg/dL    Calcium 9.1 8.5 - 10.5 mg/dL    Glucose 99 70 - 125 mg/dL    GFR Estimate 34 (L) >60 mL/min/1.73m2      Comment:      Effective December 21, 2021 eGFRcr in adults is calculated using the 2021 CKD-EPI creatinine equation which includes age and gender ( et al., NEJM, DOI: 10.1056/IHTFnn1582762)     Stool test is negative . The kidney test is stable . Thyroid test is normal  If you have any questions or concerns, please call the clinic at the number listed above.       Sincerely,      Cassidy Grimm MD

## 2022-06-06 NOTE — PROGRESS NOTES
Assessment & Plan     Gastroesophageal reflux disease without esophagitis  She did submit in the H. pylori testing  - Helicobacter pylori Antigen Stool; Future  - Helicobacter pylori Antigen Stool    Screening for hyperlipidemia      Age-related osteoporosis without current pathological fracture    - TSH    RLS (restless legs syndrome)    - TSH    Anemia, unspecified type     - TSH    Stage 3b chronic kidney disease (H)    - Basic metabolic panel  (Ca, Cl, CO2, Creat, Gluc, K, Na, BUN)    Hypertension  better control.  She is only taking the carvedilol once a day, twice a day it made her heart rate going to the 60s.  Which she says is not good for her.    Anxiety  Lexapro was increased to 10 mg and she said that makes her feel better she did not like the way gabapentin made her feel so stopped nely  Overall I do think she has reached a target blood pressure.  Based on her age.  She is due for her Prolia shot but should get it 1 month after her dental extractions.  It has been approved t                   No follow-ups on file.    Cassidy Grimm MD  [facility] is a 89 year old who presents for the following health issues very pleasant patient with anxiety and hypertension here for follow-up.  Carvedilol had been started calf gabapentin and Lexapro had been increased    HPI       Patient Active Problem List   Diagnosis     Ischemic optic neuropathy     Impaired Fasting Glucose     Hypothyroidism     Premature Ventricular Contractions     Vitamin D deficiency     Skin Cancer     Adjustment disorder with anxiety     Hypertension     Hiatal Hernia     Colon polyps     Senile osteoporosis     Right thalamic stroke (H)     Left knee pain     Acute ischemic VBA thalamic stroke, left (H)     Chronic renal failure, stage 3 (moderate) (H)     Palindromic rheumatism     Primary osteoarthritis involving multiple joints     Chronic right shoulder pain     Palpitations      "Dermatitis     Renal insufficiency     Current Outpatient Medications   Medication     aspirin 81 mg chewable tablet     atorvastatin (LIPITOR) 40 MG tablet     carvedilol (COREG) 12.5 MG tablet     cholecalciferol, vitamin D3, (VITAMIN D3) 1,000 unit capsule     clobetasoL (TEMOVATE) 0.05 % ointment     cyanocobalamin (VITAMIN B-12) 500 MCG tablet     diclofenac sodium (VOLTAREN) 1 % Gel     DOCOSAHEXANOIC ACID/EPA (FISH OIL ORAL)     escitalopram (LEXAPRO) 10 MG tablet     hydroxychloroquine (PLAQUENIL) 200 MG tablet     lansoprazole (PREVACID) 15 MG capsule     levothyroxine (SYNTHROID/LEVOTHROID) 75 MCG tablet     losartan (COZAAR) 50 MG tablet     triamcinolone (KENALOG) 0.1 % cream     Current Facility-Administered Medications   Medication     denosumab (PROLIA) injection 60 mg     denosumab (PROLIA) injection 60 mg         Review of Systems   Constitutional, HEENT, cardiovascular, pulmonary, gi and gu systems are negative, except as otherwise noted.      Objective    BP (!) 142/80   Pulse 73   Temp 97.6  F (36.4  C) (Tympanic)   Resp 22   Ht 1.626 m (5' 4\")   SpO2 96%   BMI 31.76 kg/m    Body mass index is 31.76 kg/m .  Physical Exam   GENERAL: healthy, alert and no distress  NECK: no adenopathy, no asymmetry, masses, or scars and thyroid normal to palpation  RESP: lungs clear to auscultation - no rales, rhonchi or wheezes  CV: regular rate and rhythm, normal S1 S2, no S3 or S4, no murmur, click or rub, no peripheral edema and peripheral pulses strong  MS: no gross musculoskeletal defects noted, no edema                "

## 2022-06-07 LAB — H PYLORI AG STL QL IA: NEGATIVE

## 2022-07-11 ENCOUNTER — OFFICE VISIT (OUTPATIENT)
Dept: FAMILY MEDICINE | Facility: CLINIC | Age: 87
End: 2022-07-11
Payer: COMMERCIAL

## 2022-07-11 VITALS
SYSTOLIC BLOOD PRESSURE: 162 MMHG | DIASTOLIC BLOOD PRESSURE: 80 MMHG | HEART RATE: 62 BPM | OXYGEN SATURATION: 94 % | RESPIRATION RATE: 24 BRPM

## 2022-07-11 DIAGNOSIS — R14.0 BLOATING: ICD-10-CM

## 2022-07-11 DIAGNOSIS — K58.9 SYMPTOMS CONSISTENT WITH IRRITABLE BOWEL SYNDROME: Primary | ICD-10-CM

## 2022-07-11 DIAGNOSIS — K59.04 CHRONIC IDIOPATHIC CONSTIPATION: ICD-10-CM

## 2022-07-11 DIAGNOSIS — F43.22 ADJUSTMENT DISORDER WITH ANXIETY: ICD-10-CM

## 2022-07-11 DIAGNOSIS — R14.3 EXCESSIVE GAS: ICD-10-CM

## 2022-07-11 DIAGNOSIS — I10 ESSENTIAL HYPERTENSION: ICD-10-CM

## 2022-07-11 DIAGNOSIS — R10.9 ABDOMINAL CRAMPING: ICD-10-CM

## 2022-07-11 PROCEDURE — 99213 OFFICE O/P EST LOW 20 MIN: CPT | Performed by: NURSE PRACTITIONER

## 2022-07-11 ASSESSMENT — ENCOUNTER SYMPTOMS
NAUSEA: 1
HEMATOCHEZIA: 0
ABDOMINAL PAIN: 0
ANAL BLEEDING: 0
ABDOMINAL DISTENTION: 1
CONSTITUTIONAL NEGATIVE: 1
CONSTIPATION: 1
PSYCHIATRIC NEGATIVE: 1
RECTAL PAIN: 0
CARDIOVASCULAR NEGATIVE: 1
RESPIRATORY NEGATIVE: 1
HEARTBURN: 0
VOMITING: 0
DIARRHEA: 0

## 2022-07-11 NOTE — PROGRESS NOTES
Assessment & Plan     Adjustment disorder with anxiety  - continue to follow up with PCP. Appears stable today but this can affect her stomach aliments. Conservative measures discussed.    Hypertension  BP elevated today which may be situational since she did not take her BP medications. Offered to recheck with nurse and she declined. Plans on following back up with PCP. Low salt diet recommended. Important of stable BP discussed. Benefits of taking medications as directed discussed.     Symptoms consistent with irritable bowel syndrome      Chronic idiopathic constipation  Start taking stool softeners and fiber supplements. Stay hydrated. Normal stooling discussed today. Diet discussed.     Bloating  Symptoms similar to IBS with constipation. FODMAP diet discussed. Diverticulosis was noted on imaging and this was discussed as well.     Excessive gas  Fodmap discussed. Exercise discussed.     Abdominal cramping  Warning signs discussed and when to seek care.                    Return if symptoms worsen or fail to improve.    JUSTIN Brooks [facility] is a 89 year old presenting for the following health issues:  Abdominal Pain    Present today related to intermittent abdomen cramping, bloating, and excess gas. She was seen in February for this condition and those symptoms resolved but are now back. Her symptoms started in mid June after she ate a bunch of strawberries. She also stopped her fiber supplements. She stated she does drink water and is not throwing up. Her weight is stable. She does endorse hard stools and it can be up to eight days between bowel movements. Does enjoy cauliflower. Did not take her BP medication today.      History of Present Illness       Reason for visit:  Stomach problems  Symptom onset:  More than a month  Symptoms include:  Cramping  Symptom intensity:  Moderate  Symptom progression:  Staying the same  Had these  symptoms before:  No  What makes it worse:  None  What makes it better:  Sometimes eating    She eats 0-1 servings of fruits and vegetables daily.She consumes 0 sweetened beverage(s) daily.She exercises with enough effort to increase her heart rate 9 or less minutes per day.  She exercises with enough effort to increase her heart rate 3 or less days per week.   She is taking medications regularly.    Today's PHQ-9         PHQ-9 Total Score: 8    PHQ-9 Q9 Thoughts of better off dead/self-harm past 2 weeks :   Not at all    How difficult have these problems made it for you to do your work, take care of things at home, or get along with other people: Not difficult at all             Review of Systems   Constitutional: Negative.    HENT: Negative.    Respiratory: Negative.    Cardiovascular: Negative.    Gastrointestinal: Positive for abdominal distention, constipation and nausea. Negative for abdominal pain, anal bleeding, diarrhea, heartburn, hematochezia, rectal pain and vomiting.   Genitourinary: Negative.    Psychiatric/Behavioral: Negative.             Objective    BP (!) 162/80 (BP Location: Right arm, Patient Position: Sitting, Cuff Size: Adult Regular)   Pulse 62   Resp 24   SpO2 94%   There is no height or weight on file to calculate BMI.   Repeat /78    Physical Exam   GENERAL: healthy, alert and no distress  NECK: no adenopathy, no asymmetry, masses, or scars and thyroid normal to palpation  RESP: lungs clear to auscultation - no rales, rhonchi or wheezes  CV: regular rate and rhythm, normal S1 S2, no S3 or S4, no murmur, click or rub, no peripheral edema and peripheral pulses strong  ABDOMEN: soft, nontender, no hepatosplenomegaly, no masses and bowel sounds normal  MS: no gross musculoskeletal defects noted, no edema  SKIN: no suspicious lesions or rashes  PSYCH: mentation appears normal, affect normal/bright    Office Visit on 06/06/2022   Component Date Value Ref Range Status     Helicobacter  pylori Antigen Stool 06/06/2022 Negative  Negative Final    Negative for Helicobacter pylori antigen by enzyme immunoassay. A negative result indicates the absence of H. pylori antigen or that the level of antigen is below the level of detection.     TSH 06/06/2022 2.21  0.30 - 5.00 uIU/mL Final     Sodium 06/06/2022 140  136 - 145 mmol/L Final     Potassium 06/06/2022 4.4  3.5 - 5.0 mmol/L Final     Chloride 06/06/2022 103  98 - 107 mmol/L Final     Carbon Dioxide (CO2) 06/06/2022 25  22 - 31 mmol/L Final     Anion Gap 06/06/2022 12  5 - 18 mmol/L Final     Urea Nitrogen 06/06/2022 26  8 - 28 mg/dL Final     Creatinine 06/06/2022 1.44 (A) 0.60 - 1.10 mg/dL Final     Calcium 06/06/2022 9.1  8.5 - 10.5 mg/dL Final     Glucose 06/06/2022 99  70 - 125 mg/dL Final     GFR Estimate 06/06/2022 34 (A) >60 mL/min/1.73m2 Final    Effective December 21, 2021 eGFRcr in adults is calculated using the 2021 CKD-EPI creatinine equation which includes age and gender (Ann et al., NEJM, DOI: 10.1056/YSFAny4315581)                   .  ..

## 2022-08-03 ENCOUNTER — TRANSFERRED RECORDS (OUTPATIENT)
Dept: HEALTH INFORMATION MANAGEMENT | Facility: CLINIC | Age: 87
End: 2022-08-03

## 2022-08-25 ENCOUNTER — TELEPHONE (OUTPATIENT)
Dept: INTERNAL MEDICINE | Facility: CLINIC | Age: 87
End: 2022-08-25

## 2022-08-25 DIAGNOSIS — L90.0 LICHEN SCLEROSUS: ICD-10-CM

## 2022-08-25 RX ORDER — CLOBETASOL PROPIONATE 0.5 MG/G
OINTMENT TOPICAL
Qty: 60 G | Refills: 4 | Status: SHIPPED | OUTPATIENT
Start: 2022-08-25 | End: 2024-01-01

## 2022-08-25 NOTE — TELEPHONE ENCOUNTER
Reason for Call:  Medication or medication refill:    Do you use a [facility]?  Name of the pharmacy and phone number for thecurrent request:  [facility] [address]    Name of the medication requested:clobetasoL (TEMOVATE) 0.05 % ointment    Other request: none    Can we leave a detailed message on this number? YES    Phone number patient can be reached at: Home number on file [phone] (home)    Best Time: any    Call taken on 8/25/2022 at 12:32 PM by Violeta Field

## 2022-10-05 ENCOUNTER — VIRTUAL VISIT (OUTPATIENT)
Dept: INTERNAL MEDICINE | Facility: CLINIC | Age: 87
End: 2022-10-05
Payer: COMMERCIAL

## 2022-10-05 DIAGNOSIS — I10 ESSENTIAL HYPERTENSION: ICD-10-CM

## 2022-10-05 DIAGNOSIS — B97.89 VIRAL RESPIRATORY INFECTION: Primary | ICD-10-CM

## 2022-10-05 DIAGNOSIS — J98.8 VIRAL RESPIRATORY INFECTION: Primary | ICD-10-CM

## 2022-10-05 PROBLEM — K21.9 GASTROESOPHAGEAL REFLUX DISEASE WITHOUT ESOPHAGITIS: Status: ACTIVE | Noted: 2022-08-03

## 2022-10-05 PROCEDURE — 99214 OFFICE O/P EST MOD 30 MIN: CPT | Mod: 95 | Performed by: INTERNAL MEDICINE

## 2022-10-05 RX ORDER — BENZONATATE 200 MG/1
200 CAPSULE ORAL 3 TIMES DAILY PRN
Qty: 20 CAPSULE | Refills: 1 | Status: SHIPPED | OUTPATIENT
Start: 2022-10-05 | End: 2022-10-24

## 2022-10-05 RX ORDER — GUAIFENESIN 600 MG/1
600 TABLET, EXTENDED RELEASE ORAL 2 TIMES DAILY
Qty: 60 TABLET | Refills: 2 | Status: SHIPPED | OUTPATIENT
Start: 2022-10-05 | End: 2023-05-10

## 2022-10-05 RX ORDER — TRIAMTERENE/HYDROCHLOROTHIAZID 37.5-25 MG
1 TABLET ORAL DAILY
Qty: 90 TABLET | Refills: 3 | Status: SHIPPED | OUTPATIENT
Start: 2022-10-05 | End: 2022-10-24

## 2022-10-05 RX ORDER — PREDNISONE 20 MG/1
20 TABLET ORAL EVERY MORNING
Qty: 4 TABLET | Refills: 0 | Status: SHIPPED | OUTPATIENT
Start: 2022-10-05 | End: 2022-10-09

## 2022-10-05 ASSESSMENT — PATIENT HEALTH QUESTIONNAIRE - PHQ9: SUM OF ALL RESPONSES TO PHQ QUESTIONS 1-9: 0

## 2022-10-05 NOTE — PATIENT INSTRUCTIONS
Continue to test for COVID    Discontinue losartan    Triamterene HCTZ once daily for blood pressure and nocturia    Guaifenesin 600 mg twice daily    Benzonatate as needed for cough    Prednisone 20 mg daily for 4 days    If no improvement after 48 hours add Z-Luisito

## 2022-10-05 NOTE — PROGRESS NOTES
Virginia is a 89 year old female being evaluated via a billable phone visit, and would like to be contacted via the following  Home number on file [phone] (home)    ASSESSMENT and PLAN:  1. Viral respiratory infection  Treat for inflammation and viral.  No proof of COVID yet.  If no improvement after 48 hours consider antibiotic.  Consider recurrent episodes triggered by losartan  - predniSONE (DELTASONE) 20 MG tablet; Take 1 tablet (20 mg) by mouth every morning for 4 days  Dispense: 4 tablet; Refill: 0  - guaiFENesin (MUCINEX) 600 MG 12 hr tablet; Take 1 tablet (600 mg) by mouth 2 times daily  Dispense: 60 tablet; Refill: 2  - benzonatate (TESSALON) 200 MG capsule; Take 1 capsule (200 mg) by mouth 3 times daily as needed for cough  Dispense: 20 capsule; Refill: 1    2. Essential hypertension  Trial off losartan.  Poor control per her history.  Nocturia could certainly suggest salt and water retention and recurrent coughing episodes with virus strongly suspicious of delayed losartan reaction  - triamterene-HCTZ (MAXZIDE-25) 37.5-25 MG tablet; Take 1 tablet by mouth daily  Dispense: 90 tablet; Refill: 3       Patient Instructions   Continue to test for COVID    Discontinue losartan    Triamterene HCTZ once daily for blood pressure and nocturia    Guaifenesin 600 mg twice daily    Benzonatate as needed for cough    Prednisone 20 mg daily for 4 days    If no improvement after 48 hours add Z-Luisito            Return in about 1 year (around 10/5/2023) for using a video visit.       CHIEF COMPLAINT:  Chief Complaint   Patient presents with     Cough       HPI    HISTORY OF PRESENT ILLNESS:  Virginia is a 89 year old female contacting the clinic today via phone for complaints of cough.  She became ill on October 2 with sinus and chest congestion and a severe wracking cough.  No fever or chills.  Phlegm in her chest is tight and she is not able to get it up.  No wheezing or shortness of breath.  Profuse posterior nasal  "drainage.  Last episode 3 years ago and she requests a Z-Luisito    Discussed that an initial respiratory infection and for several days is always due to a virus in the current environment that virus is probably omicron.  She has tested negative twice thus far    Had side effect of lisinopril many years ago with coughing.  Changed to losartan.  Become significantly ill when she contracted the virus and discussed that losartan can cause a much more inflammatory intensified cough due to a viral trigger.    Bacterial infection suggested by improvement after virus then worsening or continuation of symptoms after 7 days    REVIEW OF SYSTEMS:  Poorly controlled blood pressure.  Nocturia every 2 hours.  No peripheral edema    PFSH:  Social History     Social History Narrative    She is .  Her   in  suddenly, but he also had dementia. They were  for 60 years. She has 3 children, 4 grandchildren and 1 great grandchild. 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  One daughter lives with her and she has corticobasal degeneration.       TOBACCO USE:  History   Smoking Status     Never Smoker   Smokeless Tobacco     Never Used       VITALS:  There were no vitals filed for this visit.  There were no vitals taken for this visit. Estimated body mass index is 31.76 kg/m  as calculated from the following:    Height as of 22: 1.626 m (5' 4\").    Weight as of 22: 83.9 kg (185 lb).    PHYSICAL EXAM:  (observations via Phone)  Frequent cough.  No shortness of breath.  Slightly hard of hearing    MEDICATIONS  Current Outpatient Medications   Medication Sig Dispense Refill     aspirin 81 mg chewable tablet [ASPIRIN 81 MG CHEWABLE TABLET] Chew 81 mg daily.       atorvastatin (LIPITOR) 40 MG tablet TAKE 1 TABLET(40 MG) BY MOUTH DAILY 90 tablet 2     benzonatate (TESSALON) 200 MG capsule Take 1 capsule (200 mg) by mouth 3 times " daily as needed for cough 20 capsule 1     carvedilol (COREG) 12.5 MG tablet Take 1 tablet (12.5 mg) by mouth 2 times daily (with meals) 60 tablet 3     cholecalciferol, vitamin D3, (VITAMIN D3) 1,000 unit capsule [CHOLECALCIFEROL, VITAMIN D3, (VITAMIN D3) 1,000 UNIT CAPSULE] Take 1,000 Units by mouth daily.       clobetasol (TEMOVATE) 0.05 % external ointment [CLOBETASOL (TEMOVATE) 0.05 % OINTMENT] Apply 0.5 mg daily as needed 60 g 4     cyanocobalamin (VITAMIN B-12) 500 MCG tablet [CYANOCOBALAMIN (VITAMIN B-12) 500 MCG TABLET] Take 500 mcg by mouth daily.       diclofenac sodium (VOLTAREN) 1 % Gel [DICLOFENAC SODIUM (VOLTAREN) 1 % GEL] Apply 3 times a day 100 g 11     DOCOSAHEXANOIC ACID/EPA (FISH OIL ORAL) [DOCOSAHEXANOIC ACID/EPA (FISH OIL ORAL)] Take 1,200 mg by mouth daily.       escitalopram (LEXAPRO) 10 MG tablet Take 1 tablet (10 mg) by mouth daily 90 tablet 3     guaiFENesin (MUCINEX) 600 MG 12 hr tablet Take 1 tablet (600 mg) by mouth 2 times daily 60 tablet 2     hydroxychloroquine (PLAQUENIL) 200 MG tablet TAKE 1 TABLET(200 MG) BY MOUTH DAILY 90 tablet 0     lansoprazole (PREVACID) 15 MG capsule Take 15 mg by mouth as needed       levothyroxine (SYNTHROID/LEVOTHROID) 75 MCG tablet TAKE 1 TABLET BY MOUTH DAILY 5 DAYS A WEEK, SKIP DOSE 2 DAYS PER WEEK 90 tablet 3     predniSONE (DELTASONE) 20 MG tablet Take 1 tablet (20 mg) by mouth every morning for 4 days 4 tablet 0     triamterene-HCTZ (MAXZIDE-25) 37.5-25 MG tablet Take 1 tablet by mouth daily 90 tablet 3       Notes summarized:   Labs, x-rays, cardiology, GI tests reviewed: Slight renal insufficiency but potassium normal  Recent Labs   Lab Test 06/06/22  1348 02/11/22  1046 10/22/21  1550 04/23/21  1319   HGB  --  13.2 12.1 12.1   WBC  --  8.1 12.8* 8.1    140 138 140   POTASSIUM 4.4 4.2 4.0 4.0   CR 1.44* 1.49* 1.26* 1.31*   A1C  --   --  5.8* 5.8*   TSH 2.21  --  0.94 1.85   CRP  --  0.4  --   --      No results found for: [id number]  Lab  Results   Component Value Date    CHOL 166 04/12/2019     New orders: No orders of the defined types were placed in this encounter.      Independent review of:  Supplemental history by:      Patient would like to receive their AVS by Mail a copy    Erik Baires MD  [facility]    Phone Start Time: 4:36 PM  Phone End time:  4:56 PM  Conversation plus orders: 20 minutes  Dictation time:  3 minutes    The visit lasted a total of 23 minutes

## 2022-10-24 ENCOUNTER — OFFICE VISIT (OUTPATIENT)
Dept: INTERNAL MEDICINE | Facility: CLINIC | Age: 87
End: 2022-10-24
Payer: COMMERCIAL

## 2022-10-24 VITALS
OXYGEN SATURATION: 95 % | SYSTOLIC BLOOD PRESSURE: 140 MMHG | DIASTOLIC BLOOD PRESSURE: 78 MMHG | HEART RATE: 75 BPM | RESPIRATION RATE: 16 BRPM | TEMPERATURE: 98 F

## 2022-10-24 DIAGNOSIS — N18.32 CHRONIC RENAL FAILURE, STAGE 3B (H): ICD-10-CM

## 2022-10-24 DIAGNOSIS — I10 ESSENTIAL HYPERTENSION: Primary | ICD-10-CM

## 2022-10-24 DIAGNOSIS — R73.03 PREDIABETES: ICD-10-CM

## 2022-10-24 DIAGNOSIS — Z13.220 SCREENING FOR HYPERLIPIDEMIA: ICD-10-CM

## 2022-10-24 DIAGNOSIS — E78.00 HYPERCHOLESTEROLEMIA: ICD-10-CM

## 2022-10-24 DIAGNOSIS — J20.9 ACUTE BRONCHITIS WITH SYMPTOMS > 10 DAYS: ICD-10-CM

## 2022-10-24 DIAGNOSIS — M81.0 SENILE OSTEOPOROSIS: ICD-10-CM

## 2022-10-24 LAB
ALBUMIN SERPL BCG-MCNC: 3.9 G/DL (ref 3.5–5.2)
ALP SERPL-CCNC: 89 U/L (ref 35–104)
ALT SERPL W P-5'-P-CCNC: 9 U/L (ref 10–35)
ANION GAP SERPL CALCULATED.3IONS-SCNC: 13 MMOL/L (ref 7–15)
AST SERPL W P-5'-P-CCNC: 23 U/L (ref 10–35)
BILIRUB SERPL-MCNC: 0.5 MG/DL
BUN SERPL-MCNC: 18.3 MG/DL (ref 8–23)
CALCIUM SERPL-MCNC: 9.3 MG/DL (ref 8.2–9.6)
CHLORIDE SERPL-SCNC: 103 MMOL/L (ref 98–107)
CHOLEST SERPL-MCNC: 132 MG/DL
CREAT SERPL-MCNC: 1.46 MG/DL (ref 0.51–0.95)
CREAT UR-MCNC: 113 MG/DL
DEPRECATED HCO3 PLAS-SCNC: 23 MMOL/L (ref 22–29)
GFR SERPL CREATININE-BSD FRML MDRD: 33 ML/MIN/1.73M2
GLUCOSE SERPL-MCNC: 108 MG/DL (ref 70–99)
HBA1C MFR BLD: 5.8 % (ref 0–5.6)
HDLC SERPL-MCNC: 55 MG/DL
LDLC SERPL CALC-MCNC: 58 MG/DL
MICROALBUMIN UR-MCNC: 18.4 MG/L
MICROALBUMIN/CREAT UR: 16.28 MG/G CR (ref 0–25)
NONHDLC SERPL-MCNC: 77 MG/DL
POTASSIUM SERPL-SCNC: 4.3 MMOL/L (ref 3.4–5.3)
PROT SERPL-MCNC: 6.9 G/DL (ref 6.4–8.3)
SODIUM SERPL-SCNC: 139 MMOL/L (ref 136–145)
TRIGL SERPL-MCNC: 94 MG/DL

## 2022-10-24 PROCEDURE — 99214 OFFICE O/P EST MOD 30 MIN: CPT | Performed by: INTERNAL MEDICINE

## 2022-10-24 PROCEDURE — 83036 HEMOGLOBIN GLYCOSYLATED A1C: CPT | Performed by: INTERNAL MEDICINE

## 2022-10-24 PROCEDURE — 36415 COLL VENOUS BLD VENIPUNCTURE: CPT | Performed by: INTERNAL MEDICINE

## 2022-10-24 PROCEDURE — 80061 LIPID PANEL: CPT | Performed by: INTERNAL MEDICINE

## 2022-10-24 PROCEDURE — 80053 COMPREHEN METABOLIC PANEL: CPT | Performed by: INTERNAL MEDICINE

## 2022-10-24 PROCEDURE — 82043 UR ALBUMIN QUANTITATIVE: CPT | Performed by: INTERNAL MEDICINE

## 2022-10-24 RX ORDER — CARVEDILOL 6.25 MG/1
6.25 TABLET ORAL 2 TIMES DAILY WITH MEALS
Qty: 180 TABLET | Refills: 3 | Status: SHIPPED | OUTPATIENT
Start: 2022-10-24 | End: 2023-04-28

## 2022-10-24 RX ORDER — DOXYCYCLINE HYCLATE 100 MG
100 TABLET ORAL 2 TIMES DAILY
Qty: 20 TABLET | Refills: 0 | Status: SHIPPED | OUTPATIENT
Start: 2022-10-24 | End: 2023-05-10

## 2022-10-24 RX ORDER — LOSARTAN POTASSIUM 50 MG/1
TABLET ORAL
Qty: 180 TABLET | Refills: 3
Start: 2022-10-24 | End: 2022-12-27

## 2022-10-24 NOTE — PATIENT INSTRUCTIONS
Doxycycline 100 mg twice daily for ten days for bronchitis    2.  Take carvedilol 6.25 mg twice daily rather than 12.5 mg daily    3.  Prolia in several weeks.    4.  Flu shot when over current illness.  Bivalent Covid vaccine is also advised.    5.  Call if symptoms persist after Doxycycline.    6.  See in six months or as needed.    7.  Monitor blood pressure

## 2022-10-24 NOTE — LETTER
"October 25, 2022      Ruthy Man  [address] [place] [id number]        Dear [place]:    We are writing to inform you of your test results.    Lipids are excellent with a total cholesterol 132 and an LDL fraction of 58.  I would advise continuing the atorvastatin.  Kidney function is reduced, but stable with a creatinine of 1.46.  Your hemoglobin A1c, (which gives an idea of average blood sugar over the past several months), is mildly elevated to 5.8.  This is consistent with \"prediabetes \".  This is in the same range as previous checks.  Other labs including your potassium and liver enzymes are normal.  It was nice to see you.  Let me know if symptoms persist after doxycycline.    Resulted Orders   Albumin Random Urine Quantitative with Creat Ratio   Result Value Ref Range    Albumin Urine mg/L 18.4 mg/L      Comment:      The reference ranges have not been established in urine albumin. The results should be integrated into the clinical context for interpretation.    Albumin Urine mg/g Cr 16.28 0.00 - 25.00 mg/g Cr      Comment:      Microalbuminuria is defined as an albumin:creatinine ratio of 17 to 299 for males and 25 to 299 for females. A ratio of albumin:creatinine of 300 or higher is indicative of overt proteinuria.  Due to biologic variability, positive results should be confirmed by a second, first-morning random or 24-hour timed urine specimen. If there is discrepancy, a third specimen is recommended. When 2 out of 3 results are in the microalbuminuria range, this is evidence for incipient nephropathy and warrants increased efforts at glucose control, blood pressure control, and institution of therapy with an angiotensin-converting-enzyme (ACE) inhibitor (if the patient can tolerate it).      Creatinine Urine mg/dL 113.0 mg/dL      Comment:      The reference ranges have not been established in urine creatinine. The results should be integrated into the clinical context for interpretation. "   Lipid panel reflex to direct LDL Non-fasting   Result Value Ref Range    Cholesterol 132 <200 mg/dL    Triglycerides 94 <150 mg/dL    Direct Measure HDL 55 >=50 mg/dL    LDL Cholesterol Calculated 58 <=100 mg/dL    Non HDL Cholesterol 77 <130 mg/dL    Narrative    Cholesterol  Desirable:  <200 mg/dL    Triglycerides  Normal:  Less than 150 mg/dL  Borderline High:  150-199 mg/dL  High:  200-499 mg/dL  Very High:  Greater than or equal to 500 mg/dL    Direct Measure HDL  Female:  Greater than or equal to 50 mg/dL   Male:  Greater than or equal to 40 mg/dL    LDL Cholesterol  Desirable:  <100mg/dL  Above Desirable:  100-129 mg/dL   Borderline High:  130-159 mg/dL   High:  160-189 mg/dL   Very High:  >= 190 mg/dL    Non HDL Cholesterol  Desirable:  130 mg/dL  Above Desirable:  130-159 mg/dL  Borderline High:  160-189 mg/dL  High:  190-219 mg/dL  Very High:  Greater than or equal to 220 mg/dL   Hemoglobin A1c   Result Value Ref Range    Hemoglobin A1C 5.8 (H) 0.0 - 5.6 %      Comment:      Normal <5.7%   Prediabetes 5.7-6.4%    Diabetes 6.5% or higher     Note: Adopted from ADA consensus guidelines.   Comprehensive metabolic panel   Result Value Ref Range    Sodium 139 136 - 145 mmol/L    Potassium 4.3 3.4 - 5.3 mmol/L    Chloride 103 98 - 107 mmol/L    Carbon Dioxide (CO2) 23 22 - 29 mmol/L    Anion Gap 13 7 - 15 mmol/L    Urea Nitrogen 18.3 8.0 - 23.0 mg/dL    Creatinine 1.46 (H) 0.51 - 0.95 mg/dL    Calcium 9.3 8.2 - 9.6 mg/dL    Glucose 108 (H) 70 - 99 mg/dL    Alkaline Phosphatase 89 35 - 104 U/L    AST 23 10 - 35 U/L    ALT 9 (L) 10 - 35 U/L    Protein Total 6.9 6.4 - 8.3 g/dL    Albumin 3.9 3.5 - 5.2 g/dL    Bilirubin Total 0.5 <=1.2 mg/dL    GFR Estimate 33 (L) >60 mL/min/1.73m2      Comment:      Effective December 21, 2021 eGFRcr in adults is calculated using the 2021 CKD-EPI creatinine equation which includes age and gender (Ann et al., NEJM, DOI: 10.1056/NBROgz0715889)       If you have any questions or  concerns, please call the clinic at the number listed above.       Sincerely,      Jeff Funk MD

## 2022-10-24 NOTE — PROGRESS NOTES
1. Acute bronchitis with symptoms > 10 days  [facility] she has been ill since 10/4.  She previously had a virtual visit with Dr. Baires.  She was given prednisone with no change in symptoms.  She has concerned that she has persistent bronchitis.  Management options were discussed.  Since symptoms have been prolonged, she will be given doxycycline to cover for bacterial pathogens.  She was advised that it will not be effective if symptoms are viral.  She previously requested Zithromax.  However there is a concern about drug interactions with this and Plaquenil  - doxycycline hyclate (VIBRA-TABS) 100 MG tablet; Take 1 tablet (100 mg) by mouth 2 times daily  Dispense: 20 tablet; Refill: 0    2. Chronic renal failure, stage 3 (moderate) (H)  Renal function will be reassessed    3. Prediabetes  Hemoglobin A1c will be checked  - Hemoglobin A1c; Future  - Comprehensive metabolic panel; Future  - Hemoglobin A1c  - Comprehensive metabolic panel    5. Essential hypertension  She was advised to go off losartan by Dr. Baires since he was concerned that it may have contributed to her cough.  She elected to stay on the losartan rather than switch to Maxide.  She also was taking carvedilol 12.5 mg once daily since she was concerned that she might become excessively bradycardic taking it twice daily.   she was advised that the medication does not last for 24 hours.  She will switch to 6.25 mg twice daily rather than 12.5 once daily  - carvedilol (COREG) 6.25 MG tablet; Take 1 tablet (6.25 mg) by mouth 2 times daily (with meals)  Dispense: 180 tablet; Refill: 3  - losartan (COZAAR) 50 MG tablet; Two tabs daily  Dispense: 180 tablet; Refill: 3  - Comprehensive metabolic panel; Future  - Comprehensive metabolic panel    6. Senile osteoporosis  On Prolia.  She has delayed her injection due to recent dental work.  This should be scheduled    7. Hypercholesterolemia  On atorvastatin 40 mg daily.  She wonders if it is  necessary for her to take this.  Since she has a history of CVA, continuing the medication would be advised.  She has not had lipids checked for quite some time.  This will be ordered.  - Lipid panel reflex to direct LDL Non-fasting; Future  - Lipid panel reflex to direct LDL Non-fasting    8. Chronic renal failure, stage 3b (H)  Renal function will be reassessed  - Albumin Random Urine Quantitative with Creat Ratio; Future  - Comprehensive metabolic panel; Future  - Albumin Random Urine Quantitative with Creat Ratio  - Comprehensive metabolic panel    Patient Instructions   1.  Doxycycline 100 mg twice daily for ten days for bronchitis    2.  Take carvedilol 6.25 mg twice daily rather than 12.5 mg once daily.  Continue losartan    3.  Prolia in several weeks.    4.  Flu shot when over current illness.  Bivalent Covid vaccine is also advised.    5.  Call if symptoms persist after Doxycycline.    6.  See Dr. Meneses in six months or as needed.    7.  Monitor blood pressure    Laurie Bliss is a 89 year old woman, presenting for the following health issues:  No chief complaint on file.  She complains of persisting cough.  She reports she has been ill since 10/4.  She has had 2 negative home COVID tests.  She is a non-smoker with no history of asthma.  She had a virtual visit with Dr. Baires and was treated with a short course of prednisone.  No change in symptoms were noted with this.  She also was advised to switch from losartan to Maxide due to concerns that losartan may have attributed to the cough.  She did not start the Maxide and has continued losartan.  She is also on carvedilol for hypertension.  She is taking this 12.5 mg once daily due to concerns that if she takes it twice daily it may produce excessive bradycardia.    She has a history of prediabetes with hemoglobin A1c of 5.81-year ago.  She has on atorvastatin due to hypercholesterolemia and prior CVA.  She wonders if she needs to continue it.   A lipid profile has not been checked for a number of years.    She has a history of stage IIIb chronic renal insufficiency with a GFR of 34 in June.    She is on Prolia for osteoporosis.  She delayed her last injection due to dental work      History of Present Illness       Reason for visit:  I'm sick    She eats 2-3 servings of fruits and vegetables daily.She consumes 1 sweetened beverage(s) daily.She exercises with enough effort to increase her heart rate 9 or less minutes per day.  She exercises with enough effort to increase her heart rate 3 or less days per week.   She is taking medications regularly.     Social history:  .  Lives in her own home.  Son and daughter live on the same block and visit her daily.      Review of Systems   Constitutional, HEENT, cardiovascular, pulmonary, GI, , musculoskeletal, neuro, skin, endocrine and psych systems are negative, except as otherwise noted.      Objective    Blood pressure 140/70 right arm sitting.  Temp 98, pulse 75 regular, respiratory rate 16.  O2 sat 95%  There is no height or weight on file to calculate BMI.  Physical Exam   GENERAL APPEARANCE: healthy, alert, no distress and cooperative  EYES: Eyes grossly normal to inspection and conjunctivae and sclerae normal  HENT: ear canals and TM's normal and nose and mouth without ulcers or lesions  NECK: no adenopathy, no asymmetry, masses, or scars and thyroid normal to palpation  RESP: lungs clear to auscultation - no rales, rhonchi or wheezes  CV: regular rates and rhythm, normal S1 S2, no S3 or S4 and no murmur, click or rub  LYMPHATICS: no cervical adenopathy  ABDOMEN: soft, nontender, without hepatosplenomegaly or masses and bowel sounds normal  MS: extremities normal- no gross deformities noted  SKIN: no suspicious lesions or rashes  NEURO: Normal strength and tone, mentation intact and speech normal  PSYCH: mentation appears normal and affect normal/bright    Results for orders placed or performed in  visit on 10/24/22 (from the past 24 hour(s))   Hemoglobin A1c   Result Value Ref Range    Hemoglobin A1C 5.8 (H) 0.0 - 5.6 %

## 2022-12-27 DIAGNOSIS — I10 ESSENTIAL HYPERTENSION: ICD-10-CM

## 2022-12-27 DIAGNOSIS — E03.9 ACQUIRED HYPOTHYROIDISM: ICD-10-CM

## 2022-12-27 RX ORDER — LOSARTAN POTASSIUM 50 MG/1
TABLET ORAL
Qty: 180 TABLET | Refills: 3 | Status: SHIPPED | OUTPATIENT
Start: 2022-12-27 | End: 2023-04-28

## 2022-12-27 RX ORDER — LEVOTHYROXINE SODIUM 75 UG/1
TABLET ORAL
Qty: 90 TABLET | Refills: 3 | Status: SHIPPED | OUTPATIENT
Start: 2022-12-27 | End: 2024-01-01

## 2022-12-27 NOTE — TELEPHONE ENCOUNTER
"Routing refill request to provider for review/approval because:  Labs out of range:  Blood pressure, creatinine    Last Written Prescription Date:  3/4/22  Last Fill Quantity: 180,  # refills: 1   Last office visit provider:  10/24/22     Requested Prescriptions   Pending Prescriptions Disp Refills     levothyroxine (SYNTHROID/LEVOTHROID) 75 MCG tablet [Pharmacy Med Name: LEVOTHYROXINE 0.075MG (75MCG) TABS] 90 tablet 3     Sig: TAKE 1 TABLET BY MOUTH DAILY 5 DAYS A WEEK, SKIP DOSE 2 DAYS PER WEEK       Thyroid Protocol Passed - 12/27/2022 12:22 PM        Passed - Patient is 12 years or older        Passed - Recent (12 mo) or future (30 days) visit within the authorizing provider's specialty     Patient has had an office visit with the authorizing provider or a provider within the authorizing providers department within the previous 12 mos or has a future within next 30 days. See \"Patient Info\" tab in inbasket, or \"Choose Columns\" in Meds & Orders section of the refill encounter.              Passed - Medication is active on med list        Passed - Normal TSH on file in past 12 months     Recent Labs   Lab Test 06/06/22  1348   TSH 2.21              Passed - No active pregnancy on record     If patient is pregnant or has had a positive pregnancy test, please check TSH.          Passed - No positive pregnancy test in past 12 months     If patient is pregnant or has had a positive pregnancy test, please check TSH.             losartan (COZAAR) 50 MG tablet [Pharmacy Med Name: LOSARTAN 50MG TABLETS] 180 tablet 3     Sig: TAKE 1-2 TABLETS BY MOUTH DAILY BASED ON BLOOD PRESSURE READING       Angiotensin-II Receptors Failed - 12/27/2022 12:22 PM        Failed - Last blood pressure under 140/90 in past 12 months     BP Readings from Last 3 Encounters:   10/24/22 (!) 140/78   07/11/22 (!) 162/80   06/06/22 (!) 142/80                 Failed - Normal serum creatinine on file in past 12 months     Recent Labs   Lab Test " "10/24/22  1413   CR 1.46*       Ok to refill medication if creatinine is low          Passed - Recent (12 mo) or future (30 days) visit within the authorizing provider's specialty     Patient has had an office visit with the authorizing provider or a provider within the authorizing providers department within the previous 12 mos or has a future within next 30 days. See \"Patient Info\" tab in inbasket, or \"Choose Columns\" in Meds & Orders section of the refill encounter.              Passed - Medication is active on med list        Passed - Patient is age 18 or older        Passed - No active pregnancy on record        Passed - Normal serum potassium on file in past 12 months     Recent Labs   Lab Test 10/24/22  1413   POTASSIUM 4.3                    Passed - No positive pregnancy test in past 12 months             Rose Viramontes RN 12/27/22 1:08 PM  "

## 2023-01-01 ENCOUNTER — TELEPHONE (OUTPATIENT)
Dept: RHEUMATOLOGY | Facility: CLINIC | Age: 88
End: 2023-01-01
Payer: COMMERCIAL

## 2023-01-01 ENCOUNTER — OFFICE VISIT (OUTPATIENT)
Dept: RHEUMATOLOGY | Facility: CLINIC | Age: 88
End: 2023-01-01
Payer: COMMERCIAL

## 2023-01-01 ENCOUNTER — LAB (OUTPATIENT)
Dept: LAB | Facility: CLINIC | Age: 88
End: 2023-01-01
Payer: COMMERCIAL

## 2023-01-01 VITALS
TEMPERATURE: 97.8 F | DIASTOLIC BLOOD PRESSURE: 62 MMHG | SYSTOLIC BLOOD PRESSURE: 128 MMHG | RESPIRATION RATE: 20 BRPM | OXYGEN SATURATION: 95 % | HEART RATE: 66 BPM

## 2023-01-01 DIAGNOSIS — M15.0 PRIMARY OSTEOARTHRITIS INVOLVING MULTIPLE JOINTS: Primary | ICD-10-CM

## 2023-01-01 DIAGNOSIS — N18.32 CHRONIC RENAL FAILURE, STAGE 3B (H): ICD-10-CM

## 2023-01-01 DIAGNOSIS — I63.81 RIGHT THALAMIC STROKE (H): ICD-10-CM

## 2023-01-01 DIAGNOSIS — N18.30 CHRONIC RENAL FAILURE, STAGE 3 (MODERATE) (H): Primary | ICD-10-CM

## 2023-01-01 DIAGNOSIS — F41.1 GAD (GENERALIZED ANXIETY DISORDER): ICD-10-CM

## 2023-01-01 DIAGNOSIS — M12.39 PALINDROMIC RHEUMATISM INVOLVING MULTIPLE SITES: Primary | ICD-10-CM

## 2023-01-01 DIAGNOSIS — M15.0 PRIMARY OSTEOARTHRITIS INVOLVING MULTIPLE JOINTS: ICD-10-CM

## 2023-01-01 LAB
ALBUMIN SERPL BCG-MCNC: 4 G/DL (ref 3.5–5.2)
ALT SERPL W P-5'-P-CCNC: 8 U/L (ref 0–50)
CREAT SERPL-MCNC: 1.66 MG/DL (ref 0.51–0.95)
EGFRCR SERPLBLD CKD-EPI 2021: 28 ML/MIN/1.73M2
ERYTHROCYTE [DISTWIDTH] IN BLOOD BY AUTOMATED COUNT: 14.3 % (ref 10–15)
HCT VFR BLD AUTO: 37.4 % (ref 35–47)
HGB BLD-MCNC: 12.1 G/DL (ref 11.7–15.7)
MCH RBC QN AUTO: 29.9 PG (ref 26.5–33)
MCHC RBC AUTO-ENTMCNC: 32.4 G/DL (ref 31.5–36.5)
MCV RBC AUTO: 92 FL (ref 78–100)
PLATELET # BLD AUTO: 229 10E3/UL (ref 150–450)
RBC # BLD AUTO: 4.05 10E6/UL (ref 3.8–5.2)
WBC # BLD AUTO: 6.2 10E3/UL (ref 4–11)

## 2023-01-01 PROCEDURE — 36415 COLL VENOUS BLD VENIPUNCTURE: CPT

## 2023-01-01 PROCEDURE — 84460 ALANINE AMINO (ALT) (SGPT): CPT

## 2023-01-01 PROCEDURE — 82040 ASSAY OF SERUM ALBUMIN: CPT

## 2023-01-01 PROCEDURE — 82565 ASSAY OF CREATININE: CPT

## 2023-01-01 PROCEDURE — 85027 COMPLETE CBC AUTOMATED: CPT

## 2023-01-01 PROCEDURE — 99214 OFFICE O/P EST MOD 30 MIN: CPT | Performed by: INTERNAL MEDICINE

## 2023-01-01 RX ORDER — HYDROXYCHLOROQUINE SULFATE 200 MG/1
200 TABLET, FILM COATED ORAL DAILY
Qty: 90 TABLET | Refills: 1 | Status: SHIPPED | OUTPATIENT
Start: 2023-01-01 | End: 2024-01-01

## 2023-01-01 RX ORDER — ATORVASTATIN CALCIUM 40 MG/1
TABLET, FILM COATED ORAL
Qty: 90 TABLET | Refills: 0 | Status: SHIPPED | OUTPATIENT
Start: 2023-01-01 | End: 2024-01-01

## 2023-01-01 RX ORDER — ESCITALOPRAM OXALATE 10 MG/1
10 TABLET ORAL DAILY
Qty: 90 TABLET | Refills: 3 | Status: SHIPPED | OUTPATIENT
Start: 2023-01-01 | End: 2024-01-01

## 2023-01-01 ASSESSMENT — PAIN SCALES - GENERAL: PAINLEVEL: NO PAIN (0)

## 2023-01-06 DIAGNOSIS — I63.81 RIGHT THALAMIC STROKE (H): ICD-10-CM

## 2023-01-06 RX ORDER — ATORVASTATIN CALCIUM 40 MG/1
TABLET, FILM COATED ORAL
Qty: 90 TABLET | Refills: 2 | Status: SHIPPED | OUTPATIENT
Start: 2023-01-06 | End: 2023-01-01

## 2023-01-07 NOTE — TELEPHONE ENCOUNTER
"Last Written Prescription Date:  3/4/2022  Last Fill Quantity: 90,  # refills: 2   Last office visit provider:  10/24/2022     Requested Prescriptions   Pending Prescriptions Disp Refills     atorvastatin (LIPITOR) 40 MG tablet [Pharmacy Med Name: ATORVASTATIN 40MG TABLETS] 90 tablet 2     Sig: TAKE 1 TABLET(40 MG) BY MOUTH DAILY       Statins Protocol Passed - 1/6/2023  6:05 AM        Passed - LDL on file in past 12 months     Recent Labs   Lab Test 10/24/22  1413   LDL 58             Passed - No abnormal creatine kinase in past 12 months     Recent Labs   Lab Test 05/23/19  1634   CKT 64                Passed - Recent (12 mo) or future (30 days) visit within the authorizing provider's specialty     Patient has had an office visit with the authorizing provider or a provider within the authorizing providers department within the previous 12 mos or has a future within next 30 days. See \"Patient Info\" tab in inbasket, or \"Choose Columns\" in Meds & Orders section of the refill encounter.              Passed - Medication is active on med list        Passed - Patient is age 18 or older        Passed - No active pregnancy on record        Passed - No positive pregnancy test in past 12 months             Deanna Bassett RN 01/06/23 7:51 PM  "

## 2023-02-24 ENCOUNTER — TELEPHONE (OUTPATIENT)
Dept: RHEUMATOLOGY | Facility: CLINIC | Age: 88
End: 2023-02-24
Payer: COMMERCIAL

## 2023-02-24 DIAGNOSIS — M12.39 PALINDROMIC RHEUMATISM INVOLVING MULTIPLE SITES: ICD-10-CM

## 2023-02-24 RX ORDER — HYDROXYCHLOROQUINE SULFATE 200 MG/1
TABLET, FILM COATED ORAL
Qty: 90 TABLET | Refills: 0 | OUTPATIENT
Start: 2023-02-24

## 2023-02-24 NOTE — TELEPHONE ENCOUNTER
Pt has not been seen since 4/2022- pt needs follow up first available. Labs now and most recent eye exam.

## 2023-02-27 NOTE — TELEPHONE ENCOUNTER
Contacted Pt to schedule; labs for 3/3/23 and a follow up with Dr. Rey's next available for 5/23/23.    Pt goes every 6 months for her eye exam; Pt's next upcoming eye appt is 3/21/23.  Pt goes to a [facility] in [place], last saw Dr. Bautista and was told everything looks good.

## 2023-03-03 ENCOUNTER — LAB (OUTPATIENT)
Dept: LAB | Facility: CLINIC | Age: 88
End: 2023-03-03
Payer: COMMERCIAL

## 2023-03-03 DIAGNOSIS — M12.39 PALINDROMIC RHEUMATISM INVOLVING MULTIPLE SITES: ICD-10-CM

## 2023-03-03 LAB
ALBUMIN SERPL BCG-MCNC: 3.8 G/DL (ref 3.5–5.2)
ALT SERPL W P-5'-P-CCNC: 7 U/L (ref 10–35)
CREAT SERPL-MCNC: 1.34 MG/DL (ref 0.51–0.95)
ERYTHROCYTE [DISTWIDTH] IN BLOOD BY AUTOMATED COUNT: 13.3 % (ref 10–15)
GFR SERPL CREATININE-BSD FRML MDRD: 37 ML/MIN/1.73M2
HCT VFR BLD AUTO: 38.5 % (ref 35–47)
HGB BLD-MCNC: 12.4 G/DL (ref 11.7–15.7)
MCH RBC QN AUTO: 29.3 PG (ref 26.5–33)
MCHC RBC AUTO-ENTMCNC: 32.2 G/DL (ref 31.5–36.5)
MCV RBC AUTO: 91 FL (ref 78–100)
PLATELET # BLD AUTO: 220 10E3/UL (ref 150–450)
RBC # BLD AUTO: 4.23 10E6/UL (ref 3.8–5.2)
WBC # BLD AUTO: 6.2 10E3/UL (ref 4–11)

## 2023-03-03 PROCEDURE — 85027 COMPLETE CBC AUTOMATED: CPT

## 2023-03-03 PROCEDURE — 36415 COLL VENOUS BLD VENIPUNCTURE: CPT

## 2023-03-03 PROCEDURE — 82040 ASSAY OF SERUM ALBUMIN: CPT

## 2023-03-03 PROCEDURE — 82565 ASSAY OF CREATININE: CPT

## 2023-03-03 PROCEDURE — 84460 ALANINE AMINO (ALT) (SGPT): CPT

## 2023-03-06 RX ORDER — HYDROXYCHLOROQUINE SULFATE 200 MG/1
TABLET, FILM COATED ORAL
Qty: 90 TABLET | Refills: 0 | Status: SHIPPED | OUTPATIENT
Start: 2023-03-06 | End: 2023-05-23

## 2023-04-28 ENCOUNTER — OFFICE VISIT (OUTPATIENT)
Dept: INTERNAL MEDICINE | Facility: CLINIC | Age: 88
End: 2023-04-28
Payer: COMMERCIAL

## 2023-04-28 VITALS
DIASTOLIC BLOOD PRESSURE: 95 MMHG | RESPIRATION RATE: 20 BRPM | SYSTOLIC BLOOD PRESSURE: 187 MMHG | OXYGEN SATURATION: 95 % | HEART RATE: 71 BPM | TEMPERATURE: 97.8 F

## 2023-04-28 DIAGNOSIS — R79.9 ABNORMAL FINDING OF BLOOD CHEMISTRY, UNSPECIFIED: ICD-10-CM

## 2023-04-28 DIAGNOSIS — M81.0 SENILE OSTEOPOROSIS: Primary | ICD-10-CM

## 2023-04-28 DIAGNOSIS — I10 ESSENTIAL HYPERTENSION: ICD-10-CM

## 2023-04-28 LAB — HBA1C MFR BLD: 5.9 % (ref 0–5.6)

## 2023-04-28 PROCEDURE — 99214 OFFICE O/P EST MOD 30 MIN: CPT | Performed by: INTERNAL MEDICINE

## 2023-04-28 PROCEDURE — 83036 HEMOGLOBIN GLYCOSYLATED A1C: CPT | Performed by: INTERNAL MEDICINE

## 2023-04-28 PROCEDURE — 36415 COLL VENOUS BLD VENIPUNCTURE: CPT | Performed by: INTERNAL MEDICINE

## 2023-04-28 PROCEDURE — 84443 ASSAY THYROID STIM HORMONE: CPT | Performed by: INTERNAL MEDICINE

## 2023-04-28 PROCEDURE — 80053 COMPREHEN METABOLIC PANEL: CPT | Performed by: INTERNAL MEDICINE

## 2023-04-28 RX ORDER — INFLUENZA A VIRUS A/MICHIGAN/45/2015 X-275 (H1N1) ANTIGEN (FORMALDEHYDE INACTIVATED), INFLUENZA A VIRUS A/SINGAPORE/INFIMH-16-0019/2016 IVR-186 (H3N2) ANTIGEN (FORMALDEHYDE INACTIVATED), INFLUENZA B VIRUS B/PHUKET/3073/2013 ANTIGEN (FORMALDEHYDE INACTIVATED), AND INFLUENZA B VIRUS B/MARYLAND/15/2016 BX-69A ANTIGEN (FORMALDEHYDE INACTIVATED) 60; 60; 60; 60 UG/.7ML; UG/.7ML; UG/.7ML; UG/.7ML
INJECTION, SUSPENSION INTRAMUSCULAR
COMMUNITY
Start: 2022-12-09 | End: 2023-05-10

## 2023-04-28 RX ORDER — CARVEDILOL 12.5 MG/1
12.5 TABLET ORAL 2 TIMES DAILY WITH MEALS
Qty: 180 TABLET | Refills: 3 | Status: SHIPPED | OUTPATIENT
Start: 2023-04-28 | End: 2024-01-01

## 2023-04-28 RX ORDER — LOSARTAN POTASSIUM 50 MG/1
TABLET ORAL
Qty: 180 TABLET | Refills: 3 | Status: SHIPPED | OUTPATIENT
Start: 2023-04-28 | End: 2024-01-01

## 2023-04-28 ASSESSMENT — PATIENT HEALTH QUESTIONNAIRE - PHQ9
SUM OF ALL RESPONSES TO PHQ QUESTIONS 1-9: 8
10. IF YOU CHECKED OFF ANY PROBLEMS, HOW DIFFICULT HAVE THESE PROBLEMS MADE IT FOR YOU TO DO YOUR WORK, TAKE CARE OF THINGS AT HOME, OR GET ALONG WITH OTHER PEOPLE: SOMEWHAT DIFFICULT
SUM OF ALL RESPONSES TO PHQ QUESTIONS 1-9: 8

## 2023-04-28 NOTE — PATIENT INSTRUCTIONS
Continue losartan 100gm a day ( two of the 50mg tablets )    Increase carvedilol to 12.5 twice daily ( two of the 6.25mg twice daily )    Food doesn't matter . Take your pills with ot without food

## 2023-04-28 NOTE — PROGRESS NOTES
Assessment & Plan     Essential hypertension  Her blood pressure has always been high at doctor visits but this is one of the highest readings she has.  It is most likely because she is not taking her Coreg regularly.  I will double the dose to 12.5 twice a day.  Her heart rate is not too low.  Continue losartan unchanged.  Consider adding a diuretic or amlodipine if blood pressure still does not improve.  Would keep her blood pressure target a little higher than average due to her age.  I did tell her she does not have to worry about her mealtimes and take the medication regardless  - losartan (COZAAR) 50 MG tablet  Dispense: 180 tablet; Refill: 3  - carvedilol (COREG) 12.5 MG tablet  Dispense: 180 tablet; Refill: 3  - Comprehensive metabolic panel  - TSH  - Hemoglobin A1c  - Comprehensive metabolic panel  - TSH  - Hemoglobin A1c    Senile osteoporosis  2 extensive dental work she does not want to take Prolia at this time    Abnormal finding of blood chemistry, unspecified    - Hemoglobin A1c  - Hemoglobin A1c  Otherwise she is doing well lives independently does all her own ADLs and IADLs.  No cognitive dysfunction has no evidence of heart failure, does have pre-existing kidney disease which we will will monitor today.             Close follow-up in 2 weeks.    Cassidy Grimm MD  [facility] is a 89 year old, presenting for the following health issues:  Follow Up (6 month follow-up. Experiencing dizziness or having balance problems. Would like handicapped sticker for driving.) and Recheck Medication  She had one episode of vertigo that was the room spinning around her but that is gone.      4/28/2023     3:31 PM   Additional Questions   Roomed by emil saez   Accompanied by self     Forms 4/28/2023   Any forms needing to be completed Yes         4/28/2023     3:31 PM   Patient Reported Additional Medications   Patient reports taking the following new  medications no     History of Present Illness       Reason for visit:  Follow-up    She eats 2-3 servings of fruits and vegetables daily.She consumes 1 sweetened beverage(s) daily.She exercises with enough effort to increase her heart rate 9 or less minutes per day.  She exercises with enough effort to increase her heart rate 7 days per week.   She is taking medications regularly.    Today's PHQ-9         PHQ-9 Total Score: 8    PHQ-9 Q9 Thoughts of better off dead/self-harm past 2 weeks :   Not at all    How difficult have these problems made it for you to do your work, take care of things at home, or get along with other people: Somewhat difficult     Current Outpatient Medications   Medication     aspirin 81 mg chewable tablet     atorvastatin (LIPITOR) 40 MG tablet     carvedilol (COREG) 6.25 MG tablet     cholecalciferol, vitamin D3, (VITAMIN D3) 1,000 unit capsule     clobetasol (TEMOVATE) 0.05 % external ointment     cyanocobalamin (VITAMIN B-12) 500 MCG tablet     DOCOSAHEXANOIC ACID/EPA (FISH OIL ORAL)     doxycycline hyclate (VIBRA-TABS) 100 MG tablet     escitalopram (LEXAPRO) 10 MG tablet     guaiFENesin (MUCINEX) 600 MG 12 hr tablet     hydroxychloroquine (PLAQUENIL) 200 MG tablet     lansoprazole (PREVACID) 15 MG capsule     levothyroxine (SYNTHROID/LEVOTHROID) 75 MCG tablet     losartan (COZAAR) 50 MG tablet     Current Facility-Administered Medications   Medication     denosumab (PROLIA) injection 60 mg               Review of Systems         Objective    BP (!) 187/95 (BP Location: Left arm, Patient Position: Sitting, Cuff Size: Adult Regular)   Pulse 71   Temp 97.8  F (36.6  C) (Oral)   Resp 20   LMP  (LMP Unknown)   SpO2 95%   There is no height or weight on file to calculate BMI.  Physical Exam   GENERAL: healthy, alert and no distress  NECK: no adenopathy, no asymmetry, masses, or scars and thyroid normal to palpation  RESP: lungs clear to auscultation - no rales, rhonchi or wheezes  CV:  regular rate and rhythm, normal S1 S2, no S3 or S4, no murmur, click or rub, no peripheral edema and peripheral pulses strong  MS: no gross musculoskeletal defects noted, no edema                  Doesn't want to take prolia because of dental extractions

## 2023-04-28 NOTE — LETTER
May 4, 2023      Ruthy Man  [address] [facility] [address]        Dear ,    We are writing to inform you of your test results.  The tests are very good , you still have kidney disease but it is stable.  Your thyroid test is normal your diabetic test is stable at the prediabetic range.    Resulted Orders   Comprehensive metabolic panel   Result Value Ref Range    Sodium 138 136 - 145 mmol/L    Potassium 4.1 3.4 - 5.3 mmol/L    Chloride 103 98 - 107 mmol/L    Carbon Dioxide (CO2) 23 22 - 29 mmol/L    Anion Gap 12 7 - 15 mmol/L    Urea Nitrogen 16.6 8.0 - 23.0 mg/dL    Creatinine 1.44 (H) 0.51 - 0.95 mg/dL    Calcium 9.4 8.2 - 9.6 mg/dL    Glucose 105 (H) 70 - 99 mg/dL    Alkaline Phosphatase 91 35 - 104 U/L    AST 19 10 - 35 U/L    ALT 6 (L) 10 - 35 U/L    Protein Total 7.1 6.4 - 8.3 g/dL    Albumin 4.0 3.5 - 5.2 g/dL    Bilirubin Total 0.5 <=1.2 mg/dL    GFR Estimate 34 (L) >60 mL/min/1.73m2      Comment:      eGFR calculated using 2021 CKD-EPI equation.   TSH   Result Value Ref Range    TSH 2.47 0.30 - 4.20 uIU/mL   Hemoglobin A1c   Result Value Ref Range    Hemoglobin A1C 5.9 (H) 0.0 - 5.6 %      Comment:      Normal <5.7%   Prediabetes 5.7-6.4%    Diabetes 6.5% or higher     Note: Adopted from ADA consensus guidelines.       If you have any questions or concerns, please call the clinic at the number listed above.       Sincerely,      Cassidy Grimm MD

## 2023-04-29 LAB
ALBUMIN SERPL BCG-MCNC: 4 G/DL (ref 3.5–5.2)
ALP SERPL-CCNC: 91 U/L (ref 35–104)
ALT SERPL W P-5'-P-CCNC: 6 U/L (ref 10–35)
ANION GAP SERPL CALCULATED.3IONS-SCNC: 12 MMOL/L (ref 7–15)
AST SERPL W P-5'-P-CCNC: 19 U/L (ref 10–35)
BILIRUB SERPL-MCNC: 0.5 MG/DL
BUN SERPL-MCNC: 16.6 MG/DL (ref 8–23)
CALCIUM SERPL-MCNC: 9.4 MG/DL (ref 8.2–9.6)
CHLORIDE SERPL-SCNC: 103 MMOL/L (ref 98–107)
CREAT SERPL-MCNC: 1.44 MG/DL (ref 0.51–0.95)
DEPRECATED HCO3 PLAS-SCNC: 23 MMOL/L (ref 22–29)
GFR SERPL CREATININE-BSD FRML MDRD: 34 ML/MIN/1.73M2
GLUCOSE SERPL-MCNC: 105 MG/DL (ref 70–99)
POTASSIUM SERPL-SCNC: 4.1 MMOL/L (ref 3.4–5.3)
PROT SERPL-MCNC: 7.1 G/DL (ref 6.4–8.3)
SODIUM SERPL-SCNC: 138 MMOL/L (ref 136–145)
TSH SERPL DL<=0.005 MIU/L-ACNC: 2.47 UIU/ML (ref 0.3–4.2)

## 2023-05-10 ENCOUNTER — OFFICE VISIT (OUTPATIENT)
Dept: INTERNAL MEDICINE | Facility: CLINIC | Age: 88
End: 2023-05-10
Payer: COMMERCIAL

## 2023-05-10 VITALS
HEART RATE: 56 BPM | DIASTOLIC BLOOD PRESSURE: 80 MMHG | WEIGHT: 185 LBS | BODY MASS INDEX: 31.58 KG/M2 | OXYGEN SATURATION: 93 % | HEIGHT: 64 IN | SYSTOLIC BLOOD PRESSURE: 139 MMHG | TEMPERATURE: 98.2 F

## 2023-05-10 DIAGNOSIS — R00.2 PALPITATIONS: Primary | ICD-10-CM

## 2023-05-10 DIAGNOSIS — I10 PRIMARY HYPERTENSION: ICD-10-CM

## 2023-05-10 LAB
ATRIAL RATE - MUSE: 64 BPM
BASOPHILS # BLD AUTO: 0.1 10E3/UL (ref 0–0.2)
BASOPHILS NFR BLD AUTO: 1 %
DIASTOLIC BLOOD PRESSURE - MUSE: NORMAL MMHG
EOSINOPHIL # BLD AUTO: 0.4 10E3/UL (ref 0–0.7)
EOSINOPHIL NFR BLD AUTO: 5 %
ERYTHROCYTE [DISTWIDTH] IN BLOOD BY AUTOMATED COUNT: 14.2 % (ref 10–15)
HCT VFR BLD AUTO: 38.7 % (ref 35–47)
HGB BLD-MCNC: 12.1 G/DL (ref 11.7–15.7)
IMM GRANULOCYTES # BLD: 0 10E3/UL
IMM GRANULOCYTES NFR BLD: 0 %
INTERPRETATION ECG - MUSE: NORMAL
LYMPHOCYTES # BLD AUTO: 1.1 10E3/UL (ref 0.8–5.3)
LYMPHOCYTES NFR BLD AUTO: 15 %
MCH RBC QN AUTO: 28.3 PG (ref 26.5–33)
MCHC RBC AUTO-ENTMCNC: 31.3 G/DL (ref 31.5–36.5)
MCV RBC AUTO: 91 FL (ref 78–100)
MONOCYTES # BLD AUTO: 0.5 10E3/UL (ref 0–1.3)
MONOCYTES NFR BLD AUTO: 7 %
NEUTROPHILS # BLD AUTO: 5.5 10E3/UL (ref 1.6–8.3)
NEUTROPHILS NFR BLD AUTO: 72 %
P AXIS - MUSE: -20 DEGREES
PLATELET # BLD AUTO: 284 10E3/UL (ref 150–450)
PR INTERVAL - MUSE: 152 MS
QRS DURATION - MUSE: 120 MS
QT - MUSE: 444 MS
QTC - MUSE: 458 MS
R AXIS - MUSE: -68 DEGREES
RBC # BLD AUTO: 4.27 10E6/UL (ref 3.8–5.2)
SYSTOLIC BLOOD PRESSURE - MUSE: NORMAL MMHG
T AXIS - MUSE: 11 DEGREES
VENTRICULAR RATE- MUSE: 64 BPM
WBC # BLD AUTO: 7.6 10E3/UL (ref 4–11)

## 2023-05-10 PROCEDURE — 36415 COLL VENOUS BLD VENIPUNCTURE: CPT | Performed by: INTERNAL MEDICINE

## 2023-05-10 PROCEDURE — 85025 COMPLETE CBC W/AUTO DIFF WBC: CPT | Performed by: INTERNAL MEDICINE

## 2023-05-10 PROCEDURE — 93005 ELECTROCARDIOGRAM TRACING: CPT | Performed by: INTERNAL MEDICINE

## 2023-05-10 PROCEDURE — 99214 OFFICE O/P EST MOD 30 MIN: CPT | Performed by: INTERNAL MEDICINE

## 2023-05-10 PROCEDURE — 93010 ELECTROCARDIOGRAM REPORT: CPT | Performed by: INTERNAL MEDICINE

## 2023-05-10 NOTE — PROGRESS NOTES
"  Assessment & Plan     Palpitations  EKG in the office today showed PVCs, I was technically difficult study and we could not get a clear reading.  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  Heart rate was good, no atrial fibrillation.  Currently she is still experiencing palpitation symptoms, will refer her to cardiology and have heart ultrasound done.  - CBC with platelets and differential; Future  - EKG 12-lead, tracing only  - Echocardiogram Complete; Future  - Adult Cardiology Eval [facility] Referral; Future  - CBC with platelets and differential    Primary hypertension  She is on losartan 100 mg a day and Coreg dose was increased to 12.5 mg twice a day.  Blood pressure is much better controlled, goal blood pressure for her would be less than 150.     BMI:   Estimated body mass index is 31.76 kg/m  as calculated from the following:    Height as of this encounter: 1.626 m (5' 4\").    Weight as of this encounter: 83.9 kg (185 lb).     Selma Meneses MD  [facility]   Virginia is a 89 year old, presenting for the following health issues:  Hypertension (Medication follow up )        5/10/2023     1:05 PM   Additional Questions   Roomed by Latasha ALBARADO     Virginia is a 89 year old female with  history of lichen sclerosis, status post cholecystectomy, umbilical hernia, hysterectomy, diverticulosis, cystocele, spine DJD, hypothyroidism, vitamin D deficiency, ischemic optic neuropathy, anxiety, high blood pressure, hiatal hernia and chronic renal insufficiency, right thalamic stroke in April 2019.  She is currently here for follow-up.    2 weeks ago she saw Dr. Schmitt in the office and her blood pressure was elevated in 187 systolically.  She was not taking Coreg regularly.  Carvedilol was increased to 12.5 mg twice a day, losartan 100 mg was continued and she was recommended to follow-up in the office in 2 weeks.    Today blood " "pressure is much better controlled at 139/80, heart rate is 56, however she continues to feel extra beats and palpitations.  She denies any chest pain shortness of breath lower extremity edema but occasionally does feel D&C.    She does have chronic acid reflux which is controlled with Prevacid.    She has had heart monitor in 2019 which showed no affect and short episode of nonsustained V. tach, echocardiogram was normal.      Review of Systems   As above, she lives by herself but her son's checkup on her daily.      Objective    /80 (BP Location: Left arm, Patient Position: Sitting, Cuff Size: Adult Regular)   Pulse 56   Temp 98.2  F (36.8  C) (Tympanic)   Ht 1.626 m (5' 4\")   Wt 83.9 kg (185 lb)   LMP  (LMP Unknown)   SpO2 93%   BMI 31.76 kg/m    Body mass index is 31.76 kg/m .  Physical Exam   General: well appearing female, alert and oriented x3  EYES: Eyelids, conjunctiva, and sclera were normal. Pupils were normal.   HEAD, EARS, NOSE, MOUTH, AND THROAT: no cervical LAD, no thyromegaly or nodules appreciated.  RESPIRATORY: respirations non labored, CTA bl, no wheezes, rales, no forced expiratory wheezing.  CARDIOVASCULAR: Heart rate and rhythm is somewhat irregular.  Slight systolic ejection murmur in the right sternal border, there was trace peripheral edema.   GASTROINTESTINAL: Positive bowel sounds, abdomen is soft, non tender, non distended.     MUSCULOSKELETAL: Muscle mass was normal for age. No joint synovitis or deformity.  LYMPHATIC: There were no enlarged nodes palpable.  SKIN/HAIR/NAILS: Skin color was normal.  No rashes.  NEUROLOGIC: The patient was alert and oriented.  Speech was normal.  There is no facial asymmetry.   PSYCHIATRIC:  Mood and affect were normal.  Mild anxiety noted.          "

## 2023-05-10 NOTE — PATIENT INSTRUCTIONS
Will check red cell count today    2. B/p is good today. EKG shows extra beats but no arrhythmia.    3. Due to persistent symptoms please see cardiology and have heart US done.

## 2023-05-10 NOTE — LETTER
May 10, 2023      Ruthy Man  [address] [address]        Dear ,    We are writing to inform you of your test results.    Red cell count is normal, no anemia.    Please let me know if symptoms are worsening.    Please have heart US done and follow up with cardiology.    Resulted Orders   CBC with platelets and differential   Result Value Ref Range    WBC Count 7.6 4.0 - 11.0 10e3/uL    RBC Count 4.27 3.80 - 5.20 10e6/uL    Hemoglobin 12.1 11.7 - 15.7 g/dL    Hematocrit 38.7 35.0 - 47.0 %    MCV 91 78 - 100 fL    MCH 28.3 26.5 - 33.0 pg    MCHC 31.3 (L) 31.5 - 36.5 g/dL    RDW 14.2 10.0 - 15.0 %    Platelet Count 284 150 - 450 10e3/uL    % Neutrophils 72 %    % Lymphocytes 15 %    % Monocytes 7 %    % Eosinophils 5 %    % Basophils 1 %    % Immature Granulocytes 0 %    Absolute Neutrophils 5.5 1.6 - 8.3 10e3/uL    Absolute Lymphocytes 1.1 0.8 - 5.3 10e3/uL    Absolute Monocytes 0.5 0.0 - 1.3 10e3/uL    Absolute Eosinophils 0.4 0.0 - 0.7 10e3/uL    Absolute Basophils 0.1 0.0 - 0.2 10e3/uL    Absolute Immature Granulocytes 0.0 <=0.4 10e3/uL       If you have any questions or concerns, please call the clinic at the number listed above.       Sincerely,      Selma Meneses MD

## 2023-05-13 ENCOUNTER — APPOINTMENT (OUTPATIENT)
Dept: RADIOLOGY | Facility: HOSPITAL | Age: 88
End: 2023-05-13
Attending: EMERGENCY MEDICINE
Payer: COMMERCIAL

## 2023-05-13 ENCOUNTER — HOSPITAL ENCOUNTER (EMERGENCY)
Facility: HOSPITAL | Age: 88
Discharge: HOME | End: 2023-05-13
Attending: EMERGENCY MEDICINE | Admitting: EMERGENCY MEDICINE
Payer: COMMERCIAL

## 2023-05-13 VITALS
TEMPERATURE: 98.7 F | BODY MASS INDEX: 31.07 KG/M2 | HEIGHT: 64 IN | DIASTOLIC BLOOD PRESSURE: 81 MMHG | WEIGHT: 182 LBS | HEART RATE: 62 BPM | SYSTOLIC BLOOD PRESSURE: 189 MMHG | RESPIRATION RATE: 26 BRPM | OXYGEN SATURATION: 92 %

## 2023-05-13 DIAGNOSIS — R00.2 PALPITATIONS: ICD-10-CM

## 2023-05-13 LAB
ANION GAP SERPL CALCULATED.3IONS-SCNC: 11 MMOL/L (ref 7–15)
BASOPHILS # BLD AUTO: 0.1 10E3/UL (ref 0–0.2)
BASOPHILS NFR BLD AUTO: 1 %
BUN SERPL-MCNC: 23.6 MG/DL (ref 8–23)
CALCIUM SERPL-MCNC: 8.6 MG/DL (ref 8.2–9.6)
CHLORIDE SERPL-SCNC: 104 MMOL/L (ref 98–107)
CREAT SERPL-MCNC: 1.69 MG/DL (ref 0.51–0.95)
DEPRECATED HCO3 PLAS-SCNC: 24 MMOL/L (ref 22–29)
EOSINOPHIL # BLD AUTO: 0.4 10E3/UL (ref 0–0.7)
EOSINOPHIL NFR BLD AUTO: 5 %
ERYTHROCYTE [DISTWIDTH] IN BLOOD BY AUTOMATED COUNT: 14.3 % (ref 10–15)
GFR SERPL CREATININE-BSD FRML MDRD: 28 ML/MIN/1.73M2
GLUCOSE SERPL-MCNC: 109 MG/DL (ref 70–99)
HCT VFR BLD AUTO: 37.4 % (ref 35–47)
HGB BLD-MCNC: 12.1 G/DL (ref 11.7–15.7)
HOLD SPECIMEN: NORMAL
IMM GRANULOCYTES # BLD: 0 10E3/UL
IMM GRANULOCYTES NFR BLD: 1 %
LYMPHOCYTES # BLD AUTO: 1.3 10E3/UL (ref 0.8–5.3)
LYMPHOCYTES NFR BLD AUTO: 19 %
MCH RBC QN AUTO: 28.9 PG (ref 26.5–33)
MCHC RBC AUTO-ENTMCNC: 32.4 G/DL (ref 31.5–36.5)
MCV RBC AUTO: 89 FL (ref 78–100)
MONOCYTES # BLD AUTO: 0.5 10E3/UL (ref 0–1.3)
MONOCYTES NFR BLD AUTO: 8 %
NEUTROPHILS # BLD AUTO: 4.4 10E3/UL (ref 1.6–8.3)
NEUTROPHILS NFR BLD AUTO: 66 %
NRBC # BLD AUTO: 0 10E3/UL
NRBC BLD AUTO-RTO: 0 /100
PLATELET # BLD AUTO: 231 10E3/UL (ref 150–450)
POTASSIUM SERPL-SCNC: 4.1 MMOL/L (ref 3.4–5.3)
RBC # BLD AUTO: 4.19 10E6/UL (ref 3.8–5.2)
SODIUM SERPL-SCNC: 139 MMOL/L (ref 136–145)
TROPONIN T SERPL HS-MCNC: 19 NG/L
TROPONIN T SERPL HS-MCNC: 20 NG/L
TSH SERPL DL<=0.005 MIU/L-ACNC: 2.57 UIU/ML (ref 0.3–4.2)
WBC # BLD AUTO: 6.7 10E3/UL (ref 4–11)

## 2023-05-13 PROCEDURE — 93005 ELECTROCARDIOGRAM TRACING: CPT | Performed by: STUDENT IN AN ORGANIZED HEALTH CARE EDUCATION/TRAINING PROGRAM

## 2023-05-13 PROCEDURE — 71045 X-RAY EXAM CHEST 1 VIEW: CPT

## 2023-05-13 PROCEDURE — 36415 COLL VENOUS BLD VENIPUNCTURE: CPT | Performed by: EMERGENCY MEDICINE

## 2023-05-13 PROCEDURE — 250N000013 HC RX MED GY IP 250 OP 250 PS 637: Performed by: EMERGENCY MEDICINE

## 2023-05-13 PROCEDURE — 84484 ASSAY OF TROPONIN QUANT: CPT | Performed by: STUDENT IN AN ORGANIZED HEALTH CARE EDUCATION/TRAINING PROGRAM

## 2023-05-13 PROCEDURE — 84484 ASSAY OF TROPONIN QUANT: CPT | Performed by: EMERGENCY MEDICINE

## 2023-05-13 PROCEDURE — 84443 ASSAY THYROID STIM HORMONE: CPT | Performed by: EMERGENCY MEDICINE

## 2023-05-13 PROCEDURE — 85025 COMPLETE CBC W/AUTO DIFF WBC: CPT | Performed by: STUDENT IN AN ORGANIZED HEALTH CARE EDUCATION/TRAINING PROGRAM

## 2023-05-13 PROCEDURE — 99285 EMERGENCY DEPT VISIT HI MDM: CPT | Mod: 25

## 2023-05-13 PROCEDURE — 36415 COLL VENOUS BLD VENIPUNCTURE: CPT | Performed by: STUDENT IN AN ORGANIZED HEALTH CARE EDUCATION/TRAINING PROGRAM

## 2023-05-13 PROCEDURE — 84484 ASSAY OF TROPONIN QUANT: CPT | Mod: 91 | Performed by: EMERGENCY MEDICINE

## 2023-05-13 PROCEDURE — 80048 BASIC METABOLIC PNL TOTAL CA: CPT | Performed by: STUDENT IN AN ORGANIZED HEALTH CARE EDUCATION/TRAINING PROGRAM

## 2023-05-13 RX ORDER — CARVEDILOL 12.5 MG/1
12.5 TABLET ORAL ONCE
Status: COMPLETED | OUTPATIENT
Start: 2023-05-13 | End: 2023-05-13

## 2023-05-13 RX ADMIN — CARVEDILOL 12.5 MG: 12.5 TABLET, FILM COATED ORAL at 21:44

## 2023-05-13 ASSESSMENT — ACTIVITIES OF DAILY LIVING (ADL): ADLS_ACUITY_SCORE: 35

## 2023-05-14 NOTE — ED TRIAGE NOTES
"Patient arrives from home.   States she has felt \"extra\" heart beats for the past few days but today they \"feel heavier\".     Denies lightheadedness/dizziness. No other abnormal symptoms.       "

## 2023-05-14 NOTE — ED PROVIDER NOTES
EMERGENCY DEPARTMENT ENCOUNTER      NAME: Ruthy Man  AGE: 89 year old female  YOB: 1930  MRN: [id number]  EVALUATION DATE & TIME: 2023  7:09 PM    PCP: Selma Meneses    ED PROVIDER: Sahheen Chavez D.O.      Chief Complaint   Patient presents with     Irregular Heart Beat       FINAL IMPRESSION:  1. Palpitations        ED COURSE & MEDICAL DECISION MAKIN:19 PM I met with the patient to gather history and to perform my initial exam. I discussed the plan for care while in the Emergency Department.  9:43 PM I rechecked and updated the patient   10:05 PM patient care turned over to Dr. Frazier       Pertinent Labs & Imaging studies reviewed. (See chart for details)  89 year old female presents to the Emergency Department for evaluation of palpitations.  Patient has had longstanding palpitations for many years, but they have been worse over the past couple of days.  She has no significant chest pain or shortness of breath with them, nor does she get lightheaded.  EKG did not show any evidence of ischemia, troponin is not completely 0 therefore second troponin will be performed however doubt ACS at this time.  There is no clinical concern for PE, dissection, pneumothorax, or infectious process.  EKG does show occasional PACs, but otherwise no evidence of malignant arrhythmia.  A second troponin is negative I believe this patient will be safe for discharge home and she already has follow-up scheduled with cardiology for Friday of this week.  Patient returned over pending results of second troponin.    Medical Decision Making    History:    Supplemental history from: Documented in chart, if applicable    External Record(s) reviewed: Documented in chart, if applicable.    Work Up:    Chart documentation includes differential considered and any EKGs or imaging independently interpreted by provider, where specified.    In additional to work up documented, I considered the following work up:  Documented in chart, if applicable.    External consultation:    Discussion of management with another provider: Documented in chart, if applicable    Complicating factors:    Care impacted by chronic illness: Hypertension    Care affected by social determinants of health: N/A    Disposition considerations: Admission considered. Patient was signed out to the oncoming physician, disposition pending.        At the conclusion of the encounter I discussed the results of all of the tests and the disposition. The questions were answered. The patient or family acknowledged understanding and was agreeable with the care plan.        HPI    Patient information was obtained from: patient     Use of : N/A         Ruthy Man is a 89 year old female who presents for evaluation of palpitations.     Patient reports having palpitations for over the last 30 year, however, the last week they have became more frequent and noticeable. The patient denies any lightheadedness, fever, chest pain, shortness of breath, vomiting, or nausea. No other complaints at this time. The patient has a Cardiology follow up on Friday (5/19).     Per Chart Review: patient was seen in clinic on 5/10/23 for evaluation of similar symptoms. 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 Coreg does was increased to 12.5mg BID for hypertension management. Patient left with cardiology referral.       REVIEW OF SYSTEMS  Constitutional:  Denies fever, chills, weight loss or weakness  Eyes:  No pain, discharge, redness  HENT:  Denies sore throat, ear pain, congestion  Respiratory: No SOB, wheeze or cough  Cardiovascular:  No CP, Positive for palpitations  GI:  Denies abdominal pain, nausea, vomiting, diarrhea  : Denies dysuria, hematuria  Musculoskeletal:  Denies any new muscle/joint pain, swelling or loss of function.  Skin:  Denies rash, pallor  Neurologic:  Denies headache, focal  weakness or sensory changes  Lymph: Denies swollen nodes    All other systems negative unless noted in HPI.    PAST MEDICAL HISTORY:  Past Medical History:   Diagnosis Date     Hypertension      Senile osteoporosis 2019       PAST SURGICAL HISTORY:  Past Surgical History:   Procedure Laterality Date     HC REMOVAL GALLBLADDER      Description: Cholecystectomy;  Recorded: 2008;  Comments: stone     ZZC TOTAL ABDOM HYSTERECTOMY      Description: Hysterectomy;  Recorded: 2008;  Comments: benign disease         CURRENT MEDICATIONS:    Current Facility-Administered Medications   Medication     denosumab (PROLIA) injection 60 mg     Current Outpatient Medications   Medication     aspirin 81 mg chewable tablet     atorvastatin (LIPITOR) 40 MG tablet     carvedilol (COREG) 12.5 MG tablet     cholecalciferol, vitamin D3, (VITAMIN D3) 1,000 unit capsule     clobetasol (TEMOVATE) 0.05 % external ointment     cyanocobalamin (VITAMIN B-12) 500 MCG tablet     DOCOSAHEXANOIC ACID/EPA (FISH OIL ORAL)     escitalopram (LEXAPRO) 10 MG tablet     hydroxychloroquine (PLAQUENIL) 200 MG tablet     lansoprazole (PREVACID) 15 MG capsule     levothyroxine (SYNTHROID/LEVOTHROID) 75 MCG tablet     losartan (COZAAR) 50 MG tablet         ALLERGIES:  Allergies   Allergen Reactions     Ace Inhibitors Cough     Amlodipine Unknown     Edema       Clarithromycin Nausea     Diltiazem Hcl [Diltiazem] Itching     Doxazosin Nausea     Penicillins Rash     Edema       Tetanus Toxoid Other (See Comments)     Local allergic reaction       FAMILY HISTORY:  Family History   Problem Relation Age of Onset     ALS Mother          age 75     Pulmonary Embolism Father          age 79     Other - See Comments Brother          age birth     Other - See Comments Daughter         has corticobasal syndrome       SOCIAL HISTORY:  Social History     Socioeconomic History     Marital status:    Tobacco Use     Smoking status: Never      "Smokeless tobacco: Never   Vaping Use     Vaping status: Never Used   Substance and Sexual Activity     Alcohol use: Never     Drug use: Never   Social History Narrative    She is .  Her   in  suddenly, but he also had dementia. They were  for 60 years. She has 3 children, 4 grandchildren and 1 great grandchild. 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  One daughter lives with her and she has corticobasal degeneration.       VITALS:  Patient Vitals for the past 24 hrs:   BP Temp Temp src Pulse Resp SpO2 Height Weight   23 2130 (!) 201/92 -- -- 59 23 93 % -- --   23 2100 (!) 192/87 -- -- 59 21 94 % -- --   23 (!) 193/81 -- -- 61 23 94 % -- --   23 194 (!) 191/84 -- -- 63 20 92 % -- --   23 193 (!) 176/79 -- -- 65 23 96 % -- --   23 (!) 181/77 -- -- 75 28 96 % -- --   23 (!) 191/85 98.7  F (37.1  C) Oral 72 24 96 % -- --   23 -- -- -- -- -- -- 1.626 m (5' 4\") 82.6 kg (182 lb)       PHYSICAL EXAM    VITAL SIGNS: BP (!) 201/92   Pulse 59   Temp 98.7  F (37.1  C) (Oral)   Resp 23   Ht 1.626 m (5' 4\")   Wt 82.6 kg (182 lb)   LMP  (LMP Unknown)   SpO2 93%   BMI 31.24 kg/m      General Appearance: Well-appearing, well-nourished, no acute distress   Head:  Normocephalic, without obvious abnormality, atraumatic  Eyes:  PERRL, conjunctiva/corneas clear, EOM's intact,  ENT:  Lips, mucosa, and tongue normal, membranes are moist without pallor  Neck:  Normal ROM, symmetrical, trachea midline    Cardio:  Occasional irregular heart beat, no murmur, rub or gallop, 2+ pulses symmetric in all extremities  Pulm:  Clear to auscultation bilaterally, respirations unlabored,  Musculoskeletal: Full ROM, no edema, no cyanosis, good ROM of major joints  Integument:  Warm, Dry, No erythema, No rash.    Neurologic:  Alert & oriented.  No focal deficits " appreciated.  Ambulatory.  Psychiatric:  Affect normal, Judgment normal, Mood normal.      LABS  Results for orders placed or performed during the hospital encounter of 05/13/23 (from the past 24 hour(s))   CBC with Platelets & Differential    Narrative    The following orders were created for panel order CBC with Platelets & Differential.  Procedure                               Abnormality         Status                     ---------                               -----------         ------                     CBC with platelets and d...[[id number]]                      Final result                 Please view results for these tests on the individual orders.   Basic metabolic panel   Result Value Ref Range    Sodium 139 136 - 145 mmol/L    Potassium 4.1 3.4 - 5.3 mmol/L    Chloride 104 98 - 107 mmol/L    Carbon Dioxide (CO2) 24 22 - 29 mmol/L    Anion Gap 11 7 - 15 mmol/L    Urea Nitrogen 23.6 (H) 8.0 - 23.0 mg/dL    Creatinine 1.69 (H) 0.51 - 0.95 mg/dL    Calcium 8.6 8.2 - 9.6 mg/dL    Glucose 109 (H) 70 - 99 mg/dL    GFR Estimate 28 (L) >60 mL/min/1.73m2   Troponin T, High Sensitivity   Result Value Ref Range    Troponin T, High Sensitivity 20 (H) <=14 ng/L   [place] Draw    Narrative    The following orders were created for panel order [place] Draw.  Procedure                               Abnormality         Status                     ---------                               -----------         ------                     Extra Blue Top Tube[[id number]]                              Final result                 Please view results for these tests on the individual orders.   TSH with free T4 reflex   Result Value Ref Range    TSH 2.57 0.30 - 4.20 uIU/mL   CBC with platelets and differential   Result Value Ref Range    WBC Count 6.7 4.0 - 11.0 10e3/uL    RBC Count 4.19 3.80 - 5.20 10e6/uL    Hemoglobin 12.1 11.7 - 15.7 g/dL    Hematocrit 37.4 35.0 - 47.0 %    MCV 89 78 - 100 fL    MCH 28.9 26.5 - 33.0 pg    MCHC  32.4 31.5 - 36.5 g/dL    RDW 14.3 10.0 - 15.0 %    Platelet Count 231 150 - 450 10e3/uL    % Neutrophils 66 %    % Lymphocytes 19 %    % Monocytes 8 %    % Eosinophils 5 %    % Basophils 1 %    % Immature Granulocytes 1 %    NRBCs per 100 WBC 0 <1 /100    Absolute Neutrophils 4.4 1.6 - 8.3 10e3/uL    Absolute Lymphocytes 1.3 0.8 - 5.3 10e3/uL    Absolute Monocytes 0.5 0.0 - 1.3 10e3/uL    Absolute Eosinophils 0.4 0.0 - 0.7 10e3/uL    Absolute Basophils 0.1 0.0 - 0.2 10e3/uL    Absolute Immature Granulocytes 0.0 <=0.4 10e3/uL    Absolute NRBCs 0.0 10e3/uL   Extra Blue Top Tube   Result Value Ref Range    Hold Specimen JIC    XR Chest Port 1 View    Narrative    EXAM: XR CHEST PORT 1 VIEW  LOCATION: [facility]  DATE/TIME: 5/13/2023 8:27 PM CDT    INDICATION: palpitations  COMPARISON: 04/11/2019      Impression    IMPRESSION: Calcified aortic atherosclerosis. Mild degenerative change thoracic spine. Chest otherwise negative. No change from prior.         RADIOLOGY  XR Chest Port 1 View   Final Result   IMPRESSION: Calcified aortic atherosclerosis. Mild degenerative change thoracic spine. Chest otherwise negative. No change from prior.             EKG:    Rate: 70bpm  Rhythm: Sinus rhythm with occasional PACs  Axis: Normal  Interval: Normal  Conduction: Normal  QRS: Normal  ST: Normal  T-wave: Normal  QT: Not prolonged  Comparison EKG: No significant change compared to 10 May 2023  Impression:  No acute ischemic change   I have independently reviewed and interpreted today's EKG, pending Cardiologist read    PROCEDURES:  None         MEDICATIONS GIVEN IN THE EMERGENCY:  Medications   carvedilol (COREG) tablet 12.5 mg (12.5 mg Oral $Given 5/13/23 2558)       NEW PRESCRIPTIONS STARTED AT TODAY'S ER VISIT  New Prescriptions    No medications on file        MARY, Lela King, am serving as a scribe to document services personally performed by Shaheen Chavez D.O., based on my observations and the  provider's statements to me.  I, Shaheen Chavez D.O., attest that Lela Himsl is acting in a scribe capacity, has observed my performance of the services and has documented them in accordance with my direction.     Shaheen Chavez D.O.  Emergency Medicine  [facility] EMERGENCY DEPARTMENT  [address] [place] [id number]  [phone]  Dept: [phone]     Shaheen Chavez,   05/14/23 0726

## 2023-05-14 NOTE — DISCHARGE INSTRUCTIONS
Follow-up with the cardiologist as scheduled on Friday.    Take your medications as prescribed.    Return to the ER for any new or worsening concerns.  
DISPLAY PLAN FREE TEXT

## 2023-05-14 NOTE — ED NOTES
EMERGENCY DEPARTMENT SIGN OUT NOTE        ED COURSE AND MEDICAL DECISION MAKING  Patient was signed out to me by Dr Shaheen Chavez at 10:00 PM.    In brief, Ruthy Man is a 89 year old female who initially presented for evaluation of palpitations. The patient reported a 30 year history of palpitations that have become more frequent and noticeable over the past week. No associated lightheadedness, fever, chest pain, shortness of breath, nausea, or vomiting. Patient has an appointment with cardiology on 5/19/23.     At time of sign out, disposition was pending repeat troponin with plan for discharge unless this is increasing.  Please see prior physician's note for full history and physical exam and prior emergency department course.  Initial troponin was 20 and on repeat it is 19.  She has had no chest pain or shortness of breath.  She is asymptomatic at this time.  She had not taken her blood pressure medicine yet today so was given here.  Recheck of blood pressure is down to 189/81.  Remainder of her laboratory studies are largely unremarkable.  Creatinine is at its baseline at 1.69.  Discussed plan for outpatient follow-up with cardiology as scheduled on the 19th and patient is in agreement with this plan.  She was given indications for return emergency department.  She voiced understanding and was comfortable with this plan.  She was discharged home in stable condition.    10:21 PM Rechecked and updated the patient. We discussed the plan for discharge and the patient is agreeable. Reviewed supportive cares, symptomatic treatment, outpatient follow up, and reasons to return to the Emergency Department. Patient to be discharged by ED RN.     FINAL IMPRESSION    1. Palpitations        ED MEDS  Medications   carvedilol (COREG) tablet 12.5 mg (12.5 mg Oral $Given 5/13/23 9897)       LAB  Labs Ordered and Resulted from Time of ED Arrival to Time of ED Departure   BASIC METABOLIC PANEL - Abnormal       Result Value     Sodium 139      Potassium 4.1      Chloride 104      Carbon Dioxide (CO2) 24      Anion Gap 11      Urea Nitrogen 23.6 (*)     Creatinine 1.69 (*)     Calcium 8.6      Glucose 109 (*)     GFR Estimate 28 (*)    TROPONIN T, HIGH SENSITIVITY - Abnormal    Troponin T, High Sensitivity 20 (*)    TROPONIN T, HIGH SENSITIVITY - Abnormal    Troponin T, High Sensitivity 19 (*)    TSH WITH FREE T4 REFLEX - Normal    TSH 2.57     CBC WITH PLATELETS AND DIFFERENTIAL    WBC Count 6.7      RBC Count 4.19      Hemoglobin 12.1      Hematocrit 37.4      MCV 89      MCH 28.9      MCHC 32.4      RDW 14.3      Platelet Count 231      % Neutrophils 66      % Lymphocytes 19      % Monocytes 8      % Eosinophils 5      % Basophils 1      % Immature Granulocytes 1      NRBCs per 100 WBC 0      Absolute Neutrophils 4.4      Absolute Lymphocytes 1.3      Absolute Monocytes 0.5      Absolute Eosinophils 0.4      Absolute Basophils 0.1      Absolute Immature Granulocytes 0.0      Absolute NRBCs 0.0           RADIOLOGY    XR Chest Port 1 View   Final Result   IMPRESSION: Calcified aortic atherosclerosis. Mild degenerative change thoracic spine. Chest otherwise negative. No change from prior.          DISCHARGE MEDS  New Prescriptions    No medications on file         I, Vince Romeo, am serving as a scribe to document services personally performed by Makenna Frazier MD based on my observations and the provider's statements to me.  I, Makenna Frazier MD, attest that Vince Romeo is acting in a scribe capacity, has observed my performance of the services and has documented them in accordance with my direction.      Makenna Frazier MD  [facility] EMERGENCY DEPARTMENT  [address] [place] [id number] [phone]     Makenna Frazier MD  05/14/23 2213

## 2023-05-18 LAB
ATRIAL RATE - MUSE: 70 BPM
DIASTOLIC BLOOD PRESSURE - MUSE: 77 MMHG
INTERPRETATION ECG - MUSE: NORMAL
P AXIS - MUSE: 49 DEGREES
PR INTERVAL - MUSE: 150 MS
QRS DURATION - MUSE: 122 MS
QT - MUSE: 444 MS
QTC - MUSE: 479 MS
R AXIS - MUSE: -69 DEGREES
SYSTOLIC BLOOD PRESSURE - MUSE: 181 MMHG
T AXIS - MUSE: 55 DEGREES
VENTRICULAR RATE- MUSE: 70 BPM

## 2023-05-19 ENCOUNTER — OFFICE VISIT (OUTPATIENT)
Dept: CARDIOLOGY | Facility: CLINIC | Age: 88
End: 2023-05-19
Attending: INTERNAL MEDICINE
Payer: COMMERCIAL

## 2023-05-19 VITALS
RESPIRATION RATE: 14 BRPM | HEIGHT: 64 IN | BODY MASS INDEX: 31.24 KG/M2 | HEART RATE: 65 BPM | SYSTOLIC BLOOD PRESSURE: 120 MMHG | DIASTOLIC BLOOD PRESSURE: 62 MMHG

## 2023-05-19 DIAGNOSIS — R00.2 PALPITATIONS: ICD-10-CM

## 2023-05-19 PROCEDURE — 99214 OFFICE O/P EST MOD 30 MIN: CPT | Performed by: INTERNAL MEDICINE

## 2023-05-19 NOTE — LETTER
"5/19/2023    Selma Meneses MD  [address] [facility] [address]    RE: Ruthy Man       Dear Colleague,     I had the pleasure of seeing Ruthy Man in the [facility].      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  I have seen her in the past for somewhat similar complaints of heart palpitations.  She has longstanding history of PVCs dating back about 30 years.  She has been reluctant to take any medication for it.  Few weeks ago she started having more frequent episodes of heart palpitations.  She describes them as a forceful beating of the heart.  She did not have racing or syncope.  She went to the emergency room.  She had negative work-up including normal troponin and EKG.  She feels better now.  She has been taking increased dose of carvedilol.  Her blood pressures improve after medication adjustment.    Review of Systems:   Negative other than history of present illness    Objective:   /62 (BP Location: Right arm, Patient Position: Sitting, Cuff Size: Adult Large)   Pulse 65   Resp 14   Ht 1.626 m (5' 4\")   LMP  (LMP Unknown)   BMI 31.24 kg/m    Physical Exam:  GENERAL: no distress  NECK: No JVD  LUNGS: Clear to auscultation.  CARDIAC: regular rhythm, S1 & S2 normal.  No heaves, thrills, gallops or murmurs.  ABDOMEN: flat, negative hepatosplenomegaly, soft and non-tender.  EXTREMITIES: No evidence of cyanosis, clubbing or edema.    Current Outpatient Medications   Medication Sig Dispense Refill    aspirin 81 mg chewable tablet [ASPIRIN 81 MG CHEWABLE TABLET] Chew 81 mg daily.      atorvastatin (LIPITOR) 40 MG tablet " TAKE 1 TABLET(40 MG) BY MOUTH DAILY 90 tablet 2    carvedilol (COREG) 12.5 MG tablet Take 1 tablet (12.5 mg) by mouth 2 times daily (with meals) 180 tablet 3    cholecalciferol, vitamin D3, (VITAMIN D3) 1,000 unit capsule [CHOLECALCIFEROL, VITAMIN D3, (VITAMIN D3) 1,000 UNIT CAPSULE] Take 1,000 Units by mouth daily.      clobetasol (TEMOVATE) 0.05 % external ointment [CLOBETASOL (TEMOVATE) 0.05 % OINTMENT] Apply 0.5 mg daily as needed 60 g 4    cyanocobalamin (VITAMIN B-12) 500 MCG tablet [CYANOCOBALAMIN (VITAMIN B-12) 500 MCG TABLET] Take 500 mcg by mouth daily.      DOCOSAHEXANOIC ACID/EPA (FISH OIL ORAL) [DOCOSAHEXANOIC ACID/EPA (FISH OIL ORAL)] Take 1,200 mg by mouth daily.      escitalopram (LEXAPRO) 10 MG tablet Take 1 tablet (10 mg) by mouth daily 90 tablet 3    hydroxychloroquine (PLAQUENIL) 200 MG tablet TAKE 1 TABLET(200 MG) BY MOUTH DAILY 90 tablet 0    lansoprazole (PREVACID) 15 MG capsule Take 15 mg by mouth as needed      levothyroxine (SYNTHROID/LEVOTHROID) 75 MCG tablet TAKE 1 TABLET BY MOUTH DAILY 5 DAYS A WEEK, SKIP DOSE 2 DAYS PER WEEK 90 tablet 3    losartan (COZAAR) 50 MG tablet 2 tablet a day 180 tablet 3       Cardiographics:    ECG: Read by me 5/13/2023 normal sinus rhythm 1 PVC otherwise no acute ischemic changes    Echocardiogram: 2019  Normal left ventricular size and systolic function.  Left ventricle ejection fraction is normal. The estimated left ventricular ejection fraction is 60%.  Normal right ventricular size and systolic function.  No hemodynamically significant valvular heart abnormalities.     Event monitor: 2019  No symptomatic episodes were reported and no atrial fibrillation  detected.  Patient has an underlying IVCD present throughout the recording period  with occasional atrial premature beats commonly noted.  Two short runs of  nonsustained VT were noted on auto transmissions        Lab Results    Chemistry/lipid CBC Cardiac Enzymes/BNP/TSH/INR   Recent Labs   Lab Test  10/24/22  1413   CHOL 132   HDL 55   LDL 58   TRIG 94     Recent Labs   Lab Test 10/24/22  1413 10/16/19  1606 04/12/19  0509   LDL 58 61 102     Recent Labs   Lab Test 05/13/23 1941      POTASSIUM 4.1   CHLORIDE 104   CO2 24   *   BUN 23.6*   CR 1.69*   GFRESTIMATED 28*   SUE 8.6     Recent Labs   Lab Test 05/13/23  1941 04/28/23  1610 03/03/23  1235   CR 1.69* 1.44* 1.34*     Recent Labs   Lab Test 04/28/23  1610 10/24/22  1413 10/22/21  1550   A1C 5.9* 5.8* 5.8*          Recent Labs   Lab Test 05/13/23 1941   WBC 6.7   HGB 12.1   HCT 37.4   MCV 89        Recent Labs   Lab Test 05/13/23  1941 05/10/23  1403 03/03/23  1235   HGB 12.1 12.1 12.4    Recent Labs   Lab Test 04/11/19  1750   TROPONINI <0.01     No results for input(s): BNP, NTBNPI, NTBNP in the last 70708 hours.  Recent Labs   Lab Test 05/13/23 1941   TSH 2.57     No results for input(s): INR in the last 88402 hours.                     Thank you for allowing me to participate in the care of your patient.      Sincerely,     Lul Lopez MD     [facility]  cc:   Selma Meneses MD  [address] [place] [address]

## 2023-05-19 NOTE — PROGRESS NOTES
"    Cardiology Progress Note     Assessment:  Heart palpitations due to PVCs, improved with higher dose of carvedilol  Hypertension, good control on carvedilol    Plan:  Echo is pending, consider further evaluations if LV systolic dysfunction detected  Otherwise we will continue carvedilol at current dose    Subjective:   This is 89 year old female who comes in today for follow-up visit.  I have seen her in the past for somewhat similar complaints of heart palpitations.  She has longstanding history of PVCs dating back about 30 years.  She has been reluctant to take any medication for it.  Few weeks ago she started having more frequent episodes of heart palpitations.  She describes them as a forceful beating of the heart.  She did not have racing or syncope.  She went to the emergency room.  She had negative work-up including normal troponin and EKG.  She feels better now.  She has been taking increased dose of carvedilol.  Her blood pressures improve after medication adjustment.    Review of Systems:   Negative other than history of present illness    Objective:   /62 (BP Location: Right arm, Patient Position: Sitting, Cuff Size: Adult Large)   Pulse 65   Resp 14   Ht 1.626 m (5' 4\")   LMP  (LMP Unknown)   BMI 31.24 kg/m    Physical Exam:  GENERAL: no distress  NECK: No JVD  LUNGS: Clear to auscultation.  CARDIAC: regular rhythm, S1 & S2 normal.  No heaves, thrills, gallops or murmurs.  ABDOMEN: flat, negative hepatosplenomegaly, soft and non-tender.  EXTREMITIES: No evidence of cyanosis, clubbing or edema.    Current Outpatient Medications   Medication Sig Dispense Refill     aspirin 81 mg chewable tablet [ASPIRIN 81 MG CHEWABLE TABLET] Chew 81 mg daily.       atorvastatin (LIPITOR) 40 MG tablet TAKE 1 TABLET(40 MG) BY MOUTH DAILY 90 tablet 2     carvedilol (COREG) 12.5 MG tablet Take 1 tablet (12.5 mg) by mouth 2 times daily (with meals) 180 tablet 3     cholecalciferol, vitamin D3, (VITAMIN D3) 1,000 " unit capsule [CHOLECALCIFEROL, VITAMIN D3, (VITAMIN D3) 1,000 UNIT CAPSULE] Take 1,000 Units by mouth daily.       clobetasol (TEMOVATE) 0.05 % external ointment [CLOBETASOL (TEMOVATE) 0.05 % OINTMENT] Apply 0.5 mg daily as needed 60 g 4     cyanocobalamin (VITAMIN B-12) 500 MCG tablet [CYANOCOBALAMIN (VITAMIN B-12) 500 MCG TABLET] Take 500 mcg by mouth daily.       DOCOSAHEXANOIC ACID/EPA (FISH OIL ORAL) [DOCOSAHEXANOIC ACID/EPA (FISH OIL ORAL)] Take 1,200 mg by mouth daily.       escitalopram (LEXAPRO) 10 MG tablet Take 1 tablet (10 mg) by mouth daily 90 tablet 3     hydroxychloroquine (PLAQUENIL) 200 MG tablet TAKE 1 TABLET(200 MG) BY MOUTH DAILY 90 tablet 0     lansoprazole (PREVACID) 15 MG capsule Take 15 mg by mouth as needed       levothyroxine (SYNTHROID/LEVOTHROID) 75 MCG tablet TAKE 1 TABLET BY MOUTH DAILY 5 DAYS A WEEK, SKIP DOSE 2 DAYS PER WEEK 90 tablet 3     losartan (COZAAR) 50 MG tablet 2 tablet a day 180 tablet 3       Cardiographics:    ECG: Read by me 5/13/2023 normal sinus rhythm 1 PVC otherwise no acute ischemic changes    Echocardiogram: 2019    Normal left ventricular size and systolic function.    Left ventricle ejection fraction is normal. The estimated left ventricular ejection fraction is 60%.    Normal right ventricular size and systolic function.    No hemodynamically significant valvular heart abnormalities.     Event monitor: 2019  No symptomatic episodes were reported and no atrial fibrillation  detected.  Patient has an underlying IVCD present throughout the recording period  with occasional atrial premature beats commonly noted.  Two short runs of  nonsustained VT were noted on auto transmissions        Lab Results    Chemistry/lipid CBC Cardiac Enzymes/BNP/TSH/INR   Recent Labs   Lab Test 10/24/22  1413   CHOL 132   HDL 55   LDL 58   TRIG 94     Recent Labs   Lab Test 10/24/22  1413 10/16/19  1606 04/12/19  0509   LDL 58 61 102     Recent Labs   Lab Test 05/13/23  1941       POTASSIUM 4.1   CHLORIDE 104   CO2 24   *   BUN 23.6*   CR 1.69*   GFRESTIMATED 28*   SUE 8.6     Recent Labs   Lab Test 05/13/23  1941 04/28/23  1610 03/03/23  1235   CR 1.69* 1.44* 1.34*     Recent Labs   Lab Test 04/28/23  1610 10/24/22  1413 10/22/21  1550   A1C 5.9* 5.8* 5.8*          Recent Labs   Lab Test 05/13/23 1941   WBC 6.7   HGB 12.1   HCT 37.4   MCV 89        Recent Labs   Lab Test 05/13/23  1941 05/10/23  1403 03/03/23  1235   HGB 12.1 12.1 12.4    Recent Labs   Lab Test 04/11/19  1750   TROPONINI <0.01     No results for input(s): BNP, NTBNPI, NTBNP in the last 89955 hours.  Recent Labs   Lab Test 05/13/23 1941   TSH 2.57     No results for input(s): INR in the last 73754 hours.

## 2023-05-23 ENCOUNTER — OFFICE VISIT (OUTPATIENT)
Dept: RHEUMATOLOGY | Facility: CLINIC | Age: 88
End: 2023-05-23
Payer: COMMERCIAL

## 2023-05-23 VITALS — DIASTOLIC BLOOD PRESSURE: 69 MMHG | HEART RATE: 62 BPM | SYSTOLIC BLOOD PRESSURE: 120 MMHG | OXYGEN SATURATION: 95 %

## 2023-05-23 DIAGNOSIS — M12.39 PALINDROMIC RHEUMATISM INVOLVING MULTIPLE SITES: Primary | ICD-10-CM

## 2023-05-23 DIAGNOSIS — N18.32 CHRONIC RENAL FAILURE, STAGE 3B (H): ICD-10-CM

## 2023-05-23 DIAGNOSIS — M15.0 PRIMARY OSTEOARTHRITIS INVOLVING MULTIPLE JOINTS: ICD-10-CM

## 2023-05-23 PROCEDURE — 99214 OFFICE O/P EST MOD 30 MIN: CPT | Performed by: INTERNAL MEDICINE

## 2023-05-23 RX ORDER — HYDROXYCHLOROQUINE SULFATE 200 MG/1
200 TABLET, FILM COATED ORAL DAILY
Qty: 90 TABLET | Refills: 1 | Status: SHIPPED | OUTPATIENT
Start: 2023-05-23 | End: 2023-01-01

## 2023-05-23 NOTE — PROGRESS NOTES
Rheumatology follow-up visit note     Virginia is a 89 year old female presents today for follow-up.    Virginia was seen today for recheck.    Diagnoses and all orders for this visit:    Palindromic rheumatism involving multiple sites  -     hydroxychloroquine (PLAQUENIL) 200 MG tablet; Take 1 tablet (200 mg) by mouth daily for 90 days    Primary osteoarthritis involving multiple joints    Chronic renal failure, stage 3b (H)        Her palindromic rheumatism is well controlled with hydroxychloroquine she gets her eyes examined regularly.  She has pain in the left knee where she has osteoarthritis.  She had had Synvisc in the right knee.  We discussed the importance of keep walking.  Tylenol arthritis over-the-counter.  Corticosteroid injections as needed.  We will meet in 6 months or sooner if needed.    Follow up in 6 months.    HPI    Ruthy Man is a 89 year old female is here for follow-up.  This is for follow-up of palindromic rheumatoid arthritis, OA.  She is on hydroxychloroquine.  She takes it once a day.  She gets her eyes examined every 6 months.  She has not had any significant flareup of RA.  In the past her right knee used to travel more now that has subsided and now she is hurting more in the left knee which is more of a stiffness.  She is still able to do day-to-day activities.  She lives independently shops and cooks, indeed her son and daughter-in-law live in the same block and she cooks for them and they have lunch together every day. Given the past history of renal impairment not a candidate for any nonsteroidals.         DETAILED EXAMINATION  05/23/23  :    Vitals:    05/23/23 1233   BP: 120/69   Pulse: 62   SpO2: 95%     Alert oriented. Head including the face is examined for malar rash, heliotropes, scarring, lupus pernio. Eyes examined for redness such as in episcleritis/scleritis, periorbital lesions.   Neck/ Face examined for parotid gland swelling, range of motion of neck.  Left  upper and lower and right upper and lower extremities examined for tenderness, swelling, warmth of the appendicular joints, range of motion, edema, rash.  Some of the important findings included: she does not have evidence of synovitis in the palpable joints of the upper extremities.  No significant deformities of the digits.  + Heberden nodes.  Range of motion of the shoulders  show full abduction.  Joint line tenderness of the knees bilaterally worse on the left side with warmth on the left.  No effusion is detected.  She does not have dactylitis of the digits.     Patient Active Problem List    Diagnosis Date Noted     Gastroesophageal reflux disease without esophagitis 08/03/2022     Priority: Medium     Dermatitis 04/23/2021     Priority: Medium     Renal insufficiency 04/23/2021     Priority: Medium     Palpitations 09/15/2020     Priority: Medium     Chronic right shoulder pain 01/21/2020     Priority: Medium     Palindromic rheumatism 10/15/2019     Priority: Medium     Primary osteoarthritis involving multiple joints 10/15/2019     Priority: Medium     Chronic renal failure, stage 3 (moderate) (H) 08/06/2019     Priority: Medium     Acute ischemic VBA thalamic stroke, left (H) 04/12/2019     Priority: Medium     Right thalamic stroke (H) 04/11/2019     Priority: Medium     Left knee pain 04/11/2019     Priority: Medium     Ischemic optic neuropathy      Priority: Medium     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         Hypothyroidism      Priority: Medium     Created by Conversion  Replacement Utility updated for latest IMO load         Adjustment disorder with anxiety      Priority: Medium     Created by Conversion         Essential hypertension      Priority: Medium     Created by Conversion  Replacement Utility updated for latest IMO load         Senile osteoporosis 02/21/2019     Priority: Medium     Vitamin D deficiency      Priority: Medium     Created by Conversion  [facility] Annotation: May 18 2013  3:04PM -  Jad Edmond: Vit D = 10  Replacement Utility updated for latest IMO load         Hiatal Hernia      Priority: Medium     Created by Conversion  [facility] Annotation: Nov 30 2008  4:27PM - Luke Cannon:   Gastroesophageal   reflux  Replacement Utility updated for latest IMO load         Skin Cancer      Priority: Medium     Created by Conversion  Replacement Utility updated for latest IMO load         Colon polyps 02/25/2015     Priority: Medium     Impaired Fasting Glucose      Priority: Medium     Created by Conversion         Premature Ventricular Contractions      Priority: Medium     Created by Conversion  [facility] Annotation: Nov 30 2008  4:41PM - Luke Cannon: frequent,   benign         Diverticular disease of colon 12/04/2008     Priority: Medium     Past Surgical History:   Procedure Laterality Date     HC REMOVAL GALLBLADDER      Description: Cholecystectomy;  Recorded: 11/30/2008;  Comments: stone     ZZC TOTAL ABDOM HYSTERECTOMY      Description: Hysterectomy;  Recorded: 11/30/2008;  Comments: benign disease      Past Medical History:   Diagnosis Date     Hypertension      Senile osteoporosis 2/21/2019     Allergies   Allergen Reactions     Ace Inhibitors Cough     Amlodipine Unknown     Edema       Clarithromycin Nausea     Diltiazem Hcl [Diltiazem] Itching     Doxazosin Nausea     Penicillins Rash     Edema       Tetanus Toxoid Other (See Comments)     Local allergic reaction     Current Outpatient Medications   Medication Sig Dispense Refill     aspirin 81 mg chewable tablet [ASPIRIN 81 MG CHEWABLE TABLET] Chew 81 mg daily.       atorvastatin (LIPITOR) 40 MG tablet TAKE 1 TABLET(40 MG) BY MOUTH DAILY 90 tablet 2     carvedilol (COREG) 12.5 MG tablet Take 1 tablet (12.5 mg) by mouth 2 times daily (with meals) 180 tablet 3     cholecalciferol, vitamin D3, (VITAMIN D3) 1,000 unit capsule [CHOLECALCIFEROL, VITAMIN D3, (VITAMIN D3) 1,000 UNIT CAPSULE] Take 1,000 Units by mouth daily.        clobetasol (TEMOVATE) 0.05 % external ointment [CLOBETASOL (TEMOVATE) 0.05 % OINTMENT] Apply 0.5 mg daily as needed 60 g 4     cyanocobalamin (VITAMIN B-12) 500 MCG tablet [CYANOCOBALAMIN (VITAMIN B-12) 500 MCG TABLET] Take 500 mcg by mouth daily.       DOCOSAHEXANOIC ACID/EPA (FISH OIL ORAL) [DOCOSAHEXANOIC ACID/EPA (FISH OIL ORAL)] Take 1,200 mg by mouth daily.       escitalopram (LEXAPRO) 10 MG tablet Take 1 tablet (10 mg) by mouth daily 90 tablet 3     hydroxychloroquine (PLAQUENIL) 200 MG tablet TAKE 1 TABLET(200 MG) BY MOUTH DAILY 90 tablet 0     lansoprazole (PREVACID) 15 MG capsule Take 15 mg by mouth as needed       levothyroxine (SYNTHROID/LEVOTHROID) 75 MCG tablet TAKE 1 TABLET BY MOUTH DAILY 5 DAYS A WEEK, SKIP DOSE 2 DAYS PER WEEK 90 tablet 3     losartan (COZAAR) 50 MG tablet 2 tablet a day 180 tablet 3     family history includes ALS in her mother; Other - See Comments in her brother and daughter; Pulmonary Embolism in her father.  Social Connections: Not on file          WBC Count   Date Value Ref Range Status   05/13/2023 6.7 4.0 - 11.0 10e3/uL Final     RBC Count   Date Value Ref Range Status   05/13/2023 4.19 3.80 - 5.20 10e6/uL Final     Hemoglobin   Date Value Ref Range Status   05/13/2023 12.1 11.7 - 15.7 g/dL Final     Hematocrit   Date Value Ref Range Status   05/13/2023 37.4 35.0 - 47.0 % Final     MCV   Date Value Ref Range Status   05/13/2023 89 78 - 100 fL Final     MCH   Date Value Ref Range Status   05/13/2023 28.9 26.5 - 33.0 pg Final     Platelet Count   Date Value Ref Range Status   05/13/2023 231 150 - 450 10e3/uL Final     % Lymphocytes   Date Value Ref Range Status   05/13/2023 19 % Final     AST   Date Value Ref Range Status   04/28/2023 19 10 - 35 U/L Final     ALT   Date Value Ref Range Status   04/28/2023 6 (L) 10 - 35 U/L Final     Albumin   Date Value Ref Range Status   04/28/2023 4.0 3.5 - 5.2 g/dL Final   02/11/2022 3.7 3.5 - 5.0 g/dL Final     Alkaline Phosphatase   Date  Value Ref Range Status   04/28/2023 91 35 - 104 U/L Final     Creatinine   Date Value Ref Range Status   05/13/2023 1.69 (H) 0.51 - 0.95 mg/dL Final     GFR Estimate   Date Value Ref Range Status   05/13/2023 28 (L) >60 mL/min/1.73m2 Final     Comment:     eGFR calculated using 2021 CKD-EPI equation.   04/23/2021 38 (L) >60 mL/min/1.73m2 Final     GFR Estimate If Black   Date Value Ref Range Status   04/23/2021 46 (L) >60 mL/min/1.73m2 Final     Creatinine Urine mg/dL   Date Value Ref Range Status   10/24/2022 113.0 mg/dL Final     Comment:     The reference ranges have not been established in urine creatinine. The results should be integrated into the clinical context for interpretation.     Erythrocyte Sedimentation Rate   Date Value Ref Range Status   08/06/2019 25 (H) 0 - 20 mm/hr Final     CRP   Date Value Ref Range Status   02/11/2022 0.4 0.0-<0.8 mg/dL Final

## 2023-06-20 ENCOUNTER — HOSPITAL ENCOUNTER (OUTPATIENT)
Dept: CARDIOLOGY | Facility: HOSPITAL | Age: 88
Discharge: HOME | End: 2023-06-20
Attending: INTERNAL MEDICINE | Admitting: INTERNAL MEDICINE
Payer: COMMERCIAL

## 2023-06-20 DIAGNOSIS — R00.2 PALPITATIONS: ICD-10-CM

## 2023-06-20 LAB — LVEF ECHO: NORMAL

## 2023-06-20 PROCEDURE — 93306 TTE W/DOPPLER COMPLETE: CPT

## 2023-06-20 PROCEDURE — 93306 TTE W/DOPPLER COMPLETE: CPT | Mod: 26 | Performed by: INTERNAL MEDICINE

## 2023-06-28 ENCOUNTER — MEDICAL CORRESPONDENCE (OUTPATIENT)
Dept: HEALTH INFORMATION MANAGEMENT | Facility: CLINIC | Age: 88
End: 2023-06-28

## 2023-06-30 NOTE — TELEPHONE ENCOUNTER
Please call them back. I did want to check noc ox and if abnormal , order O2.  Pt has h/o stroke, VTACh, would those Dx be enough to qualify for O2?  
Spoke with [facility] and relayed pcp message.  [facility] rep will pass on message to the oxygen team and they will call back with further questions.   
Who is calling:  [facility]  Reason for Call:   [facility] received the Order for an overnight oximetry study - they need to know if this was ordered to qualify Pt for oxygen - If not, the Pt would need to be referred to a sleep clinic. Please call them back.  Date of last appointment with primary care: 7/29/20  Okay to leave a detailed message: Yes      
REQUIRED- Click to run Sepsis Recognition Calculator

## 2023-07-20 NOTE — TELEPHONE ENCOUNTER
"Last Written Prescription Date:  5/20/2022  Last Fill Quantity: 90,  # refills: 3   Last office visit provider:  5/10/2023     Requested Prescriptions   Pending Prescriptions Disp Refills     escitalopram (LEXAPRO) 10 MG tablet 90 tablet 3     Sig: Take 1 tablet (10 mg) by mouth daily       SSRIs Protocol Passed - 7/19/2023  2:16 PM        Passed - Recent (12 mo) or future (30 days) visit within the authorizing provider's specialty     Patient has had an office visit with the authorizing provider or a provider within the authorizing providers department within the previous 12 mos or has a future within next 30 days. See \"Patient Info\" tab in inbasket, or \"Choose Columns\" in Meds & Orders section of the refill encounter.              Passed - Medication is active on med list        Passed - Patient is age 18 or older        Passed - No active pregnancy on record        Passed - No positive pregnancy test in last 12 months             Tonia Hinkle RN 07/20/23 9:58 AM  "

## 2023-11-27 NOTE — PROGRESS NOTES
"      Rheumatology follow-up visit note     Virginia is a 89 year old female presents today for follow-up.    Virginia was seen today for recheck.    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  Knee pain secondary to OA that her main concern.  We discussed management she does not want to go physical therapy.  She has tried 1 corticosteroid injection in the remote past that did not provide durable relief.  She is not interested anymore.  Other options reviewed.  Recent labs reviewed chronic renal impairment noted.    Follow up in 6 months.    HPI    Ruthy Man is a 89 year old female is here for follow-up of palindromic rheumatoid arthritis, OA.  She is on hydroxychloroquine.  She takes it once a day.  She gets her eyes examined every 6 months most recently done 2 months ago.  She has had pain in the knees with activity going up and down the steps, there is no pain at rest no pain at night.  She is not woken up because of this.  There is no swelling.  She noted that her house has 3 levels that she is still able to negotiate gradually.  In the past she recalls having had 1 corticosteroid injection into her right knee which provided her with limited relief, by which she means a few days of pain abatement.  Getting up from a low couch is very difficult however the from \"regular\" chair that she has at home she can get up with minimal difficulty.   She is still able to do day-to-day activities.  She lives independently shops and cooks, indeed her son and daughter-in-law live in the same block and she cooks for them and they have lunch together every day. Given the past history of renal impairment not a " candidate for any nonsteroidals.          DETAILED EXAMINATION  11/27/23  :    Vitals:    11/27/23 1232   BP: 128/62   BP Location: Left arm   Patient Position: Sitting   Cuff Size: Adult Regular   Pulse: 66   Resp: 20   Temp: 97.8  F (36.6  C)   TempSrc: Oral   SpO2: 95%     Alert oriented. Head including the face is examined for malar rash, heliotropes, scarring, lupus pernio. Eyes examined for redness such as in episcleritis/scleritis, periorbital lesions.   Neck/ Face examined for parotid gland swelling, range of motion of neck.  Left upper and lower and right upper and lower extremities examined for tenderness, swelling, warmth of the appendicular joints, range of motion, edema, rash.  Some of the important findings included: she does not have evidence of synovitis in the palpable joints of the upper extremities.  No significant deformities of the digits.  + Heberden nodes.  Range of motion of the shoulders  show full abduction.  No JLT effusion or warmth of the knees.  she does not have dactylitis of the digits.     Patient Active Problem List    Diagnosis Date Noted    Gastroesophageal reflux disease without esophagitis 08/03/2022     Priority: Medium    Dermatitis 04/23/2021     Priority: Medium    Renal insufficiency 04/23/2021     Priority: Medium    Palpitations 09/15/2020     Priority: Medium    Chronic right shoulder pain 01/21/2020     Priority: Medium    Palindromic rheumatism 10/15/2019     Priority: Medium    Primary osteoarthritis involving multiple joints 10/15/2019     Priority: Medium    Chronic renal failure, stage 3 (moderate) (H) 08/06/2019     Priority: Medium    Acute ischemic VBA thalamic stroke, left (H) 04/12/2019     Priority: Medium    Right thalamic stroke (H) 04/11/2019     Priority: Medium    Left knee pain 04/11/2019     Priority: Medium    Ischemic optic neuropathy      Priority: Medium     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        Hypothyroidism      Priority: Medium     Created by  Conversion  Replacement Utility updated for latest IMO load        Adjustment disorder with anxiety      Priority: Medium     Created by Conversion        Essential hypertension      Priority: Medium     Created by Conversion  Replacement Utility updated for latest IMO load        Senile osteoporosis 02/21/2019     Priority: Medium    Vitamin D deficiency      Priority: Medium     Created by Conversion  [facility] Annotation: May 18 2013  3:04PM - Jad Edmond: Vit D = 10  Replacement Utility updated for latest IMO load        Hiatal Hernia      Priority: Medium     Created by Conversion  [facility] Annotation: Nov 30 2008  4:27PM - Luke Cannon:   Gastroesophageal   reflux  Replacement Utility updated for latest IMO load        Skin Cancer      Priority: Medium     Created by Conversion  Replacement Utility updated for latest IMO load        Colon polyps 02/25/2015     Priority: Medium    Impaired Fasting Glucose      Priority: Medium     Created by Conversion        Premature Ventricular Contractions      Priority: Medium     Created by Conversion  [facility] Annotation: Nov 30 2008  4:41PM - Luke Cannon: frequent,   benign        Diverticular disease of colon 12/04/2008     Priority: Medium     Past Surgical History:   Procedure Laterality Date    HC REMOVAL GALLBLADDER      Description: Cholecystectomy;  Recorded: 11/30/2008;  Comments: stone    ZZC TOTAL ABDOM HYSTERECTOMY      Description: Hysterectomy;  Recorded: 11/30/2008;  Comments: benign disease      Past Medical History:   Diagnosis Date    Hypertension     Senile osteoporosis 2/21/2019     Allergies   Allergen Reactions    Ace Inhibitors Cough    Amlodipine Unknown     Edema      Clarithromycin Nausea    Diltiazem Hcl [Diltiazem] Itching    Doxazosin Nausea    Penicillins Rash     Edema      Tetanus Toxoid Other (See Comments)     Local allergic reaction     Current Outpatient Medications   Medication Sig Dispense Refill    aspirin 81 mg  chewable tablet [ASPIRIN 81 MG CHEWABLE TABLET] Chew 81 mg daily.      atorvastatin (LIPITOR) 40 MG tablet TAKE 1 TABLET(40 MG) BY MOUTH DAILY 90 tablet 0    carvedilol (COREG) 12.5 MG tablet Take 1 tablet (12.5 mg) by mouth 2 times daily (with meals) 180 tablet 3    cholecalciferol, vitamin D3, (VITAMIN D3) 1,000 unit capsule [CHOLECALCIFEROL, VITAMIN D3, (VITAMIN D3) 1,000 UNIT CAPSULE] Take 1,000 Units by mouth daily.      clobetasol (TEMOVATE) 0.05 % external ointment [CLOBETASOL (TEMOVATE) 0.05 % OINTMENT] Apply 0.5 mg daily as needed 60 g 4    cyanocobalamin (VITAMIN B-12) 500 MCG tablet [CYANOCOBALAMIN (VITAMIN B-12) 500 MCG TABLET] Take 500 mcg by mouth daily.      DOCOSAHEXANOIC ACID/EPA (FISH OIL ORAL) [DOCOSAHEXANOIC ACID/EPA (FISH OIL ORAL)] Take 1,200 mg by mouth daily.      escitalopram (LEXAPRO) 10 MG tablet Take 1 tablet (10 mg) by mouth daily 90 tablet 3    hydroxychloroquine (PLAQUENIL) 200 MG tablet Take 1 tablet (200 mg) by mouth daily for 90 days 90 tablet 1    lansoprazole (PREVACID) 15 MG capsule Take 15 mg by mouth as needed      levothyroxine (SYNTHROID/LEVOTHROID) 75 MCG tablet TAKE 1 TABLET BY MOUTH DAILY 5 DAYS A WEEK, SKIP DOSE 2 DAYS PER WEEK 90 tablet 3    losartan (COZAAR) 50 MG tablet 2 tablet a day 180 tablet 3     family history includes ALS in her mother; Other - See Comments in her brother and daughter; Pulmonary Embolism in her father.  Social Connections: Not on file          WBC Count   Date Value Ref Range Status   11/21/2023 6.2 4.0 - 11.0 10e3/uL Final     RBC Count   Date Value Ref Range Status   11/21/2023 4.05 3.80 - 5.20 10e6/uL Final     Hemoglobin   Date Value Ref Range Status   11/21/2023 12.1 11.7 - 15.7 g/dL Final     Hematocrit   Date Value Ref Range Status   11/21/2023 37.4 35.0 - 47.0 % Final     MCV   Date Value Ref Range Status   11/21/2023 92 78 - 100 fL Final     MCH   Date Value Ref Range Status   11/21/2023 29.9 26.5 - 33.0 pg Final     Platelet Count    Date Value Ref Range Status   11/21/2023 229 150 - 450 10e3/uL Final     % Lymphocytes   Date Value Ref Range Status   05/13/2023 19 % Final     AST   Date Value Ref Range Status   04/28/2023 19 10 - 35 U/L Final     ALT   Date Value Ref Range Status   11/21/2023 8 0 - 50 U/L Final     Comment:     Reference intervals for this test were updated on 6/12/2023 to more accurately reflect our healthy population. There may be differences in the flagging of prior results with similar values performed with this method. Interpretation of those prior results can be made in the context of the updated reference intervals.       Albumin   Date Value Ref Range Status   11/21/2023 4.0 3.5 - 5.2 g/dL Final   02/11/2022 3.7 3.5 - 5.0 g/dL Final     Alkaline Phosphatase   Date Value Ref Range Status   04/28/2023 91 35 - 104 U/L Final     Creatinine   Date Value Ref Range Status   11/21/2023 1.66 (H) 0.51 - 0.95 mg/dL Final     GFR Estimate   Date Value Ref Range Status   11/21/2023 28 (L) >60 mL/min/1.73m2 Final   04/23/2021 38 (L) >60 mL/min/1.73m2 Final     GFR Estimate If Black   Date Value Ref Range Status   04/23/2021 46 (L) >60 mL/min/1.73m2 Final     Creatinine Urine mg/dL   Date Value Ref Range Status   10/24/2022 113.0 mg/dL Final     Comment:     The reference ranges have not been established in urine creatinine. The results should be integrated into the clinical context for interpretation.     Erythrocyte Sedimentation Rate   Date Value Ref Range Status   08/06/2019 25 (H) 0 - 20 mm/hr Final     CRP   Date Value Ref Range Status   02/11/2022 0.4 0.0-<0.8 mg/dL Final

## 2024-01-01 ENCOUNTER — APPOINTMENT (OUTPATIENT)
Dept: PHYSICAL THERAPY | Facility: HOSPITAL | Age: 89
DRG: 175 | End: 2024-01-01
Payer: COMMERCIAL

## 2024-01-01 ENCOUNTER — APPOINTMENT (OUTPATIENT)
Dept: OCCUPATIONAL THERAPY | Facility: HOSPITAL | Age: 89
DRG: 853 | End: 2024-01-01
Attending: INTERNAL MEDICINE
Payer: COMMERCIAL

## 2024-01-01 ENCOUNTER — OFFICE VISIT (OUTPATIENT)
Dept: RHEUMATOLOGY | Facility: CLINIC | Age: 89
End: 2024-01-01
Payer: COMMERCIAL

## 2024-01-01 ENCOUNTER — PATIENT OUTREACH (OUTPATIENT)
Dept: CARE COORDINATION | Facility: CLINIC | Age: 89
End: 2024-01-01
Payer: COMMERCIAL

## 2024-01-01 ENCOUNTER — APPOINTMENT (OUTPATIENT)
Dept: CT IMAGING | Facility: HOSPITAL | Age: 89
DRG: 853 | End: 2024-01-01
Attending: EMERGENCY MEDICINE
Payer: COMMERCIAL

## 2024-01-01 ENCOUNTER — TELEPHONE (OUTPATIENT)
Dept: GERIATRICS | Facility: CLINIC | Age: 89
End: 2024-01-01
Payer: COMMERCIAL

## 2024-01-01 ENCOUNTER — MEDICAL CORRESPONDENCE (OUTPATIENT)
Dept: HEALTH INFORMATION MANAGEMENT | Facility: CLINIC | Age: 89
End: 2024-01-01
Payer: COMMERCIAL

## 2024-01-01 ENCOUNTER — TRANSITIONAL CARE UNIT VISIT (OUTPATIENT)
Dept: GERIATRICS | Facility: CLINIC | Age: 89
End: 2024-01-01
Payer: COMMERCIAL

## 2024-01-01 ENCOUNTER — APPOINTMENT (OUTPATIENT)
Dept: CARDIOLOGY | Facility: HOSPITAL | Age: 89
DRG: 175 | End: 2024-01-01
Attending: STUDENT IN AN ORGANIZED HEALTH CARE EDUCATION/TRAINING PROGRAM
Payer: COMMERCIAL

## 2024-01-01 ENCOUNTER — APPOINTMENT (OUTPATIENT)
Dept: RADIOLOGY | Facility: HOSPITAL | Age: 89
DRG: 853 | End: 2024-01-01
Attending: EMERGENCY MEDICINE
Payer: COMMERCIAL

## 2024-01-01 ENCOUNTER — ANESTHESIA EVENT (OUTPATIENT)
Dept: SURGERY | Facility: HOSPITAL | Age: 89
DRG: 853 | End: 2024-01-01
Payer: COMMERCIAL

## 2024-01-01 ENCOUNTER — APPOINTMENT (OUTPATIENT)
Dept: PHYSICAL THERAPY | Facility: HOSPITAL | Age: 89
DRG: 853 | End: 2024-01-01
Payer: COMMERCIAL

## 2024-01-01 ENCOUNTER — HOSPITAL ENCOUNTER (INPATIENT)
Facility: HOSPITAL | Age: 89
LOS: 8 days | Discharge: NURSING, REHAB OR LONG-TERM CARE | DRG: 853 | End: 2024-06-14
Attending: EMERGENCY MEDICINE | Admitting: INTERNAL MEDICINE
Payer: COMMERCIAL

## 2024-01-01 ENCOUNTER — RADIOLOGY INJECTION OFFICE VISIT (OUTPATIENT)
Dept: PHYSICAL MEDICINE AND REHAB | Facility: CLINIC | Age: 89
End: 2024-01-01
Attending: NURSE PRACTITIONER
Payer: COMMERCIAL

## 2024-01-01 ENCOUNTER — OFFICE VISIT (OUTPATIENT)
Dept: PHYSICAL MEDICINE AND REHAB | Facility: CLINIC | Age: 89
End: 2024-01-01
Attending: INTERNAL MEDICINE
Payer: COMMERCIAL

## 2024-01-01 ENCOUNTER — LAB REQUISITION (OUTPATIENT)
Dept: LAB | Facility: CLINIC | Age: 89
End: 2024-01-01
Payer: COMMERCIAL

## 2024-01-01 ENCOUNTER — DOCUMENTATION ONLY (OUTPATIENT)
Dept: GERIATRICS | Facility: CLINIC | Age: 89
End: 2024-01-01

## 2024-01-01 ENCOUNTER — APPOINTMENT (OUTPATIENT)
Dept: ULTRASOUND IMAGING | Facility: HOSPITAL | Age: 89
DRG: 175 | End: 2024-01-01
Attending: INTERNAL MEDICINE
Payer: COMMERCIAL

## 2024-01-01 ENCOUNTER — APPOINTMENT (OUTPATIENT)
Dept: RADIOLOGY | Facility: HOSPITAL | Age: 89
DRG: 853 | End: 2024-01-01
Attending: PHYSICIAN ASSISTANT
Payer: COMMERCIAL

## 2024-01-01 ENCOUNTER — APPOINTMENT (OUTPATIENT)
Dept: OCCUPATIONAL THERAPY | Facility: HOSPITAL | Age: 89
DRG: 175 | End: 2024-01-01
Payer: COMMERCIAL

## 2024-01-01 ENCOUNTER — TELEPHONE (OUTPATIENT)
Dept: INTERNAL MEDICINE | Facility: CLINIC | Age: 89
End: 2024-01-01
Payer: COMMERCIAL

## 2024-01-01 ENCOUNTER — APPOINTMENT (OUTPATIENT)
Dept: PHYSICAL THERAPY | Facility: HOSPITAL | Age: 89
DRG: 175 | End: 2024-01-01
Attending: INTERNAL MEDICINE
Payer: COMMERCIAL

## 2024-01-01 ENCOUNTER — DOCUMENTATION ONLY (OUTPATIENT)
Dept: INTERNAL MEDICINE | Facility: CLINIC | Age: 89
End: 2024-01-01
Payer: COMMERCIAL

## 2024-01-01 ENCOUNTER — APPOINTMENT (OUTPATIENT)
Dept: PHYSICAL THERAPY | Facility: HOSPITAL | Age: 89
DRG: 853 | End: 2024-01-01
Attending: INTERNAL MEDICINE
Payer: COMMERCIAL

## 2024-01-01 ENCOUNTER — HOSPITAL ENCOUNTER (INPATIENT)
Facility: HOSPITAL | Age: 89
LOS: 7 days | Discharge: NURSING, REHAB OR LONG-TERM CARE | DRG: 175 | End: 2024-06-27
Attending: EMERGENCY MEDICINE | Admitting: STUDENT IN AN ORGANIZED HEALTH CARE EDUCATION/TRAINING PROGRAM
Payer: COMMERCIAL

## 2024-01-01 ENCOUNTER — HOSPITAL ENCOUNTER (OUTPATIENT)
Dept: GENERAL RADIOLOGY | Facility: HOSPITAL | Age: 89
Discharge: HOME | End: 2024-03-19
Attending: INTERNAL MEDICINE | Admitting: INTERNAL MEDICINE
Payer: COMMERCIAL

## 2024-01-01 ENCOUNTER — APPOINTMENT (OUTPATIENT)
Dept: SPEECH THERAPY | Facility: HOSPITAL | Age: 89
DRG: 853 | End: 2024-01-01
Attending: INTERNAL MEDICINE
Payer: COMMERCIAL

## 2024-01-01 ENCOUNTER — TRANSFERRED RECORDS (OUTPATIENT)
Dept: HEALTH INFORMATION MANAGEMENT | Facility: CLINIC | Age: 89
End: 2024-01-01
Payer: COMMERCIAL

## 2024-01-01 ENCOUNTER — OFFICE VISIT (OUTPATIENT)
Dept: INTERNAL MEDICINE | Facility: CLINIC | Age: 89
End: 2024-01-01
Payer: COMMERCIAL

## 2024-01-01 ENCOUNTER — ANESTHESIA (OUTPATIENT)
Dept: SURGERY | Facility: HOSPITAL | Age: 89
DRG: 853 | End: 2024-01-01
Payer: COMMERCIAL

## 2024-01-01 ENCOUNTER — APPOINTMENT (OUTPATIENT)
Dept: OCCUPATIONAL THERAPY | Facility: HOSPITAL | Age: 89
DRG: 175 | End: 2024-01-01
Attending: INTERNAL MEDICINE
Payer: COMMERCIAL

## 2024-01-01 ENCOUNTER — OFFICE VISIT (OUTPATIENT)
Dept: PHYSICAL MEDICINE AND REHAB | Facility: CLINIC | Age: 89
End: 2024-01-01
Payer: COMMERCIAL

## 2024-01-01 ENCOUNTER — TELEPHONE (OUTPATIENT)
Dept: GERIATRICS | Facility: CLINIC | Age: 89
End: 2024-01-01

## 2024-01-01 ENCOUNTER — APPOINTMENT (OUTPATIENT)
Dept: CT IMAGING | Facility: HOSPITAL | Age: 89
DRG: 175 | End: 2024-01-01
Attending: EMERGENCY MEDICINE
Payer: COMMERCIAL

## 2024-01-01 VITALS
SYSTOLIC BLOOD PRESSURE: 148 MMHG | WEIGHT: 182 LBS | RESPIRATION RATE: 15 BRPM | HEART RATE: 67 BPM | OXYGEN SATURATION: 94 % | HEIGHT: 64 IN | TEMPERATURE: 97.1 F | BODY MASS INDEX: 31.07 KG/M2 | DIASTOLIC BLOOD PRESSURE: 70 MMHG

## 2024-01-01 VITALS
TEMPERATURE: 97.2 F | HEART RATE: 107 BPM | OXYGEN SATURATION: 93 % | DIASTOLIC BLOOD PRESSURE: 69 MMHG | HEIGHT: 64 IN | BODY MASS INDEX: 35.27 KG/M2 | SYSTOLIC BLOOD PRESSURE: 122 MMHG | WEIGHT: 206.6 LBS | RESPIRATION RATE: 20 BRPM

## 2024-01-01 VITALS
SYSTOLIC BLOOD PRESSURE: 100 MMHG | DIASTOLIC BLOOD PRESSURE: 54 MMHG | HEIGHT: 64 IN | BODY MASS INDEX: 31.07 KG/M2 | WEIGHT: 182 LBS | HEART RATE: 77 BPM

## 2024-01-01 VITALS
BODY MASS INDEX: 31.27 KG/M2 | OXYGEN SATURATION: 93 % | SYSTOLIC BLOOD PRESSURE: 138 MMHG | DIASTOLIC BLOOD PRESSURE: 65 MMHG | TEMPERATURE: 98.6 F | RESPIRATION RATE: 18 BRPM | HEART RATE: 82 BPM | WEIGHT: 183.13 LBS | HEIGHT: 64 IN

## 2024-01-01 VITALS
SYSTOLIC BLOOD PRESSURE: 130 MMHG | TEMPERATURE: 98.7 F | OXYGEN SATURATION: 93 % | DIASTOLIC BLOOD PRESSURE: 69 MMHG | HEART RATE: 90 BPM

## 2024-01-01 VITALS
RESPIRATION RATE: 16 BRPM | OXYGEN SATURATION: 94 % | DIASTOLIC BLOOD PRESSURE: 81 MMHG | SYSTOLIC BLOOD PRESSURE: 121 MMHG | BODY MASS INDEX: 35.27 KG/M2 | HEIGHT: 64 IN | WEIGHT: 206.6 LBS | HEART RATE: 105 BPM | TEMPERATURE: 96.7 F

## 2024-01-01 VITALS
OXYGEN SATURATION: 95 % | BODY MASS INDEX: 31.07 KG/M2 | HEART RATE: 73 BPM | WEIGHT: 182 LBS | TEMPERATURE: 98.3 F | SYSTOLIC BLOOD PRESSURE: 126 MMHG | RESPIRATION RATE: 15 BRPM | HEIGHT: 64 IN | DIASTOLIC BLOOD PRESSURE: 71 MMHG

## 2024-01-01 VITALS — SYSTOLIC BLOOD PRESSURE: 112 MMHG | DIASTOLIC BLOOD PRESSURE: 64 MMHG | HEART RATE: 72 BPM

## 2024-01-01 VITALS — SYSTOLIC BLOOD PRESSURE: 163 MMHG | HEART RATE: 74 BPM | DIASTOLIC BLOOD PRESSURE: 83 MMHG | OXYGEN SATURATION: 95 %

## 2024-01-01 VITALS
BODY MASS INDEX: 34.83 KG/M2 | SYSTOLIC BLOOD PRESSURE: 136 MMHG | HEART RATE: 77 BPM | WEIGHT: 204 LBS | RESPIRATION RATE: 24 BRPM | OXYGEN SATURATION: 93 % | HEIGHT: 64 IN | TEMPERATURE: 96.3 F | DIASTOLIC BLOOD PRESSURE: 52 MMHG

## 2024-01-01 VITALS
BODY MASS INDEX: 34.06 KG/M2 | HEIGHT: 64 IN | SYSTOLIC BLOOD PRESSURE: 152 MMHG | OXYGEN SATURATION: 92 % | WEIGHT: 199.52 LBS | DIASTOLIC BLOOD PRESSURE: 65 MMHG | RESPIRATION RATE: 18 BRPM | TEMPERATURE: 98.6 F | HEART RATE: 83 BPM

## 2024-01-01 VITALS — DIASTOLIC BLOOD PRESSURE: 68 MMHG | HEART RATE: 104 BPM | SYSTOLIC BLOOD PRESSURE: 128 MMHG

## 2024-01-01 DIAGNOSIS — M15.0 PRIMARY OSTEOARTHRITIS INVOLVING MULTIPLE JOINTS: ICD-10-CM

## 2024-01-01 DIAGNOSIS — S72.001D CLOSED RIGHT HIP FRACTURE, WITH ROUTINE HEALING, SUBSEQUENT ENCOUNTER: ICD-10-CM

## 2024-01-01 DIAGNOSIS — B37.31 CANDIDAL VULVOVAGINITIS: ICD-10-CM

## 2024-01-01 DIAGNOSIS — I26.93 SINGLE SUBSEGMENTAL PULMONARY EMBOLISM WITHOUT ACUTE COR PULMONALE (H): ICD-10-CM

## 2024-01-01 DIAGNOSIS — R51.9 OCCIPITAL PAIN: Primary | ICD-10-CM

## 2024-01-01 DIAGNOSIS — M54.81 OCCIPITAL NEURALGIA OF LEFT SIDE: Primary | ICD-10-CM

## 2024-01-01 DIAGNOSIS — A41.9 SEPSIS, DUE TO UNSPECIFIED ORGANISM, UNSPECIFIED WHETHER ACUTE ORGAN DYSFUNCTION PRESENT (H): ICD-10-CM

## 2024-01-01 DIAGNOSIS — Z96.641 HISTORY OF RIGHT HIP HEMIARTHROPLASTY: Primary | ICD-10-CM

## 2024-01-01 DIAGNOSIS — R51.9 OCCIPITAL PAIN: ICD-10-CM

## 2024-01-01 DIAGNOSIS — N17.9 ACUTE KIDNEY INJURY SUPERIMPOSED ON CHRONIC KIDNEY DISEASE (H): ICD-10-CM

## 2024-01-01 DIAGNOSIS — I10 PRIMARY HYPERTENSION: ICD-10-CM

## 2024-01-01 DIAGNOSIS — I10 HYPERTENSION, UNSPECIFIED TYPE: ICD-10-CM

## 2024-01-01 DIAGNOSIS — E03.9 ACQUIRED HYPOTHYROIDISM: ICD-10-CM

## 2024-01-01 DIAGNOSIS — M54.2 NECK PAIN: ICD-10-CM
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
## 2024-01-01 DIAGNOSIS — N90.4 LICHEN SCLEROSUS OF VULVA: ICD-10-CM

## 2024-01-01 DIAGNOSIS — M47.812 CERVICAL SPONDYLOSIS: ICD-10-CM

## 2024-01-01 DIAGNOSIS — N18.32 CHRONIC RENAL FAILURE, STAGE 3B (H): ICD-10-CM

## 2024-01-01 DIAGNOSIS — S72.001A CLOSED DISPLACED FRACTURE OF RIGHT FEMORAL NECK (H): ICD-10-CM

## 2024-01-01 DIAGNOSIS — R68.84 JAW PAIN: ICD-10-CM

## 2024-01-01 DIAGNOSIS — I05.0 MITRAL VALVE STENOSIS, UNSPECIFIED ETIOLOGY: ICD-10-CM

## 2024-01-01 DIAGNOSIS — J96.00 ACUTE RESPIRATORY FAILURE, UNSPECIFIED WHETHER WITH HYPOXIA OR HYPERCAPNIA (H): ICD-10-CM

## 2024-01-01 DIAGNOSIS — I10 ESSENTIAL HYPERTENSION: ICD-10-CM

## 2024-01-01 DIAGNOSIS — Z96.649 STATUS POST HIP HEMIARTHROPLASTY: ICD-10-CM

## 2024-01-01 DIAGNOSIS — J96.01 ACUTE RESPIRATORY FAILURE WITH HYPOXIA (H): ICD-10-CM

## 2024-01-01 DIAGNOSIS — R33.9 URINARY RETENTION: ICD-10-CM

## 2024-01-01 DIAGNOSIS — N18.9 ACUTE KIDNEY INJURY SUPERIMPOSED ON CHRONIC KIDNEY DISEASE (H): ICD-10-CM

## 2024-01-01 DIAGNOSIS — R09.89 CHOKING EPISODE: Primary | ICD-10-CM

## 2024-01-01 DIAGNOSIS — M54.81 OCCIPITAL NEURALGIA OF LEFT SIDE: ICD-10-CM

## 2024-01-01 DIAGNOSIS — R52 PAIN MANAGEMENT: Primary | ICD-10-CM

## 2024-01-01 DIAGNOSIS — J47.9 BRONCHIECTASIS WITHOUT COMPLICATION (H): ICD-10-CM

## 2024-01-01 DIAGNOSIS — H47.012 ISCHEMIC OPTIC NEUROPATHY OF LEFT EYE: Chronic | ICD-10-CM

## 2024-01-01 DIAGNOSIS — M12.39 PALINDROMIC RHEUMATISM INVOLVING MULTIPLE SITES: Primary | ICD-10-CM

## 2024-01-01 DIAGNOSIS — I63.81 RIGHT THALAMIC STROKE (H): ICD-10-CM

## 2024-01-01 DIAGNOSIS — R53.83 FATIGUE, UNSPECIFIED TYPE: ICD-10-CM

## 2024-01-01 DIAGNOSIS — E03.9 ACQUIRED HYPOTHYROIDISM: Primary | ICD-10-CM

## 2024-01-01 DIAGNOSIS — J18.9 COMMUNITY ACQUIRED PNEUMONIA OF RIGHT UPPER LOBE OF LUNG: ICD-10-CM

## 2024-01-01 DIAGNOSIS — Z76.89 ENCOUNTER FOR SUPPORT AND COORDINATION OF TRANSITION OF CARE: Primary | ICD-10-CM

## 2024-01-01 DIAGNOSIS — J15.7 PNEUMONIA DUE TO MYCOPLASMA PNEUMONIAE, UNSPECIFIED LATERALITY, UNSPECIFIED PART OF LUNG: ICD-10-CM

## 2024-01-01 DIAGNOSIS — Z51.81 ENCOUNTER FOR THERAPEUTIC DRUG LEVEL MONITORING: ICD-10-CM

## 2024-01-01 DIAGNOSIS — L30.4 INTERTRIGO: ICD-10-CM
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
## 2024-01-01 DIAGNOSIS — S72.001D CLOSED RIGHT HIP FRACTURE, WITH ROUTINE HEALING, SUBSEQUENT ENCOUNTER: Primary | ICD-10-CM

## 2024-01-01 DIAGNOSIS — R53.81 PHYSICAL DECONDITIONING: ICD-10-CM

## 2024-01-01 DIAGNOSIS — M47.812 CERVICAL SPONDYLOSIS: Primary | ICD-10-CM

## 2024-01-01 DIAGNOSIS — D64.9 ANEMIA, UNSPECIFIED: ICD-10-CM

## 2024-01-01 DIAGNOSIS — M12.39 PALINDROMIC RHEUMATISM INVOLVING MULTIPLE SITES: ICD-10-CM

## 2024-01-01 DIAGNOSIS — R73.09 ABNORMAL GLUCOSE: ICD-10-CM

## 2024-01-01 DIAGNOSIS — J18.9 PNEUMONIA DUE TO INFECTIOUS ORGANISM, UNSPECIFIED LATERALITY, UNSPECIFIED PART OF LUNG: ICD-10-CM

## 2024-01-01 DIAGNOSIS — I26.99 PULMONARY EMBOLISM, OTHER, UNSPECIFIED CHRONICITY, UNSPECIFIED WHETHER ACUTE COR PULMONALE PRESENT (H): ICD-10-CM

## 2024-01-01 DIAGNOSIS — H47.012 ISCHEMIC OPTIC NEUROPATHY OF LEFT EYE: ICD-10-CM

## 2024-01-01 DIAGNOSIS — F41.1 GAD (GENERALIZED ANXIETY DISORDER): ICD-10-CM

## 2024-01-01 DIAGNOSIS — L24.A2 IRRITANT CONTACT DERMATITIS DUE TO FECAL, URINARY OR DUAL INCONTINENCE: Primary | ICD-10-CM

## 2024-01-01 LAB
ABO/RH(D): NORMAL
ABO/RH(D): NORMAL
ALBUMIN SERPL BCG-MCNC: 2.7 G/DL (ref 3.5–5.2)
ALBUMIN UR-MCNC: 10 MG/DL
ALBUMIN UR-MCNC: 10 MG/DL
ALBUMIN UR-MCNC: 20 MG/DL
ALBUMIN UR-MCNC: 30 MG/DL
ALP SERPL-CCNC: 127 U/L (ref 40–150)
ALT SERPL W P-5'-P-CCNC: 10 U/L (ref 0–50)
ANION GAP SERPL CALCULATED.3IONS-SCNC: 10 MMOL/L (ref 7–15)
ANION GAP SERPL CALCULATED.3IONS-SCNC: 11 MMOL/L (ref 7–15)
ANION GAP SERPL CALCULATED.3IONS-SCNC: 12 MMOL/L (ref 7–15)
ANION GAP SERPL CALCULATED.3IONS-SCNC: 12 MMOL/L (ref 7–15)
ANION GAP SERPL CALCULATED.3IONS-SCNC: 13 MMOL/L (ref 7–15)
ANION GAP SERPL CALCULATED.3IONS-SCNC: 18 MMOL/L (ref 7–15)
ANION GAP SERPL CALCULATED.3IONS-SCNC: 8 MMOL/L (ref 7–15)
ANION GAP SERPL CALCULATED.3IONS-SCNC: 9 MMOL/L (ref 7–15)
ANTIBODY SCREEN: NEGATIVE
ANTIBODY SCREEN: NEGATIVE
APPEARANCE UR: CLEAR
APTT PPP: 176 SECONDS (ref 22–38)
APTT PPP: 40 SECONDS (ref 22–38)
APTT PPP: 71 SECONDS (ref 22–38)
APTT PPP: 71 SECONDS (ref 22–38)
AST SERPL W P-5'-P-CCNC: 29 U/L (ref 0–45)
ATRIAL RATE - MUSE: 103 BPM
ATRIAL RATE - MUSE: 87 BPM
BACTERIA #/AREA URNS HPF: ABNORMAL /HPF
BACTERIA BLD CULT: NO GROWTH
BASE EXCESS BLDV CALC-SCNC: -6.5 MMOL/L (ref -3–3)
BASOPHILS # BLD AUTO: 0 10E3/UL (ref 0–0.2)
BASOPHILS # BLD AUTO: 0.1 10E3/UL (ref 0–0.2)
BASOPHILS NFR BLD AUTO: 0 %
BASOPHILS NFR BLD AUTO: 1 %
BILIRUB SERPL-MCNC: 0.5 MG/DL
BILIRUB UR QL STRIP: NEGATIVE
BLD PROD TYP BPU: NORMAL
BLOOD COMPONENT TYPE: NORMAL
BUN SERPL-MCNC: 12.5 MG/DL (ref 8–23)
BUN SERPL-MCNC: 13.6 MG/DL (ref 8–23)
BUN SERPL-MCNC: 14.6 MG/DL (ref 8–23)
BUN SERPL-MCNC: 14.7 MG/DL (ref 8–23)
BUN SERPL-MCNC: 15.2 MG/DL (ref 8–23)
BUN SERPL-MCNC: 15.9 MG/DL (ref 8–23)
BUN SERPL-MCNC: 16.5 MG/DL (ref 8–23)
BUN SERPL-MCNC: 16.8 MG/DL (ref 8–23)
BUN SERPL-MCNC: 19.4 MG/DL (ref 8–23)
BUN SERPL-MCNC: 19.8 MG/DL (ref 8–23)
BUN SERPL-MCNC: 20.1 MG/DL (ref 8–23)
BUN SERPL-MCNC: 20.2 MG/DL (ref 8–23)
BUN SERPL-MCNC: 20.6 MG/DL (ref 8–23)
BUN SERPL-MCNC: 21 MG/DL (ref 8–23)
BUN SERPL-MCNC: 21.4 MG/DL (ref 8–23)
BUN SERPL-MCNC: 21.6 MG/DL (ref 8–23)
BUN SERPL-MCNC: 22.1 MG/DL (ref 8–23)
BUN SERPL-MCNC: 22.8 MG/DL (ref 8–23)
BUN SERPL-MCNC: 23 MG/DL (ref 8–23)
BUN SERPL-MCNC: 24.2 MG/DL (ref 8–23)
CALCIUM SERPL-MCNC: 7.3 MG/DL (ref 8.2–9.6)
CALCIUM SERPL-MCNC: 7.5 MG/DL (ref 8.2–9.6)
CALCIUM SERPL-MCNC: 7.7 MG/DL (ref 8.2–9.6)
CALCIUM SERPL-MCNC: 7.8 MG/DL (ref 8.2–9.6)
CALCIUM SERPL-MCNC: 7.9 MG/DL (ref 8.2–9.6)
CALCIUM SERPL-MCNC: 8 MG/DL (ref 8.2–9.6)
CALCIUM SERPL-MCNC: 8 MG/DL (ref 8.2–9.6)
CALCIUM SERPL-MCNC: 8.1 MG/DL (ref 8.2–9.6)
CALCIUM SERPL-MCNC: 8.1 MG/DL (ref 8.2–9.6)
CALCIUM SERPL-MCNC: 8.2 MG/DL (ref 8.2–9.6)
CALCIUM SERPL-MCNC: 8.3 MG/DL (ref 8.2–9.6)
CALCIUM SERPL-MCNC: 8.4 MG/DL (ref 8.2–9.6)
CALCIUM SERPL-MCNC: 8.4 MG/DL (ref 8.2–9.6)
CALCIUM SERPL-MCNC: 8.6 MG/DL (ref 8.2–9.6)
CALCIUM SERPL-MCNC: 8.6 MG/DL (ref 8.2–9.6)
CALCIUM SERPL-MCNC: 8.7 MG/DL (ref 8.2–9.6)
CALCIUM SERPL-MCNC: 8.8 MG/DL (ref 8.2–9.6)
CALCIUM SERPL-MCNC: 8.9 MG/DL (ref 8.2–9.6)
CHLORIDE SERPL-SCNC: 100 MMOL/L (ref 98–107)
CHLORIDE SERPL-SCNC: 100 MMOL/L (ref 98–107)
CHLORIDE SERPL-SCNC: 102 MMOL/L (ref 98–107)
CHLORIDE SERPL-SCNC: 103 MMOL/L (ref 98–107)
CHLORIDE SERPL-SCNC: 104 MMOL/L (ref 98–107)
CHLORIDE SERPL-SCNC: 105 MMOL/L (ref 98–107)
CHLORIDE SERPL-SCNC: 105 MMOL/L (ref 98–107)
CHLORIDE SERPL-SCNC: 106 MMOL/L (ref 98–107)
CHLORIDE SERPL-SCNC: 107 MMOL/L (ref 98–107)
CHLORIDE SERPL-SCNC: 97 MMOL/L (ref 98–107)
CODING SYSTEM: NORMAL
COLOR UR AUTO: ABNORMAL
COLOR UR AUTO: YELLOW
CREAT SERPL-MCNC: 1.02 MG/DL (ref 0.51–0.95)
CREAT SERPL-MCNC: 1.04 MG/DL (ref 0.51–0.95)
CREAT SERPL-MCNC: 1.07 MG/DL (ref 0.51–0.95)
CREAT SERPL-MCNC: 1.08 MG/DL (ref 0.51–0.95)
CREAT SERPL-MCNC: 1.14 MG/DL (ref 0.51–0.95)
CREAT SERPL-MCNC: 1.14 MG/DL (ref 0.51–0.95)
CREAT SERPL-MCNC: 1.18 MG/DL (ref 0.51–0.95)
CREAT SERPL-MCNC: 1.24 MG/DL (ref 0.51–0.95)
CREAT SERPL-MCNC: 1.26 MG/DL (ref 0.51–0.95)
CREAT SERPL-MCNC: 1.3 MG/DL (ref 0.51–0.95)
CREAT SERPL-MCNC: 1.3 MG/DL (ref 0.51–0.95)
CREAT SERPL-MCNC: 1.31 MG/DL (ref 0.51–0.95)
CREAT SERPL-MCNC: 1.45 MG/DL (ref 0.51–0.95)
CREAT SERPL-MCNC: 1.49 MG/DL (ref 0.51–0.95)
CREAT SERPL-MCNC: 1.5 MG/DL (ref 0.51–0.95)
CREAT SERPL-MCNC: 1.52 MG/DL (ref 0.51–0.95)
CREAT SERPL-MCNC: 1.52 MG/DL (ref 0.51–0.95)
CREAT SERPL-MCNC: 1.53 MG/DL (ref 0.51–0.95)
CREAT SERPL-MCNC: 1.65 MG/DL (ref 0.51–0.95)
CREAT SERPL-MCNC: 1.9 MG/DL (ref 0.51–0.95)
CROSSMATCH: NORMAL
CRP SERPL-MCNC: 26.7 MG/L
CRP SERPL-MCNC: 8.57 MG/L
DEPRECATED HCO3 PLAS-SCNC: 17 MMOL/L (ref 22–29)
DEPRECATED HCO3 PLAS-SCNC: 20 MMOL/L (ref 22–29)
DEPRECATED HCO3 PLAS-SCNC: 21 MMOL/L (ref 22–29)
DEPRECATED HCO3 PLAS-SCNC: 22 MMOL/L (ref 22–29)
DEPRECATED HCO3 PLAS-SCNC: 23 MMOL/L (ref 22–29)
DEPRECATED HCO3 PLAS-SCNC: 24 MMOL/L (ref 22–29)
DEPRECATED HCO3 PLAS-SCNC: 25 MMOL/L (ref 22–29)
DIASTOLIC BLOOD PRESSURE - MUSE: 101 MMHG
DIASTOLIC BLOOD PRESSURE - MUSE: 49 MMHG
EGFRCR SERPLBLD CKD-EPI 2021: 24 ML/MIN/1.73M2
EGFRCR SERPLBLD CKD-EPI 2021: 28 ML/MIN/1.73M2
EGFRCR SERPLBLD CKD-EPI 2021: 31 ML/MIN/1.73M2
EGFRCR SERPLBLD CKD-EPI 2021: 32 ML/MIN/1.73M2
EGFRCR SERPLBLD CKD-EPI 2021: 32 ML/MIN/1.73M2
EGFRCR SERPLBLD CKD-EPI 2021: 33 ML/MIN/1.73M2
EGFRCR SERPLBLD CKD-EPI 2021: 38 ML/MIN/1.73M2
EGFRCR SERPLBLD CKD-EPI 2021: 39 ML/MIN/1.73M2
EGFRCR SERPLBLD CKD-EPI 2021: 40 ML/MIN/1.73M2
EGFRCR SERPLBLD CKD-EPI 2021: 43 ML/MIN/1.73M2
EGFRCR SERPLBLD CKD-EPI 2021: 44 ML/MIN/1.73M2
EGFRCR SERPLBLD CKD-EPI 2021: 44 ML/MIN/1.73M2
EGFRCR SERPLBLD CKD-EPI 2021: 47 ML/MIN/1.73M2
EGFRCR SERPLBLD CKD-EPI 2021: 48 ML/MIN/1.73M2
EGFRCR SERPLBLD CKD-EPI 2021: 50 ML/MIN/1.73M2
EGFRCR SERPLBLD CKD-EPI 2021: 51 ML/MIN/1.73M2
EOSINOPHIL # BLD AUTO: 0.2 10E3/UL (ref 0–0.7)
EOSINOPHIL # BLD AUTO: 0.3 10E3/UL (ref 0–0.7)
EOSINOPHIL # BLD AUTO: 0.4 10E3/UL (ref 0–0.7)
EOSINOPHIL # BLD AUTO: 0.5 10E3/UL (ref 0–0.7)
EOSINOPHIL # BLD AUTO: 0.7 10E3/UL (ref 0–0.7)
EOSINOPHIL NFR BLD AUTO: 1 %
EOSINOPHIL NFR BLD AUTO: 3 %
EOSINOPHIL NFR BLD AUTO: 4 %
EOSINOPHIL NFR BLD AUTO: 6 %
EOSINOPHIL NFR BLD AUTO: 6 %
ERYTHROCYTE [DISTWIDTH] IN BLOOD BY AUTOMATED COUNT: 14.3 % (ref 10–15)
ERYTHROCYTE [DISTWIDTH] IN BLOOD BY AUTOMATED COUNT: 14.4 % (ref 10–15)
ERYTHROCYTE [DISTWIDTH] IN BLOOD BY AUTOMATED COUNT: 14.6 % (ref 10–15)
ERYTHROCYTE [DISTWIDTH] IN BLOOD BY AUTOMATED COUNT: 14.8 % (ref 10–15)
ERYTHROCYTE [DISTWIDTH] IN BLOOD BY AUTOMATED COUNT: 14.9 % (ref 10–15)
ERYTHROCYTE [DISTWIDTH] IN BLOOD BY AUTOMATED COUNT: 15 % (ref 10–15)
ERYTHROCYTE [DISTWIDTH] IN BLOOD BY AUTOMATED COUNT: 15 % (ref 10–15)
ERYTHROCYTE [DISTWIDTH] IN BLOOD BY AUTOMATED COUNT: 15.1 % (ref 10–15)
ERYTHROCYTE [DISTWIDTH] IN BLOOD BY AUTOMATED COUNT: 15.3 % (ref 10–15)
ERYTHROCYTE [DISTWIDTH] IN BLOOD BY AUTOMATED COUNT: 15.3 % (ref 10–15)
ERYTHROCYTE [DISTWIDTH] IN BLOOD BY AUTOMATED COUNT: 16.1 % (ref 10–15)
ERYTHROCYTE [SEDIMENTATION RATE] IN BLOOD BY WESTERGREN METHOD: 21 MM/HR (ref 0–30)
ERYTHROCYTE [SEDIMENTATION RATE] IN BLOOD BY WESTERGREN METHOD: 31 MM/HR (ref 0–30)
FLUAV RNA SPEC QL NAA+PROBE: NEGATIVE
FLUAV RNA SPEC QL NAA+PROBE: NEGATIVE
FLUBV RNA RESP QL NAA+PROBE: NEGATIVE
FLUBV RNA RESP QL NAA+PROBE: NEGATIVE
GLUCOSE BLDC GLUCOMTR-MCNC: 117 MG/DL (ref 70–99)
GLUCOSE SERPL-MCNC: 101 MG/DL (ref 70–99)
GLUCOSE SERPL-MCNC: 105 MG/DL (ref 70–99)
GLUCOSE SERPL-MCNC: 106 MG/DL (ref 70–99)
GLUCOSE SERPL-MCNC: 111 MG/DL (ref 70–99)
GLUCOSE SERPL-MCNC: 120 MG/DL (ref 70–99)
GLUCOSE SERPL-MCNC: 122 MG/DL (ref 70–99)
GLUCOSE SERPL-MCNC: 146 MG/DL (ref 70–99)
GLUCOSE SERPL-MCNC: 151 MG/DL (ref 70–99)
GLUCOSE SERPL-MCNC: 246 MG/DL (ref 70–99)
GLUCOSE SERPL-MCNC: 69 MG/DL (ref 70–99)
GLUCOSE SERPL-MCNC: 81 MG/DL (ref 70–99)
GLUCOSE SERPL-MCNC: 86 MG/DL (ref 70–99)
GLUCOSE SERPL-MCNC: 89 MG/DL (ref 70–99)
GLUCOSE SERPL-MCNC: 93 MG/DL (ref 70–99)
GLUCOSE SERPL-MCNC: 93 MG/DL (ref 70–99)
GLUCOSE SERPL-MCNC: 94 MG/DL (ref 70–99)
GLUCOSE SERPL-MCNC: 97 MG/DL (ref 70–99)
GLUCOSE SERPL-MCNC: 97 MG/DL (ref 70–99)
GLUCOSE SERPL-MCNC: 99 MG/DL (ref 70–99)
GLUCOSE SERPL-MCNC: 99 MG/DL (ref 70–99)
GLUCOSE UR STRIP-MCNC: NEGATIVE MG/DL
HBA1C MFR BLD: 6 % (ref 0–5.6)
HCO3 BLDV-SCNC: 19 MMOL/L (ref 21–28)
HCT VFR BLD AUTO: 17.5 % (ref 35–47)
HCT VFR BLD AUTO: 21.9 % (ref 35–47)
HCT VFR BLD AUTO: 22.1 % (ref 35–47)
HCT VFR BLD AUTO: 23 % (ref 35–47)
HCT VFR BLD AUTO: 25.7 % (ref 35–47)
HCT VFR BLD AUTO: 25.9 % (ref 35–47)
HCT VFR BLD AUTO: 27.6 % (ref 35–47)
HCT VFR BLD AUTO: 29.3 % (ref 35–47)
HCT VFR BLD AUTO: 29.4 % (ref 35–47)
HCT VFR BLD AUTO: 29.6 % (ref 35–47)
HCT VFR BLD AUTO: 30.2 % (ref 35–47)
HCT VFR BLD AUTO: 33.6 % (ref 35–47)
HCT VFR BLD AUTO: 38.3 % (ref 35–47)
HCT VFR BLD AUTO: 38.4 % (ref 35–47)
HCT VFR BLD AUTO: 39.1 % (ref 35–47)
HGB BLD-MCNC: 10.7 G/DL (ref 11.7–15.7)
HGB BLD-MCNC: 12.1 G/DL (ref 11.7–15.7)
HGB BLD-MCNC: 12.1 G/DL (ref 11.7–15.7)
HGB BLD-MCNC: 12.4 G/DL (ref 11.7–15.7)
HGB BLD-MCNC: 5.6 G/DL (ref 11.7–15.7)
HGB BLD-MCNC: 5.8 G/DL (ref 11.7–15.7)
HGB BLD-MCNC: 7.2 G/DL (ref 11.7–15.7)
HGB BLD-MCNC: 7.2 G/DL (ref 11.7–15.7)
HGB BLD-MCNC: 7.3 G/DL (ref 11.7–15.7)
HGB BLD-MCNC: 7.4 G/DL (ref 11.7–15.7)
HGB BLD-MCNC: 7.5 G/DL (ref 11.7–15.7)
HGB BLD-MCNC: 8.1 G/DL (ref 11.7–15.7)
HGB BLD-MCNC: 8.3 G/DL (ref 11.7–15.7)
HGB BLD-MCNC: 8.6 G/DL (ref 11.7–15.7)
HGB BLD-MCNC: 8.8 G/DL (ref 11.7–15.7)
HGB BLD-MCNC: 9 G/DL (ref 11.7–15.7)
HGB BLD-MCNC: 9 G/DL (ref 11.7–15.7)
HGB BLD-MCNC: 9.2 G/DL (ref 11.7–15.7)
HGB BLD-MCNC: 9.2 G/DL (ref 11.7–15.7)
HGB BLD-MCNC: 9.4 G/DL (ref 11.7–15.7)
HGB BLD-MCNC: 9.6 G/DL (ref 11.7–15.7)
HGB BLD-MCNC: 9.7 G/DL (ref 11.7–15.7)
HGB UR QL STRIP: NEGATIVE
HOLD SPECIMEN: NORMAL
HYALINE CASTS: 4 /LPF
IMM GRANULOCYTES # BLD: 0 10E3/UL
IMM GRANULOCYTES # BLD: 0 10E3/UL
IMM GRANULOCYTES # BLD: 0.1 10E3/UL
IMM GRANULOCYTES # BLD: 0.3 10E3/UL
IMM GRANULOCYTES # BLD: 0.4 10E3/UL
IMM GRANULOCYTES NFR BLD: 0 %
IMM GRANULOCYTES NFR BLD: 0 %
IMM GRANULOCYTES NFR BLD: 2 %
INR PPP: 1.04 (ref 0.85–1.15)
INTERPRETATION ECG - MUSE: NORMAL
INTERPRETATION ECG - MUSE: NORMAL
IRON BINDING CAPACITY (ROCHE): 144 UG/DL (ref 240–430)
IRON SATN MFR SERPL: 12 % (ref 15–46)
IRON SERPL-MCNC: 17 UG/DL (ref 37–145)
ISSUE DATE AND TIME: NORMAL
KETONES UR STRIP-MCNC: NEGATIVE MG/DL
L PNEUMO1 AG UR QL IA: NEGATIVE
LACTATE SERPL-SCNC: 0.9 MMOL/L (ref 0.7–2)
LACTATE SERPL-SCNC: 1 MMOL/L (ref 0.7–2)
LACTATE SERPL-SCNC: 1.1 MMOL/L (ref 0.7–2)
LACTATE SERPL-SCNC: 1.2 MMOL/L (ref 0.7–2)
LACTATE SERPL-SCNC: 1.6 MMOL/L (ref 0.7–2)
LACTATE SERPL-SCNC: 2.6 MMOL/L (ref 0.7–2)
LACTATE SERPL-SCNC: 6.5 MMOL/L (ref 0.7–2)
LEUKOCYTE ESTERASE UR QL STRIP: ABNORMAL
LEUKOCYTE ESTERASE UR QL STRIP: NEGATIVE
LVEF ECHO: NORMAL
LYMPHOCYTES # BLD AUTO: 1 10E3/UL (ref 0.8–5.3)
LYMPHOCYTES # BLD AUTO: 1.1 10E3/UL (ref 0.8–5.3)
LYMPHOCYTES # BLD AUTO: 1.3 10E3/UL (ref 0.8–5.3)
LYMPHOCYTES # BLD AUTO: 1.5 10E3/UL (ref 0.8–5.3)
LYMPHOCYTES # BLD AUTO: 3.5 10E3/UL (ref 0.8–5.3)
LYMPHOCYTES NFR BLD AUTO: 10 %
LYMPHOCYTES NFR BLD AUTO: 13 %
LYMPHOCYTES NFR BLD AUTO: 14 %
LYMPHOCYTES NFR BLD AUTO: 14 %
LYMPHOCYTES NFR BLD AUTO: 16 %
MCH RBC QN AUTO: 28.8 PG (ref 26.5–33)
MCH RBC QN AUTO: 28.9 PG (ref 26.5–33)
MCH RBC QN AUTO: 29 PG (ref 26.5–33)
MCH RBC QN AUTO: 29.1 PG (ref 26.5–33)
MCH RBC QN AUTO: 29.4 PG (ref 26.5–33)
MCH RBC QN AUTO: 29.5 PG (ref 26.5–33)
MCH RBC QN AUTO: 29.6 PG (ref 26.5–33)
MCH RBC QN AUTO: 29.8 PG (ref 26.5–33)
MCH RBC QN AUTO: 30 PG (ref 26.5–33)
MCH RBC QN AUTO: 30.1 PG (ref 26.5–33)
MCH RBC QN AUTO: 30.4 PG (ref 26.5–33)
MCH RBC QN AUTO: 30.7 PG (ref 26.5–33)
MCH RBC QN AUTO: 30.8 PG (ref 26.5–33)
MCH RBC QN AUTO: 30.8 PG (ref 26.5–33)
MCH RBC QN AUTO: 31.3 PG (ref 26.5–33)
MCHC RBC AUTO-ENTMCNC: 31.3 G/DL (ref 31.5–36.5)
MCHC RBC AUTO-ENTMCNC: 31.5 G/DL (ref 31.5–36.5)
MCHC RBC AUTO-ENTMCNC: 31.6 G/DL (ref 31.5–36.5)
MCHC RBC AUTO-ENTMCNC: 31.7 G/DL (ref 31.5–36.5)
MCHC RBC AUTO-ENTMCNC: 31.8 G/DL (ref 31.5–36.5)
MCHC RBC AUTO-ENTMCNC: 31.9 G/DL (ref 31.5–36.5)
MCHC RBC AUTO-ENTMCNC: 32 G/DL (ref 31.5–36.5)
MCHC RBC AUTO-ENTMCNC: 32 G/DL (ref 31.5–36.5)
MCHC RBC AUTO-ENTMCNC: 32.1 G/DL (ref 31.5–36.5)
MCHC RBC AUTO-ENTMCNC: 32.3 G/DL (ref 31.5–36.5)
MCHC RBC AUTO-ENTMCNC: 32.8 G/DL (ref 31.5–36.5)
MCHC RBC AUTO-ENTMCNC: 32.8 G/DL (ref 31.5–36.5)
MCHC RBC AUTO-ENTMCNC: 32.9 G/DL (ref 31.5–36.5)
MCHC RBC AUTO-ENTMCNC: 33.5 G/DL (ref 31.5–36.5)
MCHC RBC AUTO-ENTMCNC: 34 G/DL (ref 31.5–36.5)
MCV RBC AUTO: 91 FL (ref 78–100)
MCV RBC AUTO: 92 FL (ref 78–100)
MCV RBC AUTO: 93 FL (ref 78–100)
MCV RBC AUTO: 94 FL (ref 78–100)
MCV RBC AUTO: 94 FL (ref 78–100)
MCV RBC AUTO: 96 FL (ref 78–100)
MONOCYTES # BLD AUTO: 0.5 10E3/UL (ref 0–1.3)
MONOCYTES # BLD AUTO: 0.5 10E3/UL (ref 0–1.3)
MONOCYTES # BLD AUTO: 0.7 10E3/UL (ref 0–1.3)
MONOCYTES # BLD AUTO: 0.7 10E3/UL (ref 0–1.3)
MONOCYTES # BLD AUTO: 1.3 10E3/UL (ref 0–1.3)
MONOCYTES NFR BLD AUTO: 4 %
MONOCYTES NFR BLD AUTO: 5 %
MONOCYTES NFR BLD AUTO: 6 %
MONOCYTES NFR BLD AUTO: 8 %
MONOCYTES NFR BLD AUTO: 8 %
MUCOUS THREADS #/AREA URNS LPF: PRESENT /LPF
NEUTROPHILS # BLD AUTO: 12.6 10E3/UL (ref 1.6–8.3)
NEUTROPHILS # BLD AUTO: 19.7 10E3/UL (ref 1.6–8.3)
NEUTROPHILS # BLD AUTO: 4.9 10E3/UL (ref 1.6–8.3)
NEUTROPHILS # BLD AUTO: 5.9 10E3/UL (ref 1.6–8.3)
NEUTROPHILS # BLD AUTO: 6.2 10E3/UL (ref 1.6–8.3)
NEUTROPHILS NFR BLD AUTO: 68 %
NEUTROPHILS NFR BLD AUTO: 72 %
NEUTROPHILS NFR BLD AUTO: 77 %
NEUTROPHILS NFR BLD AUTO: 77 %
NEUTROPHILS NFR BLD AUTO: 83 %
NITRATE UR QL: NEGATIVE
NRBC # BLD AUTO: 0 10E3/UL
NRBC BLD AUTO-RTO: 0 /100
NT-PROBNP SERPL-MCNC: 523 PG/ML (ref 0–1800)
O2/TOTAL GAS SETTING VFR VENT: 28 %
OXYHGB MFR BLDV: 82 % (ref 70–75)
P AXIS - MUSE: 24 DEGREES
P AXIS - MUSE: 58 DEGREES
PCO2 BLDV: 31 MM HG (ref 40–50)
PH BLDV: 7.39 [PH] (ref 7.32–7.43)
PH UR STRIP: 5.5 [PH] (ref 5–7)
PLATELET # BLD AUTO: 160 10E3/UL (ref 150–450)
PLATELET # BLD AUTO: 168 10E3/UL (ref 150–450)
PLATELET # BLD AUTO: 234 10E3/UL (ref 150–450)
PLATELET # BLD AUTO: 248 10E3/UL (ref 150–450)
PLATELET # BLD AUTO: 254 10E3/UL (ref 150–450)
PLATELET # BLD AUTO: 255 10E3/UL (ref 150–450)
PLATELET # BLD AUTO: 258 10E3/UL (ref 150–450)
PLATELET # BLD AUTO: 265 10E3/UL (ref 150–450)
PLATELET # BLD AUTO: 274 10E3/UL (ref 150–450)
PLATELET # BLD AUTO: 276 10E3/UL (ref 150–450)
PLATELET # BLD AUTO: 279 10E3/UL (ref 150–450)
PLATELET # BLD AUTO: 285 10E3/UL (ref 150–450)
PLATELET # BLD AUTO: 294 10E3/UL (ref 150–450)
PLATELET # BLD AUTO: 339 10E3/UL (ref 150–450)
PLATELET # BLD AUTO: 348 10E3/UL (ref 150–450)
PLATELET # BLD AUTO: 358 10E3/UL (ref 150–450)
PLATELET # BLD AUTO: 444 10E3/UL (ref 150–450)
PO2 BLDV: 49 MM HG (ref 25–47)
POTASSIUM SERPL-SCNC: 3.7 MMOL/L (ref 3.4–5.3)
POTASSIUM SERPL-SCNC: 3.8 MMOL/L (ref 3.4–5.3)
POTASSIUM SERPL-SCNC: 3.9 MMOL/L (ref 3.4–5.3)
POTASSIUM SERPL-SCNC: 4 MMOL/L (ref 3.4–5.3)
POTASSIUM SERPL-SCNC: 4.1 MMOL/L (ref 3.4–5.3)
POTASSIUM SERPL-SCNC: 4.1 MMOL/L (ref 3.4–5.3)
POTASSIUM SERPL-SCNC: 4.2 MMOL/L (ref 3.4–5.3)
POTASSIUM SERPL-SCNC: 4.5 MMOL/L (ref 3.4–5.3)
POTASSIUM SERPL-SCNC: 4.5 MMOL/L (ref 3.4–5.3)
POTASSIUM SERPL-SCNC: 4.6 MMOL/L (ref 3.4–5.3)
PR INTERVAL - MUSE: 150 MS
PR INTERVAL - MUSE: 156 MS
PROCALCITONIN SERPL IA-MCNC: 0.03 NG/ML
PROCALCITONIN SERPL IA-MCNC: 0.57 NG/ML
PROT SERPL-MCNC: 5.2 G/DL (ref 6.4–8.3)
QRS DURATION - MUSE: 116 MS
QRS DURATION - MUSE: 122 MS
QT - MUSE: 376 MS
QT - MUSE: 392 MS
QTC - MUSE: 471 MS
QTC - MUSE: 492 MS
R AXIS - MUSE: -76 DEGREES
R AXIS - MUSE: 261 DEGREES
RADIOLOGIST FLAGS: ABNORMAL
RBC # BLD AUTO: 1.88 10E6/UL (ref 3.8–5.2)
RBC # BLD AUTO: 2.37 10E6/UL (ref 3.8–5.2)
RBC # BLD AUTO: 2.4 10E6/UL (ref 3.8–5.2)
RBC # BLD AUTO: 2.5 10E6/UL (ref 3.8–5.2)
RBC # BLD AUTO: 2.77 10E6/UL (ref 3.8–5.2)
RBC # BLD AUTO: 2.86 10E6/UL (ref 3.8–5.2)
RBC # BLD AUTO: 2.98 10E6/UL (ref 3.8–5.2)
RBC # BLD AUTO: 3.1 10E6/UL (ref 3.8–5.2)
RBC # BLD AUTO: 3.13 10E6/UL (ref 3.8–5.2)
RBC # BLD AUTO: 3.2 10E6/UL (ref 3.8–5.2)
RBC # BLD AUTO: 3.22 10E6/UL (ref 3.8–5.2)
RBC # BLD AUTO: 3.62 10E6/UL (ref 3.8–5.2)
RBC # BLD AUTO: 4.17 10E6/UL (ref 3.8–5.2)
RBC # BLD AUTO: 4.19 10E6/UL (ref 3.8–5.2)
RBC # BLD AUTO: 4.26 10E6/UL (ref 3.8–5.2)
RBC URINE: 0 /HPF
RBC URINE: 1 /HPF
RBC URINE: 1 /HPF
RBC URINE: <1 /HPF
RSV RNA SPEC NAA+PROBE: NEGATIVE
RSV RNA SPEC NAA+PROBE: NEGATIVE
S PNEUM AG SPEC QL: NEGATIVE
SAO2 % BLDV: 83.3 % (ref 70–75)
SARS-COV-2 RNA RESP QL NAA+PROBE: NEGATIVE
SARS-COV-2 RNA RESP QL NAA+PROBE: NEGATIVE
SODIUM SERPL-SCNC: 132 MMOL/L (ref 135–145)
SODIUM SERPL-SCNC: 134 MMOL/L (ref 135–145)
SODIUM SERPL-SCNC: 135 MMOL/L (ref 135–145)
SODIUM SERPL-SCNC: 136 MMOL/L (ref 135–145)
SODIUM SERPL-SCNC: 137 MMOL/L (ref 135–145)
SODIUM SERPL-SCNC: 138 MMOL/L (ref 135–145)
SODIUM SERPL-SCNC: 139 MMOL/L (ref 135–145)
SODIUM SERPL-SCNC: 139 MMOL/L (ref 135–145)
SP GR UR STRIP: 1.01 (ref 1–1.03)
SP GR UR STRIP: 1.01 (ref 1–1.03)
SP GR UR STRIP: 1.02 (ref 1–1.03)
SP GR UR STRIP: 1.02 (ref 1–1.03)
SPECIMEN EXPIRATION DATE: NORMAL
SPECIMEN EXPIRATION DATE: NORMAL
SQUAMOUS EPITHELIAL: <1 /HPF
SYSTOLIC BLOOD PRESSURE - MUSE: 230 MMHG
SYSTOLIC BLOOD PRESSURE - MUSE: 86 MMHG
T AXIS - MUSE: 28 DEGREES
T AXIS - MUSE: 35 DEGREES
TRANSITIONAL EPI: <1 /HPF
TROPONIN T SERPL HS-MCNC: 48 NG/L
TROPONIN T SERPL HS-MCNC: 51 NG/L
TSH SERPL DL<=0.005 MIU/L-ACNC: 1.69 UIU/ML (ref 0.3–4.2)
TSH SERPL DL<=0.005 MIU/L-ACNC: 3.57 UIU/ML (ref 0.3–4.2)
UFH PPP CHRO-ACNC: >1.1 IU/ML
UFH PPP CHRO-ACNC: >1.1 IU/ML
UNIT ABO/RH: NORMAL
UNIT NUMBER: NORMAL
UNIT STATUS: NORMAL
UNIT TYPE ISBT: 5100
UROBILINOGEN UR STRIP-MCNC: <2 MG/DL
VENTRICULAR RATE- MUSE: 103 BPM
VENTRICULAR RATE- MUSE: 87 BPM
WBC # BLD AUTO: 10.4 10E3/UL (ref 4–11)
WBC # BLD AUTO: 11.4 10E3/UL (ref 4–11)
WBC # BLD AUTO: 14.7 10E3/UL (ref 4–11)
WBC # BLD AUTO: 15.3 10E3/UL (ref 4–11)
WBC # BLD AUTO: 15.6 10E3/UL (ref 4–11)
WBC # BLD AUTO: 18.9 10E3/UL (ref 4–11)
WBC # BLD AUTO: 19.4 10E3/UL (ref 4–11)
WBC # BLD AUTO: 21.7 10E3/UL (ref 4–11)
WBC # BLD AUTO: 25.6 10E3/UL (ref 4–11)
WBC # BLD AUTO: 6.8 10E3/UL (ref 4–11)
WBC # BLD AUTO: 8.1 10E3/UL (ref 4–11)
WBC # BLD AUTO: 8.6 10E3/UL (ref 4–11)
WBC # BLD AUTO: 8.8 10E3/UL (ref 4–11)
WBC # BLD AUTO: 9.6 10E3/UL (ref 4–11)
WBC # BLD AUTO: 9.8 10E3/UL (ref 4–11)
WBC # BLD AUTO: 9.8 10E3/UL (ref 4–11)
WBC URINE: 1 /HPF
WBC URINE: 1 /HPF
WBC URINE: 6 /HPF
WBC URINE: 6 /HPF
YEAST #/AREA URNS HPF: ABNORMAL /HPF
YEAST #/AREA URNS HPF: ABNORMAL /HPF

## 2024-01-01 PROCEDURE — 85018 HEMOGLOBIN: CPT | Performed by: INTERNAL MEDICINE

## 2024-01-01 PROCEDURE — 36415 COLL VENOUS BLD VENIPUNCTURE: CPT | Mod: ORL | Performed by: NURSE PRACTITIONER

## 2024-01-01 PROCEDURE — 120N000001 HC R&B MED SURG/OB

## 2024-01-01 PROCEDURE — 999N000065 XR FEMUR PORT RIGHT 2 VIEWS: Mod: RT

## 2024-01-01 PROCEDURE — C1776 JOINT DEVICE (IMPLANTABLE): HCPCS | Performed by: ORTHOPAEDIC SURGERY

## 2024-01-01 PROCEDURE — 250N000013 HC RX MED GY IP 250 OP 250 PS 637: Performed by: INTERNAL MEDICINE

## 2024-01-01 PROCEDURE — 97110 THERAPEUTIC EXERCISES: CPT | Mod: GP

## 2024-01-01 PROCEDURE — 70450 CT HEAD/BRAIN W/O DYE: CPT

## 2024-01-01 PROCEDURE — 258N000003 HC RX IP 258 OP 636: Mod: JZ | Performed by: NURSE ANESTHETIST, CERTIFIED REGISTERED

## 2024-01-01 PROCEDURE — 250N000013 HC RX MED GY IP 250 OP 250 PS 637: Performed by: PHYSICIAN ASSISTANT

## 2024-01-01 PROCEDURE — 99204 OFFICE O/P NEW MOD 45 MIN: CPT | Performed by: NURSE PRACTITIONER

## 2024-01-01 PROCEDURE — 258N000003 HC RX IP 258 OP 636: Mod: JZ | Performed by: PHYSICIAN ASSISTANT

## 2024-01-01 PROCEDURE — 36415 COLL VENOUS BLD VENIPUNCTURE: CPT | Mod: ORL | Performed by: FAMILY MEDICINE

## 2024-01-01 PROCEDURE — 36415 COLL VENOUS BLD VENIPUNCTURE: CPT | Performed by: EMERGENCY MEDICINE

## 2024-01-01 PROCEDURE — 93306 TTE W/DOPPLER COMPLETE: CPT | Mod: 26 | Performed by: INTERNAL MEDICINE

## 2024-01-01 PROCEDURE — 99214 OFFICE O/P EST MOD 30 MIN: CPT | Performed by: INTERNAL MEDICINE

## 2024-01-01 PROCEDURE — 93970 EXTREMITY STUDY: CPT

## 2024-01-01 PROCEDURE — 250N000011 HC RX IP 250 OP 636: Performed by: INTERNAL MEDICINE

## 2024-01-01 PROCEDURE — 96365 THER/PROPH/DIAG IV INF INIT: CPT | Mod: 59

## 2024-01-01 PROCEDURE — 250N000013 HC RX MED GY IP 250 OP 250 PS 637: Performed by: STUDENT IN AN ORGANIZED HEALTH CARE EDUCATION/TRAINING PROGRAM

## 2024-01-01 PROCEDURE — 85730 THROMBOPLASTIN TIME PARTIAL: CPT | Performed by: INTERNAL MEDICINE

## 2024-01-01 PROCEDURE — 92526 ORAL FUNCTION THERAPY: CPT | Mod: GN

## 2024-01-01 PROCEDURE — P9604 ONE-WAY ALLOW PRORATED TRIP: HCPCS | Mod: ORL | Performed by: NURSE PRACTITIONER

## 2024-01-01 PROCEDURE — 99213 OFFICE O/P EST LOW 20 MIN: CPT | Performed by: NURSE PRACTITIONER

## 2024-01-01 PROCEDURE — 97530 THERAPEUTIC ACTIVITIES: CPT | Mod: GP

## 2024-01-01 PROCEDURE — 85025 COMPLETE CBC W/AUTO DIFF WBC: CPT | Performed by: INTERNAL MEDICINE

## 2024-01-01 PROCEDURE — 96375 TX/PRO/DX INJ NEW DRUG ADDON: CPT

## 2024-01-01 PROCEDURE — G2211 COMPLEX E/M VISIT ADD ON: HCPCS | Performed by: INTERNAL MEDICINE

## 2024-01-01 PROCEDURE — 36415 COLL VENOUS BLD VENIPUNCTURE: CPT | Performed by: INTERNAL MEDICINE

## 2024-01-01 PROCEDURE — 82374 ASSAY BLOOD CARBON DIOXIDE: CPT | Performed by: EMERGENCY MEDICINE

## 2024-01-01 PROCEDURE — 97116 GAIT TRAINING THERAPY: CPT | Mod: GP

## 2024-01-01 PROCEDURE — 250N000011 HC RX IP 250 OP 636: Mod: JZ | Performed by: INTERNAL MEDICINE

## 2024-01-01 PROCEDURE — 97530 THERAPEUTIC ACTIVITIES: CPT | Mod: GO

## 2024-01-01 PROCEDURE — 99233 SBSQ HOSP IP/OBS HIGH 50: CPT | Performed by: INTERNAL MEDICINE

## 2024-01-01 PROCEDURE — 73552 X-RAY EXAM OF FEMUR 2/>: CPT | Mod: RT

## 2024-01-01 PROCEDURE — 85520 HEPARIN ASSAY: CPT | Performed by: STUDENT IN AN ORGANIZED HEALTH CARE EDUCATION/TRAINING PROGRAM

## 2024-01-01 PROCEDURE — G0463 HOSPITAL OUTPT CLINIC VISIT: HCPCS | Mod: 25

## 2024-01-01 PROCEDURE — 94640 AIRWAY INHALATION TREATMENT: CPT

## 2024-01-01 PROCEDURE — 250N000009 HC RX 250: Performed by: STUDENT IN AN ORGANIZED HEALTH CARE EDUCATION/TRAINING PROGRAM

## 2024-01-01 PROCEDURE — 99232 SBSQ HOSP IP/OBS MODERATE 35: CPT | Performed by: INTERNAL MEDICINE

## 2024-01-01 PROCEDURE — 93005 ELECTROCARDIOGRAM TRACING: CPT | Performed by: EMERGENCY MEDICINE

## 2024-01-01 PROCEDURE — 80048 BASIC METABOLIC PNL TOTAL CA: CPT | Performed by: EMERGENCY MEDICINE

## 2024-01-01 PROCEDURE — 85049 AUTOMATED PLATELET COUNT: CPT | Performed by: INTERNAL MEDICINE

## 2024-01-01 PROCEDURE — 82374 ASSAY BLOOD CARBON DIOXIDE: CPT | Performed by: HOSPITALIST

## 2024-01-01 PROCEDURE — 96366 THER/PROPH/DIAG IV INF ADDON: CPT

## 2024-01-01 PROCEDURE — 85048 AUTOMATED LEUKOCYTE COUNT: CPT | Performed by: INTERNAL MEDICINE

## 2024-01-01 PROCEDURE — 99239 HOSP IP/OBS DSCHRG MGMT >30: CPT | Performed by: HOSPITALIST

## 2024-01-01 PROCEDURE — 0SRR019 REPLACEMENT OF RIGHT HIP JOINT, FEMORAL SURFACE WITH METAL SYNTHETIC SUBSTITUTE, CEMENTED, OPEN APPROACH: ICD-10-PCS | Performed by: ORTHOPAEDIC SURGERY

## 2024-01-01 PROCEDURE — 250N000011 HC RX IP 250 OP 636: Mod: JZ | Performed by: PHYSICIAN ASSISTANT

## 2024-01-01 PROCEDURE — G0378 HOSPITAL OBSERVATION PER HR: HCPCS

## 2024-01-01 PROCEDURE — 80048 BASIC METABOLIC PNL TOTAL CA: CPT | Performed by: INTERNAL MEDICINE

## 2024-01-01 PROCEDURE — 85018 HEMOGLOBIN: CPT | Performed by: HOSPITALIST

## 2024-01-01 PROCEDURE — 85041 AUTOMATED RBC COUNT: CPT | Performed by: INTERNAL MEDICINE

## 2024-01-01 PROCEDURE — 36415 COLL VENOUS BLD VENIPUNCTURE: CPT | Performed by: PHYSICIAN ASSISTANT

## 2024-01-01 PROCEDURE — 83036 HEMOGLOBIN GLYCOSYLATED A1C: CPT | Performed by: INTERNAL MEDICINE

## 2024-01-01 PROCEDURE — 99207 PR NO BILLABLE SERVICE THIS VISIT: CPT | Performed by: INTERNAL MEDICINE

## 2024-01-01 PROCEDURE — 86900 BLOOD TYPING SEROLOGIC ABO: CPT | Performed by: EMERGENCY MEDICINE

## 2024-01-01 PROCEDURE — 81001 URINALYSIS AUTO W/SCOPE: CPT | Performed by: EMERGENCY MEDICINE

## 2024-01-01 PROCEDURE — 84443 ASSAY THYROID STIM HORMONE: CPT | Performed by: INTERNAL MEDICINE

## 2024-01-01 PROCEDURE — 86140 C-REACTIVE PROTEIN: CPT | Performed by: INTERNAL MEDICINE

## 2024-01-01 PROCEDURE — 999N000157 HC STATISTIC RCP TIME EA 10 MIN

## 2024-01-01 PROCEDURE — 83605 ASSAY OF LACTIC ACID: CPT | Performed by: EMERGENCY MEDICINE

## 2024-01-01 PROCEDURE — 99310 SBSQ NF CARE HIGH MDM 45: CPT | Performed by: NURSE PRACTITIONER

## 2024-01-01 PROCEDURE — 85027 COMPLETE CBC AUTOMATED: CPT | Performed by: INTERNAL MEDICINE

## 2024-01-01 PROCEDURE — 82310 ASSAY OF CALCIUM: CPT | Performed by: STUDENT IN AN ORGANIZED HEALTH CARE EDUCATION/TRAINING PROGRAM

## 2024-01-01 PROCEDURE — 250N000013 HC RX MED GY IP 250 OP 250 PS 637: Performed by: HOSPITALIST

## 2024-01-01 PROCEDURE — 99207 PR NO BILLABLE SERVICE THIS VISIT: CPT | Performed by: PHYSICIAN ASSISTANT

## 2024-01-01 PROCEDURE — 72125 CT NECK SPINE W/O DYE: CPT

## 2024-01-01 PROCEDURE — 97110 THERAPEUTIC EXERCISES: CPT | Mod: GO

## 2024-01-01 PROCEDURE — 80048 BASIC METABOLIC PNL TOTAL CA: CPT | Performed by: HOSPITALIST

## 2024-01-01 PROCEDURE — 999N000104 CT THORACIC SPINE RECONSTRUCTED

## 2024-01-01 PROCEDURE — 99309 SBSQ NF CARE MODERATE MDM 30: CPT | Performed by: NURSE PRACTITIONER

## 2024-01-01 PROCEDURE — 97166 OT EVAL MOD COMPLEX 45 MIN: CPT | Mod: GO

## 2024-01-01 PROCEDURE — 370N000017 HC ANESTHESIA TECHNICAL FEE, PER MIN: Performed by: ORTHOPAEDIC SURGERY

## 2024-01-01 PROCEDURE — 999N000104 CT LUMBAR SPINE RECONSTRUCTED

## 2024-01-01 PROCEDURE — 258N000003 HC RX IP 258 OP 636: Mod: JZ | Performed by: INTERNAL MEDICINE

## 2024-01-01 PROCEDURE — 99207 PR APP CREDIT; MD BILLING SHARED VISIT: CPT | Performed by: INTERNAL MEDICINE

## 2024-01-01 PROCEDURE — 93306 TTE W/DOPPLER COMPLETE: CPT

## 2024-01-01 PROCEDURE — 710N000010 HC RECOVERY PHASE 1, LEVEL 2, PER MIN: Performed by: ORTHOPAEDIC SURGERY

## 2024-01-01 PROCEDURE — 250N000011 HC RX IP 250 OP 636: Mod: JZ | Performed by: STUDENT IN AN ORGANIZED HEALTH CARE EDUCATION/TRAINING PROGRAM

## 2024-01-01 PROCEDURE — 99223 1ST HOSP IP/OBS HIGH 75: CPT | Performed by: STUDENT IN AN ORGANIZED HEALTH CARE EDUCATION/TRAINING PROGRAM

## 2024-01-01 PROCEDURE — 87899 AGENT NOS ASSAY W/OPTIC: CPT | Performed by: INTERNAL MEDICINE

## 2024-01-01 PROCEDURE — 87899 AGENT NOS ASSAY W/OPTIC: CPT | Performed by: EMERGENCY MEDICINE

## 2024-01-01 PROCEDURE — 86923 COMPATIBILITY TEST ELECTRIC: CPT | Performed by: INTERNAL MEDICINE

## 2024-01-01 PROCEDURE — 82805 BLOOD GASES W/O2 SATURATION: CPT | Performed by: EMERGENCY MEDICINE

## 2024-01-01 PROCEDURE — 250N000011 HC RX IP 250 OP 636: Mod: JZ | Performed by: ANESTHESIOLOGY

## 2024-01-01 PROCEDURE — P9016 RBC LEUKOCYTES REDUCED: HCPCS | Performed by: INTERNAL MEDICINE

## 2024-01-01 PROCEDURE — 81001 URINALYSIS AUTO W/SCOPE: CPT | Performed by: HOSPITALIST

## 2024-01-01 PROCEDURE — 86900 BLOOD TYPING SEROLOGIC ABO: CPT | Performed by: INTERNAL MEDICINE

## 2024-01-01 PROCEDURE — 85652 RBC SED RATE AUTOMATED: CPT | Performed by: INTERNAL MEDICINE

## 2024-01-01 PROCEDURE — 87040 BLOOD CULTURE FOR BACTERIA: CPT | Performed by: EMERGENCY MEDICINE

## 2024-01-01 PROCEDURE — 85041 AUTOMATED RBC COUNT: CPT | Performed by: STUDENT IN AN ORGANIZED HEALTH CARE EDUCATION/TRAINING PROGRAM

## 2024-01-01 PROCEDURE — 92610 EVALUATE SWALLOWING FUNCTION: CPT | Mod: GN

## 2024-01-01 PROCEDURE — 272N000001 HC OR GENERAL SUPPLY STERILE: Performed by: ORTHOPAEDIC SURGERY

## 2024-01-01 PROCEDURE — 999N000141 HC STATISTIC PRE-PROCEDURE NURSING ASSESSMENT: Performed by: ORTHOPAEDIC SURGERY

## 2024-01-01 PROCEDURE — 99232 SBSQ HOSP IP/OBS MODERATE 35: CPT | Performed by: HOSPITALIST

## 2024-01-01 PROCEDURE — 80048 BASIC METABOLIC PNL TOTAL CA: CPT | Mod: ORL | Performed by: NURSE PRACTITIONER

## 2024-01-01 PROCEDURE — 85520 HEPARIN ASSAY: CPT | Performed by: INTERNAL MEDICINE

## 2024-01-01 PROCEDURE — 84145 PROCALCITONIN (PCT): CPT | Performed by: INTERNAL MEDICINE

## 2024-01-01 PROCEDURE — 72170 X-RAY EXAM OF PELVIS: CPT

## 2024-01-01 PROCEDURE — 999N000156 HC STATISTIC RCP CONSULT EA 30 MIN

## 2024-01-01 PROCEDURE — 96368 THER/DIAG CONCURRENT INF: CPT

## 2024-01-01 PROCEDURE — 250N000011 HC RX IP 250 OP 636: Performed by: EMERGENCY MEDICINE

## 2024-01-01 PROCEDURE — 258N000003 HC RX IP 258 OP 636: Mod: JZ | Performed by: STUDENT IN AN ORGANIZED HEALTH CARE EDUCATION/TRAINING PROGRAM

## 2024-01-01 PROCEDURE — C1713 ANCHOR/SCREW BN/BN,TIS/BN: HCPCS | Performed by: ORTHOPAEDIC SURGERY

## 2024-01-01 PROCEDURE — 51701 INSERT BLADDER CATHETER: CPT

## 2024-01-01 PROCEDURE — 99233 SBSQ HOSP IP/OBS HIGH 50: CPT | Performed by: HOSPITALIST

## 2024-01-01 PROCEDURE — 97535 SELF CARE MNGMENT TRAINING: CPT | Mod: GO

## 2024-01-01 PROCEDURE — 36415 COLL VENOUS BLD VENIPUNCTURE: CPT | Performed by: HOSPITALIST

## 2024-01-01 PROCEDURE — 71250 CT THORAX DX C-: CPT

## 2024-01-01 PROCEDURE — 250N000011 HC RX IP 250 OP 636: Mod: JZ | Performed by: NURSE ANESTHETIST, CERTIFIED REGISTERED

## 2024-01-01 PROCEDURE — 83605 ASSAY OF LACTIC ACID: CPT | Performed by: INTERNAL MEDICINE

## 2024-01-01 PROCEDURE — 250N000011 HC RX IP 250 OP 636: Mod: JZ | Performed by: EMERGENCY MEDICINE

## 2024-01-01 PROCEDURE — 99285 EMERGENCY DEPT VISIT HI MDM: CPT | Mod: 25

## 2024-01-01 PROCEDURE — 99221 1ST HOSP IP/OBS SF/LOW 40: CPT | Performed by: SURGERY

## 2024-01-01 PROCEDURE — 85014 HEMATOCRIT: CPT | Performed by: INTERNAL MEDICINE

## 2024-01-01 PROCEDURE — 85025 COMPLETE CBC W/AUTO DIFF WBC: CPT | Performed by: EMERGENCY MEDICINE

## 2024-01-01 PROCEDURE — 250N000011 HC RX IP 250 OP 636: Performed by: STUDENT IN AN ORGANIZED HEALTH CARE EDUCATION/TRAINING PROGRAM

## 2024-01-01 PROCEDURE — 258N000003 HC RX IP 258 OP 636: Mod: JZ | Performed by: HOSPITALIST

## 2024-01-01 PROCEDURE — 85025 COMPLETE CBC W/AUTO DIFF WBC: CPT | Mod: ORL | Performed by: FAMILY MEDICINE

## 2024-01-01 PROCEDURE — 87637 SARSCOV2&INF A&B&RSV AMP PRB: CPT | Performed by: EMERGENCY MEDICINE

## 2024-01-01 PROCEDURE — 258N000003 HC RX IP 258 OP 636: Mod: JZ | Performed by: EMERGENCY MEDICINE

## 2024-01-01 PROCEDURE — 87637 SARSCOV2&INF A&B&RSV AMP PRB: CPT | Performed by: INTERNAL MEDICINE

## 2024-01-01 PROCEDURE — 99222 1ST HOSP IP/OBS MODERATE 55: CPT | Performed by: INTERNAL MEDICINE

## 2024-01-01 PROCEDURE — 36415 COLL VENOUS BLD VENIPUNCTURE: CPT | Performed by: STUDENT IN AN ORGANIZED HEALTH CARE EDUCATION/TRAINING PROGRAM

## 2024-01-01 PROCEDURE — 250N000013 HC RX MED GY IP 250 OP 250 PS 637: Performed by: EMERGENCY MEDICINE

## 2024-01-01 PROCEDURE — 64405 NJX AA&/STRD GR OCPL NRV: CPT | Mod: LT | Performed by: PAIN MEDICINE

## 2024-01-01 PROCEDURE — 250N000009 HC RX 250: Performed by: ORTHOPAEDIC SURGERY

## 2024-01-01 PROCEDURE — 76942 ECHO GUIDE FOR BIOPSY: CPT | Performed by: PAIN MEDICINE

## 2024-01-01 PROCEDURE — 72040 X-RAY EXAM NECK SPINE 2-3 VW: CPT

## 2024-01-01 PROCEDURE — 250N000009 HC RX 250: Performed by: INTERNAL MEDICINE

## 2024-01-01 PROCEDURE — 83550 IRON BINDING TEST: CPT | Performed by: INTERNAL MEDICINE

## 2024-01-01 PROCEDURE — 81001 URINALYSIS AUTO W/SCOPE: CPT | Performed by: INTERNAL MEDICINE

## 2024-01-01 PROCEDURE — 96376 TX/PRO/DX INJ SAME DRUG ADON: CPT

## 2024-01-01 PROCEDURE — 71275 CT ANGIOGRAPHY CHEST: CPT

## 2024-01-01 PROCEDURE — 250N000009 HC RX 250: Performed by: NURSE ANESTHETIST, CERTIFIED REGISTERED

## 2024-01-01 PROCEDURE — 96366 THER/PROPH/DIAG IV INF ADDON: CPT | Mod: 59

## 2024-01-01 PROCEDURE — 96367 TX/PROPH/DG ADDL SEQ IV INF: CPT

## 2024-01-01 PROCEDURE — 250N000011 HC RX IP 250 OP 636: Performed by: PHYSICIAN ASSISTANT

## 2024-01-01 PROCEDURE — 85027 COMPLETE CBC AUTOMATED: CPT | Mod: ORL | Performed by: NURSE PRACTITIONER

## 2024-01-01 PROCEDURE — 84484 ASSAY OF TROPONIN QUANT: CPT | Performed by: EMERGENCY MEDICINE

## 2024-01-01 PROCEDURE — 83880 ASSAY OF NATRIURETIC PEPTIDE: CPT | Performed by: EMERGENCY MEDICINE

## 2024-01-01 PROCEDURE — 96361 HYDRATE IV INFUSION ADD-ON: CPT

## 2024-01-01 PROCEDURE — 258N000003 HC RX IP 258 OP 636: Performed by: EMERGENCY MEDICINE

## 2024-01-01 PROCEDURE — 97112 NEUROMUSCULAR REEDUCATION: CPT | Mod: GP

## 2024-01-01 PROCEDURE — 97162 PT EVAL MOD COMPLEX 30 MIN: CPT | Mod: GP

## 2024-01-01 PROCEDURE — 73560 X-RAY EXAM OF KNEE 1 OR 2: CPT | Mod: RT

## 2024-01-01 PROCEDURE — 82374 ASSAY BLOOD CARBON DIOXIDE: CPT | Performed by: STUDENT IN AN ORGANIZED HEALTH CARE EDUCATION/TRAINING PROGRAM

## 2024-01-01 PROCEDURE — 85610 PROTHROMBIN TIME: CPT | Performed by: EMERGENCY MEDICINE

## 2024-01-01 PROCEDURE — 272N000202 HC AEROBIKA WITH MANOMETER

## 2024-01-01 PROCEDURE — 85018 HEMOGLOBIN: CPT | Performed by: PHYSICIAN ASSISTANT

## 2024-01-01 PROCEDURE — 360N000077 HC SURGERY LEVEL 4, PER MIN: Performed by: ORTHOPAEDIC SURGERY

## 2024-01-01 PROCEDURE — 99223 1ST HOSP IP/OBS HIGH 75: CPT | Performed by: INTERNAL MEDICINE

## 2024-01-01 PROCEDURE — 85014 HEMATOCRIT: CPT | Performed by: STUDENT IN AN ORGANIZED HEALTH CARE EDUCATION/TRAINING PROGRAM

## 2024-01-01 PROCEDURE — 82374 ASSAY BLOOD CARBON DIOXIDE: CPT | Performed by: INTERNAL MEDICINE

## 2024-01-01 PROCEDURE — 83605 ASSAY OF LACTIC ACID: CPT | Performed by: HOSPITALIST

## 2024-01-01 DEVICE — LOW FRICTION ION TREATMENT
Type: IMPLANTABLE DEVICE | Site: HIP | Status: FUNCTIONAL
Brand: C-TAPER HEAD

## 2024-01-01 DEVICE — BONE CEMENT SIMPLEX FULL DOSE 6191-1-001: Type: IMPLANTABLE DEVICE | Site: HIP | Status: FUNCTIONAL

## 2024-01-01 DEVICE — PLUG CEMENT BUCK W/INSERT 18.5 71279422: Type: IMPLANTABLE DEVICE | Site: HIP | Status: FUNCTIONAL

## 2024-01-01 DEVICE — 132 DEGREE CEMENTED HIP STEM
Type: IMPLANTABLE DEVICE | Site: HIP | Status: FUNCTIONAL
Brand: OMNIFIT

## 2024-01-01 DEVICE — BIPOLAR COMPONENT
Type: IMPLANTABLE DEVICE | Site: HIP | Status: FUNCTIONAL
Brand: UHR

## 2024-01-01 RX ORDER — OXYCODONE HYDROCHLORIDE 5 MG/1
10 TABLET ORAL
Status: CANCELLED | OUTPATIENT
Start: 2024-01-01

## 2024-01-01 RX ORDER — ESCITALOPRAM OXALATE 10 MG/1
10 TABLET ORAL DAILY
Status: DISCONTINUED | OUTPATIENT
Start: 2024-01-01 | End: 2024-01-01 | Stop reason: HOSPADM

## 2024-01-01 RX ORDER — ACETAMINOPHEN 325 MG/1
975 TABLET ORAL EVERY 8 HOURS
Qty: 27 TABLET | Refills: 0 | Status: COMPLETED | OUTPATIENT
Start: 2024-01-01 | End: 2024-01-01

## 2024-01-01 RX ORDER — CEFAZOLIN SODIUM/WATER 2 G/20 ML
2 SYRINGE (ML) INTRAVENOUS
Status: COMPLETED | OUTPATIENT
Start: 2024-01-01 | End: 2024-01-01

## 2024-01-01 RX ORDER — OXYCODONE HYDROCHLORIDE 5 MG/1
5 TABLET ORAL
Status: CANCELLED | OUTPATIENT
Start: 2024-01-01

## 2024-01-01 RX ORDER — PROPOFOL 10 MG/ML
INJECTION, EMULSION INTRAVENOUS CONTINUOUS PRN
Status: DISCONTINUED | OUTPATIENT
Start: 2024-01-01 | End: 2024-01-01

## 2024-01-01 RX ORDER — BUPIVACAINE HYDROCHLORIDE 5 MG/ML
INJECTION, SOLUTION EPIDURAL; INTRACAUDAL
Status: COMPLETED | OUTPATIENT
Start: 2024-01-01 | End: 2024-01-01

## 2024-01-01 RX ORDER — ONDANSETRON 2 MG/ML
4 INJECTION INTRAMUSCULAR; INTRAVENOUS EVERY 6 HOURS PRN
Status: DISCONTINUED | OUTPATIENT
Start: 2024-01-01 | End: 2024-01-01

## 2024-01-01 RX ORDER — CEFEPIME HYDROCHLORIDE 2 G/1
2 INJECTION, POWDER, FOR SOLUTION INTRAVENOUS EVERY 24 HOURS
Status: DISCONTINUED | OUTPATIENT
Start: 2024-01-01 | End: 2024-01-01

## 2024-01-01 RX ORDER — ONDANSETRON 2 MG/ML
4 INJECTION INTRAMUSCULAR; INTRAVENOUS EVERY 30 MIN PRN
Status: DISCONTINUED | OUTPATIENT
Start: 2024-01-01 | End: 2024-01-01 | Stop reason: HOSPADM

## 2024-01-01 RX ORDER — CLOBETASOL PROPIONATE 0.5 MG/G
OINTMENT TOPICAL DAILY PRN
COMMUNITY

## 2024-01-01 RX ORDER — ONDANSETRON 4 MG/1
4 TABLET, ORALLY DISINTEGRATING ORAL EVERY 6 HOURS PRN
Status: DISCONTINUED | OUTPATIENT
Start: 2024-01-01 | End: 2024-01-01

## 2024-01-01 RX ORDER — FENTANYL CITRATE 50 UG/ML
25-100 INJECTION, SOLUTION INTRAMUSCULAR; INTRAVENOUS
Status: DISCONTINUED | OUTPATIENT
Start: 2024-01-01 | End: 2024-01-01 | Stop reason: HOSPADM

## 2024-01-01 RX ORDER — HYDROMORPHONE HCL IN WATER/PF 6 MG/30 ML
0.4 PATIENT CONTROLLED ANALGESIA SYRINGE INTRAVENOUS EVERY 5 MIN PRN
Status: DISCONTINUED | OUTPATIENT
Start: 2024-01-01 | End: 2024-01-01 | Stop reason: HOSPADM

## 2024-01-01 RX ORDER — LOSARTAN POTASSIUM 25 MG/1
25 TABLET ORAL DAILY
Status: DISCONTINUED | OUTPATIENT
Start: 2024-01-01 | End: 2024-01-01

## 2024-01-01 RX ORDER — SODIUM CHLORIDE, SODIUM LACTATE, POTASSIUM CHLORIDE, CALCIUM CHLORIDE 600; 310; 30; 20 MG/100ML; MG/100ML; MG/100ML; MG/100ML
INJECTION, SOLUTION INTRAVENOUS CONTINUOUS
Status: DISCONTINUED | OUTPATIENT
Start: 2024-01-01 | End: 2024-01-01 | Stop reason: HOSPADM

## 2024-01-01 RX ORDER — IPRATROPIUM BROMIDE AND ALBUTEROL SULFATE 2.5; .5 MG/3ML; MG/3ML
3 SOLUTION RESPIRATORY (INHALATION) EVERY 4 HOURS PRN
Status: DISCONTINUED | OUTPATIENT
Start: 2024-01-01 | End: 2024-01-01 | Stop reason: HOSPADM

## 2024-01-01 RX ORDER — HYDROMORPHONE HCL IN WATER/PF 6 MG/30 ML
0.2 PATIENT CONTROLLED ANALGESIA SYRINGE INTRAVENOUS EVERY 5 MIN PRN
Status: DISCONTINUED | OUTPATIENT
Start: 2024-01-01 | End: 2024-01-01 | Stop reason: HOSPADM

## 2024-01-01 RX ORDER — HEPARIN SODIUM 10000 [USP'U]/100ML
0-5000 INJECTION, SOLUTION INTRAVENOUS CONTINUOUS
Status: DISCONTINUED | OUTPATIENT
Start: 2024-01-01 | End: 2024-01-01

## 2024-01-01 RX ORDER — HYDROXYCHLOROQUINE SULFATE 200 MG/1
200 TABLET, FILM COATED ORAL DAILY
Qty: 90 TABLET | Refills: 1 | Status: SHIPPED | OUTPATIENT
Start: 2024-01-01

## 2024-01-01 RX ORDER — RESPIRATORY SYNCYTIAL VIRUS VACCINE 120MCG/0.5
0.5 KIT INTRAMUSCULAR ONCE
Qty: 1 EACH | Refills: 0 | Status: CANCELLED | OUTPATIENT
Start: 2024-01-01 | End: 2024-01-01

## 2024-01-01 RX ORDER — PANTOPRAZOLE SODIUM 20 MG/1
40 TABLET, DELAYED RELEASE ORAL AT BEDTIME
COMMUNITY
Start: 2024-01-01

## 2024-01-01 RX ORDER — CALCIUM CARBONATE/VITAMIN D3 600 MG-10
500 TABLET ORAL DAILY
Status: DISCONTINUED | OUTPATIENT
Start: 2024-01-01 | End: 2024-01-01 | Stop reason: HOSPADM

## 2024-01-01 RX ORDER — OXYCODONE HYDROCHLORIDE 5 MG/1
2.5 TABLET ORAL EVERY 6 HOURS PRN
Status: ON HOLD | COMMUNITY
End: 2024-01-01

## 2024-01-01 RX ORDER — ACETAMINOPHEN 650 MG/1
650 SUPPOSITORY RECTAL EVERY 4 HOURS PRN
Status: DISCONTINUED | OUTPATIENT
Start: 2024-01-01 | End: 2024-01-01

## 2024-01-01 RX ORDER — FERROUS SULFATE 325(65) MG
325 TABLET ORAL DAILY
Status: DISCONTINUED | OUTPATIENT
Start: 2024-01-01 | End: 2024-01-01 | Stop reason: HOSPADM

## 2024-01-01 RX ORDER — NALOXONE HYDROCHLORIDE 0.4 MG/ML
0.2 INJECTION, SOLUTION INTRAMUSCULAR; INTRAVENOUS; SUBCUTANEOUS
Status: DISCONTINUED | OUTPATIENT
Start: 2024-01-01 | End: 2024-01-01

## 2024-01-01 RX ORDER — AMOXICILLIN 250 MG
2 CAPSULE ORAL 2 TIMES DAILY PRN
Status: DISCONTINUED | OUTPATIENT
Start: 2024-01-01 | End: 2024-01-01 | Stop reason: HOSPADM

## 2024-01-01 RX ORDER — HYDROXYZINE HYDROCHLORIDE 25 MG/1
50 TABLET, FILM COATED ORAL EVERY 6 HOURS PRN
Status: DISCONTINUED | OUTPATIENT
Start: 2024-01-01 | End: 2024-01-01 | Stop reason: HOSPADM

## 2024-01-01 RX ORDER — OXYCODONE HYDROCHLORIDE 5 MG/1
2.5 TABLET ORAL EVERY 6 HOURS PRN
Qty: 5 TABLET | Refills: 0 | Status: SHIPPED | OUTPATIENT
Start: 2024-01-01 | End: 2024-01-01

## 2024-01-01 RX ORDER — POLYETHYLENE GLYCOL 3350 17 G/17G
17 POWDER, FOR SOLUTION ORAL DAILY
Status: DISCONTINUED | OUTPATIENT
Start: 2024-01-01 | End: 2024-01-01

## 2024-01-01 RX ORDER — CALCIUM CARBONATE 500 MG/1
1000 TABLET, CHEWABLE ORAL 4 TIMES DAILY PRN
Status: DISCONTINUED | OUTPATIENT
Start: 2024-01-01 | End: 2024-01-01 | Stop reason: HOSPADM

## 2024-01-01 RX ORDER — DEXAMETHASONE SODIUM PHOSPHATE 4 MG/ML
4 INJECTION, SOLUTION INTRA-ARTICULAR; INTRALESIONAL; INTRAMUSCULAR; INTRAVENOUS; SOFT TISSUE
Status: DISCONTINUED | OUTPATIENT
Start: 2024-01-01 | End: 2024-01-01 | Stop reason: HOSPADM

## 2024-01-01 RX ORDER — LIDOCAINE 40 MG/G
CREAM TOPICAL
Status: DISCONTINUED | OUTPATIENT
Start: 2024-01-01 | End: 2024-01-01 | Stop reason: HOSPADM

## 2024-01-01 RX ORDER — HYDROMORPHONE HCL IN WATER/PF 6 MG/30 ML
.2-.4 PATIENT CONTROLLED ANALGESIA SYRINGE INTRAVENOUS
Status: DISCONTINUED | OUTPATIENT
Start: 2024-01-01 | End: 2024-01-01

## 2024-01-01 RX ORDER — CARVEDILOL 3.12 MG/1
6.25 TABLET ORAL ONCE
Status: COMPLETED | OUTPATIENT
Start: 2024-01-01 | End: 2024-01-01

## 2024-01-01 RX ORDER — NALOXONE HYDROCHLORIDE 0.4 MG/ML
0.1 INJECTION, SOLUTION INTRAMUSCULAR; INTRAVENOUS; SUBCUTANEOUS
Status: CANCELLED | OUTPATIENT
Start: 2024-01-01

## 2024-01-01 RX ORDER — NALOXONE HYDROCHLORIDE 0.4 MG/ML
0.4 INJECTION, SOLUTION INTRAMUSCULAR; INTRAVENOUS; SUBCUTANEOUS
Status: DISCONTINUED | OUTPATIENT
Start: 2024-01-01 | End: 2024-01-01

## 2024-01-01 RX ORDER — PROPOFOL 10 MG/ML
INJECTION, EMULSION INTRAVENOUS PRN
Status: DISCONTINUED | OUTPATIENT
Start: 2024-01-01 | End: 2024-01-01

## 2024-01-01 RX ORDER — CARVEDILOL 3.12 MG/1
6.25 TABLET ORAL 2 TIMES DAILY WITH MEALS
Status: DISCONTINUED | OUTPATIENT
Start: 2024-01-01 | End: 2024-01-01 | Stop reason: HOSPADM

## 2024-01-01 RX ORDER — HYDRALAZINE HYDROCHLORIDE 25 MG/1
25 TABLET, FILM COATED ORAL 3 TIMES DAILY
Status: DISCONTINUED | OUTPATIENT
Start: 2024-01-01 | End: 2024-01-01

## 2024-01-01 RX ORDER — AMOXICILLIN 250 MG
1 CAPSULE ORAL 2 TIMES DAILY
Qty: 60 TABLET | Refills: 0 | Status: SHIPPED | OUTPATIENT
Start: 2024-01-01 | End: 2024-01-01

## 2024-01-01 RX ORDER — ONDANSETRON 4 MG/1
4 TABLET, ORALLY DISINTEGRATING ORAL EVERY 6 HOURS PRN
Status: DISCONTINUED | OUTPATIENT
Start: 2024-01-01 | End: 2024-01-01 | Stop reason: HOSPADM

## 2024-01-01 RX ORDER — HYDROMORPHONE HYDROCHLORIDE 1 MG/ML
0.5 INJECTION, SOLUTION INTRAMUSCULAR; INTRAVENOUS; SUBCUTANEOUS
Status: DISCONTINUED | OUTPATIENT
Start: 2024-01-01 | End: 2024-01-01

## 2024-01-01 RX ORDER — CEFDINIR 300 MG/1
300 CAPSULE ORAL DAILY
Status: COMPLETED | OUTPATIENT
Start: 2024-01-01 | End: 2024-01-01

## 2024-01-01 RX ORDER — PREDNISONE 20 MG/1
20 TABLET ORAL DAILY
Qty: 5 TABLET | Refills: 0 | Status: SHIPPED | OUTPATIENT
Start: 2024-01-01 | End: 2024-01-01

## 2024-01-01 RX ORDER — KETAMINE HYDROCHLORIDE 10 MG/ML
INJECTION INTRAMUSCULAR; INTRAVENOUS PRN
Status: DISCONTINUED | OUTPATIENT
Start: 2024-01-01 | End: 2024-01-01

## 2024-01-01 RX ORDER — AMOXICILLIN 250 MG
1 CAPSULE ORAL 2 TIMES DAILY PRN
Status: DISCONTINUED | OUTPATIENT
Start: 2024-01-01 | End: 2024-01-01 | Stop reason: HOSPADM

## 2024-01-01 RX ORDER — AMOXICILLIN 250 MG
2 CAPSULE ORAL 2 TIMES DAILY
Status: DISCONTINUED | OUTPATIENT
Start: 2024-01-01 | End: 2024-01-01

## 2024-01-01 RX ORDER — HYDRALAZINE HYDROCHLORIDE 25 MG/1
25 TABLET, FILM COATED ORAL EVERY 8 HOURS SCHEDULED
Status: DISCONTINUED | OUTPATIENT
Start: 2024-01-01 | End: 2024-01-01 | Stop reason: HOSPADM

## 2024-01-01 RX ORDER — ACETAMINOPHEN 650 MG/1
650 SUPPOSITORY RECTAL EVERY 4 HOURS PRN
Status: DISCONTINUED | OUTPATIENT
Start: 2024-01-01 | End: 2024-01-01 | Stop reason: HOSPADM

## 2024-01-01 RX ORDER — LEVOTHYROXINE SODIUM 75 UG/1
75 TABLET ORAL SEE ADMIN INSTRUCTIONS
Qty: 90 TABLET | Refills: 11 | Status: SHIPPED | OUTPATIENT
Start: 2024-01-01

## 2024-01-01 RX ORDER — CEFDINIR 300 MG/1
300 CAPSULE ORAL DAILY
Status: DISCONTINUED | OUTPATIENT
Start: 2024-01-01 | End: 2024-01-01

## 2024-01-01 RX ORDER — POLYETHYLENE GLYCOL 3350 17 G/17G
17 POWDER, FOR SOLUTION ORAL 2 TIMES DAILY PRN
Status: DISCONTINUED | OUTPATIENT
Start: 2024-01-01 | End: 2024-01-01

## 2024-01-01 RX ORDER — DOXYCYCLINE 100 MG/1
100 CAPSULE ORAL 2 TIMES DAILY
Status: COMPLETED | OUTPATIENT
Start: 2024-01-01 | End: 2024-01-01

## 2024-01-01 RX ORDER — SODIUM CHLORIDE 9 MG/ML
INJECTION, SOLUTION INTRAVENOUS CONTINUOUS
Status: ACTIVE | OUTPATIENT
Start: 2024-01-01 | End: 2024-01-01

## 2024-01-01 RX ORDER — HYDROMORPHONE HCL IN WATER/PF 6 MG/30 ML
0.2 PATIENT CONTROLLED ANALGESIA SYRINGE INTRAVENOUS
Status: DISCONTINUED | OUTPATIENT
Start: 2024-01-01 | End: 2024-01-01

## 2024-01-01 RX ORDER — CEFTRIAXONE 2 G/1
2 INJECTION, POWDER, FOR SOLUTION INTRAMUSCULAR; INTRAVENOUS ONCE
Qty: 20 ML | Refills: 0 | Status: COMPLETED | OUTPATIENT
Start: 2024-01-01 | End: 2024-01-01

## 2024-01-01 RX ORDER — MORPHINE SULFATE 2 MG/ML
2 INJECTION, SOLUTION INTRAMUSCULAR; INTRAVENOUS ONCE
Status: COMPLETED | OUTPATIENT
Start: 2024-01-01 | End: 2024-01-01

## 2024-01-01 RX ORDER — NALOXONE HYDROCHLORIDE 0.4 MG/ML
0.4 INJECTION, SOLUTION INTRAMUSCULAR; INTRAVENOUS; SUBCUTANEOUS
Status: DISCONTINUED | OUTPATIENT
Start: 2024-01-01 | End: 2024-01-01 | Stop reason: HOSPADM

## 2024-01-01 RX ORDER — IPRATROPIUM BROMIDE AND ALBUTEROL SULFATE 2.5; .5 MG/3ML; MG/3ML
3 SOLUTION RESPIRATORY (INHALATION) 4 TIMES DAILY PRN
Status: DISCONTINUED | OUTPATIENT
Start: 2024-01-01 | End: 2024-01-01 | Stop reason: HOSPADM

## 2024-01-01 RX ORDER — SODIUM CHLORIDE, SODIUM LACTATE, POTASSIUM CHLORIDE, CALCIUM CHLORIDE 600; 310; 30; 20 MG/100ML; MG/100ML; MG/100ML; MG/100ML
INJECTION, SOLUTION INTRAVENOUS CONTINUOUS
Status: DISCONTINUED | OUTPATIENT
Start: 2024-01-01 | End: 2024-01-01

## 2024-01-01 RX ORDER — ACETAMINOPHEN 325 MG/1
650 TABLET ORAL EVERY 4 HOURS PRN
Status: DISCONTINUED | OUTPATIENT
Start: 2024-01-01 | End: 2024-01-01

## 2024-01-01 RX ORDER — ACETAMINOPHEN 325 MG/1
975 TABLET ORAL ONCE
Status: COMPLETED | OUTPATIENT
Start: 2024-01-01 | End: 2024-01-01

## 2024-01-01 RX ORDER — FLUCONAZOLE 150 MG/1
150 TABLET ORAL ONCE
Qty: 2 TABLET | Refills: 0 | Status: SHIPPED | OUTPATIENT
Start: 2024-01-01 | End: 2024-01-01

## 2024-01-01 RX ORDER — ATORVASTATIN CALCIUM 40 MG/1
40 TABLET, FILM COATED ORAL DAILY
Status: DISCONTINUED | OUTPATIENT
Start: 2024-01-01 | End: 2024-01-01 | Stop reason: HOSPADM

## 2024-01-01 RX ORDER — DOXYCYCLINE 100 MG/10ML
100 INJECTION, POWDER, LYOPHILIZED, FOR SOLUTION INTRAVENOUS EVERY 12 HOURS
Status: DISCONTINUED | OUTPATIENT
Start: 2024-01-01 | End: 2024-01-01

## 2024-01-01 RX ORDER — ACETAMINOPHEN 325 MG/1
650 TABLET ORAL EVERY 4 HOURS PRN
Qty: 100 TABLET | Refills: 0 | Status: SHIPPED | OUTPATIENT
Start: 2024-01-01

## 2024-01-01 RX ORDER — ONDANSETRON 2 MG/ML
4 INJECTION INTRAMUSCULAR; INTRAVENOUS EVERY 30 MIN PRN
Status: CANCELLED | OUTPATIENT
Start: 2024-01-01

## 2024-01-01 RX ORDER — LOSARTAN POTASSIUM 50 MG/1
50 TABLET ORAL DAILY
Status: DISCONTINUED | OUTPATIENT
Start: 2024-01-01 | End: 2024-01-01

## 2024-01-01 RX ORDER — HYDROXYCHLOROQUINE SULFATE 200 MG/1
200 TABLET, FILM COATED ORAL DAILY
Status: DISCONTINUED | OUTPATIENT
Start: 2024-01-01 | End: 2024-01-01 | Stop reason: HOSPADM

## 2024-01-01 RX ORDER — VITAMIN B COMPLEX
25 TABLET ORAL DAILY
Status: DISCONTINUED | OUTPATIENT
Start: 2024-01-01 | End: 2024-01-01 | Stop reason: HOSPADM

## 2024-01-01 RX ORDER — ONDANSETRON 2 MG/ML
4 INJECTION INTRAMUSCULAR; INTRAVENOUS EVERY 6 HOURS PRN
Status: DISCONTINUED | OUTPATIENT
Start: 2024-01-01 | End: 2024-01-01 | Stop reason: HOSPADM

## 2024-01-01 RX ORDER — AMOXICILLIN 250 MG
1 CAPSULE ORAL 2 TIMES DAILY
Status: DISCONTINUED | OUTPATIENT
Start: 2024-01-01 | End: 2024-01-01

## 2024-01-01 RX ORDER — ONDANSETRON 2 MG/ML
4 INJECTION INTRAMUSCULAR; INTRAVENOUS ONCE
Status: COMPLETED | OUTPATIENT
Start: 2024-01-01 | End: 2024-01-01

## 2024-01-01 RX ORDER — IPRATROPIUM BROMIDE AND ALBUTEROL SULFATE 2.5; .5 MG/3ML; MG/3ML
3 SOLUTION RESPIRATORY (INHALATION)
Status: DISCONTINUED | OUTPATIENT
Start: 2024-01-01 | End: 2024-01-01

## 2024-01-01 RX ORDER — DEXAMETHASONE SODIUM PHOSPHATE 4 MG/ML
4 INJECTION, SOLUTION INTRA-ARTICULAR; INTRALESIONAL; INTRAMUSCULAR; INTRAVENOUS; SOFT TISSUE
Status: CANCELLED | OUTPATIENT
Start: 2024-01-01

## 2024-01-01 RX ORDER — LOSARTAN POTASSIUM 50 MG/1
50 TABLET ORAL ONCE
Status: COMPLETED | OUTPATIENT
Start: 2024-01-01 | End: 2024-01-01

## 2024-01-01 RX ORDER — BISACODYL 10 MG
10 SUPPOSITORY, RECTAL RECTAL DAILY PRN
Status: DISCONTINUED | OUTPATIENT
Start: 2024-01-01 | End: 2024-01-01 | Stop reason: HOSPADM

## 2024-01-01 RX ORDER — MAGNESIUM HYDROXIDE 1200 MG/15ML
LIQUID ORAL PRN
Status: DISCONTINUED | OUTPATIENT
Start: 2024-01-01 | End: 2024-01-01 | Stop reason: HOSPADM

## 2024-01-01 RX ORDER — HYDRALAZINE HYDROCHLORIDE 25 MG/1
25 TABLET, FILM COATED ORAL EVERY 8 HOURS SCHEDULED
Status: DISCONTINUED | OUTPATIENT
Start: 2024-01-01 | End: 2024-01-01

## 2024-01-01 RX ORDER — HYDRALAZINE HYDROCHLORIDE 25 MG/1
25 TABLET, FILM COATED ORAL EVERY 8 HOURS
Status: SHIPPED
Start: 2024-01-01

## 2024-01-01 RX ORDER — PROCHLORPERAZINE MALEATE 5 MG
5 TABLET ORAL EVERY 6 HOURS PRN
Status: DISCONTINUED | OUTPATIENT
Start: 2024-01-01 | End: 2024-01-01

## 2024-01-01 RX ORDER — ONDANSETRON 4 MG/1
4 TABLET, ORALLY DISINTEGRATING ORAL EVERY 30 MIN PRN
Status: DISCONTINUED | OUTPATIENT
Start: 2024-01-01 | End: 2024-01-01 | Stop reason: HOSPADM

## 2024-01-01 RX ORDER — BUPIVACAINE HYDROCHLORIDE 2.5 MG/ML
INJECTION, SOLUTION EPIDURAL; INFILTRATION; INTRACAUDAL
Status: COMPLETED | OUTPATIENT
Start: 2024-01-01 | End: 2024-01-01

## 2024-01-01 RX ORDER — CEFAZOLIN SODIUM/WATER 2 G/20 ML
2 SYRINGE (ML) INTRAVENOUS SEE ADMIN INSTRUCTIONS
Status: DISCONTINUED | OUTPATIENT
Start: 2024-01-01 | End: 2024-01-01 | Stop reason: HOSPADM

## 2024-01-01 RX ORDER — ONDANSETRON 4 MG/1
4 TABLET, ORALLY DISINTEGRATING ORAL EVERY 30 MIN PRN
Status: CANCELLED | OUTPATIENT
Start: 2024-01-01

## 2024-01-01 RX ORDER — LEVOTHYROXINE SODIUM 25 UG/1
75 TABLET ORAL
Status: DISCONTINUED | OUTPATIENT
Start: 2024-01-01 | End: 2024-01-01 | Stop reason: HOSPADM

## 2024-01-01 RX ORDER — FENTANYL CITRATE 50 UG/ML
50 INJECTION, SOLUTION INTRAMUSCULAR; INTRAVENOUS ONCE
Status: COMPLETED | OUTPATIENT
Start: 2024-01-01 | End: 2024-01-01

## 2024-01-01 RX ORDER — ACETAMINOPHEN 325 MG/1
650 TABLET ORAL EVERY 4 HOURS PRN
Status: DISCONTINUED | OUTPATIENT
Start: 2024-01-01 | End: 2024-01-01 | Stop reason: HOSPADM

## 2024-01-01 RX ORDER — PROCHLORPERAZINE MALEATE 5 MG
5 TABLET ORAL EVERY 6 HOURS PRN
Status: DISCONTINUED | OUTPATIENT
Start: 2024-01-01 | End: 2024-01-01 | Stop reason: HOSPADM

## 2024-01-01 RX ORDER — TRANEXAMIC ACID 650 MG/1
1950 TABLET ORAL ONCE
Status: COMPLETED | OUTPATIENT
Start: 2024-01-01 | End: 2024-01-01

## 2024-01-01 RX ORDER — ACETYLCYSTEINE 200 MG/ML
2 SOLUTION ORAL; RESPIRATORY (INHALATION) 4 TIMES DAILY
Status: DISCONTINUED | OUTPATIENT
Start: 2024-01-01 | End: 2024-01-01

## 2024-01-01 RX ORDER — GABAPENTIN 100 MG/1
100 CAPSULE ORAL AT BEDTIME
Qty: 30 CAPSULE | Refills: 1 | Status: SHIPPED | OUTPATIENT
Start: 2024-01-01 | End: 2024-01-01

## 2024-01-01 RX ORDER — FENTANYL CITRATE 50 UG/ML
50 INJECTION, SOLUTION INTRAMUSCULAR; INTRAVENOUS EVERY 5 MIN PRN
Status: DISCONTINUED | OUTPATIENT
Start: 2024-01-01 | End: 2024-01-01 | Stop reason: HOSPADM

## 2024-01-01 RX ORDER — AMOXICILLIN 250 MG
2 CAPSULE ORAL 2 TIMES DAILY
COMMUNITY

## 2024-01-01 RX ORDER — LANOLIN ALCOHOL/MO/W.PET/CERES
3 CREAM (GRAM) TOPICAL AT BEDTIME
Status: DISCONTINUED | OUTPATIENT
Start: 2024-01-01 | End: 2024-01-01 | Stop reason: HOSPADM

## 2024-01-01 RX ORDER — NALOXONE HYDROCHLORIDE 0.4 MG/ML
0.1 INJECTION, SOLUTION INTRAMUSCULAR; INTRAVENOUS; SUBCUTANEOUS
Status: DISCONTINUED | OUTPATIENT
Start: 2024-01-01 | End: 2024-01-01 | Stop reason: HOSPADM

## 2024-01-01 RX ORDER — LIDOCAINE 40 MG/G
CREAM TOPICAL
Status: ACTIVE | OUTPATIENT
Start: 2024-01-01 | End: 2024-01-01

## 2024-01-01 RX ORDER — LIDOCAINE HYDROCHLORIDE 10 MG/ML
INJECTION, SOLUTION EPIDURAL; INFILTRATION; INTRACAUDAL; PERINEURAL
Status: COMPLETED | OUTPATIENT
Start: 2024-01-01 | End: 2024-01-01

## 2024-01-01 RX ORDER — PANTOPRAZOLE SODIUM 20 MG/1
20 TABLET, DELAYED RELEASE ORAL EVERY EVENING
Status: DISCONTINUED | OUTPATIENT
Start: 2024-01-01 | End: 2024-01-01 | Stop reason: HOSPADM

## 2024-01-01 RX ORDER — LEVOTHYROXINE SODIUM 75 UG/1
75 TABLET ORAL SEE ADMIN INSTRUCTIONS
Status: ON HOLD | COMMUNITY
End: 2024-01-01

## 2024-01-01 RX ORDER — NALOXONE HYDROCHLORIDE 0.4 MG/ML
0.2 INJECTION, SOLUTION INTRAMUSCULAR; INTRAVENOUS; SUBCUTANEOUS
Status: DISCONTINUED | OUTPATIENT
Start: 2024-01-01 | End: 2024-01-01 | Stop reason: HOSPADM

## 2024-01-01 RX ORDER — GLYCOPYRROLATE 0.2 MG/ML
INJECTION, SOLUTION INTRAMUSCULAR; INTRAVENOUS PRN
Status: DISCONTINUED | OUTPATIENT
Start: 2024-01-01 | End: 2024-01-01

## 2024-01-01 RX ORDER — OXYCODONE HYDROCHLORIDE 5 MG/1
2.5 TABLET ORAL EVERY 6 HOURS PRN
Qty: 5 TABLET | Refills: 0 | Status: SHIPPED | OUTPATIENT
Start: 2024-01-01

## 2024-01-01 RX ORDER — HYDROMORPHONE HCL IN WATER/PF 6 MG/30 ML
0.1 PATIENT CONTROLLED ANALGESIA SYRINGE INTRAVENOUS
Status: DISCONTINUED | OUTPATIENT
Start: 2024-01-01 | End: 2024-01-01

## 2024-01-01 RX ORDER — CEFTRIAXONE 1 G/1
1 INJECTION, POWDER, FOR SOLUTION INTRAMUSCULAR; INTRAVENOUS EVERY 24 HOURS
Status: DISCONTINUED | OUTPATIENT
Start: 2024-01-01 | End: 2024-01-01

## 2024-01-01 RX ORDER — AMOXICILLIN 250 MG
2 CAPSULE ORAL 2 TIMES DAILY PRN
Status: DISCONTINUED | OUTPATIENT
Start: 2024-01-01 | End: 2024-01-01

## 2024-01-01 RX ORDER — CEFAZOLIN SODIUM 2 G/100ML
2 INJECTION, SOLUTION INTRAVENOUS EVERY 8 HOURS
Qty: 200 ML | Refills: 0 | Status: COMPLETED | OUTPATIENT
Start: 2024-01-01 | End: 2024-01-01

## 2024-01-01 RX ORDER — ASPIRIN 81 MG/1
81 TABLET ORAL 2 TIMES DAILY
Status: DISCONTINUED | OUTPATIENT
Start: 2024-01-01 | End: 2024-01-01 | Stop reason: HOSPADM

## 2024-01-01 RX ORDER — OXYCODONE HYDROCHLORIDE 5 MG/1
5 TABLET ORAL EVERY 4 HOURS PRN
Status: DISCONTINUED | OUTPATIENT
Start: 2024-01-01 | End: 2024-01-01

## 2024-01-01 RX ORDER — LEVOTHYROXINE SODIUM 75 UG/1
TABLET ORAL
Qty: 90 TABLET | Refills: 0 | Status: SHIPPED | OUTPATIENT
Start: 2024-01-01 | End: 2024-01-01

## 2024-01-01 RX ORDER — ONDANSETRON 2 MG/ML
INJECTION INTRAMUSCULAR; INTRAVENOUS PRN
Status: DISCONTINUED | OUTPATIENT
Start: 2024-01-01 | End: 2024-01-01

## 2024-01-01 RX ORDER — HEPARIN SODIUM 5000 [USP'U]/.5ML
5000 INJECTION, SOLUTION INTRAVENOUS; SUBCUTANEOUS EVERY 8 HOURS
Status: DISCONTINUED | OUTPATIENT
Start: 2024-01-01 | End: 2024-01-01

## 2024-01-01 RX ORDER — POLYETHYLENE GLYCOL 3350 17 G/17G
17 POWDER, FOR SOLUTION ORAL 2 TIMES DAILY PRN
Status: DISCONTINUED | OUTPATIENT
Start: 2024-01-01 | End: 2024-01-01 | Stop reason: HOSPADM

## 2024-01-01 RX ORDER — MECOBALAMIN 5000 MCG
15 TABLET,DISINTEGRATING ORAL EVERY EVENING
Status: DISCONTINUED | OUTPATIENT
Start: 2024-01-01 | End: 2024-01-01

## 2024-01-01 RX ORDER — OXYCODONE HYDROCHLORIDE 5 MG/1
2.5 TABLET ORAL EVERY 6 HOURS PRN
Qty: 30 TABLET | Refills: 0 | Status: SHIPPED | OUTPATIENT
Start: 2024-01-01 | End: 2024-01-01

## 2024-01-01 RX ORDER — PROCHLORPERAZINE 25 MG
12.5 SUPPOSITORY, RECTAL RECTAL EVERY 12 HOURS PRN
Status: DISCONTINUED | OUTPATIENT
Start: 2024-01-01 | End: 2024-01-01 | Stop reason: HOSPADM

## 2024-01-01 RX ORDER — HYDRALAZINE HYDROCHLORIDE 25 MG/1
25 TABLET, FILM COATED ORAL EVERY 8 HOURS
Status: DISCONTINUED | OUTPATIENT
Start: 2024-01-01 | End: 2024-01-01 | Stop reason: HOSPADM

## 2024-01-01 RX ORDER — ASPIRIN 81 MG/1
81 TABLET ORAL 2 TIMES DAILY
Qty: 60 TABLET | Refills: 0 | Status: ON HOLD | OUTPATIENT
Start: 2024-01-01 | End: 2024-01-01

## 2024-01-01 RX ORDER — HEPARIN SODIUM 10000 [USP'U]/100ML
0-5000 INJECTION, SOLUTION INTRAVENOUS CONTINUOUS
Status: DISPENSED | OUTPATIENT
Start: 2024-01-01 | End: 2024-01-01

## 2024-01-01 RX ORDER — NYSTATIN 100000 [USP'U]/G
POWDER TOPICAL 2 TIMES DAILY
Qty: 69 G | Refills: 11 | Status: SHIPPED | OUTPATIENT
Start: 2024-01-01 | End: 2024-01-01

## 2024-01-01 RX ORDER — HYDRALAZINE HYDROCHLORIDE 20 MG/ML
10 INJECTION INTRAMUSCULAR; INTRAVENOUS EVERY 4 HOURS PRN
Status: DISCONTINUED | OUTPATIENT
Start: 2024-01-01 | End: 2024-01-01 | Stop reason: HOSPADM

## 2024-01-01 RX ORDER — CEFEPIME HYDROCHLORIDE 2 G/1
2 INJECTION, POWDER, FOR SOLUTION INTRAVENOUS ONCE
Status: COMPLETED | OUTPATIENT
Start: 2024-01-01 | End: 2024-01-01

## 2024-01-01 RX ORDER — PANTOPRAZOLE SODIUM 40 MG/1
40 TABLET, DELAYED RELEASE ORAL AT BEDTIME
Status: DISCONTINUED | OUTPATIENT
Start: 2024-01-01 | End: 2024-01-01 | Stop reason: HOSPADM

## 2024-01-01 RX ORDER — ASPIRIN 81 MG/1
81 TABLET, CHEWABLE ORAL DAILY
Status: DISCONTINUED | OUTPATIENT
Start: 2024-01-01 | End: 2024-01-01

## 2024-01-01 RX ORDER — ACETAMINOPHEN 500 MG
1000 TABLET ORAL ONCE
Status: COMPLETED | OUTPATIENT
Start: 2024-01-01 | End: 2024-01-01

## 2024-01-01 RX ORDER — IPRATROPIUM BROMIDE AND ALBUTEROL SULFATE 2.5; .5 MG/3ML; MG/3ML
1 SOLUTION RESPIRATORY (INHALATION) EVERY 4 HOURS PRN
COMMUNITY

## 2024-01-01 RX ORDER — LOSARTAN POTASSIUM 50 MG/1
TABLET ORAL
Qty: 180 TABLET | Refills: 3 | Status: ON HOLD | OUTPATIENT
Start: 2024-01-01 | End: 2024-01-01

## 2024-01-01 RX ORDER — HYDROMORPHONE HCL IN WATER/PF 6 MG/30 ML
0.1 PATIENT CONTROLLED ANALGESIA SYRINGE INTRAVENOUS EVERY 4 HOURS PRN
Status: DISCONTINUED | OUTPATIENT
Start: 2024-01-01 | End: 2024-01-01 | Stop reason: HOSPADM

## 2024-01-01 RX ORDER — ATORVASTATIN CALCIUM 40 MG/1
40 TABLET, FILM COATED ORAL EVERY MORNING
Status: DISCONTINUED | OUTPATIENT
Start: 2024-01-01 | End: 2024-01-01 | Stop reason: HOSPADM

## 2024-01-01 RX ORDER — PREDNISONE 20 MG/1
20 TABLET ORAL DAILY
Qty: 7 TABLET | Refills: 0 | Status: SHIPPED | OUTPATIENT
Start: 2024-01-01 | End: 2024-01-01

## 2024-01-01 RX ORDER — ATORVASTATIN CALCIUM 40 MG/1
TABLET, FILM COATED ORAL
Qty: 90 TABLET | Refills: 3 | Status: SHIPPED | OUTPATIENT
Start: 2024-01-01 | End: 2024-01-01

## 2024-01-01 RX ORDER — METHYLPREDNISOLONE ACETATE 40 MG/ML
INJECTION, SUSPENSION INTRA-ARTICULAR; INTRALESIONAL; INTRAMUSCULAR; SOFT TISSUE
Status: COMPLETED | OUTPATIENT
Start: 2024-01-01 | End: 2024-01-01

## 2024-01-01 RX ORDER — AMOXICILLIN 250 MG
1 CAPSULE ORAL 2 TIMES DAILY PRN
Status: DISCONTINUED | OUTPATIENT
Start: 2024-01-01 | End: 2024-01-01

## 2024-01-01 RX ORDER — IOPAMIDOL 755 MG/ML
70 INJECTION, SOLUTION INTRAVASCULAR ONCE
Status: COMPLETED | OUTPATIENT
Start: 2024-01-01 | End: 2024-01-01

## 2024-01-01 RX ORDER — SODIUM CHLORIDE 9 MG/ML
INJECTION, SOLUTION INTRAVENOUS CONTINUOUS
Status: DISCONTINUED | OUTPATIENT
Start: 2024-01-01 | End: 2024-01-01

## 2024-01-01 RX ORDER — ESCITALOPRAM OXALATE 10 MG/1
10 TABLET ORAL DAILY
Qty: 90 TABLET | Refills: 0 | Status: SHIPPED | OUTPATIENT
Start: 2024-01-01

## 2024-01-01 RX ORDER — CEFAZOLIN SODIUM 1 G/50ML
2000 SOLUTION INTRAVENOUS ONCE
Status: COMPLETED | OUTPATIENT
Start: 2024-01-01 | End: 2024-01-01

## 2024-01-01 RX ORDER — FENTANYL CITRATE 50 UG/ML
25 INJECTION, SOLUTION INTRAMUSCULAR; INTRAVENOUS EVERY 5 MIN PRN
Status: DISCONTINUED | OUTPATIENT
Start: 2024-01-01 | End: 2024-01-01 | Stop reason: HOSPADM

## 2024-01-01 RX ORDER — CARVEDILOL 6.25 MG/1
6.25 TABLET ORAL 2 TIMES DAILY WITH MEALS
Qty: 180 TABLET | Refills: 3 | Status: SHIPPED | OUTPATIENT
Start: 2024-01-01

## 2024-01-01 RX ORDER — LANOLIN ALCOHOL/MO/W.PET/CERES
3 CREAM (GRAM) TOPICAL
Status: DISCONTINUED | OUTPATIENT
Start: 2024-01-01 | End: 2024-01-01

## 2024-01-01 RX ORDER — HYDROXYZINE HYDROCHLORIDE 25 MG/1
25 TABLET, FILM COATED ORAL EVERY 6 HOURS PRN
Status: DISCONTINUED | OUTPATIENT
Start: 2024-01-01 | End: 2024-01-01 | Stop reason: HOSPADM

## 2024-01-01 RX ORDER — CEFDINIR 300 MG/1
300 CAPSULE ORAL EVERY 12 HOURS SCHEDULED
Status: DISCONTINUED | OUTPATIENT
Start: 2024-01-01 | End: 2024-01-01

## 2024-01-01 RX ORDER — ATORVASTATIN CALCIUM 40 MG/1
40 TABLET, FILM COATED ORAL EVERY MORNING
COMMUNITY

## 2024-01-01 RX ADMIN — HYDRALAZINE HYDROCHLORIDE 25 MG: 25 TABLET ORAL at 06:37

## 2024-01-01 RX ADMIN — APIXABAN 5 MG: 5 TABLET, FILM COATED ORAL at 09:57

## 2024-01-01 RX ADMIN — Medication 500 MCG: at 11:21

## 2024-01-01 RX ADMIN — HYDRALAZINE HYDROCHLORIDE 10 MG: 20 INJECTION INTRAMUSCULAR; INTRAVENOUS at 23:02

## 2024-01-01 RX ADMIN — PANTOPRAZOLE SODIUM 40 MG: 40 TABLET, DELAYED RELEASE ORAL at 21:48

## 2024-01-01 RX ADMIN — ACETAMINOPHEN 975 MG: 325 TABLET ORAL at 09:27

## 2024-01-01 RX ADMIN — CARVEDILOL 6.25 MG: 3.12 TABLET, FILM COATED ORAL at 08:35

## 2024-01-01 RX ADMIN — HYDROXYCHLOROQUINE SULFATE 200 MG: 200 TABLET, FILM COATED ORAL at 08:09

## 2024-01-01 RX ADMIN — POLYETHYLENE GLYCOL 3350 17 G: 17 POWDER, FOR SOLUTION ORAL at 08:18

## 2024-01-01 RX ADMIN — CEFDINIR 300 MG: 300 CAPSULE ORAL at 08:34

## 2024-01-01 RX ADMIN — CARVEDILOL 6.25 MG: 3.12 TABLET, FILM COATED ORAL at 08:20

## 2024-01-01 RX ADMIN — CEFTRIAXONE SODIUM 1 G: 1 INJECTION, POWDER, FOR SOLUTION INTRAMUSCULAR; INTRAVENOUS at 08:36

## 2024-01-01 RX ADMIN — Medication 25 MCG: at 08:04

## 2024-01-01 RX ADMIN — ONDANSETRON 4 MG: 4 TABLET, ORALLY DISINTEGRATING ORAL at 09:20

## 2024-01-01 RX ADMIN — CARVEDILOL 6.25 MG: 3.12 TABLET, FILM COATED ORAL at 08:16

## 2024-01-01 RX ADMIN — HYDRALAZINE HYDROCHLORIDE 25 MG: 25 TABLET ORAL at 13:53

## 2024-01-01 RX ADMIN — ATORVASTATIN CALCIUM 40 MG: 40 TABLET, FILM COATED ORAL at 08:35

## 2024-01-01 RX ADMIN — FENTANYL CITRATE 25 MCG: 50 INJECTION, SOLUTION INTRAMUSCULAR; INTRAVENOUS at 16:14

## 2024-01-01 RX ADMIN — OXYCODONE HYDROCHLORIDE 2.5 MG: 5 TABLET ORAL at 21:15

## 2024-01-01 RX ADMIN — HYDRALAZINE HYDROCHLORIDE 10 MG: 20 INJECTION INTRAMUSCULAR; INTRAVENOUS at 00:11

## 2024-01-01 RX ADMIN — ANORECTAL OINTMENT: 15.7; .44; 24; 20.6 OINTMENT TOPICAL at 21:48

## 2024-01-01 RX ADMIN — ACETAMINOPHEN 975 MG: 325 TABLET ORAL at 08:20

## 2024-01-01 RX ADMIN — ATORVASTATIN CALCIUM 40 MG: 40 TABLET, FILM COATED ORAL at 09:01

## 2024-01-01 RX ADMIN — APIXABAN 5 MG: 5 TABLET, FILM COATED ORAL at 22:02

## 2024-01-01 RX ADMIN — HYDROXYCHLOROQUINE SULFATE 200 MG: 200 TABLET, FILM COATED ORAL at 09:39

## 2024-01-01 RX ADMIN — LOSARTAN POTASSIUM 50 MG: 50 TABLET, FILM COATED ORAL at 09:03

## 2024-01-01 RX ADMIN — HYDRALAZINE HYDROCHLORIDE 25 MG: 25 TABLET ORAL at 06:39

## 2024-01-01 RX ADMIN — ESCITALOPRAM OXALATE 10 MG: 10 TABLET ORAL at 08:30

## 2024-01-01 RX ADMIN — LIDOCAINE HYDROCHLORIDE 1 ML: 10 INJECTION, SOLUTION EPIDURAL; INFILTRATION; INTRACAUDAL; PERINEURAL at 09:26

## 2024-01-01 RX ADMIN — ACETAMINOPHEN 975 MG: 325 TABLET ORAL at 00:55

## 2024-01-01 RX ADMIN — ASPIRIN 81 MG: 81 TABLET, COATED ORAL at 09:02

## 2024-01-01 RX ADMIN — ONDANSETRON 4 MG: 2 INJECTION INTRAMUSCULAR; INTRAVENOUS at 19:13

## 2024-01-01 RX ADMIN — ATORVASTATIN CALCIUM 40 MG: 40 TABLET, FILM COATED ORAL at 09:20

## 2024-01-01 RX ADMIN — Medication 3 MG: at 21:18

## 2024-01-01 RX ADMIN — APIXABAN 5 MG: 5 TABLET, FILM COATED ORAL at 08:28

## 2024-01-01 RX ADMIN — ACETAMINOPHEN 650 MG: 325 TABLET ORAL at 05:40

## 2024-01-01 RX ADMIN — ACETAMINOPHEN 975 MG: 325 TABLET ORAL at 00:15

## 2024-01-01 RX ADMIN — ASPIRIN 81 MG: 81 TABLET, COATED ORAL at 21:18

## 2024-01-01 RX ADMIN — ESCITALOPRAM OXALATE 10 MG: 10 TABLET ORAL at 09:32

## 2024-01-01 RX ADMIN — HYDROXYZINE HYDROCHLORIDE 50 MG: 25 TABLET, FILM COATED ORAL at 05:17

## 2024-01-01 RX ADMIN — SODIUM CHLORIDE: 9 INJECTION, SOLUTION INTRAVENOUS at 09:48

## 2024-01-01 RX ADMIN — METHYLPREDNISOLONE ACETATE 20 MG: 40 INJECTION, SUSPENSION INTRA-ARTICULAR; INTRALESIONAL; INTRAMUSCULAR; SOFT TISSUE at 09:27

## 2024-01-01 RX ADMIN — DOXYCYCLINE HYCLATE 100 MG: 100 CAPSULE ORAL at 09:31

## 2024-01-01 RX ADMIN — Medication 500 MCG: at 09:05

## 2024-01-01 RX ADMIN — ATORVASTATIN CALCIUM 40 MG: 40 TABLET, FILM COATED ORAL at 09:33

## 2024-01-01 RX ADMIN — PANTOPRAZOLE SODIUM 20 MG: 20 TABLET, DELAYED RELEASE ORAL at 21:18

## 2024-01-01 RX ADMIN — LEVOTHYROXINE SODIUM 75 MCG: 0.03 TABLET ORAL at 09:04

## 2024-01-01 RX ADMIN — ASPIRIN 81 MG: 81 TABLET, COATED ORAL at 09:29

## 2024-01-01 RX ADMIN — CEFDINIR 300 MG: 300 CAPSULE ORAL at 08:30

## 2024-01-01 RX ADMIN — CARVEDILOL 6.25 MG: 3.12 TABLET, FILM COATED ORAL at 09:32

## 2024-01-01 RX ADMIN — ESCITALOPRAM OXALATE 10 MG: 10 TABLET ORAL at 09:38

## 2024-01-01 RX ADMIN — Medication 500 MCG: at 09:32

## 2024-01-01 RX ADMIN — LIDOCAINE HYDROCHLORIDE 3 ML: 10 INJECTION, SOLUTION INFILTRATION; PERINEURAL at 16:51

## 2024-01-01 RX ADMIN — BUPIVACAINE HYDROCHLORIDE 30 ML: 2.5 INJECTION, SOLUTION EPIDURAL; INFILTRATION; INTRACAUDAL; PERINEURAL at 16:15

## 2024-01-01 RX ADMIN — ASPIRIN 81 MG: 81 TABLET, COATED ORAL at 21:23

## 2024-01-01 RX ADMIN — ESCITALOPRAM OXALATE 10 MG: 10 TABLET ORAL at 09:04

## 2024-01-01 RX ADMIN — GLYCOPYRROLATE 0.2 MG: 0.2 INJECTION INTRAMUSCULAR; INTRAVENOUS at 17:09

## 2024-01-01 RX ADMIN — Medication 500 MCG: at 08:34

## 2024-01-01 RX ADMIN — ASPIRIN 81 MG: 81 TABLET, COATED ORAL at 08:34

## 2024-01-01 RX ADMIN — HYDRALAZINE HYDROCHLORIDE 10 MG: 20 INJECTION INTRAMUSCULAR; INTRAVENOUS at 16:15

## 2024-01-01 RX ADMIN — HYDROXYCHLOROQUINE SULFATE 200 MG: 200 TABLET, FILM COATED ORAL at 08:19

## 2024-01-01 RX ADMIN — HYDROXYCHLOROQUINE SULFATE 200 MG: 200 TABLET, FILM COATED ORAL at 08:05

## 2024-01-01 RX ADMIN — LOSARTAN POTASSIUM 50 MG: 50 TABLET, FILM COATED ORAL at 07:32

## 2024-01-01 RX ADMIN — CEFDINIR 300 MG: 300 CAPSULE ORAL at 09:03

## 2024-01-01 RX ADMIN — HYDROXYCHLOROQUINE SULFATE 200 MG: 200 TABLET, FILM COATED ORAL at 09:03

## 2024-01-01 RX ADMIN — ACETAMINOPHEN 975 MG: 325 TABLET ORAL at 08:05

## 2024-01-01 RX ADMIN — SODIUM CHLORIDE, POTASSIUM CHLORIDE, SODIUM LACTATE AND CALCIUM CHLORIDE: 600; 310; 30; 20 INJECTION, SOLUTION INTRAVENOUS at 00:25

## 2024-01-01 RX ADMIN — TRANEXAMIC ACID 1950 MG: 650 TABLET ORAL at 16:03

## 2024-01-01 RX ADMIN — HYDRALAZINE HYDROCHLORIDE 25 MG: 25 TABLET ORAL at 14:34

## 2024-01-01 RX ADMIN — IPRATROPIUM BROMIDE AND ALBUTEROL SULFATE 3 ML: .5; 3 SOLUTION RESPIRATORY (INHALATION) at 18:14

## 2024-01-01 RX ADMIN — SENNOSIDES AND DOCUSATE SODIUM 1 TABLET: 8.6; 5 TABLET ORAL at 21:23

## 2024-01-01 RX ADMIN — ONDANSETRON 4 MG: 2 INJECTION INTRAMUSCULAR; INTRAVENOUS at 10:56

## 2024-01-01 RX ADMIN — HEPARIN SODIUM 600 UNITS/HR: 10000 INJECTION, SOLUTION INTRAVENOUS at 04:31

## 2024-01-01 RX ADMIN — BUPIVACAINE HYDROCHLORIDE 2 ML: 2.5 INJECTION, SOLUTION EPIDURAL; INFILTRATION; INTRACAUDAL at 09:27

## 2024-01-01 RX ADMIN — ACETAMINOPHEN 1000 MG: 500 TABLET ORAL at 03:45

## 2024-01-01 RX ADMIN — LOSARTAN POTASSIUM 50 MG: 50 TABLET, FILM COATED ORAL at 08:34

## 2024-01-01 RX ADMIN — ASPIRIN 81 MG: 81 TABLET, COATED ORAL at 08:30

## 2024-01-01 RX ADMIN — HEPARIN SODIUM 1100 UNITS/HR: 10000 INJECTION, SOLUTION INTRAVENOUS at 11:42

## 2024-01-01 RX ADMIN — CARVEDILOL 6.25 MG: 3.12 TABLET, FILM COATED ORAL at 09:01

## 2024-01-01 RX ADMIN — ASPIRIN 81 MG: 81 TABLET, COATED ORAL at 20:43

## 2024-01-01 RX ADMIN — PHENYLEPHRINE HYDROCHLORIDE 200 MCG: 10 INJECTION INTRAVENOUS at 16:49

## 2024-01-01 RX ADMIN — APIXABAN 5 MG: 5 TABLET, FILM COATED ORAL at 21:49

## 2024-01-01 RX ADMIN — HYDRALAZINE HYDROCHLORIDE 10 MG: 20 INJECTION INTRAMUSCULAR; INTRAVENOUS at 04:36

## 2024-01-01 RX ADMIN — HYDROXYCHLOROQUINE SULFATE 200 MG: 200 TABLET, FILM COATED ORAL at 09:32

## 2024-01-01 RX ADMIN — HYDROXYZINE HYDROCHLORIDE 25 MG: 25 TABLET, FILM COATED ORAL at 20:14

## 2024-01-01 RX ADMIN — SODIUM CHLORIDE 2000 MG: 9 INJECTION, SOLUTION INTRAVENOUS at 02:35

## 2024-01-01 RX ADMIN — PHENYLEPHRINE HYDROCHLORIDE 200 MCG: 10 INJECTION INTRAVENOUS at 16:52

## 2024-01-01 RX ADMIN — FERROUS SULFATE TAB 325 MG (65 MG ELEMENTAL FE) 325 MG: 325 (65 FE) TAB at 11:24

## 2024-01-01 RX ADMIN — CARVEDILOL 6.25 MG: 3.12 TABLET, FILM COATED ORAL at 18:25

## 2024-01-01 RX ADMIN — PANTOPRAZOLE SODIUM 20 MG: 20 TABLET, DELAYED RELEASE ORAL at 20:44

## 2024-01-01 RX ADMIN — LEVOTHYROXINE SODIUM 75 MCG: 0.03 TABLET ORAL at 09:38

## 2024-01-01 RX ADMIN — AZITHROMYCIN MONOHYDRATE 500 MG: 500 INJECTION, POWDER, LYOPHILIZED, FOR SOLUTION INTRAVENOUS at 09:03

## 2024-01-01 RX ADMIN — HYDRALAZINE HYDROCHLORIDE 25 MG: 25 TABLET, FILM COATED ORAL at 21:18

## 2024-01-01 RX ADMIN — LOSARTAN POTASSIUM 50 MG: 50 TABLET, FILM COATED ORAL at 08:07

## 2024-01-01 RX ADMIN — PANTOPRAZOLE SODIUM 20 MG: 20 TABLET, DELAYED RELEASE ORAL at 21:16

## 2024-01-01 RX ADMIN — HYDRALAZINE HYDROCHLORIDE 25 MG: 25 TABLET, FILM COATED ORAL at 09:03

## 2024-01-01 RX ADMIN — CARVEDILOL 6.25 MG: 3.12 TABLET, FILM COATED ORAL at 09:19

## 2024-01-01 RX ADMIN — Medication 500 MCG: at 09:01

## 2024-01-01 RX ADMIN — SENNOSIDES AND DOCUSATE SODIUM 1 TABLET: 8.6; 5 TABLET ORAL at 21:16

## 2024-01-01 RX ADMIN — CEFEPIME HYDROCHLORIDE 2 G: 2 INJECTION, POWDER, FOR SOLUTION INTRAVENOUS at 01:52

## 2024-01-01 RX ADMIN — CARVEDILOL 6.25 MG: 3.12 TABLET, FILM COATED ORAL at 08:28

## 2024-01-01 RX ADMIN — LEVOTHYROXINE SODIUM 75 MCG: 0.03 TABLET ORAL at 08:35

## 2024-01-01 RX ADMIN — HYDRALAZINE HYDROCHLORIDE 25 MG: 25 TABLET, FILM COATED ORAL at 09:01

## 2024-01-01 RX ADMIN — HYDRALAZINE HYDROCHLORIDE 25 MG: 25 TABLET ORAL at 21:47

## 2024-01-01 RX ADMIN — SENNOSIDES AND DOCUSATE SODIUM 1 TABLET: 8.6; 5 TABLET ORAL at 21:25

## 2024-01-01 RX ADMIN — CEFAZOLIN SODIUM 2 G: 2 INJECTION, SOLUTION INTRAVENOUS at 00:22

## 2024-01-01 RX ADMIN — DOXYCYCLINE 100 MG: 100 INJECTION, POWDER, LYOPHILIZED, FOR SOLUTION INTRAVENOUS at 09:49

## 2024-01-01 RX ADMIN — ATORVASTATIN CALCIUM 40 MG: 40 TABLET, FILM COATED ORAL at 08:08

## 2024-01-01 RX ADMIN — DOXYCYCLINE 100 MG: 100 INJECTION, POWDER, LYOPHILIZED, FOR SOLUTION INTRAVENOUS at 21:15

## 2024-01-01 RX ADMIN — ATORVASTATIN CALCIUM 40 MG: 40 TABLET, FILM COATED ORAL at 09:29

## 2024-01-01 RX ADMIN — LOSARTAN POTASSIUM 25 MG: 25 TABLET, FILM COATED ORAL at 09:33

## 2024-01-01 RX ADMIN — HYDRALAZINE HYDROCHLORIDE 10 MG: 20 INJECTION INTRAMUSCULAR; INTRAVENOUS at 00:49

## 2024-01-01 RX ADMIN — CEFDINIR 300 MG: 300 CAPSULE ORAL at 21:18

## 2024-01-01 RX ADMIN — POLYETHYLENE GLYCOL 3350 17 G: 17 POWDER, FOR SOLUTION ORAL at 08:02

## 2024-01-01 RX ADMIN — ESCITALOPRAM OXALATE 10 MG: 10 TABLET ORAL at 08:07

## 2024-01-01 RX ADMIN — ESCITALOPRAM OXALATE 10 MG: 10 TABLET ORAL at 08:06

## 2024-01-01 RX ADMIN — SODIUM CHLORIDE: 9 INJECTION, SOLUTION INTRAVENOUS at 06:57

## 2024-01-01 RX ADMIN — LEVOTHYROXINE SODIUM 75 MCG: 0.03 TABLET ORAL at 08:27

## 2024-01-01 RX ADMIN — ASPIRIN 81 MG: 81 TABLET, COATED ORAL at 08:07

## 2024-01-01 RX ADMIN — HYDROXYCHLOROQUINE SULFATE 200 MG: 200 TABLET, FILM COATED ORAL at 08:16

## 2024-01-01 RX ADMIN — IPRATROPIUM BROMIDE AND ALBUTEROL SULFATE 3 ML: .5; 3 SOLUTION RESPIRATORY (INHALATION) at 19:09

## 2024-01-01 RX ADMIN — Medication 500 MCG: at 13:53

## 2024-01-01 RX ADMIN — SODIUM CHLORIDE, POTASSIUM CHLORIDE, SODIUM LACTATE AND CALCIUM CHLORIDE: 600; 310; 30; 20 INJECTION, SOLUTION INTRAVENOUS at 17:48

## 2024-01-01 RX ADMIN — Medication 2 G: at 16:57

## 2024-01-01 RX ADMIN — CARVEDILOL 6.25 MG: 3.12 TABLET, FILM COATED ORAL at 09:39

## 2024-01-01 RX ADMIN — SODIUM CHLORIDE 1000 ML: 9 INJECTION, SOLUTION INTRAVENOUS at 01:48

## 2024-01-01 RX ADMIN — CEFAZOLIN SODIUM 2 G: 2 INJECTION, SOLUTION INTRAVENOUS at 11:10

## 2024-01-01 RX ADMIN — LEVOTHYROXINE SODIUM 75 MCG: 0.03 TABLET ORAL at 08:06

## 2024-01-01 RX ADMIN — ASPIRIN 81 MG: 81 TABLET, COATED ORAL at 20:13

## 2024-01-01 RX ADMIN — Medication 25 MCG: at 08:30

## 2024-01-01 RX ADMIN — HYDROXYCHLOROQUINE SULFATE 200 MG: 200 TABLET, FILM COATED ORAL at 08:28

## 2024-01-01 RX ADMIN — HYDROXYCHLOROQUINE SULFATE 200 MG: 200 TABLET, FILM COATED ORAL at 08:08

## 2024-01-01 RX ADMIN — ESCITALOPRAM OXALATE 10 MG: 10 TABLET ORAL at 08:08

## 2024-01-01 RX ADMIN — PANTOPRAZOLE SODIUM 20 MG: 20 TABLET, DELAYED RELEASE ORAL at 21:26

## 2024-01-01 RX ADMIN — PHENYLEPHRINE HYDROCHLORIDE 200 MCG: 10 INJECTION INTRAVENOUS at 16:55

## 2024-01-01 RX ADMIN — ESCITALOPRAM OXALATE 10 MG: 10 TABLET ORAL at 08:28

## 2024-01-01 RX ADMIN — PROPOFOL 50 MCG/KG/MIN: 10 INJECTION, EMULSION INTRAVENOUS at 16:57

## 2024-01-01 RX ADMIN — PHENYLEPHRINE HYDROCHLORIDE 200 MCG: 10 INJECTION INTRAVENOUS at 16:47

## 2024-01-01 RX ADMIN — ASPIRIN 81 MG: 81 TABLET, COATED ORAL at 09:04

## 2024-01-01 RX ADMIN — LEVOTHYROXINE SODIUM 75 MCG: 0.03 TABLET ORAL at 11:18

## 2024-01-01 RX ADMIN — ESCITALOPRAM OXALATE 10 MG: 10 TABLET ORAL at 09:01

## 2024-01-01 RX ADMIN — PANTOPRAZOLE SODIUM 20 MG: 20 TABLET, DELAYED RELEASE ORAL at 20:12

## 2024-01-01 RX ADMIN — HYDRALAZINE HYDROCHLORIDE 10 MG: 20 INJECTION INTRAMUSCULAR; INTRAVENOUS at 16:00

## 2024-01-01 RX ADMIN — APIXABAN 10 MG: 5 TABLET, FILM COATED ORAL at 19:52

## 2024-01-01 RX ADMIN — PANTOPRAZOLE SODIUM 40 MG: 40 TABLET, DELAYED RELEASE ORAL at 22:06

## 2024-01-01 RX ADMIN — PANTOPRAZOLE SODIUM 40 MG: 40 TABLET, DELAYED RELEASE ORAL at 21:03

## 2024-01-01 RX ADMIN — ONDANSETRON 4 MG: 2 INJECTION INTRAMUSCULAR; INTRAVENOUS at 07:54

## 2024-01-01 RX ADMIN — ACETAMINOPHEN 975 MG: 325 TABLET ORAL at 16:55

## 2024-01-01 RX ADMIN — CARVEDILOL 6.25 MG: 3.12 TABLET, FILM COATED ORAL at 18:45

## 2024-01-01 RX ADMIN — PANTOPRAZOLE SODIUM 40 MG: 40 TABLET, DELAYED RELEASE ORAL at 20:34

## 2024-01-01 RX ADMIN — OXYCODONE HYDROCHLORIDE 2.5 MG: 5 TABLET ORAL at 09:03

## 2024-01-01 RX ADMIN — Medication 3 MG: at 20:43

## 2024-01-01 RX ADMIN — PANTOPRAZOLE SODIUM 40 MG: 40 TABLET, DELAYED RELEASE ORAL at 22:02

## 2024-01-01 RX ADMIN — HYDROMORPHONE HYDROCHLORIDE 0.5 MG: 1 INJECTION, SOLUTION INTRAMUSCULAR; INTRAVENOUS; SUBCUTANEOUS at 11:48

## 2024-01-01 RX ADMIN — ASPIRIN 81 MG: 81 TABLET, COATED ORAL at 20:59

## 2024-01-01 RX ADMIN — PROPOFOL 40 MG: 10 INJECTION, EMULSION INTRAVENOUS at 16:51

## 2024-01-01 RX ADMIN — ACETAMINOPHEN 975 MG: 325 TABLET ORAL at 16:02

## 2024-01-01 RX ADMIN — DOXYCYCLINE 100 MG: 100 INJECTION, POWDER, LYOPHILIZED, FOR SOLUTION INTRAVENOUS at 12:13

## 2024-01-01 RX ADMIN — SENNOSIDES AND DOCUSATE SODIUM 1 TABLET: 50; 8.6 TABLET ORAL at 22:06

## 2024-01-01 RX ADMIN — HYDROXYCHLOROQUINE SULFATE 200 MG: 200 TABLET, FILM COATED ORAL at 08:30

## 2024-01-01 RX ADMIN — APIXABAN 5 MG: 5 TABLET, FILM COATED ORAL at 21:50

## 2024-01-01 RX ADMIN — HYDROMORPHONE HYDROCHLORIDE 0.2 MG: 0.2 INJECTION, SOLUTION INTRAMUSCULAR; INTRAVENOUS; SUBCUTANEOUS at 09:26

## 2024-01-01 RX ADMIN — LEVOTHYROXINE SODIUM 75 MCG: 0.03 TABLET ORAL at 09:01

## 2024-01-01 RX ADMIN — LOSARTAN POTASSIUM 50 MG: 50 TABLET, FILM COATED ORAL at 08:39

## 2024-01-01 RX ADMIN — ATORVASTATIN CALCIUM 40 MG: 40 TABLET, FILM COATED ORAL at 09:03

## 2024-01-01 RX ADMIN — ATORVASTATIN CALCIUM 40 MG: 40 TABLET, FILM COATED ORAL at 08:28

## 2024-01-01 RX ADMIN — DOXYCYCLINE HYCLATE 100 MG: 100 CAPSULE ORAL at 08:34

## 2024-01-01 RX ADMIN — CARVEDILOL 6.25 MG: 3.12 TABLET, FILM COATED ORAL at 18:57

## 2024-01-01 RX ADMIN — HYDROXYCHLOROQUINE SULFATE 200 MG: 200 TABLET, FILM COATED ORAL at 09:02

## 2024-01-01 RX ADMIN — ONDANSETRON 4 MG: 2 INJECTION INTRAMUSCULAR; INTRAVENOUS at 18:09

## 2024-01-01 RX ADMIN — FERROUS SULFATE TAB 325 MG (65 MG ELEMENTAL FE) 325 MG: 325 (65 FE) TAB at 14:48

## 2024-01-01 RX ADMIN — Medication 25 MCG: at 09:33

## 2024-01-01 RX ADMIN — ASPIRIN 81 MG: 81 TABLET, COATED ORAL at 21:16

## 2024-01-01 RX ADMIN — CARVEDILOL 6.25 MG: 3.12 TABLET, FILM COATED ORAL at 08:06

## 2024-01-01 RX ADMIN — ACETAMINOPHEN 650 MG: 325 TABLET ORAL at 09:40

## 2024-01-01 RX ADMIN — HYDROXYCHLOROQUINE SULFATE 200 MG: 200 TABLET, FILM COATED ORAL at 08:35

## 2024-01-01 RX ADMIN — HEPARIN SODIUM 5000 UNITS: 10000 INJECTION, SOLUTION INTRAVENOUS; SUBCUTANEOUS at 12:25

## 2024-01-01 RX ADMIN — BUPIVACAINE HYDROCHLORIDE 2.6 ML: 5 INJECTION, SOLUTION EPIDURAL; INTRACAUDAL; PERINEURAL at 16:48

## 2024-01-01 RX ADMIN — Medication 25 MCG: at 09:30

## 2024-01-01 RX ADMIN — ACETAMINOPHEN 975 MG: 325 TABLET ORAL at 17:30

## 2024-01-01 RX ADMIN — CARVEDILOL 6.25 MG: 3.12 TABLET, FILM COATED ORAL at 18:05

## 2024-01-01 RX ADMIN — ASPIRIN 81 MG: 81 TABLET, COATED ORAL at 09:31

## 2024-01-01 RX ADMIN — ANORECTAL OINTMENT: 15.7; .44; 24; 20.6 OINTMENT TOPICAL at 18:38

## 2024-01-01 RX ADMIN — LEVOTHYROXINE SODIUM 75 MCG: 0.03 TABLET ORAL at 09:19

## 2024-01-01 RX ADMIN — ATORVASTATIN CALCIUM 40 MG: 40 TABLET, FILM COATED ORAL at 08:06

## 2024-01-01 RX ADMIN — Medication 25 MCG: at 09:04

## 2024-01-01 RX ADMIN — ACETAMINOPHEN 975 MG: 325 TABLET ORAL at 16:03

## 2024-01-01 RX ADMIN — PANTOPRAZOLE SODIUM 20 MG: 20 TABLET, DELAYED RELEASE ORAL at 20:14

## 2024-01-01 RX ADMIN — HYDRALAZINE HYDROCHLORIDE 25 MG: 25 TABLET ORAL at 14:51

## 2024-01-01 RX ADMIN — ASPIRIN 81 MG: 81 TABLET, COATED ORAL at 08:23

## 2024-01-01 RX ADMIN — HYDRALAZINE HYDROCHLORIDE 25 MG: 25 TABLET, FILM COATED ORAL at 13:28

## 2024-01-01 RX ADMIN — PANTOPRAZOLE SODIUM 40 MG: 40 TABLET, DELAYED RELEASE ORAL at 21:50

## 2024-01-01 RX ADMIN — DOXYCYCLINE HYCLATE 100 MG: 100 CAPSULE ORAL at 20:43

## 2024-01-01 RX ADMIN — CARVEDILOL 6.25 MG: 3.12 TABLET, FILM COATED ORAL at 18:21

## 2024-01-01 RX ADMIN — PHENYLEPHRINE HYDROCHLORIDE 0.5 MCG/KG/MIN: 10 INJECTION INTRAVENOUS at 16:52

## 2024-01-01 RX ADMIN — SODIUM CHLORIDE 1000 ML: 9 INJECTION, SOLUTION INTRAVENOUS at 02:15

## 2024-01-01 RX ADMIN — SENNOSIDES AND DOCUSATE SODIUM 1 TABLET: 8.6; 5 TABLET ORAL at 08:07

## 2024-01-01 RX ADMIN — ESCITALOPRAM OXALATE 10 MG: 10 TABLET ORAL at 09:40

## 2024-01-01 RX ADMIN — LEVOTHYROXINE SODIUM 75 MCG: 0.03 TABLET ORAL at 08:15

## 2024-01-01 RX ADMIN — HEPARIN SODIUM 1400 UNITS/HR: 10000 INJECTION, SOLUTION INTRAVENOUS at 15:46

## 2024-01-01 RX ADMIN — PHENYLEPHRINE HYDROCHLORIDE 100 MCG: 10 INJECTION INTRAVENOUS at 17:19

## 2024-01-01 RX ADMIN — IOPAMIDOL 70 ML: 755 INJECTION, SOLUTION INTRAVENOUS at 02:25

## 2024-01-01 RX ADMIN — SENNOSIDES AND DOCUSATE SODIUM 1 TABLET: 8.6; 5 TABLET ORAL at 20:14

## 2024-01-01 RX ADMIN — SODIUM CHLORIDE 500 ML: 9 INJECTION, SOLUTION INTRAVENOUS at 00:36

## 2024-01-01 RX ADMIN — IPRATROPIUM BROMIDE AND ALBUTEROL SULFATE 3 ML: .5; 3 SOLUTION RESPIRATORY (INHALATION) at 20:15

## 2024-01-01 RX ADMIN — ESCITALOPRAM OXALATE 10 MG: 10 TABLET ORAL at 08:39

## 2024-01-01 RX ADMIN — APIXABAN 5 MG: 5 TABLET, FILM COATED ORAL at 09:20

## 2024-01-01 RX ADMIN — LEVOTHYROXINE SODIUM 75 MCG: 0.03 TABLET ORAL at 09:33

## 2024-01-01 RX ADMIN — Medication 500 MCG: at 11:03

## 2024-01-01 RX ADMIN — CEFDINIR 300 MG: 300 CAPSULE ORAL at 20:14

## 2024-01-01 RX ADMIN — CEFTRIAXONE SODIUM 1 G: 1 INJECTION, POWDER, FOR SOLUTION INTRAMUSCULAR; INTRAVENOUS at 08:30

## 2024-01-01 RX ADMIN — POLYETHYLENE GLYCOL 3350 17 G: 17 POWDER, FOR SOLUTION ORAL at 09:27

## 2024-01-01 RX ADMIN — CARVEDILOL 6.25 MG: 3.12 TABLET, FILM COATED ORAL at 18:07

## 2024-01-01 RX ADMIN — SENNOSIDES AND DOCUSATE SODIUM 1 TABLET: 8.6; 5 TABLET ORAL at 09:30

## 2024-01-01 RX ADMIN — DOXYCYCLINE HYCLATE 100 MG: 100 CAPSULE ORAL at 21:18

## 2024-01-01 RX ADMIN — CARVEDILOL 6.25 MG: 3.12 TABLET, FILM COATED ORAL at 17:55

## 2024-01-01 RX ADMIN — SODIUM CHLORIDE 500 ML: 9 INJECTION, SOLUTION INTRAVENOUS at 16:10

## 2024-01-01 RX ADMIN — CEFTRIAXONE SODIUM 2 G: 2 INJECTION, POWDER, FOR SOLUTION INTRAMUSCULAR; INTRAVENOUS at 08:13

## 2024-01-01 RX ADMIN — ASPIRIN 81 MG: 81 TABLET, COATED ORAL at 21:24

## 2024-01-01 RX ADMIN — CARVEDILOL 6.25 MG: 3.12 TABLET, FILM COATED ORAL at 09:03

## 2024-01-01 RX ADMIN — LEVOTHYROXINE SODIUM 75 MCG: 0.03 TABLET ORAL at 08:28

## 2024-01-01 RX ADMIN — HYDRALAZINE HYDROCHLORIDE 25 MG: 25 TABLET, FILM COATED ORAL at 08:30

## 2024-01-01 RX ADMIN — LEVOTHYROXINE SODIUM 75 MCG: 0.03 TABLET ORAL at 09:39

## 2024-01-01 RX ADMIN — IPRATROPIUM BROMIDE AND ALBUTEROL SULFATE 3 ML: .5; 3 SOLUTION RESPIRATORY (INHALATION) at 12:35

## 2024-01-01 RX ADMIN — CARVEDILOL 6.25 MG: 3.12 TABLET, FILM COATED ORAL at 18:36

## 2024-01-01 RX ADMIN — Medication 3 MG: at 20:59

## 2024-01-01 RX ADMIN — HYDRALAZINE HYDROCHLORIDE 25 MG: 25 TABLET ORAL at 05:07

## 2024-01-01 RX ADMIN — CARVEDILOL 6.25 MG: 3.12 TABLET, FILM COATED ORAL at 09:30

## 2024-01-01 RX ADMIN — HYDRALAZINE HYDROCHLORIDE 25 MG: 25 TABLET, FILM COATED ORAL at 20:14

## 2024-01-01 RX ADMIN — HYDRALAZINE HYDROCHLORIDE 25 MG: 25 TABLET ORAL at 22:01

## 2024-01-01 RX ADMIN — HYDRALAZINE HYDROCHLORIDE 10 MG: 20 INJECTION INTRAMUSCULAR; INTRAVENOUS at 20:16

## 2024-01-01 RX ADMIN — CARVEDILOL 6.25 MG: 3.12 TABLET, FILM COATED ORAL at 07:32

## 2024-01-01 RX ADMIN — CEFDINIR 300 MG: 300 CAPSULE ORAL at 20:43

## 2024-01-01 RX ADMIN — HYDROXYCHLOROQUINE SULFATE 200 MG: 200 TABLET, FILM COATED ORAL at 09:40

## 2024-01-01 RX ADMIN — SENNOSIDES AND DOCUSATE SODIUM 1 TABLET: 8.6; 5 TABLET ORAL at 08:38

## 2024-01-01 RX ADMIN — ATORVASTATIN CALCIUM 40 MG: 40 TABLET, FILM COATED ORAL at 07:52

## 2024-01-01 RX ADMIN — CEFDINIR 300 MG: 300 CAPSULE ORAL at 09:01

## 2024-01-01 RX ADMIN — KETAMINE HYDROCHLORIDE 40 MG: 10 INJECTION INTRAMUSCULAR; INTRAVENOUS at 16:51

## 2024-01-01 RX ADMIN — CARVEDILOL 6.25 MG: 3.12 TABLET, FILM COATED ORAL at 17:30

## 2024-01-01 RX ADMIN — Medication 25 MCG: at 08:34

## 2024-01-01 RX ADMIN — ESCITALOPRAM OXALATE 10 MG: 10 TABLET ORAL at 08:34

## 2024-01-01 RX ADMIN — Medication 25 MCG: at 09:01

## 2024-01-01 RX ADMIN — MORPHINE SULFATE 2 MG: 2 INJECTION, SOLUTION INTRAMUSCULAR; INTRAVENOUS at 07:56

## 2024-01-01 RX ADMIN — ATORVASTATIN CALCIUM 40 MG: 40 TABLET, FILM COATED ORAL at 08:09

## 2024-01-01 RX ADMIN — CARVEDILOL 6.25 MG: 3.12 TABLET, FILM COATED ORAL at 17:01

## 2024-01-01 RX ADMIN — Medication 500 MCG: at 08:30

## 2024-01-01 RX ADMIN — HYDRALAZINE HYDROCHLORIDE 25 MG: 25 TABLET ORAL at 21:48

## 2024-01-01 RX ADMIN — DOXYCYCLINE 100 MG: 100 INJECTION, POWDER, LYOPHILIZED, FOR SOLUTION INTRAVENOUS at 07:51

## 2024-01-01 RX ADMIN — HYDRALAZINE HYDROCHLORIDE 10 MG: 20 INJECTION INTRAMUSCULAR; INTRAVENOUS at 02:21

## 2024-01-01 RX ADMIN — HYDROXYCHLOROQUINE SULFATE 200 MG: 200 TABLET, FILM COATED ORAL at 09:19

## 2024-01-01 RX ADMIN — PANTOPRAZOLE SODIUM 20 MG: 20 TABLET, DELAYED RELEASE ORAL at 21:23

## 2024-01-01 RX ADMIN — HYDRALAZINE HYDROCHLORIDE 25 MG: 25 TABLET ORAL at 07:52

## 2024-01-01 RX ADMIN — PANTOPRAZOLE SODIUM 40 MG: 40 TABLET, DELAYED RELEASE ORAL at 21:47

## 2024-01-01 RX ADMIN — ACETAMINOPHEN 975 MG: 325 TABLET ORAL at 00:22

## 2024-01-01 RX ADMIN — ASPIRIN 81 MG: 81 TABLET, COATED ORAL at 20:14

## 2024-01-01 RX ADMIN — SODIUM CHLORIDE, POTASSIUM CHLORIDE, SODIUM LACTATE AND CALCIUM CHLORIDE: 600; 310; 30; 20 INJECTION, SOLUTION INTRAVENOUS at 20:06

## 2024-01-01 RX ADMIN — SODIUM CHLORIDE, POTASSIUM CHLORIDE, SODIUM LACTATE AND CALCIUM CHLORIDE: 600; 310; 30; 20 INJECTION, SOLUTION INTRAVENOUS at 15:55

## 2024-01-01 RX ADMIN — ESCITALOPRAM OXALATE 10 MG: 10 TABLET ORAL at 09:20

## 2024-01-01 RX ADMIN — FENTANYL CITRATE 50 MCG: 50 INJECTION, SOLUTION INTRAMUSCULAR; INTRAVENOUS at 06:54

## 2024-01-01 RX ADMIN — ACETAMINOPHEN 650 MG: 325 TABLET ORAL at 22:05

## 2024-01-01 RX ADMIN — CEFDINIR 300 MG: 300 CAPSULE ORAL at 09:30

## 2024-01-01 RX ADMIN — HEPARIN SODIUM 1700 UNITS/HR: 10000 INJECTION, SOLUTION INTRAVENOUS at 03:24

## 2024-01-01 RX ADMIN — ATORVASTATIN CALCIUM 40 MG: 40 TABLET, FILM COATED ORAL at 09:40

## 2024-01-01 RX ADMIN — FERROUS SULFATE TAB 325 MG (65 MG ELEMENTAL FE) 325 MG: 325 (65 FE) TAB at 12:47

## 2024-01-01 RX ADMIN — DOXYCYCLINE 100 MG: 100 INJECTION, POWDER, LYOPHILIZED, FOR SOLUTION INTRAVENOUS at 20:08

## 2024-01-01 RX ADMIN — APIXABAN 5 MG: 5 TABLET, FILM COATED ORAL at 08:15

## 2024-01-01 RX ADMIN — LOSARTAN POTASSIUM 50 MG: 50 TABLET, FILM COATED ORAL at 08:30

## 2024-01-01 RX ADMIN — PANTOPRAZOLE SODIUM 20 MG: 20 TABLET, DELAYED RELEASE ORAL at 20:59

## 2024-01-01 RX ADMIN — CEFDINIR 300 MG: 300 CAPSULE ORAL at 09:33

## 2024-01-01 RX ADMIN — ATORVASTATIN CALCIUM 40 MG: 40 TABLET, FILM COATED ORAL at 08:25

## 2024-01-01 RX ADMIN — ATORVASTATIN CALCIUM 40 MG: 40 TABLET, FILM COATED ORAL at 08:07

## 2024-01-01 RX ADMIN — ATORVASTATIN CALCIUM 40 MG: 40 TABLET, FILM COATED ORAL at 08:15

## 2024-01-01 ASSESSMENT — ACTIVITIES OF DAILY LIVING (ADL)
ADLS_ACUITY_SCORE: 48
ADLS_ACUITY_SCORE: 44
ADLS_ACUITY_SCORE: 35
ADLS_ACUITY_SCORE: 33
ADLS_ACUITY_SCORE: 38
ADLS_ACUITY_SCORE: 48
ADLS_ACUITY_SCORE: 47
ADLS_ACUITY_SCORE: 36
ADLS_ACUITY_SCORE: 48
ADLS_ACUITY_SCORE: 44
ADLS_ACUITY_SCORE: 51
ADLS_ACUITY_SCORE: 44
ADLS_ACUITY_SCORE: 33
ADLS_ACUITY_SCORE: 49
ADLS_ACUITY_SCORE: 44
ADLS_ACUITY_SCORE: 42
ADLS_ACUITY_SCORE: 43
ADLS_ACUITY_SCORE: 32
ADLS_ACUITY_SCORE: 31
ADLS_ACUITY_SCORE: 48
ADLS_ACUITY_SCORE: 36
ADLS_ACUITY_SCORE: 34
ADLS_ACUITY_SCORE: 33
ADLS_ACUITY_SCORE: 36
ADLS_ACUITY_SCORE: 49
ADLS_ACUITY_SCORE: 36
ADLS_ACUITY_SCORE: 33
ADLS_ACUITY_SCORE: 44
ADLS_ACUITY_SCORE: 37
ADLS_ACUITY_SCORE: 32
ADLS_ACUITY_SCORE: 40
ADLS_ACUITY_SCORE: 42
ADLS_ACUITY_SCORE: 36
ADLS_ACUITY_SCORE: 50
ADLS_ACUITY_SCORE: 40
ADLS_ACUITY_SCORE: 35
ADLS_ACUITY_SCORE: 44
ADLS_ACUITY_SCORE: 48
ADLS_ACUITY_SCORE: 37
ADLS_ACUITY_SCORE: 37
ADLS_ACUITY_SCORE: 32
ADLS_ACUITY_SCORE: 38
ADLS_ACUITY_SCORE: 48
ADLS_ACUITY_SCORE: 44
ADLS_ACUITY_SCORE: 51
ADLS_ACUITY_SCORE: 43
ADLS_ACUITY_SCORE: 32
ADLS_ACUITY_SCORE: 36
ADLS_ACUITY_SCORE: 47
ADLS_ACUITY_SCORE: 43
ADLS_ACUITY_SCORE: 35
ADLS_ACUITY_SCORE: 32
ADLS_ACUITY_SCORE: 32
ADLS_ACUITY_SCORE: 40
ADLS_ACUITY_SCORE: 40
ADLS_ACUITY_SCORE: 37
ADLS_ACUITY_SCORE: 36
ADLS_ACUITY_SCORE: 31
ADLS_ACUITY_SCORE: 50
ADLS_ACUITY_SCORE: 40
ADLS_ACUITY_SCORE: 32
ADLS_ACUITY_SCORE: 44
ADLS_ACUITY_SCORE: 34
ADLS_ACUITY_SCORE: 41
ADLS_ACUITY_SCORE: 44
ADLS_ACUITY_SCORE: 42
ADLS_ACUITY_SCORE: 44
ADLS_ACUITY_SCORE: 44
ADLS_ACUITY_SCORE: 48
ADLS_ACUITY_SCORE: 36
ADLS_ACUITY_SCORE: 32
ADLS_ACUITY_SCORE: 36
ADLS_ACUITY_SCORE: 45
ADLS_ACUITY_SCORE: 35
ADLS_ACUITY_SCORE: 51
ADLS_ACUITY_SCORE: 35
ADLS_ACUITY_SCORE: 47
ADLS_ACUITY_SCORE: 48
ADLS_ACUITY_SCORE: 44
ADLS_ACUITY_SCORE: 44
ADLS_ACUITY_SCORE: 36
ADLS_ACUITY_SCORE: 32
ADLS_ACUITY_SCORE: 32
ADLS_ACUITY_SCORE: 41
ADLS_ACUITY_SCORE: 44
ADLS_ACUITY_SCORE: 48
ADLS_ACUITY_SCORE: 44
ADLS_ACUITY_SCORE: 38
ADLS_ACUITY_SCORE: 36
ADLS_ACUITY_SCORE: 49
ADLS_ACUITY_SCORE: 33
ADLS_ACUITY_SCORE: 36
ADLS_ACUITY_SCORE: 51
ADLS_ACUITY_SCORE: 34
ADLS_ACUITY_SCORE: 38
ADLS_ACUITY_SCORE: 44
ADLS_ACUITY_SCORE: 32
ADLS_ACUITY_SCORE: 44
ADLS_ACUITY_SCORE: 49
ADLS_ACUITY_SCORE: 43
ADLS_ACUITY_SCORE: 35
ADLS_ACUITY_SCORE: 48
ADLS_ACUITY_SCORE: 44
ADLS_ACUITY_SCORE: 48
ADLS_ACUITY_SCORE: 44
ADLS_ACUITY_SCORE: 48
ADLS_ACUITY_SCORE: 38
ADLS_ACUITY_SCORE: 44
ADLS_ACUITY_SCORE: 38
ADLS_ACUITY_SCORE: 51
ADLS_ACUITY_SCORE: 42
ADLS_ACUITY_SCORE: 40
ADLS_ACUITY_SCORE: 44
ADLS_ACUITY_SCORE: 47
ADLS_ACUITY_SCORE: 35
ADLS_ACUITY_SCORE: 48
ADLS_ACUITY_SCORE: 48
ADLS_ACUITY_SCORE: 47
ADLS_ACUITY_SCORE: 36
ADLS_ACUITY_SCORE: 49
ADLS_ACUITY_SCORE: 40
ADLS_ACUITY_SCORE: 44
ADLS_ACUITY_SCORE: 46
ADLS_ACUITY_SCORE: 32
ADLS_ACUITY_SCORE: 34
ADLS_ACUITY_SCORE: 36
ADLS_ACUITY_SCORE: 45
ADLS_ACUITY_SCORE: 44
ADLS_ACUITY_SCORE: 35
ADLS_ACUITY_SCORE: 45
ADLS_ACUITY_SCORE: 40
ADLS_ACUITY_SCORE: 48
ADLS_ACUITY_SCORE: 49
ADLS_ACUITY_SCORE: 40
ADLS_ACUITY_SCORE: 33
ADLS_ACUITY_SCORE: 35
ADLS_ACUITY_SCORE: 40
ADLS_ACUITY_SCORE: 36
ADLS_ACUITY_SCORE: 45
ADLS_ACUITY_SCORE: 32
ADLS_ACUITY_SCORE: 35
ADLS_ACUITY_SCORE: 40
ADLS_ACUITY_SCORE: 36
ADLS_ACUITY_SCORE: 37
ADLS_ACUITY_SCORE: 36
ADLS_ACUITY_SCORE: 43
ADLS_ACUITY_SCORE: 44
ADLS_ACUITY_SCORE: 42
ADLS_ACUITY_SCORE: 32
ADLS_ACUITY_SCORE: 38
ADLS_ACUITY_SCORE: 48
ADLS_ACUITY_SCORE: 33
ADLS_ACUITY_SCORE: 38
ADLS_ACUITY_SCORE: 43
ADLS_ACUITY_SCORE: 48
ADLS_ACUITY_SCORE: 36
ADLS_ACUITY_SCORE: 44
ADLS_ACUITY_SCORE: 49
ADLS_ACUITY_SCORE: 36
ADLS_ACUITY_SCORE: 44
ADLS_ACUITY_SCORE: 48
ADLS_ACUITY_SCORE: 50
ADLS_ACUITY_SCORE: 35
ADLS_ACUITY_SCORE: 45
ADLS_ACUITY_SCORE: 36
ADLS_ACUITY_SCORE: 35
ADLS_ACUITY_SCORE: 36
ADLS_ACUITY_SCORE: 48
ADLS_ACUITY_SCORE: 44
ADLS_ACUITY_SCORE: 36
ADLS_ACUITY_SCORE: 36
ADLS_ACUITY_SCORE: 32
ADLS_ACUITY_SCORE: 44
ADLS_ACUITY_SCORE: 36
ADLS_ACUITY_SCORE: 48
ADLS_ACUITY_SCORE: 40
ADLS_ACUITY_SCORE: 48
ADLS_ACUITY_SCORE: 33
ADLS_ACUITY_SCORE: 47
ADLS_ACUITY_SCORE: 43
ADLS_ACUITY_SCORE: 34
ADLS_ACUITY_SCORE: 37
ADLS_ACUITY_SCORE: 45
ADLS_ACUITY_SCORE: 41
ADLS_ACUITY_SCORE: 45
ADLS_ACUITY_SCORE: 35
ADLS_ACUITY_SCORE: 44
ADLS_ACUITY_SCORE: 51
ADLS_ACUITY_SCORE: 44
ADLS_ACUITY_SCORE: 51
ADLS_ACUITY_SCORE: 43
ADLS_ACUITY_SCORE: 43
ADLS_ACUITY_SCORE: 32
ADLS_ACUITY_SCORE: 32
ADLS_ACUITY_SCORE: 34
ADLS_ACUITY_SCORE: 45
ADLS_ACUITY_SCORE: 34
ADLS_ACUITY_SCORE: 48
ADLS_ACUITY_SCORE: 49
ADLS_ACUITY_SCORE: 45
ADLS_ACUITY_SCORE: 44
ADLS_ACUITY_SCORE: 44
ADLS_ACUITY_SCORE: 40
ADLS_ACUITY_SCORE: 45
ADLS_ACUITY_SCORE: 44
ADLS_ACUITY_SCORE: 48
ADLS_ACUITY_SCORE: 35
ADLS_ACUITY_SCORE: 40
ADLS_ACUITY_SCORE: 44
ADLS_ACUITY_SCORE: 36
ADLS_ACUITY_SCORE: 32
ADLS_ACUITY_SCORE: 51
ADLS_ACUITY_SCORE: 49
ADLS_ACUITY_SCORE: 50
ADLS_ACUITY_SCORE: 33
ADLS_ACUITY_SCORE: 32
ADLS_ACUITY_SCORE: 44
ADLS_ACUITY_SCORE: 36
ADLS_ACUITY_SCORE: 35
ADLS_ACUITY_SCORE: 44
ADLS_ACUITY_SCORE: 32
DEPENDENT_IADLS:: INDEPENDENT
ADLS_ACUITY_SCORE: 38
ADLS_ACUITY_SCORE: 45
ADLS_ACUITY_SCORE: 40
ADLS_ACUITY_SCORE: 37
ADLS_ACUITY_SCORE: 32
ADLS_ACUITY_SCORE: 40
DEPENDENT_IADLS:: INDEPENDENT
ADLS_ACUITY_SCORE: 32
ADLS_ACUITY_SCORE: 48
ADLS_ACUITY_SCORE: 35
ADLS_ACUITY_SCORE: 43
ADLS_ACUITY_SCORE: 43
ADLS_ACUITY_SCORE: 49
ADLS_ACUITY_SCORE: 51
ADLS_ACUITY_SCORE: 35
ADLS_ACUITY_SCORE: 35
ADLS_ACUITY_SCORE: 24
ADLS_ACUITY_SCORE: 38
ADLS_ACUITY_SCORE: 33
ADLS_ACUITY_SCORE: 44
ADLS_ACUITY_SCORE: 32
ADLS_ACUITY_SCORE: 44
ADLS_ACUITY_SCORE: 40
ADLS_ACUITY_SCORE: 48
ADLS_ACUITY_SCORE: 49
ADLS_ACUITY_SCORE: 38
ADLS_ACUITY_SCORE: 47
ADLS_ACUITY_SCORE: 48
ADLS_ACUITY_SCORE: 44
ADLS_ACUITY_SCORE: 44
ADLS_ACUITY_SCORE: 38
ADLS_ACUITY_SCORE: 40
ADLS_ACUITY_SCORE: 38
ADLS_ACUITY_SCORE: 41
ADLS_ACUITY_SCORE: 44
ADLS_ACUITY_SCORE: 32
ADLS_ACUITY_SCORE: 53
ADLS_ACUITY_SCORE: 36
ADLS_ACUITY_SCORE: 35
ADLS_ACUITY_SCORE: 37
ADLS_ACUITY_SCORE: 42
ADLS_ACUITY_SCORE: 48
ADLS_ACUITY_SCORE: 37
ADLS_ACUITY_SCORE: 24
ADLS_ACUITY_SCORE: 48
ADLS_ACUITY_SCORE: 51
ADLS_ACUITY_SCORE: 35
ADLS_ACUITY_SCORE: 48
ADLS_ACUITY_SCORE: 36
ADLS_ACUITY_SCORE: 44
ADLS_ACUITY_SCORE: 36
ADLS_ACUITY_SCORE: 38
ADLS_ACUITY_SCORE: 44
ADLS_ACUITY_SCORE: 32
ADLS_ACUITY_SCORE: 41
ADLS_ACUITY_SCORE: 45
ADLS_ACUITY_SCORE: 48
ADLS_ACUITY_SCORE: 43
ADLS_ACUITY_SCORE: 32
ADLS_ACUITY_SCORE: 32
ADLS_ACUITY_SCORE: 44
ADLS_ACUITY_SCORE: 37
ADLS_ACUITY_SCORE: 35
ADLS_ACUITY_SCORE: 39
ADLS_ACUITY_SCORE: 35
ADLS_ACUITY_SCORE: 40
ADLS_ACUITY_SCORE: 36
ADLS_ACUITY_SCORE: 43
ADLS_ACUITY_SCORE: 34
ADLS_ACUITY_SCORE: 36
ADLS_ACUITY_SCORE: 48
ADLS_ACUITY_SCORE: 36
ADLS_ACUITY_SCORE: 32
ADLS_ACUITY_SCORE: 38
ADLS_ACUITY_SCORE: 44
ADLS_ACUITY_SCORE: 44
ADLS_ACUITY_SCORE: 45
ADLS_ACUITY_SCORE: 44
ADLS_ACUITY_SCORE: 44
ADLS_ACUITY_SCORE: 47
ADLS_ACUITY_SCORE: 35
ADLS_ACUITY_SCORE: 45
ADLS_ACUITY_SCORE: 45
ADLS_ACUITY_SCORE: 43
ADLS_ACUITY_SCORE: 45
ADLS_ACUITY_SCORE: 38
ADLS_ACUITY_SCORE: 36
ADLS_ACUITY_SCORE: 44
ADLS_ACUITY_SCORE: 43
ADLS_ACUITY_SCORE: 33
ADLS_ACUITY_SCORE: 43
ADLS_ACUITY_SCORE: 38
ADLS_ACUITY_SCORE: 34
ADLS_ACUITY_SCORE: 36
ADLS_ACUITY_SCORE: 48
ADLS_ACUITY_SCORE: 44
ADLS_ACUITY_SCORE: 32
ADLS_ACUITY_SCORE: 35
ADLS_ACUITY_SCORE: 49
ADLS_ACUITY_SCORE: 34
ADLS_ACUITY_SCORE: 34
ADLS_ACUITY_SCORE: 51
ADLS_ACUITY_SCORE: 50
ADLS_ACUITY_SCORE: 44
ADLS_ACUITY_SCORE: 47
ADLS_ACUITY_SCORE: 44
ADLS_ACUITY_SCORE: 44
ADLS_ACUITY_SCORE: 48
ADLS_ACUITY_SCORE: 32
ADLS_ACUITY_SCORE: 49
ADLS_ACUITY_SCORE: 50
ADLS_ACUITY_SCORE: 51
ADLS_ACUITY_SCORE: 43
ADLS_ACUITY_SCORE: 44
ADLS_ACUITY_SCORE: 38
ADLS_ACUITY_SCORE: 48
ADLS_ACUITY_SCORE: 37
ADLS_ACUITY_SCORE: 35
ADLS_ACUITY_SCORE: 45
ADLS_ACUITY_SCORE: 45
ADLS_ACUITY_SCORE: 48
ADLS_ACUITY_SCORE: 47
ADLS_ACUITY_SCORE: 44
ADLS_ACUITY_SCORE: 48
ADLS_ACUITY_SCORE: 44
ADLS_ACUITY_SCORE: 53
ADLS_ACUITY_SCORE: 44
ADLS_ACUITY_SCORE: 44
ADLS_ACUITY_SCORE: 45
ADLS_ACUITY_SCORE: 47
ADLS_ACUITY_SCORE: 44
ADLS_ACUITY_SCORE: 43
ADLS_ACUITY_SCORE: 53

## 2024-01-01 ASSESSMENT — PATIENT HEALTH QUESTIONNAIRE - PHQ9
10. IF YOU CHECKED OFF ANY PROBLEMS, HOW DIFFICULT HAVE THESE PROBLEMS MADE IT FOR YOU TO DO YOUR WORK, TAKE CARE OF THINGS AT HOME, OR GET ALONG WITH OTHER PEOPLE: NOT DIFFICULT AT ALL
SUM OF ALL RESPONSES TO PHQ QUESTIONS 1-9: 2
SUM OF ALL RESPONSES TO PHQ QUESTIONS 1-9: 2

## 2024-01-01 ASSESSMENT — COLUMBIA-SUICIDE SEVERITY RATING SCALE - C-SSRS
6. HAVE YOU EVER DONE ANYTHING, STARTED TO DO ANYTHING, OR PREPARED TO DO ANYTHING TO END YOUR LIFE?: NO
1. IN THE PAST MONTH, HAVE YOU WISHED YOU WERE DEAD OR WISHED YOU COULD GO TO SLEEP AND NOT WAKE UP?: NO
2. HAVE YOU ACTUALLY HAD ANY THOUGHTS OF KILLING YOURSELF IN THE PAST MONTH?: NO

## 2024-01-01 ASSESSMENT — PAIN SCALES - GENERAL
PAINLEVEL: NO PAIN (1)
PAINLEVEL: MILD PAIN (3)
PAINLEVEL: MILD PAIN (2)
PAINLEVEL: MILD PAIN (2)

## 2024-03-18 PROBLEM — N18.32 CHRONIC RENAL FAILURE, STAGE 3B (H): Status: ACTIVE | Noted: 2024-01-01

## 2024-03-18 NOTE — PROGRESS NOTES
"  Assessment & Plan     Neck pain, occipital pain, jaw pain differential would include cervical nerve impingement, occipital neuralgia, trigeminal neuralgia.  The left eye does tear a little more than the right but she denies any left eye pain or changes in the vision.  Jaw pain can also be from trigeminal neuralgia, temporal arteritis is possible but less likely since there is no temporal pain however will check inflammatory markers.   she does have chronic palindromic rheumatism but if inflammatory markers are superhigh, will bring to the attention of her rheumatologist.  For treatment, for now discussed to use Tylenol 650-1000 mg 3 times a day and a small dose of gabapentin at bedtime.  Will also send her to spine care clinic for possible occipital trigger point injection.  -- gabapentin (NEURONTIN) 100 MG capsule; Take 1 capsule (100 mg) by mouth at bedtime  - Spine  Referral; Future  - XR Cervical Spine 2/3 Views; Future  - ESR: Erythrocyte sedimentation rate  - CRP, inflammation    Chronic renal failure, stage 3b (H)  Creatinine at baseline is 1.6-1.4  - CRP, inflammation; Future  - Basic metabolic panel  (Ca, Cl, CO2, Creat, Gluc, K, Na, BUN)  - CBC with platelets and differential  - ESR: Erythrocyte sedimentation rate  - CRP, inflammation    Hypertension.  Blood pressure fluctuate.  Initially was 160 on repeat was 148.  She does take losartan 100 mg in the morning but takes Coreg only once a day.  Discussed to take Coreg full tablet in the morning and half tablet in the evening since she reported feeling orthostatic sometimes.  Repeat blood pressure has been less than 150, given her age, will monitor for now and she will monitor it at home and let me know if it runs higher.  She does have constipation so eating amlodipine may not be a good idea.    BMI  Estimated body mass index is 31.24 kg/m  as calculated from the following:    Height as of this encounter: 1.626 m (5' 4\").    Weight as of this " encounter: 82.6 kg (182 lb).       Subjective   Virginia is a 89 year old, presenting for the following health issues:  Office Visit (Pain up left side of neck and ear into your head. This has been going on for 2 weeks.) and Recheck Medication      3/18/2024     3:25 PM   Additional Questions   Roomed by emil saez   Accompanied by self         3/18/2024     3:25 PM   Patient Reported Additional Medications   Patient reports taking the following new medications none     History of Present Illness       Reason for visit:  Neck pain    She eats 2-3 servings of fruits and vegetables daily.She consumes 1 sweetened beverage(s) daily.She exercises with enough effort to increase her heart rate 20 to 29 minutes per day.  She exercises with enough effort to increase her heart rate 5 days per week.   She is taking medications regularly.     Virginia is a 89 year old female with  history of lichen sclerosis, status post cholecystectomy, umbilical hernia, hysterectomy, diverticulosis, cystocele, spine DJD, hypothyroidism, vitamin D deficiency, ischemic optic neuropathy, anxiety, high blood pressure, hiatal hernia and chronic renal insufficiency, right thalamic stroke in April 2019.  She is current here for acute visit due to pain on the left side of her neck, occiput and the left jaw.    Symptoms started 3 weeks ago and lasted for 3 days when they spontaneously resolved but then returned 2 weeks later and currently has been constant on and off.  She describes pain in her mid lower cervical spine radiating into her occipital area and also into her jaw area.  She feels that her left eye is tearing but she denies any eye pain or changes in the vision.  No rashes, no pain in her temple.  Pain is not worse when she is chewing.  She denies any recent vaccinations..    She does have history of palindromic rheumatism and is followed by rheumatologist, symptoms have been well-controlled on Plaquenil.    She has high blood pressure and renal  insufficiency.  She has been taking losartan 2 mg daily but has been only taking Coreg once a day because sometimes feels dizzy.  She reports that the nurse from insurance company come to check her blood pressure at home and when she stood up it did drop a bunch.  She does suffer from constipation but no liquid retention in her legs.    She has been having more urgency and occasional incontinence at nighttime but not during the day.  Review of systems: As above    Objective    LMP  (LMP Unknown)   There is no height or weight on file to calculate BMI.  Physical Exam   General: well appearing female, alert and oriented x3  EYES: Eyelids, conjunctiva, and sclera were normal. Pupils were normal.   HEAD, EARS, NOSE, MOUTH, AND THROAT: no cervical LAD, no thyromegaly or nodules appreciated. TMs are visualized and normal, oropharynx is clear.  Rashes.  No TMJ tenderness to palpation, no temporal pain.  RESPIRATORY: respirations non labored, CTA bl, no wheezes, rales, no forced expiratory wheezing.  CARDIOVASCULAR: Heart rate and rhythm were normal. No murmurs, rubs,gallops. There was no peripheral edema.   GASTROINTESTINAL: Positive bowel sounds, abdomen is soft, non tender, non distended.     MUSCULOSKELETAL: Muscle mass was normal for age. No joint synovitis or deformity.  She does have tenderness to palpation of the mid lower cervical spine and also around her left occipital area with some trigger point.  LYMPHATIC: There were no enlarged nodes palpable.  SKIN/HAIR/NAILS: Skin color was normal.  No rashes.  NEUROLOGIC: The patient was alert and oriented.  Speech was normal.  There is no facial asymmetry.   PSYCHIATRIC:  Mood and affect were normal.            Signed Electronically by: Selma Meneses MD

## 2024-03-18 NOTE — PATIENT INSTRUCTIONS
Will check labs today and inflammatory markers.    2. If you develop vision changes , let us know    3. Try gabapentin small dose at bed time.  During the day can take Tylenol 650-1000 mg 3 times a day    4. See spine care clinic for possible trigger point injection for occipital neuralgia or trigeminal neuralgia/nerve impingement.

## 2024-03-22 NOTE — TELEPHONE ENCOUNTER
Spoke with patient, results discussed, neck x-ray showed advanced DJD and facet arthropathy but nothing suspicious, labs are stable.    ESR was normal, CRP was elevated, she continues to have quite severe pain more in the occipital area which radiates toward her jaw but no temporal pain or vision symptoms.    She has not tried gabapentin yet.  Discussed to try gabapentin and I will send her prescription for Medrol Dosepak, encouraged her to call spine care clinic to schedule an appointment for possible occipital block for occipital neuralgia trigeminal neuralgia.    Dr Rey, given her age and history of palindromic rheumatism I was wondering if this could be presenting symptoms of temporal arteritis although she has not had specific pain in the temporal area but does have a lot of pain in her jaw and occiput  and CRP is elevated.  If you could see her in follow-up to further evaluate this as well that would be very helpful. Thank you!    NH

## 2024-03-26 NOTE — PROGRESS NOTES
ASSESSMENT: Ruthy Man is a 89 year old female presents for consultation at the request of PCP Selma Meneses, who presents today for new patient evaluation of:    -occipital neuralgia, left     patient symptoms consistent with left occipital neuralgia.  Given severity of pain level,  we talked about risks and benefits of and orders placed for an occipital nerve block on the left.  She is quite anxious about her po prednisone course lapsing and the severity of her pain increasing potentially.  I have extended the course for another 7 days.  We talked about the risks and benefits of this.  She is not an NSAID candidate given CKD.  I encouraged her to start her gabapentin and to slowly increase it as directed to 100 mg 3 times daily.  we talked about if symptoms worsen going to the emergency room.     Aspirin 81mg, OK to continue.         No data to display                     Diagnoses and all orders for this visit:  Occipital neuralgia of left side  -     PAIN US Guided Occipital NB; Future  Occipital pain  -     Spine  Referral  -     PAIN US Guided Occipital NB; Future  -     predniSONE (DELTASONE) 20 MG tablet; Take 1 tablet (20 mg) by mouth daily  Neck pain  -     Spine  Referral  Jaw pain  -     Spine  Referral  -     predniSONE (DELTASONE) 20 MG tablet; Take 1 tablet (20 mg) by mouth daily       PLAN:  Reviewed spine anatomy and disease process. Discussed diagnosis and treatment options with the patient today. A shared decision making model was used. The patient's values and choices were respected. The following represents what was discussed and decided upon by the provider and the patient.     -DIAGNOSTIC TESTS:  Images were personally reviewed and interpreted and explained to patient today using spine model.   --Did not order any new diagnostic test today    -PHYSICAL THERAPY:    - discussed the role of physical therapy likely after injection for neck  strengthening    -MEDICATIONS:    - recommended starting current prescription for gabapentin at 100 mg at night x 3 days, increase to twice daily x 3 days, increase to 3 times daily ongoing.  Patient may or may not choose to do so based on our discussion  Extended current prednisone dose for another 7 days.  Discussed the risks and benefits of doing so.  She will hold this on the morning of her injection   Discussed multiple medication options today with patient. Discussed risks, side effects, and proper use of medications. Patient verbalized understanding.    -INTERVENTIONS:    - ordered a left occipital nerve block  Discussed risks and benefits of injections with patient today. Patient would be a good candidate in the future for either epidural steroid injections or medial branch blocks if indicated based on symptoms and supported by imaging results.    -PATIENT EDUCATION: Total time of  40 minutes, on the day of service, spent with the patient, reviewing the chart, placing orders, and documenting.   -Today we also discussed the issues related to the pros and cons of the current treatment plan.    -FOLLOW-UP:    per routine following injection    Advised patient to call the [facility] if symptoms worsen or if they develop red flag symptoms such as numbness, weakness, severe pain uncontrolled by current pain med regimen, or any new or worsening problems controlling bladder and bowel function.   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  Symptoms lasted for a day or 2, then resolved.  Symptoms then returned about a week later and have been consistently present for 2 weeks at this point.  Pain was 10 out of 10, but has improved to a 3 out of 10 today and at best a 2 out of 10 while on a course of prednisone prescribed by her primary care after a visit on 3/18.  She describes the pain as sharp, crawling, like a worm.  The area feels odd in sensation. She has some blurriness in her left eye which is chronic, maybe a little worse.  She denies fever, nausea, vomiting, double vision, falls, arm pain, numbness, weakness or leg symptoms.  She has not been sick lately.  She denies rash. She had ESR, CRP, CBC drawn 3/18    She has not started the gabapentin, was wary of any potential side effects.  She is taking prednisone 20 mg daily and is due for her final dose of the course per her primary care this afternoon.  She is hoping to extend this as it has been helpful, but pain remains significant  And constant and she is hoping for immediate options to improve this.     she was started on Plaquenil  about 5 years ago, which has significantly improved the pain she used to have in other joints in her body.    She has had prior injections in her knee, but not in several years.  She was told at the time that she may need surgery, however the knee pain resolved after injection and has not returned.      She has not done physical therapy, chiropractic care, or had any previous spine surgeries     she is here with her son today    She had some x-ray cervical spine done    -Treatment to Date:     -Medications:   prednisone    Current Outpatient Medications   Medication    aspirin 81 mg chewable tablet    atorvastatin (LIPITOR) 40 MG tablet    carvedilol (COREG) 12.5 MG tablet    cholecalciferol, vitamin D3, (VITAMIN D3) 1,000 unit capsule    clobetasol (TEMOVATE)  0.05 % external ointment    cyanocobalamin (VITAMIN B-12) 500 MCG tablet    DOCOSAHEXANOIC ACID/EPA (FISH OIL ORAL)    escitalopram (LEXAPRO) 10 MG tablet    hydroxychloroquine (PLAQUENIL) 200 MG tablet    lansoprazole (PREVACID) 15 MG capsule    levothyroxine (SYNTHROID/LEVOTHROID) 75 MCG tablet    losartan (COZAAR) 50 MG tablet    predniSONE (DELTASONE) 20 MG tablet    gabapentin (NEURONTIN) 100 MG capsule     Current Facility-Administered Medications   Medication    denosumab (PROLIA) injection 60 mg       Allergies   Allergen Reactions    Ace Inhibitors Cough    Amlodipine Unknown     Edema      Clarithromycin Nausea    Diltiazem Hcl [Diltiazem] Itching    Doxazosin Nausea    Penicillins Rash     Edema      Tetanus Toxoid Other (See Comments)     Local allergic reaction       Past Medical History:   Diagnosis Date    Hypertension     Senile osteoporosis 2/21/2019        Patient Active Problem List   Diagnosis    Ischemic optic neuropathy    Impaired Fasting Glucose    Hypothyroidism    Premature Ventricular Contractions    Vitamin D deficiency    Skin Cancer    Adjustment disorder with anxiety    Essential hypertension    Hiatal Hernia    Colon polyps    Senile osteoporosis    Right thalamic stroke (H)    Left knee pain    Acute ischemic VBA thalamic stroke, left (H)    Chronic renal failure, stage 3 (moderate) (H)    Palindromic rheumatism    Primary osteoarthritis involving multiple joints    Chronic right shoulder pain    Palpitations    Dermatitis    Renal insufficiency    Diverticular disease of colon    Gastroesophageal reflux disease without esophagitis    Chronic renal failure, stage 3b (H)       Past Surgical History:   Procedure Laterality Date    HC REMOVAL GALLBLADDER      Description: Cholecystectomy;  Recorded: 11/30/2008;  Comments: stone    ZZC TOTAL ABDOM HYSTERECTOMY      Description: Hysterectomy;  Recorded: 11/30/2008;  Comments: benign disease       Family History   Problem Relation Age of  "Onset    ALS Mother          age 75    Pulmonary Embolism Father          age 79    Other - See Comments Brother          age birth    Other - See Comments Daughter         has corticobasal syndrome       Reviewed past medical, surgical, and family history with patient found on new patient intake packet located in EMR Media tab.     SOCIAL HX:  non-smoker, no alcohol use, no heavy drinking, no recreational drug use    Oswestry (UYEN) Questionnaire:         No data to display                Neck Disability Index:       No data to display                   PHQ-2 Score:       3/19/2022     1:21 PM   PHQ-2 (  [employer])   Q1: Little interest or pleasure in doing things 0   Q2: Feeling down, depressed or hopeless 0   PHQ-2 Score 0   Q1: Little interest or pleasure in doing things Not at all   Q2: Feeling down, depressed or hopeless Not at all   PHQ-2 Score 0          ROS:  positive for weight gain, headache, hoarseness, ringing in ears, cough, constipation, reflux, loss of bladder control at times, joint pain, muscle pain, muscle fatigue, itching, imbalance, dizziness, insomnia, excessive tiredness, anxiety. Specifically negative for bowel/bladder dysfunction, balance changes, headache, dizziness, foot drop, fevers, chills, appetite changes, nausea/vomiting, unexplained weight loss. Otherwise 13 systems reviewed are negative. Please see the patient's intake questionnaire from today for details.    OBJECTIVE:  /54   Pulse 77   Ht 5' 4\" (1.626 m)   Wt 182 lb (82.6 kg)   LMP  (LMP Unknown)   BMI 31.24 kg/m      PHYSICAL EXAMINATION:  --CONSTITUTIONAL: Vital signs as above. No acute distress. The patient is well nourished and well groomed.  --PSYCHIATRIC: The patient is awake, alert, oriented to person, place, and time, and answering questions appropriately with clear speech. Appropriate mood and affect   --HEENT: Sclera are non-injected. Extraocular muscles are intact. Moist oral mucosa.  --SKIN: Skin " over the face, bilateral upper extremities, and posterior torso is clean, dry, intact without rashes.  --LYMPH NODES: No palpable or tender anterior/posterior cervical, submandibular, or supraclavicular lymph nodes.   --RESPIRATORY: Normal rhythm and effort. No abnormal accessory muscle breathing patterns noted.     --NEUROLOGIC: CN III-XII are grossly intact.   Pupil on the left slightly smaller compared to right.  Negative dysmetria or pronator drift.  Slight left nasolabial flattening.  Hearing intact     no swelling of left lateral neck   temporomandibular joint glides well without popping or dislocation.    No lymphadenopathy   no posterior ear tenderness or swelling or redness   mild tenderness with  movement of ear auricle, but not significant     positive tenderness to left occipital area   no temporal tenderness or fluctuance   no maxillary or sphenoid sinus tenderness   no rash in the hairline or near eye    --GROSS MOTOR: Easily arises from a seated position    --UPPER EXTREMITY MOTOR TESTING:  Shoulder abduction: right 5/5, left 5/5  Triceps: right 5/5 left 5/5  Biceps:right 5/5  left 5/5,    Hand :  right 5/5  left 5/5,   Intrinsics: right 5/5  left 5/5,   Extensors: right 5/5  left 5/5,     --LOWER EXTREMITY MOTOR TESTING  Hip flexion: right 5/5  left 5/5,   Quads:right 5/5  left 5/5,   Hamstrings: right 5/5  left 5/5,   Dorsiflexion: right 5/5  left 5/5,   Plantarflexion: right 5/5  left 5/5,     REFLEXES: 0/4 symmetric triceps, biceps, brachioradialis bilaterally. 0/4 symmetric patellar, achilles reflex bilaterally.  Negative Clonus, Babinski, and Freeman's bilaterally.      SENSATION: of the upper and lower extremities is  intact to light touch.   --VASCULAR: Warm upper and lower limbs bilaterally.     --CERVICAL SPINE: Inspection reveals no evidence of deformity or swelling. Range of motion is  not limited in cervical flexion, extension, lateral rotation, head tilt. No point tenderness to  palpation of cervical spine. No tenderness to palpation of traps, scaps, or paraspinal musculature.      RESULTS:   Prior medical records from [facility]where were reviewed today.         IMAGING:  Spine imaging was personally reviewed and interpreted today. The images were shown to the patient and the findings were explained using a spine model.     XR Cervical Spine 2/3 Views    Result Date: 3/20/2024  EXAM: XR CERVICAL SPINE 2/3 VIEWS LOCATION: [facility] DATE: 3/19/2024 INDICATION:  Occipital pain, Neck pain COMPARISON: None.     IMPRESSION: Straightening of cervical lordosis. At C3-C4, to millimeter retrolisthesis. Mild cervical levocurvature. No displaced fracture. Vertebral body heights are maintained. No aggressive osseous abnormality. Moderate to advanced degenerative disc disease at C3-C4. Mild degenerative disc disease at C5-C6, C6-C7, and C7-T1. Multilevel facet arthropathy. No acute extraspinal abnormality.       Labs 3/18/24:  WBC: 8.1   CRP: 8.57  ESR: 21      Rose Teran FNP-C  [facility]  O. [phone]

## 2024-03-26 NOTE — LETTER
3/26/2024         RE: Ruthy Man  [facility] [address]        Dear Colleague,    Thank you for referring your patient, Ruthy Man, to the [facility]. Please see a copy of my visit note below.    ASSESSMENT: Ruthy Man is a 89 year old female presents for consultation at the request of PCP Selma Meneses, who presents today for new patient evaluation of:    -occipital neuralgia, left     patient symptoms consistent with left occipital neuralgia.  Given severity of pain level,  we talked about risks and benefits of and orders placed for an occipital nerve block on the left.  She is quite anxious about her po prednisone course lapsing and the severity of her pain increasing potentially.  I have extended the course for another 7 days.  We talked about the risks and benefits of this.  She is not an NSAID candidate given CKD.  I encouraged her to start her gabapentin and to slowly increase it as directed to 100 mg 3 times daily.  we talked about if symptoms worsen going to the emergency room.     Aspirin 81mg, OK to continue.         No data to display                     Diagnoses and all orders for this visit:  Occipital neuralgia of left side  -     PAIN US Guided Occipital NB; Future  Occipital pain  -     Spine  Referral  -     PAIN US Guided Occipital NB; Future  -     predniSONE (DELTASONE) 20 MG tablet; Take 1 tablet (20 mg) by mouth daily  Neck pain  -     Spine  Referral  Jaw pain  -     Spine  Referral  -     predniSONE (DELTASONE) 20 MG tablet; Take 1 tablet (20 mg) by mouth daily       PLAN:  Reviewed spine anatomy and disease process. Discussed diagnosis and treatment options with the patient today. A shared decision making model was used. The patient's values and choices were respected. The following represents what was discussed and decided upon by the provider and the patient.     -DIAGNOSTIC TESTS:   Images were personally reviewed and interpreted and explained to patient today using spine model.   --Did not order any new diagnostic test today    -PHYSICAL THERAPY:    - discussed the role of physical therapy likely after injection for neck strengthening    -MEDICATIONS:    - recommended starting current prescription for gabapentin at 100 mg at night x 3 days, increase to twice daily x 3 days, increase to 3 times daily ongoing.  Patient may or may not choose to do so based on our discussion  Extended current prednisone dose for another 7 days.  Discussed the risks and benefits of doing so.  She will hold this on the morning of her injection   Discussed multiple medication options today with patient. Discussed risks, side effects, and proper use of medications. Patient verbalized understanding.    -INTERVENTIONS:    - ordered a left occipital nerve block  Discussed risks and benefits of injections with patient today. Patient would be a good candidate in the future for either epidural steroid injections or medial branch blocks if indicated based on symptoms and supported by imaging results.    -PATIENT EDUCATION: Total time of  40 minutes, on the day of service, spent with the patient, reviewing the chart, placing orders, and documenting.   -Today we also discussed the issues related to the pros and cons of the current treatment plan.    -FOLLOW-UP:    per routine following injection    Advised patient to call the Spine Center if symptoms worsen or if they develop red flag symptoms such as numbness, weakness, severe pain uncontrolled by current pain med regimen, or any new or worsening problems controlling bladder and bowel function.   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  Symptoms lasted for a day or 2, then resolved.  Symptoms then returned about a week later and have been consistently present for 2 weeks at this point.  Pain was 10 out of 10, but has improved to a 3 out of 10 today and at best a 2 out of 10 while on a course of prednisone prescribed by her primary care after a visit on 3/18.  She describes the pain as sharp, crawling, like a worm.  The area feels odd in sensation. She has some blurriness in her left eye which is chronic, maybe a little worse.  She denies fever, nausea, vomiting, double vision, falls, arm pain, numbness, weakness or leg symptoms.  She has not been sick lately.  She denies rash. She had ESR, CRP, CBC drawn 3/18    She has not started the gabapentin, was wary of any potential side effects.  She is taking prednisone 20 mg daily and is due for her final dose of the course per her primary care this afternoon.  She is hoping to extend this as it has been helpful, but pain remains significant  And constant and she is hoping for immediate options to improve this.     she was started on Plaquenil  about 5 years ago, which has significantly improved the pain she used to have in other joints in her body.    She has had prior injections in her knee, but not in several years.  She was told at the time that she may need surgery, however the knee pain resolved after injection and has not returned.      She has not done physical therapy, chiropractic care, or had any previous spine surgeries     she is here with her son today    She had some x-ray cervical spine done    -Treatment to Date:     -Medications:    prednisone    Current Outpatient Medications   Medication     aspirin 81 mg chewable tablet     atorvastatin (LIPITOR) 40 MG tablet     carvedilol (COREG) 12.5 MG tablet     cholecalciferol, vitamin D3, (VITAMIN D3) 1,000 unit capsule     clobetasol (TEMOVATE) 0.05 % external ointment     cyanocobalamin (VITAMIN B-12) 500 MCG tablet     DOCOSAHEXANOIC ACID/EPA (FISH OIL ORAL)     escitalopram (LEXAPRO) 10 MG tablet     hydroxychloroquine (PLAQUENIL) 200 MG tablet     lansoprazole (PREVACID) 15 MG capsule     levothyroxine (SYNTHROID/LEVOTHROID) 75 MCG tablet     losartan (COZAAR) 50 MG tablet     predniSONE (DELTASONE) 20 MG tablet     gabapentin (NEURONTIN) 100 MG capsule     Current Facility-Administered Medications   Medication     denosumab (PROLIA) injection 60 mg       Allergies   Allergen Reactions     Ace Inhibitors Cough     Amlodipine Unknown     Edema       Clarithromycin Nausea     Diltiazem Hcl [Diltiazem] Itching     Doxazosin Nausea     Penicillins Rash     Edema       Tetanus Toxoid Other (See Comments)     Local allergic reaction       Past Medical History:   Diagnosis Date     Hypertension      Senile osteoporosis 2/21/2019        Patient Active Problem List   Diagnosis     Ischemic optic neuropathy     Impaired Fasting Glucose     Hypothyroidism     Premature Ventricular Contractions     Vitamin D deficiency     Skin Cancer     Adjustment disorder with anxiety     Essential hypertension     Hiatal Hernia     Colon polyps     Senile osteoporosis     Right thalamic stroke (H)     Left knee pain     Acute ischemic VBA thalamic stroke, left (H)     Chronic renal failure, stage 3 (moderate) (H)     Palindromic rheumatism     Primary osteoarthritis involving multiple joints     Chronic right shoulder pain     Palpitations     Dermatitis     Renal insufficiency     Diverticular disease of colon     Gastroesophageal reflux disease without esophagitis     Chronic renal failure, stage 3b (H)       Past  "Surgical History:   Procedure Laterality Date     HC REMOVAL GALLBLADDER      Description: Cholecystectomy;  Recorded: 2008;  Comments: stone     ZZC TOTAL ABDOM HYSTERECTOMY      Description: Hysterectomy;  Recorded: 2008;  Comments: benign disease       Family History   Problem Relation Age of Onset     ALS Mother          age 75     Pulmonary Embolism Father          age 79     Other - See Comments Brother          age birth     Other - See Comments Daughter         has corticobasal syndrome       Reviewed past medical, surgical, and family history with patient found on new patient intake packet located in EMR Media tab.     SOCIAL HX:  non-smoker, no alcohol use, no heavy drinking, no recreational drug use    Oswestry (UYEN) Questionnaire:         No data to display                Neck Disability Index:       No data to display                   PHQ-2 Score:       3/19/2022     1:21 PM   PHQ-2 (  [employer])   Q1: Little interest or pleasure in doing things 0   Q2: Feeling down, depressed or hopeless 0   PHQ-2 Score 0   Q1: Little interest or pleasure in doing things Not at all   Q2: Feeling down, depressed or hopeless Not at all   PHQ-2 Score 0          ROS:  positive for weight gain, headache, hoarseness, ringing in ears, cough, constipation, reflux, loss of bladder control at times, joint pain, muscle pain, muscle fatigue, itching, imbalance, dizziness, insomnia, excessive tiredness, anxiety. Specifically negative for bowel/bladder dysfunction, balance changes, headache, dizziness, foot drop, fevers, chills, appetite changes, nausea/vomiting, unexplained weight loss. Otherwise 13 systems reviewed are negative. Please see the patient's intake questionnaire from today for details.    OBJECTIVE:  /54   Pulse 77   Ht 5' 4\" (1.626 m)   Wt 182 lb (82.6 kg)   LMP  (LMP Unknown)   BMI 31.24 kg/m      PHYSICAL EXAMINATION:  --CONSTITUTIONAL: Vital signs as above. No acute distress. " The patient is well nourished and well groomed.  --PSYCHIATRIC: The patient is awake, alert, oriented to person, place, and time, and answering questions appropriately with clear speech. Appropriate mood and affect   --HEENT: Sclera are non-injected. Extraocular muscles are intact. Moist oral mucosa.  --SKIN: Skin over the face, bilateral upper extremities, and posterior torso is clean, dry, intact without rashes.  --LYMPH NODES: No palpable or tender anterior/posterior cervical, submandibular, or supraclavicular lymph nodes.   --RESPIRATORY: Normal rhythm and effort. No abnormal accessory muscle breathing patterns noted.     --NEUROLOGIC: CN III-XII are grossly intact.   Pupil on the left slightly smaller compared to right.  Negative dysmetria or pronator drift.  Slight left nasolabial flattening.  Hearing intact     no swelling of left lateral neck   temporomandibular joint glides well without popping or dislocation.    No lymphadenopathy   no posterior ear tenderness or swelling or redness   mild tenderness with  movement of ear auricle, but not significant     positive tenderness to left occipital area   no temporal tenderness or fluctuance   no maxillary or sphenoid sinus tenderness   no rash in the hairline or near eye    --GROSS MOTOR: Easily arises from a seated position    --UPPER EXTREMITY MOTOR TESTING:  Shoulder abduction: right 5/5, left 5/5  Triceps: right 5/5 left 5/5  Biceps:right 5/5  left 5/5,    Hand :  right 5/5  left 5/5,   Intrinsics: right 5/5  left 5/5,   Extensors: right 5/5  left 5/5,     --LOWER EXTREMITY MOTOR TESTING  Hip flexion: right 5/5  left 5/5,   Quads:right 5/5  left 5/5,   Hamstrings: right 5/5  left 5/5,   Dorsiflexion: right 5/5  left 5/5,   Plantarflexion: right 5/5  left 5/5,     REFLEXES: 0/4 symmetric triceps, biceps, brachioradialis bilaterally. 0/4 symmetric patellar, achilles reflex bilaterally.  Negative Clonus, Babinski, and Freeman's bilaterally.      SENSATION:  of the upper and lower extremities is  intact to light touch.   --VASCULAR: Warm upper and lower limbs bilaterally.     --CERVICAL SPINE: Inspection reveals no evidence of deformity or swelling. Range of motion is  not limited in cervical flexion, extension, lateral rotation, head tilt. No point tenderness to palpation of cervical spine. No tenderness to palpation of traps, scaps, or paraspinal musculature.      RESULTS:   Prior medical records from [facility] and Care Everywhere were reviewed today.         IMAGING:  Spine imaging was personally reviewed and interpreted today. The images were shown to the patient and the findings were explained using a spine model.     XR Cervical Spine 2/3 Views    Result Date: 3/20/2024  EXAM: XR CERVICAL SPINE 2/3 VIEWS LOCATION: [facility] DATE: 3/19/2024 INDICATION:  Occipital pain, Neck pain COMPARISON: None.     IMPRESSION: Straightening of cervical lordosis. At C3-C4, to millimeter retrolisthesis. Mild cervical levocurvature. No displaced fracture. Vertebral body heights are maintained. No aggressive osseous abnormality. Moderate to advanced degenerative disc disease at C3-C4. Mild degenerative disc disease at C5-C6, C6-C7, and C7-T1. Multilevel facet arthropathy. No acute extraspinal abnormality.       Labs 3/18/24:  WBC: 8.1   CRP: 8.57  ESR: 21      Rose DRUMMOND-C  [facility]  O. [phone]      Again, thank you for allowing me to participate in the care of your patient.        Sincerely,        JUSTIN Santoyo CNP   ilda

## 2024-03-26 NOTE — PATIENT INSTRUCTIONS
An injection has been ordered today to potentially help with your pain symptoms. These injections do not fix what is going on in your back, therefore they typically do not take away the pain completely, however they can many times help improve symptoms. Injections should always be completed along with other modalities such as physical therapy for the best long term outcomes. If injections alone are done, then pain will likely return.     [facility] Injection Requirements:    A  is required for all fluoroscopically-guided injections.  Injection appointments may be cancelled if there are signs/symptoms of an active infection or if the patient is being actively treated with antibiotics for a diagnosed infection.  Patients may have their steroid injection cancelled if they have had another steroid injection within 2 weeks.  Diabetic patients will have their blood glucose levels checked the day of their injection and the appointment will be rescheduled if the blood glucose level is 300 or higher.  Patients with allergies to cortisone, local anesthetics, iodine, or contrast dye should contact the Spine Center to further discuss these considerations.  Patients scheduled for medial branch block diagnostic injections should refrain from taking pain medication the day of the procedure.  The medial branch block injection appointment will be rescheduled if the patient's pain rating is not 5/10 or greater at the time of the procedure.  Patients taking warfarin/Coumadin will have their INR checked the day of the procedure and the procedure may be rescheduled if the INR is greater than 3.0.  Please contact the Spine Center (#[phone]) if you are taking any prescription blood-thinning medications (warfarin, Plavix, Lovenox, Eliquis, Brilinta, Effient, etc.) as special dosing adjustments may need to be made depending on the type of injection you are scheduled to receive.  It is recommended  that you delay having your steroid injection if you have received a flu shot or shingles vaccine within 2 weeks.       I recommend starting your gabapentin. You can take 100mg at night x 3 days, then can increase to 100mg twice daily x 3 days, then 100mg three times daily to help with your pain.    I recommend starting tylenol over the counter for your pain as well    I have extended your prednisone course for another 7 days. You will need to hold this medication the morning of your injection.      Ultimately recommend ED for further evaluation if symptoms worsen

## 2024-03-26 NOTE — TELEPHONE ENCOUNTER
Please call pt to schedule follow up with Dr Rey to discuss these symptoms.     Dr Rey reviewed Dr Meneses's note, he would like to see pt in person to further evaluate her symptoms.

## 2024-04-02 NOTE — PROGRESS NOTES
"      Rheumatology follow-up visit note     Virginia is a 89 year old female presents today for follow-up.    Virginia was seen today for recheck.    Diagnoses and all orders for this visit:    Cervical spondylosis    Palindromic rheumatism involving multiple sites    Primary osteoarthritis involving multiple joints    Ischemic optic neuropathy of left eye        This patient with occipital area pain radiating toward the left face and jaw, modest elevation of C-reactive protein normal sedimentation rate has features of cervical radiculopathy or trigeminal neuralgia.  A consideration for GCA was topic of discussion the likelihood of that fortunately appears to be low this is discussed with her.  She would like to proceed with local injection at the pain clinic, for the residual pain and tenderness in the cervical occipital area.  There are no features of chondritis around the external ear..  Will meet here in a month or sooner.   The longitudinal plan of care for the diagnosis(es)/condition(s) as documented were addressed during this visit. Due to the added complexity in care, I will continue to support Virginia in the subsequent management and with ongoing continuity of care.      HPI    Ruthy Man is a 89 year old female who has history of palindromic rheumatoid arthritis, optic neuritis ischemic,  is here for occipital area pain, this came on about 2 weeks ago, radiated toward her ear, and then to the left jaw, up to the TMJ area and to the face.  There is no history of trauma.  Approximately a month 6 weeks ago she had similar symptoms that lasted 2 days.  She was seen at her primary's office.  Her sedimentation rate and CRP were checked earlier was normal the latter modestly increased.  She noted no jaw claudication double vision.  She noted that the pain has now subsided leaving her with the \"funny feeling almost like crawling sensation on her left face.  She still has pain in the occipital area.  This is " "associated with local tenderness when she \"walks around\" there is no radiation down the arms.  The symptoms were not preceded by flareup of her palindromic rheumatoid arthritis which is remains under it seems good control with the hydroxychloroquine.  She is added 20 mg of prednisone for the past 7 to 8 days she stopped taking it because she did not think it was required.  That has not resulted in worsening of her pain.  She has longstanding loss of vision on the left side lower have.   She is able to do day-to-day activities.  She lives independently shops and cooks, indeed her son and daughter-in-law live in the same block and she cooks for them and they have lunch together every day. Given the past history of renal impairment not a candidate for any nonsteroidals      DETAILED EXAMINATION  04/02/24  :    Vitals:    04/02/24 1227   BP: 112/64   BP Location: Right arm   Patient Position: Sitting   Cuff Size: Adult Large   Pulse: 72     Alert oriented. Head including the face is examined for malar rash, heliotropes, scarring, lupus pernio. Eyes examined for redness such as in episcleritis/scleritis, periorbital lesions.   Neck/ Face examined for parotid gland swelling, range of motion of neck.  Left upper and lower and right upper and lower extremities examined for tenderness, swelling, warmth of the appendicular joints, range of motion, edema, rash.  Some of the important findings included: she does not have evidence of synovitis in the palpable joints of the upper extremities.  No significant deformities of the digits.  + Heberden nodes.  Range of motion of the shoulders  show full abduction.  No JLT effusion or warmth of the knees.  she does not have dactylitis of the digits.  She is exquisitely tender on the left cranial part of the cervical spine reproducing some of her symptoms, there is no tenderness to the TMJ.  Superficial temporal arteries are nicely palpable.     Patient Active Problem List    Diagnosis " Date Noted    Chronic renal failure, stage 3b (H) 03/18/2024     Priority: Medium    Gastroesophageal reflux disease without esophagitis 08/03/2022     Priority: Medium    Dermatitis 04/23/2021     Priority: Medium    Renal insufficiency 04/23/2021     Priority: Medium    Palpitations 09/15/2020     Priority: Medium    Chronic right shoulder pain 01/21/2020     Priority: Medium    Palindromic rheumatism 10/15/2019     Priority: Medium    Primary osteoarthritis involving multiple joints 10/15/2019     Priority: Medium    Chronic renal failure, stage 3 (moderate) (H) 08/06/2019     Priority: Medium    Acute ischemic VBA thalamic stroke, left (H) 04/12/2019     Priority: Medium    Right thalamic stroke (H) 04/11/2019     Priority: Medium    Left knee pain 04/11/2019     Priority: Medium    Ischemic optic neuropathy      Priority: Medium     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        Hypothyroidism      Priority: Medium     Created by Conversion  Replacement Utility updated for latest IMO load        Adjustment disorder with anxiety      Priority: Medium     Created by Conversion        Essential hypertension      Priority: Medium     Created by Conversion  Replacement Utility updated for latest IMO load        Senile osteoporosis 02/21/2019     Priority: Medium    Vitamin D deficiency      Priority: Medium     Created by Conversion  [facility] Annotation: May 18 2013  3:04PM - Jad Edmond: Vit D = 10  Replacement Utility updated for latest IMO load        Hiatal Hernia      Priority: Medium     Created by Conversion  [facility] Annotation: Nov 30 2008  4:27PM - Luke Cannon:   Gastroesophageal   reflux  Replacement Utility updated for latest IMO load        Skin Cancer      Priority: Medium     Created by Conversion  Replacement Utility updated for latest IMO load        Colon polyps 02/25/2015     Priority: Medium    Impaired Fasting Glucose      Priority: Medium     Created by Conversion        Premature Ventricular  Contractions      Priority: Medium     Created by [facility] Annotation: Nov 30 2008  4:41PM - Luke Cannon: frequent,   benign        Diverticular disease of colon 12/04/2008     Priority: Medium     Past Surgical History:   Procedure Laterality Date    HC REMOVAL GALLBLADDER      Description: Cholecystectomy;  Recorded: 11/30/2008;  Comments: stone    ZZC TOTAL ABDOM HYSTERECTOMY      Description: Hysterectomy;  Recorded: 11/30/2008;  Comments: benign disease      Past Medical History:   Diagnosis Date    Hypertension     Senile osteoporosis 2/21/2019     Allergies   Allergen Reactions    Ace Inhibitors Cough    Amlodipine Unknown     Edema      Clarithromycin Nausea    Diltiazem Hcl [Diltiazem] Itching    Doxazosin Nausea    Penicillins Rash     Edema      Tetanus Toxoid Other (See Comments)     Local allergic reaction     Current Outpatient Medications   Medication Sig Dispense Refill    aspirin 81 mg chewable tablet [ASPIRIN 81 MG CHEWABLE TABLET] Chew 81 mg daily.      atorvastatin (LIPITOR) 40 MG tablet TAKE 1 TABLET(40 MG) BY MOUTH DAILY 90 tablet 3    carvedilol (COREG) 12.5 MG tablet Take 1 tablet (12.5 mg) by mouth 2 times daily (with meals) 180 tablet 3    cholecalciferol, vitamin D3, (VITAMIN D3) 1,000 unit capsule [CHOLECALCIFEROL, VITAMIN D3, (VITAMIN D3) 1,000 UNIT CAPSULE] Take 1,000 Units by mouth daily.      clobetasol (TEMOVATE) 0.05 % external ointment [CLOBETASOL (TEMOVATE) 0.05 % OINTMENT] Apply 0.5 mg daily as needed 60 g 4    cyanocobalamin (VITAMIN B-12) 500 MCG tablet [CYANOCOBALAMIN (VITAMIN B-12) 500 MCG TABLET] Take 500 mcg by mouth daily.      DOCOSAHEXANOIC ACID/EPA (FISH OIL ORAL) [DOCOSAHEXANOIC ACID/EPA (FISH OIL ORAL)] Take 1,200 mg by mouth daily.      escitalopram (LEXAPRO) 10 MG tablet Take 1 tablet (10 mg) by mouth daily 90 tablet 3    gabapentin (NEURONTIN) 100 MG capsule Take 1 capsule (100 mg) by mouth at bedtime (Patient not taking: Reported on 3/26/2024) 30  capsule 1    hydroxychloroquine (PLAQUENIL) 200 MG tablet Take 1 tablet (200 mg) by mouth daily 90 tablet 1    lansoprazole (PREVACID) 15 MG capsule Take 15 mg by mouth as needed      levothyroxine (SYNTHROID/LEVOTHROID) 75 MCG tablet TAKE 1 TABLET BY MOUTH DAILY FOR 5 DAYS A WEEK, SKIP DOSE 2 DAYS PER WEEK 90 tablet 0    losartan (COZAAR) 50 MG tablet 2 tablet a day 180 tablet 3    predniSONE (DELTASONE) 20 MG tablet Take 1 tablet (20 mg) by mouth daily 7 tablet 0     family history includes ALS in her mother; Other - See Comments in her brother and daughter; Pulmonary Embolism in her father.  Social Connections: Not on file          WBC Count   Date Value Ref Range Status   03/18/2024 8.1 4.0 - 11.0 10e3/uL Final     RBC Count   Date Value Ref Range Status   03/18/2024 4.17 3.80 - 5.20 10e6/uL Final     Hemoglobin   Date Value Ref Range Status   03/18/2024 12.1 11.7 - 15.7 g/dL Final     Hematocrit   Date Value Ref Range Status   03/18/2024 38.3 35.0 - 47.0 % Final     MCV   Date Value Ref Range Status   03/18/2024 92 78 - 100 fL Final     MCH   Date Value Ref Range Status   03/18/2024 29.0 26.5 - 33.0 pg Final     Platelet Count   Date Value Ref Range Status   03/18/2024 248 150 - 450 10e3/uL Final     % Lymphocytes   Date Value Ref Range Status   03/18/2024 13 % Final     AST   Date Value Ref Range Status   04/28/2023 19 10 - 35 U/L Final     ALT   Date Value Ref Range Status   11/21/2023 8 0 - 50 U/L Final     Comment:     Reference intervals for this test were updated on 6/12/2023 to more accurately reflect our healthy population. There may be differences in the flagging of prior results with similar values performed with this method. Interpretation of those prior results can be made in the context of the updated reference intervals.       Albumin   Date Value Ref Range Status   11/21/2023 4.0 3.5 - 5.2 g/dL Final   02/11/2022 3.7 3.5 - 5.0 g/dL Final     Alkaline Phosphatase   Date Value Ref Range Status    04/28/2023 91 35 - 104 U/L Final     Creatinine   Date Value Ref Range Status   03/18/2024 1.45 (H) 0.51 - 0.95 mg/dL Final     GFR Estimate   Date Value Ref Range Status   03/18/2024 33 (L) >60 mL/min/1.73m2 Final   04/23/2021 38 (L) >60 mL/min/1.73m2 Final     GFR Estimate If Black   Date Value Ref Range Status   04/23/2021 46 (L) >60 mL/min/1.73m2 Final     Creatinine Urine mg/dL   Date Value Ref Range Status   10/24/2022 113.0 mg/dL Final     Comment:     The reference ranges have not been established in urine creatinine. The results should be integrated into the clinical context for interpretation.     Erythrocyte Sedimentation Rate   Date Value Ref Range Status   03/18/2024 21 0 - 30 mm/hr Final     CRP   Date Value Ref Range Status   02/11/2022 0.4 0.0-<0.8 mg/dL Final

## 2024-04-02 NOTE — PATIENT INSTRUCTIONS
DISCHARGE INSTRUCTIONS  During office hours (8:00 a.m.- 4:00 p.m.) questions or concerns may be answered  by calling [facility] Nurses at  [phone].  Messages received after hours will be returned the following business day.      In the case of an emergency, please dial 911 or seek assistance at the nearest Emergency Room/Urgent Care facility.     You may experience an increase in your symptoms for the first 2 days (It may take anywhere between 2 days- 2 weeks for the steroid to have maximum effect).    You may use ice on the injection site, as frequently as 20 minutes each hour if needed.    You may take your pain medicine.    You may shower. No swimming, tub bath or hot tub for 48 hours.  You may remove your bandaid/bandage as soon as you are home.    You may resume light activities, as tolerated.    Resume your usual diet as tolerated.    POSSIBLE STEROID SIDE EFFECTS (If steroid/cortisone was used for your procedure)  -If you experience these symptoms, it should only last for a short period  Swelling of the legs              Skin redness (flushing)     Mouth (oral) irritation   Blood sugar (glucose) levels            Sweats                    Mood changes  Headache  Sleeplessness  Weakened immune system for up to 14 days, which could increase the risk of barby the COVID-19 virus and/or experiencing more severe symptoms of the disease, if exposed.  Decreased effectiveness of the flu vaccine if given within 2 weeks of the steroid.         POSSIBLE PROCEDURE SIDE EFFECTS  -Call the [facility] if you are concerned  Increased Pain           Increased numbness/tingling      Nausea/Vomiting          Bruising/bleeding at site      Redness or swelling                                              Difficulty walking      Weakness           Fever greater than 100.5

## 2024-04-19 NOTE — LETTER
April 21, 2024      Ruthy Man  [address] [facility] [address]        Dear ,    We are writing to inform you of your test results.    Thyroid level is in good range.    Kidney function is slightly slow, but stable, not getting worse.    Red cell count is normal.    Inflammatory markers are moderately elevated. Please follow up with rheumatology of having more joint pain.    Please let me know if start having more pain in the temple area. See eye doctor as well to make sure there are no new vision changes.    Dr NIXON    Resulted Orders   TSH WITH FREE T4 REFLEX   Result Value Ref Range    TSH 1.69 0.30 - 4.20 uIU/mL   Basic metabolic panel  (Ca, Cl, CO2, Creat, Gluc, K, Na, BUN)   Result Value Ref Range    Sodium 138 135 - 145 mmol/L      Comment:      Reference intervals for this test were updated on 09/26/2023 to more accurately reflect our healthy population. There may be differences in the flagging of prior results with similar values performed with this method. Interpretation of those prior results can be made in the context of the updated reference intervals.     Potassium 4.6 3.4 - 5.3 mmol/L    Chloride 102 98 - 107 mmol/L    Carbon Dioxide (CO2) 24 22 - 29 mmol/L    Anion Gap 12 7 - 15 mmol/L    Urea Nitrogen 21.0 8.0 - 23.0 mg/dL    Creatinine 1.50 (H) 0.51 - 0.95 mg/dL    GFR Estimate 32 (L) >60 mL/min/1.73m2    Calcium 8.9 8.2 - 9.6 mg/dL    Glucose 101 (H) 70 - 99 mg/dL   ESR: Erythrocyte sedimentation rate   Result Value Ref Range    Erythrocyte Sedimentation Rate 31 (H) 0 - 30 mm/hr   CRP, inflammation   Result Value Ref Range    CRP Inflammation 26.70 (H) <5.00 mg/L   Hemoglobin A1c   Result Value Ref Range    Hemoglobin A1C 6.0 (H) 0.0 - 5.6 %      Comment:      Normal <5.7%   Prediabetes 5.7-6.4%    Diabetes 6.5% or higher     Note: Adopted from ADA consensus guidelines.   CBC with platelets and differential   Result Value Ref Range    WBC Count 6.8 4.0 - 11.0 10e3/uL    RBC Count  4.19 3.80 - 5.20 10e6/uL    Hemoglobin 12.1 11.7 - 15.7 g/dL    Hematocrit 38.4 35.0 - 47.0 %    MCV 92 78 - 100 fL    MCH 28.9 26.5 - 33.0 pg    MCHC 31.5 31.5 - 36.5 g/dL    RDW 14.4 10.0 - 15.0 %    Platelet Count 285 150 - 450 10e3/uL    % Neutrophils 72 %    % Lymphocytes 14 %    % Monocytes 8 %    % Eosinophils 6 %    % Basophils 0 %    % Immature Granulocytes 0 %    Absolute Neutrophils 4.9 1.6 - 8.3 10e3/uL    Absolute Lymphocytes 1.0 0.8 - 5.3 10e3/uL    Absolute Monocytes 0.5 0.0 - 1.3 10e3/uL    Absolute Eosinophils 0.4 0.0 - 0.7 10e3/uL    Absolute Basophils 0.0 0.0 - 0.2 10e3/uL    Absolute Immature Granulocytes 0.0 <=0.4 10e3/uL       If you have any questions or concerns, please call the clinic at the number listed above.       Sincerely,      Selma Meneses MD

## 2024-04-19 NOTE — PROGRESS NOTES
Assessment & Plan     Occipital neuralgia of left side  Initially she did have improvement to his prednisone 20 mg which she took for 12 days but did not resolve it 100%.  She was seen by her rheumatologist who did not think she has giant cell arthritis and has seen spine care clinic we did occipital nerve block 2 weeks ago with significant improvement of pain.  Currently she has mild pains in her jaw occasionally but nothing persistent, she takes Tylenol at bedtime.  She has follow-up with spine care clinic next week  - ESR: Erythrocyte sedimentation rate  - CRP, inflammation    Acquired hypothyroidism  She is on Synthroid, will check levels  - TSH WITH FREE T4 REFLEX    Primary hypertension  Before blood pressure was elevated and she was only taking Coreg once a day, when she was taking it twice a day I developed low blood pressure dizziness, currently she is back to taking it once a day and still feels dizzy lightheaded frequently, discussed to decrease dose from 12 mg daily to 60 mg twice a day.  If dizziness persists and blood pressure is not elevated we could decrease dose further.  - carvedilol (COREG) 6.25 MG tablet; Take 1 tablet (6.25 mg) by mouth 2 times daily (with meals)  - Basic metabolic panel  (Ca, Cl, CO2, Creat, Gluc, K, Na, BUN)    Fatigue, unspecified type  Mild fatigue even though her pain is better, will check labs and screen for diabetes.  - TSH WITH FREE T4 REFLEX  - CBC with platelets and differential  - Hemoglobin A1c    Candidal vulvovaginitis  Will treat with oral Diflucan she will use nystatin powder over the summer  - fluconazole (DIFLUCAN) 150 MG tablet; Take 1 tablet (150 mg) by mouth once for 1 dose . May repeat dose in 72 hours if still symptomatic.    Intertrigo  - nystatin (MYCOSTATIN) 484945 UNIT/GM external powder; Apply topically 2 times daily    Lichen sclerosus of vulva  She does have triamcinolone ointment that she uses 3 times a week but itching currently is worse, could  "be due to Candida, in the past she had biopsies done which were benign, currently has mild ulceration on labia minora, will have her see gynecology.  - Ob/Gyn  Referral; Future    Abnormal glucose  History of prediabetic sugars in the past  - Hemoglobin A1c    Chronic renal failure, stage 3b (H)  Patient is between 1.4 and 1.6  - Basic metabolic panel  (Ca, Cl, CO2, Creat, Gluc, K, Na, BUN)      BMI  Estimated body mass index is 31.24 kg/m  as calculated from the following:    Height as of this encounter: 1.626 m (5' 4\").    Weight as of this encounter: 82.6 kg (182 lb).       Subjective   Virginia is a 89 year old, presenting for the following health issues:  Recheck Medication and Follow Up (Pain up left side of neck and ear into head)      4/19/2024     1:53 PM   Additional Questions   Roomed by marcia cerda   Accompanied by self     History of Present Illness       Reason for visit:  Pain    She eats 2-3 servings of fruits and vegetables daily.She consumes 0 sweetened beverage(s) daily.She exercises with enough effort to increase her heart rate 10 to 19 minutes per day.  She exercises with enough effort to increase her heart rate 5 days per week.   She is taking medications regularly.     Virginia is a 89 year old female with  history of lichen sclerosis, status post cholecystectomy, umbilical hernia, hysterectomy, diverticulosis, cystocele, spine DJD, hypothyroidism, vitamin D deficiency, ischemic optic neuropathy, anxiety, high blood pressure, hiatal hernia and chronic renal insufficiency, right thalamic stroke in April 2019.  She is currently here for follow-up.    I saw Virginia a month ago, at that time she was having a lot of neck pain and jaw pain and a concern for occipital neuralgia versus temporal arteritis.  Her ESR was normal and CRP was elevated.  Neck x-ray showed advanced degenerative changes, I gave her prednisone 20 mg for 7 days and she reports that greatly improved her pain up to 90%.  " "Subsequently she did see spine care clinic who gave her additional prednisone and ordered occipital block and her rheumatologist who ruled out giant cell arthritis.  Currently she had occipital block injection on April 3 and feels much better.  Once in a while she still feels creepy crawly sensation in her jaw and ear but nothing persistent, no temporal pain.    On last visit here her blood pressure was slightly elevated and she was taking Coreg only once a day, she tried taking it twice a day and it caused dizziness.  On exam today blood pressure today is better but she still frequently feels lightheaded.  We discussed that we will try a small dose of Coreg so she could take it twice a day.    For pain she has been taking Tylenol at bedtime.    She also has history of lichen sclerosis and has been using triamcinolone ointment 3 times a week.  She was followed by [facility] in the past but stopped going 3 years ago due to too much travel.  She did have previous biopsies in the area.  Most recently the itchiness has increased and on exam today she also has some intertrigo in the area.    Review of systems: As above      Objective    /71 (BP Location: Left arm, Patient Position: Sitting, Cuff Size: Adult Large)   Pulse 73   Temp 98.3  F (36.8  C)   Resp 15   Ht 1.626 m (5' 4\")   Wt 82.6 kg (182 lb)   LMP  (LMP Unknown)   SpO2 95%   BMI 31.24 kg/m    Body mass index is 31.24 kg/m .  Physical Exam   General: well appearing elderly female, alert and oriented x3  EYES: Eyelids, conjunctiva, and sclera were normal. Pupils were normal.   HEAD, EARS, NOSE, MOUTH, AND THROAT: no cervical LAD, no thyromegaly or nodules appreciated. TMs are visualized and normal, oropharynx is clear.  No temporal tenderness on exam  RESPIRATORY: respirations non labored, CTA bl, no wheezes, rales, no forced expiratory wheezing.  CARDIOVASCULAR: Heart rate and rhythm were normal. No murmurs, rubs,gallops. There was no peripheral " edema.   GASTROINTESTINAL: Positive bowel sounds, abdomen is soft, non tender, non distended.     MUSCULOSKELETAL: Muscle mass was normal for age. No joint synovitis or deformity.  LYMPHATIC: There were no enlarged nodes palpable.  SKIN/HAIR/NAILS: Skin color was normal.  No rashes.  NEUROLOGIC: The patient was alert and oriented.  Speech was normal.  There is no facial asymmetry.   PSYCHIATRIC:  Mood and affect were normal.   GYN: Mild external labral irritation with intertrigo atrophic skin in the introitus, previous punch biopsy sites noted, she does have slight ulceration on labia minora.        Signed Electronically by: Selma Meneses MD

## 2024-04-19 NOTE — PATIENT INSTRUCTIONS
Covid booster and tetanus shot at the pharmacy    2. If having more nerve pain at night then can take Gabapentin pill for pain.    3. Decrease Carvedilol to 6 mg pil and take it twice a day (instead of 12.5 mg daily).    4. Increased itching can be due to candida infection. Use Nystatin powder in the groin daily  and take diflucan pill to treat yeast.

## 2024-04-24 PROBLEM — M54.81 OCCIPITAL NEURALGIA OF LEFT SIDE: Status: ACTIVE | Noted: 2024-01-01

## 2024-04-24 NOTE — LETTER
4/24/2024         RE: Ruthy Man  [facility] [facility] [address]        Dear Colleague,    Thank you for referring your patient, Ruthy Man, to the [facility]. Please see a copy of my visit note below.    ASSESSMENT: Ruthy Man is a 89 year old female presents for consultation at the request of PCP Selma Meneses, who presents today for follow up patient evaluation of:    -occipital neuralgia, left    Virginia experienced 95% improvement in pain after her occipital nerve block. She is very satisfied with level of relief. We talked about the potential of repeating in future up to 4x annually if her pain returns. I offered the option of some physical therapy to strengthen the surrounding muscles in that area to help prevent any future episodes. She is not interested at this time and would like to see how things go. We will see her back PRN           No data to display                     Diagnoses and all orders for this visit:  Occipital neuralgia of left side         PLAN:  Reviewed spine anatomy and disease process. Discussed diagnosis and treatment options with the patient today. A shared decision making model was used. The patient's values and choices were respected. The following represents what was discussed and decided upon by the provider and the patient.     -DIAGNOSTIC TESTS:  Images were personally reviewed and interpreted and explained to patient today using spine model.   --Did not order any new diagnostic tests today    -PHYSICAL THERAPY:    - discussed the role of physical therapy, she will let us know if she would like to pursue this in the future    -MEDICATIONS:    - did not make any medication changes today     -INTERVENTIONS:    -patient would be a great candidate for repeat injection if symptoms return in future. She will let us know how things go     -PATIENT EDUCATION: Total time of  20 minutes, on the day of service, spent with  the patient, reviewing the chart, placing orders, and documenting.   -Today we also discussed the issues related to the pros and cons of the current treatment plan.    -FOLLOW-UP:    PRN    Advised patient to call the [facility] Center if symptoms worsen or if they develop red flag symptoms such as numbness, weakness, severe pain uncontrolled by current pain med regimen, or any new or worsening problems controlling bladder and bowel function.   ______________________________________________________________________    SUBJECTIVE:     Virginia is doing great. She reports 95% pain relief following left occipital nerve block. She notes the pain sometimes returns at the back of the scalp on the left just to a mild degree 1/10 but only lasts for about a minute then resolves. She continues to experience a mild crawling sensation in her left lower jaw/chin but this is not bothersome to her. It has not worsened. She is not taking gabapentin. She takes tylenol sometimes as needed.        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  Symptoms lasted for a day or 2, then resolved.  Symptoms then returned about a week later and have been consistently present for 2 weeks at this point.  Pain was 10 out of 10, but has improved to a 3 out of 10 today and at best a 2 out of 10 while on a course of prednisone prescribed by her primary care after a visit on 3/18.  She describes the pain as  sharp, crawling, like a worm.  The area feels odd in sensation. She has some blurriness in her left eye which is chronic, maybe a little worse.  She denies fever, nausea, vomiting, double vision, falls, arm pain, numbness, weakness or leg symptoms.  She has not been sick lately.  She denies rash. She had ESR, CRP, CBC drawn 3/18    She has not started the gabapentin, was wary of any potential side effects.  She is taking prednisone 20 mg daily and is due for her final dose of the course per her primary care this afternoon.  She is hoping to extend this as it has been helpful, but pain remains significant  And constant and she is hoping for immediate options to improve this.     she was started on Plaquenil  about 5 years ago, which has significantly improved the pain she used to have in other joints in her body.    She has had prior injections in her knee, but not in several years.  She was told at the time that she may need surgery, however the knee pain resolved after injection and has not returned.      She has not done physical therapy, chiropractic care, or had any previous spine surgeries     she is here with her son today    She had some x-ray cervical spine done    -Treatment to Date:     -Medications:   Prednisone  Gabapentin    Injections:  US guided Left occipital nerve block 4/3/24, 95% relief    Current Outpatient Medications   Medication Sig Dispense Refill     aspirin 81 mg chewable tablet [ASPIRIN 81 MG CHEWABLE TABLET] Chew 81 mg daily.       atorvastatin (LIPITOR) 40 MG tablet TAKE 1 TABLET(40 MG) BY MOUTH DAILY 90 tablet 3     carvedilol (COREG) 6.25 MG tablet Take 1 tablet (6.25 mg) by mouth 2 times daily (with meals) 180 tablet 3     cholecalciferol, vitamin D3, (VITAMIN D3) 1,000 unit capsule [CHOLECALCIFEROL, VITAMIN D3, (VITAMIN D3) 1,000 UNIT CAPSULE] Take 1,000 Units by mouth daily.       clobetasol (TEMOVATE) 0.05 % external ointment [CLOBETASOL (TEMOVATE) 0.05 % OINTMENT] Apply 0.5 mg  daily as needed 60 g 4     cyanocobalamin (VITAMIN B-12) 500 MCG tablet [CYANOCOBALAMIN (VITAMIN B-12) 500 MCG TABLET] Take 500 mcg by mouth daily.       DOCOSAHEXANOIC ACID/EPA (FISH OIL ORAL) [DOCOSAHEXANOIC ACID/EPA (FISH OIL ORAL)] Take 1,200 mg by mouth daily.       escitalopram (LEXAPRO) 10 MG tablet Take 1 tablet (10 mg) by mouth daily 90 tablet 3     hydroxychloroquine (PLAQUENIL) 200 MG tablet Take 1 tablet (200 mg) by mouth daily 90 tablet 1     lansoprazole (PREVACID) 15 MG capsule Take 15 mg by mouth as needed       levothyroxine (SYNTHROID/LEVOTHROID) 75 MCG tablet TAKE 1 TABLET BY MOUTH DAILY FOR 5 DAYS A WEEK, SKIP DOSE 2 DAYS PER WEEK 90 tablet 0     losartan (COZAAR) 50 MG tablet TAKE 2 TABLETS BY MOUTH EVERY  tablet 3     nystatin (MYCOSTATIN) 654104 UNIT/GM external powder Apply topically 2 times daily 69 g 11     Current Facility-Administered Medications   Medication Dose Route Frequency Provider Last Rate Last Admin     denosumab (PROLIA) injection 60 mg  60 mg Subcutaneous Once Cassidy Grimm MD           Allergies   Allergen Reactions     Ace Inhibitors Cough     Amlodipine Unknown     Edema       Clarithromycin Nausea     Diltiazem Hcl [Diltiazem] Itching     Doxazosin Nausea     Penicillins Rash     Edema       Tetanus Toxoid Other (See Comments)     Local allergic reaction       Past Medical History:   Diagnosis Date     Hypertension      Senile osteoporosis 2/21/2019        Patient Active Problem List   Diagnosis     Ischemic optic neuropathy     Impaired Fasting Glucose     Hypothyroidism     Premature Ventricular Contractions     Vitamin D deficiency     Skin Cancer     Adjustment disorder with anxiety     Essential hypertension     Hiatal Hernia     Colon polyps     Senile osteoporosis     Right thalamic stroke (H)     Left knee pain     Acute ischemic VBA thalamic stroke, left (H)     Chronic renal failure, stage 3 (moderate) (H)     Palindromic rheumatism     Primary  osteoarthritis involving multiple joints     Chronic right shoulder pain     Palpitations     Dermatitis     Renal insufficiency     Diverticular disease of colon     Gastroesophageal reflux disease without esophagitis     Chronic renal failure, stage 3b (H)     Occipital neuralgia of left side       Past Surgical History:   Procedure Laterality Date     HC REMOVAL GALLBLADDER      Description: Cholecystectomy;  Recorded: 2008;  Comments: stone     ZZC TOTAL ABDOM HYSTERECTOMY      Description: Hysterectomy;  Recorded: 2008;  Comments: benign disease       Family History   Problem Relation Age of Onset     ALS Mother          age 75     Pulmonary Embolism Father          age 79     Other - See Comments Brother          age birth     Other - See Comments Daughter         has corticobasal syndrome       Reviewed past medical, surgical, and family history with patient found on new patient intake packet located in EMR Media tab.     SOCIAL HX:  non-smoker, no alcohol use, no heavy drinking, no recreational drug use    Oswestry (UYEN) Questionnaire:         No data to display                Neck Disability Index:       No data to display                   PHQ-2 Score:       3/19/2022     1:21 PM   PHQ-2 (  [employer])   Q1: Little interest or pleasure in doing things 0   Q2: Feeling down, depressed or hopeless 0   PHQ-2 Score 0   Q1: Little interest or pleasure in doing things Not at all   Q2: Feeling down, depressed or hopeless Not at all   PHQ-2 Score 0          ROS:  positive for weight gain, headache, hoarseness, ringing in ears, cough, constipation, reflux, loss of bladder control at times, joint pain, muscle pain, muscle fatigue, itching, imbalance, dizziness, insomnia, excessive tiredness, anxiety. Specifically negative for bowel/bladder dysfunction, balance changes, headache, dizziness, foot drop, fevers, chills, appetite changes, nausea/vomiting, unexplained weight loss. Otherwise 13  systems reviewed are negative. Please see the patient's intake questionnaire from today for details.    OBJECTIVE:  /68   Pulse 104   LMP  (LMP Unknown)     PHYSICAL EXAMINATION:  --CONSTITUTIONAL: Vital signs as above. No acute distress. The patient is well nourished and well groomed.  --PSYCHIATRIC: The patient is awake, alert, oriented to person, place, and time, and answering questions appropriately with clear speech. Appropriate mood and affect   --HEENT: Sclera are non-injected. Extraocular muscles are intact. Moist oral mucosa.  --SKIN: Skin over the face is clean, dry, intact without rashes.  --RESPIRATORY: Normal rhythm and effort. No abnormal accessory muscle breathing patterns noted.       RESULTS:   Prior medical records from [facility] and Care Everywhere were reviewed today.         IMAGING:  Spine imaging was personally reviewed and interpreted today. The images were shown to the patient and the findings were explained using a spine model.     XR Cervical Spine 2/3 Views    Result Date: 3/20/2024  EXAM: XR CERVICAL SPINE 2/3 VIEWS LOCATION: [facility] DATE: 3/19/2024 INDICATION:  Occipital pain, Neck pain COMPARISON: None.     IMPRESSION: Straightening of cervical lordosis. At C3-C4, to millimeter retrolisthesis. Mild cervical levocurvature. No displaced fracture. Vertebral body heights are maintained. No aggressive osseous abnormality. Moderate to advanced degenerative disc disease at C3-C4. Mild degenerative disc disease at C5-C6, C6-C7, and C7-T1. Multilevel facet arthropathy. No acute extraspinal abnormality.       Rose Teran FNP-C  [facility]  O. [phone]      Again, thank you for allowing me to participate in the care of your patient.        Sincerely,        Rose Teran, JUSTIN CNP

## 2024-04-24 NOTE — PROGRESS NOTES
ASSESSMENT: Ruthy Man is a 89 year old female presents for consultation at the request of PCP Selma Meneses, who presents today for follow up patient evaluation of:    -occipital neuralgia, left    [place]ia experienced 95% improvement in pain after her occipital nerve block. She is very satisfied with level of relief. We talked about the potential of repeating in future up to 4x annually if her pain returns. I offered the option of some physical therapy to strengthen the surrounding muscles in that area to help prevent any future episodes. She is not interested at this time and would like to see how things go. We will see her back PRN           No data to display                     Diagnoses and all orders for this visit:  Occipital neuralgia of left side         PLAN:  Reviewed spine anatomy and disease process. Discussed diagnosis and treatment options with the patient today. A shared decision making model was used. The patient's values and choices were respected. The following represents what was discussed and decided upon by the provider and the patient.     -DIAGNOSTIC TESTS:  Images were personally reviewed and interpreted and explained to patient today using spine model.   --Did not order any new diagnostic tests today    -PHYSICAL THERAPY:    - discussed the role of physical therapy, she will let us know if she would like to pursue this in the future    -MEDICATIONS:    - did not make any medication changes today     -INTERVENTIONS:    -patient would be a great candidate for repeat injection if symptoms return in future. She will let us know how things go     -PATIENT EDUCATION: Total time of  20 minutes, on the day of service, spent with the patient, reviewing the chart, placing orders, and documenting.   -Today we also discussed the issues related to the pros and cons of the current treatment plan.    -FOLLOW-UP:    PRN    Advised patient to call the Spine Center if symptoms worsen or if they  develop red flag symptoms such as numbness, weakness, severe pain uncontrolled by current pain med regimen, or any new or worsening problems controlling bladder and bowel function.   ______________________________________________________________________    SUBJECTIVE:     Virginia is doing great. She reports 95% pain relief following left occipital nerve block. She notes the pain sometimes returns at the back of the scalp on the left just to a mild degree 1/10 but only lasts for about a minute then resolves. She continues to experience a mild crawling sensation in her left lower jaw/chin but this is not bothersome to her. It has not worsened. She is not taking gabapentin. She takes tylenol sometimes as needed.        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  Symptoms lasted for a day or 2, then resolved.  Symptoms then returned about a week later and have been consistently present for 2 weeks at this point.  Pain was 10 out of 10, but has improved to a 3 out of 10 today and at best a 2 out of 10 while on a course of prednisone prescribed by her primary care after a visit on 3/18.  She describes the pain as sharp, crawling, like a worm.  The area feels odd in sensation. She has some blurriness in her left eye which is chronic, maybe a little worse.  She denies fever, nausea, vomiting, double vision, falls, arm pain, numbness, weakness or leg symptoms.  She has not  been sick lately.  She denies rash. She had ESR, CRP, CBC drawn 3/18    She has not started the gabapentin, was wary of any potential side effects.  She is taking prednisone 20 mg daily and is due for her final dose of the course per her primary care this afternoon.  She is hoping to extend this as it has been helpful, but pain remains significant  And constant and she is hoping for immediate options to improve this.     she was started on Plaquenil  about 5 years ago, which has significantly improved the pain she used to have in other joints in her body.    She has had prior injections in her knee, but not in several years.  She was told at the time that she may need surgery, however the knee pain resolved after injection and has not returned.      She has not done physical therapy, chiropractic care, or had any previous spine surgeries     she is here with her son today    She had some x-ray cervical spine done    -Treatment to Date:     -Medications:   Prednisone  Gabapentin    Injections:  US guided Left occipital nerve block 4/3/24, 95% relief    Current Outpatient Medications   Medication Sig Dispense Refill    aspirin 81 mg chewable tablet [ASPIRIN 81 MG CHEWABLE TABLET] Chew 81 mg daily.      atorvastatin (LIPITOR) 40 MG tablet TAKE 1 TABLET(40 MG) BY MOUTH DAILY 90 tablet 3    carvedilol (COREG) 6.25 MG tablet Take 1 tablet (6.25 mg) by mouth 2 times daily (with meals) 180 tablet 3    cholecalciferol, vitamin D3, (VITAMIN D3) 1,000 unit capsule [CHOLECALCIFEROL, VITAMIN D3, (VITAMIN D3) 1,000 UNIT CAPSULE] Take 1,000 Units by mouth daily.      clobetasol (TEMOVATE) 0.05 % external ointment [CLOBETASOL (TEMOVATE) 0.05 % OINTMENT] Apply 0.5 mg daily as needed 60 g 4    cyanocobalamin (VITAMIN B-12) 500 MCG tablet [CYANOCOBALAMIN (VITAMIN B-12) 500 MCG TABLET] Take 500 mcg by mouth daily.      DOCOSAHEXANOIC ACID/EPA (FISH OIL ORAL) [DOCOSAHEXANOIC ACID/EPA (FISH OIL ORAL)] Take 1,200 mg by mouth daily.       escitalopram (LEXAPRO) 10 MG tablet Take 1 tablet (10 mg) by mouth daily 90 tablet 3    hydroxychloroquine (PLAQUENIL) 200 MG tablet Take 1 tablet (200 mg) by mouth daily 90 tablet 1    lansoprazole (PREVACID) 15 MG capsule Take 15 mg by mouth as needed      levothyroxine (SYNTHROID/LEVOTHROID) 75 MCG tablet TAKE 1 TABLET BY MOUTH DAILY FOR 5 DAYS A WEEK, SKIP DOSE 2 DAYS PER WEEK 90 tablet 0    losartan (COZAAR) 50 MG tablet TAKE 2 TABLETS BY MOUTH EVERY  tablet 3    nystatin (MYCOSTATIN) 714545 UNIT/GM external powder Apply topically 2 times daily 69 g 11     Current Facility-Administered Medications   Medication Dose Route Frequency Provider Last Rate Last Admin    denosumab (PROLIA) injection 60 mg  60 mg Subcutaneous Once Cassidy Grimm MD           Allergies   Allergen Reactions    Ace Inhibitors Cough    Amlodipine Unknown     Edema      Clarithromycin Nausea    Diltiazem Hcl [Diltiazem] Itching    Doxazosin Nausea    Penicillins Rash     Edema      Tetanus Toxoid Other (See Comments)     Local allergic reaction       Past Medical History:   Diagnosis Date    Hypertension     Senile osteoporosis 2/21/2019        Patient Active Problem List   Diagnosis    Ischemic optic neuropathy    Impaired Fasting Glucose    Hypothyroidism    Premature Ventricular Contractions    Vitamin D deficiency    Skin Cancer    Adjustment disorder with anxiety    Essential hypertension    Hiatal Hernia    Colon polyps    Senile osteoporosis    Right thalamic stroke (H)    Left knee pain    Acute ischemic VBA thalamic stroke, left (H)    Chronic renal failure, stage 3 (moderate) (H)    Palindromic rheumatism    Primary osteoarthritis involving multiple joints    Chronic right shoulder pain    Palpitations    Dermatitis    Renal insufficiency    Diverticular disease of colon    Gastroesophageal reflux disease without esophagitis    Chronic renal failure, stage 3b (H)    Occipital neuralgia of left side       Past Surgical  History:   Procedure Laterality Date    HC REMOVAL GALLBLADDER      Description: Cholecystectomy;  Recorded: 2008;  Comments: stone    ZZC TOTAL ABDOM HYSTERECTOMY      Description: Hysterectomy;  Recorded: 2008;  Comments: benign disease       Family History   Problem Relation Age of Onset    ALS Mother          age 75    Pulmonary Embolism Father          age 79    Other - See Comments Brother          age birth    Other - See Comments Daughter         has corticobasal syndrome       Reviewed past medical, surgical, and family history with patient found on new patient intake packet located in EMR [employer] tab.     SOCIAL HX:  non-smoker, no alcohol use, no heavy drinking, no recreational drug use    Oswestry (UYEN) Questionnaire:         No data to display                Neck Disability Index:       No data to display                   PHQ-2 Score:       3/19/2022     1:21 PM   PHQ-2 (  [employer])   Q1: Little interest or pleasure in doing things 0   Q2: Feeling down, depressed or hopeless 0   PHQ-2 Score 0   Q1: Little interest or pleasure in doing things Not at all   Q2: Feeling down, depressed or hopeless Not at all   PHQ-2 Score 0          ROS:  positive for weight gain, headache, hoarseness, ringing in ears, cough, constipation, reflux, loss of bladder control at times, joint pain, muscle pain, muscle fatigue, itching, imbalance, dizziness, insomnia, excessive tiredness, anxiety. Specifically negative for bowel/bladder dysfunction, balance changes, headache, dizziness, foot drop, fevers, chills, appetite changes, nausea/vomiting, unexplained weight loss. Otherwise 13 systems reviewed are negative. Please see the patient's intake questionnaire from today for details.    OBJECTIVE:  /68   Pulse 104   LMP  (LMP Unknown)     PHYSICAL EXAMINATION:  --CONSTITUTIONAL: Vital signs as above. No acute distress. The patient is well nourished and well groomed.  --PSYCHIATRIC: The patient is  awake, alert, oriented to person, place, and time, and answering questions appropriately with clear speech. Appropriate mood and affect   --HEENT: Sclera are non-injected. Extraocular muscles are intact. Moist oral mucosa.  --SKIN: Skin over the face is clean, dry, intact without rashes.  --RESPIRATORY: Normal rhythm and effort. No abnormal accessory muscle breathing patterns noted.       RESULTS:   Prior medical records from [facility] and Care Everywhere were reviewed today.         IMAGING:  Spine imaging was personally reviewed and interpreted today. The images were shown to the patient and the findings were explained using a spine model.     XR Cervical Spine 2/3 Views    Result Date: 3/20/2024  EXAM: XR CERVICAL SPINE 2/3 VIEWS LOCATION: [facility] DATE: 3/19/2024 INDICATION:  Occipital pain, Neck pain COMPARISON: None.     IMPRESSION: Straightening of cervical lordosis. At C3-C4, to millimeter retrolisthesis. Mild cervical levocurvature. No displaced fracture. Vertebral body heights are maintained. No aggressive osseous abnormality. Moderate to advanced degenerative disc disease at C3-C4. Mild degenerative disc disease at C5-C6, C6-C7, and C7-T1. Multilevel facet arthropathy. No acute extraspinal abnormality.       Rose Teran FNP-C  [facility]  O. [phone]

## 2024-06-04 NOTE — PROGRESS NOTES
Rheumatology follow-up visit note     Virginia is a 89 year old female presents today for follow-up.    Virginia was seen today for recheck.    Diagnoses and all orders for this visit:    Palindromic rheumatism involving multiple sites  -     hydroxychloroquine (PLAQUENIL) 200 MG tablet; Take 1 tablet (200 mg) by mouth daily    Primary osteoarthritis involving multiple joints    Cervical spondylosis    Ischemic optic neuropathy of left eye    Chronic renal failure, stage 3b (H)        The longitudinal plan of care for the diagnosis(es)/condition(s) as documented were addressed during this visit. Due to the added complexity in care, I will continue to support Virginia in the subsequent management and with ongoing continuity of care.     Follow up in 4 months.    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  Reports no flareup of her joint symptoms recently.  She has noted low back pain and is planning to look into the possibility of a spine clinic evaluation as she has done so well with the occipital pain since the injection in the C-spine.  Reminded of eye examination with hydroxychloroquine.  She continues to have modest elevation of sed rate and CRP.  .  She takes care of her vegetable gardens.  She noted that she has 40 tomato plants.   She is able to do day-to-day activities.  She lives independently shops and cooks, indeed her son and daughter-in-law live in the same block and she cooks for them and they have lunch together every day. Given the past history of renal impairment not a candidate for any nonsteroidals      DETAILED EXAMINATION  06/04/24  :    Vitals:    06/04/24 1253   BP: (!) 163/83   BP Location: Left arm    Pulse: 74   SpO2: 95%     Alert oriented. Head including the face is examined for malar rash, heliotropes, scarring, lupus pernio. Eyes examined for redness such as in episcleritis/scleritis, periorbital lesions.   Neck/ Face examined for parotid gland swelling, range of motion of neck.  Left upper and lower and right upper and lower extremities examined for tenderness, swelling, warmth of the appendicu left medially lar joints, range of motion, edema, rash.  Some of the important findings included: she does not have evidence of synovitis in the palpable joints of the upper extremities.  No significant deformities of the digits.  + Heberden nodes.  Range of motion of the shoulders   show full abduction.  No JLT effusion or warmth of the knees.  she does not have dactylitis of the digits.  Her blood pressure was elevated as she noted that she has forgotten to take her antihypertensive this morning.        Patient Active Problem List    Diagnosis Date Noted    Occipital neuralgia of left side 04/24/2024     Priority: Medium    Chronic renal failure, stage 3b (H) 03/18/2024     Priority: Medium    Gastroesophageal reflux disease without esophagitis 08/03/2022     Priority: Medium    Dermatitis 04/23/2021     Priority: Medium    Renal insufficiency 04/23/2021     Priority: Medium    Palpitations 09/15/2020     Priority: Medium    Chronic right shoulder pain 01/21/2020     Priority: Medium    Palindromic rheumatism 10/15/2019     Priority: Medium    Primary osteoarthritis involving multiple joints 10/15/2019     Priority: Medium    Chronic renal failure, stage 3 (moderate) (H) 08/06/2019     Priority: Medium    Acute ischemic VBA thalamic stroke, left (H) 04/12/2019     Priority: Medium    Right thalamic stroke (H) 04/11/2019     Priority: Medium    Left knee pain 04/11/2019     Priority: Medium    Ischemic optic neuropathy      Priority: Medium     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 Annotation: May 18 2013  3:04PM Jad Vines: Vit D = 10  Replacement Utility updated for latest IMO load        Hiatal Hernia      Priority: Medium     Created by Conversion  [facility] Annotation: Nov 30 2008  4:27PM - Luke Cannon:   Gastroesophageal   reflux  Replacement Utility updated for latest IMO load        Skin Cancer      Priority: Medium     Created by Conversion  Replacement Utility updated for latest IMO load        Colon polyps 02/25/2015     Priority: Medium    Impaired Fasting Glucose      Priority: Medium     Created by Conversion        Premature Ventricular Contractions      Priority: Medium     Created by Conversion  [facility] Annotation: Nov 30 2008  4:41PM - Luke Cannon: frequent,   benign        Diverticular disease of colon 12/04/2008     Priority: Medium     Past Surgical History:   Procedure Laterality Date    HC REMOVAL GALLBLADDER      Description: Cholecystectomy;  Recorded: 11/30/2008;  Comments: stone    ZZC TOTAL ABDOM HYSTERECTOMY      Description: Hysterectomy;  Recorded: 11/30/2008;  Comments: benign disease      Past Medical History:   Diagnosis Date    Hypertension     Senile osteoporosis 2/21/2019     Allergies   Allergen Reactions    Ace Inhibitors Cough    Amlodipine Unknown     Edema      Clarithromycin Nausea    Diltiazem Hcl [Diltiazem] Itching    Doxazosin Nausea    Penicillins Rash     Edema      Tetanus Toxoid Other (See Comments)     Local allergic reaction     Current Outpatient Medications   Medication Sig Dispense Refill     aspirin 81 mg chewable tablet [ASPIRIN 81 MG CHEWABLE TABLET] Chew 81 mg daily.      atorvastatin (LIPITOR) 40 MG tablet TAKE 1 TABLET(40 MG) BY MOUTH DAILY 90 tablet 3    carvedilol (COREG) 6.25 MG tablet Take 1 tablet (6.25 mg) by mouth 2 times daily (with meals) 180 tablet 3    cholecalciferol, vitamin D3, (VITAMIN D3) 1,000 unit capsule [CHOLECALCIFEROL, VITAMIN D3, (VITAMIN D3) 1,000 UNIT CAPSULE] Take 1,000 Units by mouth daily.      clobetasol (TEMOVATE) 0.05 % external ointment [CLOBETASOL (TEMOVATE) 0.05 % OINTMENT] Apply 0.5 mg daily as needed 60 g 4    cyanocobalamin (VITAMIN B-12) 500 MCG tablet [CYANOCOBALAMIN (VITAMIN B-12) 500 MCG TABLET] Take 500 mcg by mouth daily.      DOCOSAHEXANOIC ACID/EPA (FISH OIL ORAL) [DOCOSAHEXANOIC ACID/EPA (FISH OIL ORAL)] Take 1,200 mg by mouth daily.      escitalopram (LEXAPRO) 10 MG tablet Take 1 tablet (10 mg) by mouth daily 90 tablet 3    hydroxychloroquine (PLAQUENIL) 200 MG tablet Take 1 tablet (200 mg) by mouth daily 90 tablet 1    lansoprazole (PREVACID) 15 MG capsule Take 15 mg by mouth as needed      levothyroxine (SYNTHROID/LEVOTHROID) 75 MCG tablet TAKE 1 TABLET BY MOUTH DAILY FOR 5 DAYS A WEEK, SKIP DOSE 2 DAYS PER WEEK 90 tablet 0    losartan (COZAAR) 50 MG tablet TAKE 2 TABLETS BY MOUTH EVERY  tablet 3    nystatin (MYCOSTATIN) 316390 UNIT/GM external powder Apply topically 2 times daily 69 g 11     family history includes ALS in her mother; Other - See Comments in her brother and daughter; Pulmonary Embolism in her father.  Social Connections: Not on file          WBC Count   Date Value Ref Range Status   04/19/2024 6.8 4.0 - 11.0 10e3/uL Final     RBC Count   Date Value Ref Range Status   04/19/2024 4.19 3.80 - 5.20 10e6/uL Final     Hemoglobin   Date Value Ref Range Status   04/19/2024 12.1 11.7 - 15.7 g/dL Final     Hematocrit   Date Value Ref Range Status   04/19/2024 38.4 35.0 - 47.0 % Final     MCV   Date Value Ref Range Status   04/19/2024 92  78 - 100 fL Final     MCH   Date Value Ref Range Status   04/19/2024 28.9 26.5 - 33.0 pg Final     Platelet Count   Date Value Ref Range Status   04/19/2024 285 150 - 450 10e3/uL Final     % Lymphocytes   Date Value Ref Range Status   04/19/2024 14 % Final     AST   Date Value Ref Range Status   04/28/2023 19 10 - 35 U/L Final     ALT   Date Value Ref Range Status   11/21/2023 8 0 - 50 U/L Final     Comment:     Reference intervals for this test were updated on 6/12/2023 to more accurately reflect our healthy population. There may be differences in the flagging of prior results with similar values performed with this method. Interpretation of those prior results can be made in the context of the updated reference intervals.       Albumin   Date Value Ref Range Status   11/21/2023 4.0 3.5 - 5.2 g/dL Final   02/11/2022 3.7 3.5 - 5.0 g/dL Final     Alkaline Phosphatase   Date Value Ref Range Status   04/28/2023 91 35 - 104 U/L Final     Creatinine   Date Value Ref Range Status   04/19/2024 1.50 (H) 0.51 - 0.95 mg/dL Final     GFR Estimate   Date Value Ref Range Status   04/19/2024 32 (L) >60 mL/min/1.73m2 Final   04/23/2021 38 (L) >60 mL/min/1.73m2 Final     GFR Estimate If Black   Date Value Ref Range Status   04/23/2021 46 (L) >60 mL/min/1.73m2 Final     Creatinine Urine mg/dL   Date Value Ref Range Status   10/24/2022 113.0 mg/dL Final     Comment:     The reference ranges have not been established in urine creatinine. The results should be integrated into the clinical context for interpretation.     Erythrocyte Sedimentation Rate   Date Value Ref Range Status   04/19/2024 31 (H) 0 - 30 mm/hr Final     CRP   Date Value Ref Range Status   02/11/2022 0.4 0.0-<0.8 mg/dL Final

## 2024-06-06 PROBLEM — S72.001A CLOSED DISPLACED FRACTURE OF RIGHT FEMORAL NECK (H): Status: ACTIVE | Noted: 2024-01-01

## 2024-06-06 PROBLEM — A41.9 SEPSIS, DUE TO UNSPECIFIED ORGANISM, UNSPECIFIED WHETHER ACUTE ORGAN DYSFUNCTION PRESENT (H): Status: ACTIVE | Noted: 2024-01-01
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
## 2024-06-06 PROBLEM — J96.01 ACUTE RESPIRATORY FAILURE WITH HYPOXIA (H): Status: ACTIVE | Noted: 2024-01-01

## 2024-06-06 PROBLEM — J18.9 COMMUNITY ACQUIRED PNEUMONIA OF RIGHT UPPER LOBE OF LUNG: Status: ACTIVE | Noted: 2024-01-01

## 2024-06-06 NOTE — TREATMENT PLAN
"Respiratory Treatment Plan     Patient Score: 7  Patient Acuity: 4    Clinical Indication for Therapy: prevent atelectasis, pneumonia     Therapy Ordered: Flutter valve QID along with incentive spirometer, duoneb PRN      History:   -Smoking History: no   -Home Medications: no   -Home Oxygen: no   -SHERMAN Hx: no sleep study completed and was reccommended to completed nocturnal ox study in 2022      Assessment Summary: Patient was admitted via the ED for femoral fracture and community acquired pneumonia. She is currently on 3lpm nasal canula with an Sp02 of 91%. Respiratory rate is normal, along with depth and pattern.     Breath sounds are diminished but no wheezing noted upon exam or rhonchi.     She does have a strong cough thought non-productive and dry. Chest CT from 6/6/24 per MD \"CT imaging findings of mild cylindrical bronchiectasis with endobronchial mucosal thickening and secretions, peribronchial nodular opacities and congestion foci of consolidation in the posterior segment right upper lobe and right middle lobe appeared on previous CT 2 years ago \"      Will transition to flutter valve QID and encourage hourly usage, along with Incentive spirometer for lung recruitment. Will continue to assess daily for pulmonary needs.       Deanna Nieves, RT  6/6/2024    "

## 2024-06-06 NOTE — ANESTHESIA PROCEDURE NOTES
Fascia iliaca Procedure Note    Pre-Procedure   Staff -        Anesthesiologist:  Noe Langley MD       Performed By: anesthesiologist       Location: pre-op       Procedure Start/Stop Times: 6/6/2024 4:15 PM and 6/6/2024 4:20 PM       Pre-Anesthestic Checklist: patient identified, IV checked, site marked, risks and benefits discussed, informed consent, monitors and equipment checked, pre-op evaluation, at physician/surgeon's request and post-op pain management  Timeout:       Correct Patient: Yes        Correct Procedure: Yes        Correct Site: Yes        Correct Position: Yes        Correct Laterality: Yes        Site Marked: Yes  Procedure Documentation  Procedure: Fascia iliaca       Laterality: right       Patient Position: supine       Patient Prep/Sterile Barriers: sterile gloves, mask       Skin prep: Chloraprep       Needle Type: short bevel       Needle Gauge: 21.        Needle Length (Inches): 4        Ultrasound guided       1. Ultrasound was used to identify targeted nerve, plexus, vascular marker, or fascial plane and place a needle adjacent to it in real-time.       2. Ultrasound was used to visualize the spread of anesthetic in close proximity to the above referenced structure.       3. A permanent image is entered into the patient's record.       4. The visualized anatomic structures appeared normal.       5. There were no apparent abnormal pathologic findings.    Assessment/Narrative         The placement was negative for: blood aspirated, painful injection and site bleeding       Paresthesias: No.       Bolus given via needle..        Secured via.        Insertion/Infusion Method: Single Shot       Complications: none       Injection made incrementally with aspirations every 5 mL.    Medication(s) Administered   Bupivacaine 0.25% PF (Infiltration) - Infiltration   30 mL - 6/6/2024 4:15:00 PM  Medication Administration Time: 6/6/2024 4:15 PM      FOR [facility] ([facility] [place] ONLY:   Pain Team  "Contact information: please page the Pain Team Via [facility]. Search \"Pain\". During daytime hours, please page the attending first. At night please page the resident first.      "

## 2024-06-06 NOTE — PHARMACY-ADMISSION MEDICATION HISTORY
Pharmacist Admission Medication History    Admission medication history is complete. The information provided in this note is only as accurate as the sources available at the time of the update.    Information Source(s): Patient and CareEverywhere/SureScripts via in-person    Pertinent Information:     Changes made to PTA medication list:  Added: None  Deleted: None  Changed: None    Allergies reviewed with patient and updates made in EHR: yes    Medication History Completed By: Tejal Valle [facility] 6/6/2024 9:16 AM    Current Facility-Administered Medications for the 6/6/24 encounter (Hospital Encounter)   Medication    denosumab (PROLIA) injection 60 mg     PTA Med List   Medication Sig Last Dose    aspirin 81 mg chewable tablet [ASPIRIN 81 MG CHEWABLE TABLET] Chew 81 mg daily. 6/5/2024 at am    atorvastatin (LIPITOR) 40 MG tablet TAKE 1 TABLET(40 MG) BY MOUTH DAILY 6/5/2024 at am    carvedilol (COREG) 6.25 MG tablet Take 1 tablet (6.25 mg) by mouth 2 times daily (with meals) 6/5/2024 at pm    cholecalciferol, vitamin D3, (VITAMIN D3) 1,000 unit capsule [CHOLECALCIFEROL, VITAMIN D3, (VITAMIN D3) 1,000 UNIT CAPSULE] Take 1,000 Units by mouth daily. 6/5/2024 at am    clobetasol (TEMOVATE) 0.05 % external ointment [CLOBETASOL (TEMOVATE) 0.05 % OINTMENT] Apply 0.5 mg daily as needed Unknown at prn    cyanocobalamin (VITAMIN B-12) 500 MCG tablet [CYANOCOBALAMIN (VITAMIN B-12) 500 MCG TABLET] Take 500 mcg by mouth daily. 6/5/2024 at am    DOCOSAHEXANOIC ACID/EPA (FISH OIL ORAL) [DOCOSAHEXANOIC ACID/EPA (FISH OIL ORAL)] Take 1,200 mg by mouth daily. Past Week at am    escitalopram (LEXAPRO) 10 MG tablet Take 1 tablet (10 mg) by mouth daily 6/5/2024 at am    hydroxychloroquine (PLAQUENIL) 200 MG tablet Take 1 tablet (200 mg) by mouth daily 6/5/2024 at am    lansoprazole (PREVACID) 15 MG capsule Take 15 mg by mouth every evening 6/5/2024 at pm    levothyroxine (SYNTHROID/LEVOTHROID) 75 MCG tablet TAKE 1 TABLET BY  MOUTH DAILY FOR 5 DAYS A WEEK, SKIP DOSE 2 DAYS PER WEEK 6/5/2024 at am    losartan (COZAAR) 50 MG tablet TAKE 2 TABLETS BY MOUTH EVERY DAY 6/5/2024 at am

## 2024-06-06 NOTE — CONSULTS
"Care Management Initial Consult    General Information  Assessment completed with: Patient, Children, [place] and son Mat  Type of CM/SW Visit: Initial Assessment    Primary Care Provider verified and updated as needed: Yes   Readmission within the last 30 days: no previous admission in last 30 days      Reason for Consult: discharge planning  Advance Care Planning: Advance Care Planning Reviewed: no concerns identified          Communication Assessment  Patient's communication style: spoken language (English or Bilingual)             Cognitive  Cognitive/Neuro/Behavioral:                        Living Environment:   People in home: alone     Current living Arrangements: house (\"it is a 4 level house. It is a split entry. I have to use steps to get to the bathroom and bedroom, but once I am on that level I could stay on that level\".)      Able to return to prior arrangements: other (see comments) (likely needs TCU)  Living Arrangement Comments: \"son and daughter both live in houses within a few blocks\"    Family/Social Support:  Care provided by: self  Provides care for: no one  Marital Status:   Children          Description of Support System: Supportive, Involved    Support Assessment: Adequate family and caregiver support, Adequate social supports, Patient communicates needs well met    Current Resources:   Patient receiving home care services: No     Community Resources: None  Equipment currently used at home: cane, straight (\"I have a cane at home, but I never use it\")  Supplies currently used at home: Other (\"dentures, glasses\")    Employment/Financial:  Employment Status: retired     Employment/ Comments: \"no  history\"  Financial Concerns:     Referral to Financial Worker: No       Does the patient's insurance plan have a 3 day qualifying hospital stay waiver?  Yes     Which insurance plan 3 day waiver is available? Alternative insurance waiver    Will the waiver be used for " "post-acute placement? Undetermined at this time, likely will have a qualifying stay in the hospital for TCU needs.    Functional Status:  Prior to admission patient needed assistance:   Dependent ADLs:: Independent, Ambulation-no assistive device  Dependent IADLs:: Independent  Assesssment of Functional Status: Not at baseline with ADL Functioning, Not at baseline with mobility, Not at  functional baseline    Mental Health Status:          Chemical Dependency Status:                Values/Beliefs:  Spiritual, Cultural Beliefs, Baptism Practices, Values that affect care:                 Additional Information:  Virginia lives in a house alone. \"It is a 4 level house. It is a split entry. I have to use steps to get to the bathroom and bedroom, but once I am on that level I could stay on that level. My son and daughter both live in houses within a few blocks of my house\".    She is independent with ADLs and IADLs. She still drives.    Likely needs TCU and son given TCU list. Son states, \"mom listen to her you are going to need to go to TCU. The steps would be too much at home.\" Patient states, \"I have never been to a TCU before and I don't want to go to one now. I would want to be able to leave in 2 days after getting there.\" I spoke to her about the process and post surgery PT/OT seeing her and giving recommendations. I also spoke to her about the benefits of going to TCU vs home with home care.    [facility] transport at discharge.    CM to follow for medical progression of care, discharge recommendations, and final discharge plan.    Violeta Carrillo RN    "

## 2024-06-06 NOTE — CONSULTS
ORTHOPEDIC CONSULTATION    Consultation  Ruthy Man,  1930, MRN [id number]    Fall at home, initial encounter  Closed displaced fracture of right femoral neck (H)  Community acquired pneumonia of right upper lobe of lung  Sepsis, due to unspecified organism, unspecified whether acute organ dysfunction present (H)  Acute respiratory failure with hypoxia (H)    PCP: Selma Meneses, [phone]   Code status:  No Order       Extended Emergency Contact Information  Primary Emergency Contact: Mat Man   [place]  Mobile Phone: [phone]  Relation: Son  Secondary Emergency Contact: Jaquelin Callahan  Mobile Phone: [phone]  Relation: Daughter         IMPRESSION:  Acute closed displaced right femoral neck fracture     PLAN:  This patient was discussed with Dr. Lara, on-call surgeon for [facility] and they are in agreement with the following plan.   - Discussed treatment options including surgical intervention with Dr. Lara. Patient would likely benefit from a hemiarthroplasty. Discussed risks of procedure including but not limited to, injury to nerves, arteries or veins, malunion, nonunion, periprosthetic fracture, DVT or even death with the patient and they are in agreement with proceeding.  - Will take to the OR today.  NPO at this time  - Type & Screen done  - Anesthesia to place block for improved pain control  - Medical optimization per Hospitalist. Hgb 12.4.  White blood cell 15.3.  Per hospitalist conversation with pulm should be stable for surgery.  Currently on 3 L O2  - Pain control.  Used IV morphine which did help with the pain but is causing her significant nausea  - NWB right lower extremity.    Thank you for including [facility] in the care of Ruthy Man. It has been a pleasure participating in their care.      CHIEF COMPLAINT: Right hip fracture      HISTORY OF PRESENT ILLNESS:  The patient is seen in orthopedic consultation at the request of   Ted Lara DO.  The patient is a 89 year old female with c/o right hip pain.  They presented to the ED with right hip pain following a fall earlier this morning.  She had gotten up to go to the bathroom when she slipped and fell directly onto her right hip.  She has severe onset of pain and was unable to weight-bear and ambulate following the fall.  She notes pain in her anterior hip and thigh, no pain past the level of her knee.  She does not have any numbness or tingling in her leg either.  She has no history of surgeries on her hip in the past.  Pain is not very well-controlled at this time and she had a lot of nausea with her recent dose of IV morphine.  She is not on any anticoagulation at this time.  She does take aspirin daily.  She lives at home alone in a house.  She does have assistance of her son who lives nearby.  She ambulates independently without assistance at baseline    PAST MEDICAL HISTORY:     Past Medical History:   Diagnosis Date    Hypertension     Senile osteoporosis 2019       ALLERGIES:   Ace inhibitors, Amlodipine, Clarithromycin, Diltiazem hcl [diltiazem], Doxazosin, Penicillins, and Tetanus toxoid    MEDICATIONS ON ADMISSION:  Medications were reviewed.  They do include:   (Not in a hospital admission)      SOCIAL HISTORY:    reports that she has never smoked. She has never used smokeless tobacco. She reports that she does not drink alcohol and does not use drugs.     FAMILY HISTORY:  Family History   Problem Relation Age of Onset    ALS Mother          age 75    Pulmonary Embolism Father          age 79    Other - See Comments Brother          age birth    Other - See Comments Daughter         has corticobasal syndrome       REVIEW OF SYSTEMS:   See Admission History and Physical     Clinically Significant Risk Factors Present on Admission      # Drug Induced Platelet Defect: home medication list includes an antiplatelet medication    # Obesity: Estimated body  "mass index is 31.24 kg/m  as calculated from the following:    Height as of this encounter: 1.626 m (5' 4\").    Weight as of this encounter: 82.6 kg (182 lb).      #WBC includes abnormal value:  Recent labs: (last 2 weeks)     06/06/24  0641   WBC 15.3*     Risk Factors for Delirium:  -Pt has respiratory rate >20 within the past 24 hours.  -Pt has elevated HR>90 within the past 24 hours.  - Pt has elevated WBC >11 within the past 24 hours.  #Age: >80   #Cognitive impairment:  Noted on problem list  #Infection: Noted on problem list      PHYSICAL EXAMINATION:  General: On examination, the patient is resting comfortably, NAD and awake, lying supine in bed and oriented to person, place and time   SKIN: Ecchymosis noted over right hip, skin intact  Pulses:  Palpable bilateral dorsalis pedis pulses.  Sensation: intact and equal bilaterally to the distal lower extremities, feet and toes.  Tenderness: Right hip tenderness noted. No tenderness noted in calfs bilaterally  ROM: Equal ankle plantar and dorsiflexion, moves all toes bilaterally. Unable to lift leg off bed due to pain  Motor:  +5/5 ankle DF, PF, EHL bilaterally.  Right hip and knee not assessed due to fracture. Contralateral hip flexion and knee extension +5/5.   Extremity: Right lower extremity externally rotated & shortened, thigh & calf compartments soft    Temp:  [98.1  F (36.7  C)] 98.1  F (36.7  C)  Pulse:  [87-98] 98  Resp:  [24-37] 37  BP: (194-237)/() 203/93  SpO2:  [85 %-96 %] 91 %    RADIOGRAPHIC EVALUATION:  Radiographs personally reviewed.  EXAM: XR PELVIS 1/2 VIEWS, XR FEMUR RIGHT 2 VIEWS, XR KNEE RIGHT 1/2 VIEWS  LOCATION: [facility]  DATE: 6/6/2024     INDICATION: right hip pain after fall  COMPARISON: None.                                                                      IMPRESSION: Fracture through the right femoral neck without evidence for intertrochanteric extension. No dislocation. Mild degenerative change " "of both hip joints. Left hip negative for fracture. Pelvis negative for fracture. Distal to the hip, the right   femur is negative for fracture. There is degenerative change at the knee joint.    LABORATORY DATA:     Lab Results   Component Value Date    INR 1.04 06/06/2024       Lab Results   Component Value Date    WBC 15.3 06/06/2024     Lab Results   Component Value Date    RBC 4.26 06/06/2024     Lab Results   Component Value Date    HGB 12.4 06/06/2024     Lab Results   Component Value Date    HCT 39.1 06/06/2024     No components found for: \"MCT\"  Lab Results   Component Value Date    MCV 92 06/06/2024     Lab Results   Component Value Date    MCH 29.1 06/06/2024     Lab Results   Component Value Date    MCHC 31.7 06/06/2024     Lab Results   Component Value Date    RDW 14.9 06/06/2024     Lab Results   Component Value Date     06/06/2024         Saad Zhang PA-C/Dr. Tripp  [facility]        "

## 2024-06-06 NOTE — ANESTHESIA PROCEDURE NOTES
"Intrathecal injection Procedure Note    Pre-Procedure   Staff -        Anesthesiologist:  Twan Maxwell MD       Performed By: anesthesiologist       Location: OR       Procedure Start/Stop Times: 6/6/2024 4:48 PM and 6/6/2024 4:51 PM       Pre-Anesthestic Checklist: patient identified, IV checked, risks and benefits discussed, informed consent, monitors and equipment checked, pre-op evaluation, at physician/surgeon's request and post-op pain management  Timeout:       Correct Patient: Yes        Correct Procedure: Yes        Correct Site: Yes        Correct Position: Yes   Procedure Documentation  Procedure: intrathecal injection       Patient Position: LLD       Skin prep: Betadine       Insertion Site: L2-3. (left paramedian approach).       Needle Gauge: 22.        Needle Length (Inches): 3.5        Spinal Needle Type: PencanNo introducer used       # of attempts: 1 and  # of redirects:  1    Assessment/Narrative         Paresthesias: No.       CSF fluid: clear.    Medication(s) Administered   0.5% Bupivacaine PF (Intrathecal) - Intrathecal   2.6 mL - 6/6/2024 4:48:00 PM  Medication Administration Time: 6/6/2024 4:48 PM      FOR [facility] ([facility] [place], [place]) ONLY:   Pain Team Contact information: please page the Pain Team Via [employer]. Search \"Pain\". During daytime hours, please page the attending first. At night please page the resident first.      "

## 2024-06-06 NOTE — OP NOTE
Operative Note    Name:  Ruthy Man  :  1930  MRN:  [id number]  Procedure Date:  2024      PREOPERATIVE DIAGNOSIS  Right hip femoral neck fracture    POSTOPERATIVE DIAGNOSIS  Same    PROCEDURE  Right hip hemiarthroplasty.     SURGEON:  Eliud Tripp MD, M.D.    ASSISTANT:    Doretha Waterman PA-C PA-C assistance was required due to the complexity of the procedure, for patient positioning, instrumentation assistance, soft tissue retraction and patient safety.      Circulator: Kristan Gardner RN; Radha Beal RN  Relief Circulator: Leslie Herndon RN  Relief Scrub: Krista Nevarez  Scrub Person: Irma Alamo    ANESTHESIA  Spinal    Estimated Blood Loss  : 100cc    Specimens: none     Drains: none     Complications: none     Disposition:  Stable and awake to postanesthesia recovery.    INDICATION FOR OPERATION    Ruthy Man is an 89 year old female with a history of a hip fracture. X-rays showed evidence of a displaced femoral neck fracture. It was recommended she undergo hip hemiarthroplasty. The risks and benefits of the planned procedure, as well as details of surgery, were discussed with the patient's family and consent was obtained.    REPORT OF OPERATION    The patient was brought to the operating room and placed supine on the operating room table. A spinal block was placed by Anesthesia preoperatively. The patient was given appropriate preoperative antibiotic.  After spinal was placed, she was placed in the lateral decubitus position with a Vargas frame and the hip was then prepped and draped in the normal sterile fashion.   A standard posterior approach to the hip was utilized. Deep retractors were placed below the tensor fascia flo and the short external rotators were divided off the posterior aspect of the femur. The capsule was split in line with the femoral neck and the femoral head exposed. The head fragment was removed and sized and the femoral neck cut was  performed.     The femoral canal was sequentially reamed and broached up to appropriate size.  The canal was prepared and then the femoral stem was then cemented into place with a distal cement restrictor. After the cement had cured, the head was trialed, appropriate sized head was impacted into place. The acetabulum was copiously irrigated with antibiotic irrigation and then the hip was reduced.    The posterior capsule was repaired using multiple #2 Ethibond sutures. The capsule and short external rotators were repaired back to the greater trochanter through bone tunnels. The hip was then copiously irrigated again with antibiotic irrigation, and closed in layers with  #2 Ethibond, #1 Vicryl closure of the tensor fascia flo, 0 Vicryl and 2-0 Vicryl subcutaneous sutures and skin staples.    A sterile dressing was placed on the hip. she was placed in a abduction pillow and transferred in stable and awake condition to postanesthesia recovery.      Eliud Tripp MD  Date: 6/6/2024  Time: 6:17 PM    Implants:  Implant Name Type Inv. Item Serial No.  Lot No. LRB No. Used Action   18.5mm flange lawrence sizes 9-15 mm     [id number] Right 1 Implanted   BONE CEMENT SIMPLEX FULL DOSE 6191-1-001 - [id number] Cement, Bone BONE CEMENT SIMPLEX FULL DOSE 6191-1-001  [employer] [id number] Right 2 Implanted   IMP STEM FEMORAL STEVE HALEIGH OMNIFIT SZ 7 132DEG 1431-5693 - [id number] Total Joint Component/Insert IMP STEM FEMORAL STEVE HALEIGH OMNIFIT SZ 7 132DEG 0376-1250  [employer] [id number] Right 1 Implanted   IMP HEAD STRK FEMORAL UHR BIPOLAR [id number] UH1-45-28 - [id number] Total Joint Component/Insert IMP HEAD STRK FEMORAL UHR BIPOLAR [id number] UH1-45-28  [employer] [id number] Right 1 Implanted   IMP HEAD STRK FEMORAL C-TAPER COCR LFIT 28MM +0MM  - [id number] Total Joint Component/Insert IMP HEAD STRK FEMORAL C-TAPER COCR LFIT 28MM +0MM   [employer] [id number] Right 1 Implanted

## 2024-06-06 NOTE — H&P
[facility]    History and Physical - Hospitalist Service       Date of Admission:  6/6/2024    Assessment & Plan   Principal Problem:    Closed displaced fracture of right femoral neck (H)  Active Problems:    Acute respiratory failure with hypoxia (H)    Fall at home, initial encounter    Community acquired pneumonia of right upper lobe of lung    Sepsis, due to unspecified organism, unspecified whether acute organ dysfunction present (H)       Ruthy Man is a 89 year old female with history of CKD3, HTN, hypothyroidism, occipital neuralgia, palindromic rheumatism involving multiple sites, and 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      Right femoral neck fracture, sustained after a fall  -- supportive cares, consult ortho  -- medically stable for planned intervention this evening      Acute hypoxemic respiratory failure  Question if secondary to pneumonia given productive cough with green sputum last 2-3 weeks  CT imaging findings of mild cylindrical bronchiectasis with endobronchial mucosal thickening and secretions, peribronchial nodular opacities and congestion foci of consolidation in the posterior segment right upper lobe and right middle lobe appeared on previous CT 2 years ago  -- check sputum and blood cultures  -- check procal, legionella and strep pneumo Ag  -- start IV ceftriaxone and doxycycline  -- start metanebs, mucomyst, duonebs, RCAT  -- SLP eval ordered for tomorrow      CKD3: GFR stable, trend      HTN: start IV hydralazine PRN, continue home coreg and losartan      Leukocytosis: trend      Hypothyroidism: continue home replacement       History of RA: continue home plaquenil      Ischemic optic neuropathy of the left eye:  -- continue home ASA and statin      Occipital neuralgia: continue tylenol       GERD: continue  "home PPI      Mood disorder: continue home lexapro         Diet: NPO per Anesthesia Guidelines for Procedure/Surgery Except for: Meds    DVT Prophylaxis: Pneumatic Compression Devices  Garcia Catheter: Not present  Lines: None     Cardiac Monitoring: None  Code Status:  Full Code confirmed at bedside    Clinically Significant Risk Factors Present on Admission                # Drug Induced Platelet Defect: home medication list includes an antiplatelet medication   # Hypertension: Noted on problem list             # Obesity: Estimated body mass index is 31.24 kg/m  as calculated from the following:    Height as of this encounter: 1.626 m (5' 4\").    Weight as of this encounter: 82.6 kg (182 lb).              Disposition Plan      Expected Discharge Date: 06/08/2024             Medically Ready for Discharge: Anticipated in 2-4 Days      Ted Lara DO  Hospitalist Service  [facility]  Securely message with [employer] (more info)  Text page via [employer] Paging/Directory     ______________________________________________________________________    Chief Complaint   Right leg pain  Productive cough    History is obtained from the patient    History of Present Illness   Ruthy Man is a 89 year old female who presents with sudden onset right thigh pain after mechanical fall this AM. She endorses recent history of productive cough with green sputum for last 2-3 weeks. She denies subjective SOB, fevers, chills or fatigue.       Past Medical History    Past Medical History:   Diagnosis Date    Hypertension     Senile osteoporosis 2/21/2019       Past Surgical History   Past Surgical History:   Procedure Laterality Date    HC REMOVAL GALLBLADDER      Description: Cholecystectomy;  Recorded: 11/30/2008;  Comments: stone    ZZC TOTAL ABDOM HYSTERECTOMY      Description: Hysterectomy;  Recorded: 11/30/2008;  Comments: benign disease       Prior to Admission Medications   Prior to Admission " Medications   Prescriptions Last Dose Informant Patient Reported? Taking?   DOCOSAHEXANOIC ACID/EPA (FISH OIL ORAL)   Yes No   Sig: [DOCOSAHEXANOIC ACID/EPA (FISH OIL ORAL)] Take 1,200 mg by mouth daily.   aspirin 81 mg chewable tablet   Yes No   Sig: [ASPIRIN 81 MG CHEWABLE TABLET] Chew 81 mg daily.   atorvastatin (LIPITOR) 40 MG tablet   No No   Sig: TAKE 1 TABLET(40 MG) BY MOUTH DAILY   carvedilol (COREG) 6.25 MG tablet   No No   Sig: Take 1 tablet (6.25 mg) by mouth 2 times daily (with meals)   cholecalciferol, vitamin D3, (VITAMIN D3) 1,000 unit capsule   Yes No   Sig: [CHOLECALCIFEROL, VITAMIN D3, (VITAMIN D3) 1,000 UNIT CAPSULE] Take 1,000 Units by mouth daily.   clobetasol (TEMOVATE) 0.05 % external ointment   No No   Sig: [CLOBETASOL (TEMOVATE) 0.05 % OINTMENT] Apply 0.5 mg daily as needed   cyanocobalamin (VITAMIN B-12) 500 MCG tablet   Yes No   Sig: [CYANOCOBALAMIN (VITAMIN B-12) 500 MCG TABLET] Take 500 mcg by mouth daily.   escitalopram (LEXAPRO) 10 MG tablet   No No   Sig: Take 1 tablet (10 mg) by mouth daily   hydroxychloroquine (PLAQUENIL) 200 MG tablet   No No   Sig: Take 1 tablet (200 mg) by mouth daily   lansoprazole (PREVACID) 15 MG capsule   Yes No   Sig: Take 15 mg by mouth as needed   levothyroxine (SYNTHROID/LEVOTHROID) 75 MCG tablet   No No   Sig: TAKE 1 TABLET BY MOUTH DAILY FOR 5 DAYS A WEEK, SKIP DOSE 2 DAYS PER WEEK   losartan (COZAAR) 50 MG tablet   No No   Sig: TAKE 2 TABLETS BY MOUTH EVERY DAY      Facility-Administered Medications Last Administration Doses Remaining   denosumab (PROLIA) injection 60 mg None recorded 1              Physical Exam   Vital Signs: Temp: 98.1  F (36.7  C) Temp src: Oral BP: (!) 227/103 Pulse: 92   Resp: (!) 37 SpO2: 94 % O2 Device: Nasal cannula Oxygen Delivery: 3 LPM  Weight: 182 lbs 0 oz    General Appearance: In no acute distress  RESPIRATORY: respirations nonlabored  CARDIOVASCULAR:Trace nonpititng le edema bilat.  ABDOMEN: soft and  non-tender  NEUROLOGIC: Alert, speech is clear         Medical Decision Making       >75 MINUTES SPENT BY ME on the date of service doing chart review, history, exam, documentation & further activities per the note.      Data

## 2024-06-06 NOTE — ANESTHESIA PREPROCEDURE EVALUATION
Anesthesia Pre-Procedure Evaluation    Patient: Ruthy Man   MRN: [id number] : 1930        Procedure : Procedure(s):  HEMIARTHROPLASTY, HIP, BIPOLAR          Past Medical History:   Diagnosis Date    Hypertension     Senile osteoporosis 2019      Past Surgical History:   Procedure Laterality Date    HC REMOVAL GALLBLADDER      Description: Cholecystectomy;  Recorded: 2008;  Comments: stone    ZZC TOTAL ABDOM HYSTERECTOMY      Description: Hysterectomy;  Recorded: 2008;  Comments: benign disease      Allergies   Allergen Reactions    Ace Inhibitors Cough    Amlodipine Unknown     Edema      Clarithromycin Nausea    Diltiazem Hcl [Diltiazem] Itching    Doxazosin Nausea    Penicillins Rash     Edema      Tetanus Toxoid Other (See Comments)     Local allergic reaction      Social History     Tobacco Use    Smoking status: Never    Smokeless tobacco: Never   Substance Use Topics    Alcohol use: Never      Wt Readings from Last 1 Encounters:   24 82.6 kg (182 lb)        Anesthesia Evaluation   Pt has had prior anesthetic.     No history of anesthetic complications       ROS/MED HX  ENT/Pulmonary: Comment: Supplemental oxygen needed. Desaturates to 85 on RA    (+)                              recent URI (Active Pneumonia), unresolved,         Neurologic:     (+)          CVA,    TIA,                  Cardiovascular: Comment: EKG  Sinus rhythm with occasional Premature ventricular complexes  Right bundle branch block  Left anterior fascicular block  Bifascicular block  Abnormal ECG  When compared with ECG of 2019 17:36,  Premature ventricular complexes are now Present  Confirmed by SARA CHOUDHARY MD LOC:JN ([id number]) on 5/10/2023 4:42:57 PM     Echo  Interpretation Summary    Left ventricular function is normal.The ejection fraction is 55-60%.  Mild valvular aortic stenosis.  Technically difficult, suboptimal study.  _____________________________________         (+)  hypertension- -   " -  - -                                   (-) wheezes   METS/Exercise Tolerance:     Hematologic:  - neg hematologic  ROS     Musculoskeletal: Comment: R hip frx      GI/Hepatic:     (+) GERD,                   Renal/Genitourinary:     (+) renal disease, type: CRI,            Endo:     (+)          thyroid problem,            Psychiatric/Substance Use:  - neg psychiatric ROS     Infectious Disease:     (+) Recent Fever,           Malignancy:  - neg malignancy ROS     Other:            Physical Exam    Airway        Mallampati: III   TM distance: > 3 FB   Neck ROM: full   Mouth opening: < 3 cm    Respiratory Devices and Support         Dental  no notable dental history     (+) Multiple crowns, permanant bridges      Cardiovascular          Rhythm and rate: regular and normal     Pulmonary       Comment: Supplemental oxygen.     breath sounds clear to auscultation   (+) decreased breath sounds   (-) no wheezes        OUTSIDE LABS:  CBC:   Lab Results   Component Value Date    WBC 15.3 (H) 06/06/2024    WBC 6.8 04/19/2024    HGB 12.4 06/06/2024    HGB 12.1 04/19/2024    HCT 39.1 06/06/2024    HCT 38.4 04/19/2024     06/06/2024     04/19/2024     BMP:   Lab Results   Component Value Date     06/06/2024     04/19/2024    POTASSIUM 4.1 06/06/2024    POTASSIUM 4.6 04/19/2024    CHLORIDE 104 06/06/2024    CHLORIDE 102 04/19/2024    CO2 24 06/06/2024    CO2 24 04/19/2024    BUN 20.1 06/06/2024    BUN 21.0 04/19/2024    CR 1.24 (H) 06/06/2024    CR 1.50 (H) 04/19/2024     (H) 06/06/2024     (H) 04/19/2024     COAGS:   Lab Results   Component Value Date    INR 1.04 06/06/2024     POC: No results found for: \"BGM\", \"HCG\", \"HCGS\"  HEPATIC:   Lab Results   Component Value Date    ALBUMIN 4.0 11/21/2023    PROTTOTAL 7.1 04/28/2023    ALT 8 11/21/2023    AST 19 04/28/2023    ALKPHOS 91 04/28/2023    BILITOTAL 0.5 04/28/2023     OTHER:   Lab Results   Component Value Date    LACT 1.0 06/06/2024 " "   A1C 6.0 (H) 04/19/2024    SUE 8.8 06/06/2024    LIPASE 33 06/28/2018    TSH 1.69 04/19/2024    CRP 0.4 02/11/2022    SED 31 (H) 04/19/2024       Anesthesia Plan    ASA Status:  4, emergent    NPO Status:  NPO Appropriate    Anesthesia Type: Spinal.   Induction: Intravenous, Propofol.   Maintenance: TIVA.   Techniques and Equipment:     - Lines/Monitors: 2nd IV     Consents    Anesthesia Plan(s) and associated risks, benefits, and realistic alternatives discussed. Questions answered and patient/representative(s) expressed understanding.     - Discussed: Risks, Benefits and Alternatives for BOTH SEDATION and the PROCEDURE were discussed     - Discussed with:  Patient       - Patient is DNR/DNI Status: No     Use of blood products discussed: Yes.     - Discussed with: Patient.     - Consented: consented to blood products     Postoperative Care    Pain management: IV analgesics, Oral pain medications.   PONV prophylaxis: Ondansetron (or other 5HT-3), Dexamethasone or Solumedrol     Comments:    Other Comments: Spinal anesthesia in OR  Fascia iliaca block in pre op area  Plan for native airway, but high risk for conversion to ETT GA given patients respiratory status. Plan discussed with family and they are agreeable.           Noe Langley MD    I have reviewed the pertinent notes and labs in the chart from the past 30 days and (re)examined the patient.  Any updates or changes from those notes are reflected in this note.             # Drug Induced Platelet Defect: home medication list includes an antiplatelet medication  # Obesity: Estimated body mass index is 31.24 kg/m  as calculated from the following:    Height as of this encounter: 1.626 m (5' 4\").    Weight as of this encounter: 82.6 kg (182 lb).      "

## 2024-06-06 NOTE — ED PROVIDER NOTES
EMERGENCY DEPARTMENT ENCOUNTER      NAME: Ruthy Man  AGE: 89 year old female  YOB: 1930  MRN: [id number]  EVALUATION DATE & TIME: 6/6/2024  6:19 AM    PCP: Selma Meneses    ED PROVIDER: Nicolle Leggett M.D.      Chief Complaint   Patient presents with    Fall    Leg Pain    Back Pain         FINAL IMPRESSION:  1. Fall at home, initial encounter    2. Closed displaced fracture of right femoral neck (H)    3. Community acquired pneumonia of right upper lobe of lung    4. Sepsis, due to unspecified organism, unspecified whether acute organ dysfunction present (H)    5. Acute respiratory failure with hypoxia (H)          ED COURSE & MEDICAL DECISION MAKING:    ED Course as of 06/06/24 0837   Thu Jun 06, 2024   0636 Patient with  upper midline thoracic and rihgt hip pain after a fall this morning, ameanble to CT head and c-spine with age 89 and distracting injury and significant fall with likely broken hip, CAP given some pain thorughout her back and reconstructed T-spine and L-spine images, XR righ thip to knee, preop EKG and T&S and INR and CBC/chemistry, cardiac monitoring, supplemental O2 with her not taking deep breaths due to thoracic back pain component after fall but not precipitating fall, bedrest and given AM antihypertensive medication, NPO and begin normal saline infusion in patient who is NPO with likely neurovascularly intact right femur fracture   0741 Pt with insufficient pain relief after fentanyl 50 micrograms, initially declined analgesic therapy and then accepted, given low dose 2mg IV morphine. Viral PCR negative, son and daughter in law at bedside. INR WNL and CBC And BMP WNL   0741 Patient going to imaging now   0757 CT also reveals right upper lobe possible developing CAP, begun on IV abx with cultures/lactic acid pending, sepsis noted with WBC 15 and O2 low with CAP   0800 Orthopedic surgery and hospitalist paged for admission and patient and family updated   0817 Pt  endorsed to hospitalist Dr Lara to med surg   0831 Orthopedics repaged re: case   0837 [facility]  at bedside       Pertinent Labs & Imaging studies reviewed. (See chart for details)    Critical Care time was 35 minutes for this patient excluding procedures.      At the conclusion of the encounter I discussed the results of all of the tests and the disposition. The questions were answered. The patient or family acknowledged understanding and was agreeable with the care plan.     MEDICATIONS GIVEN IN THE EMERGENCY:  Medications   sodium chloride 0.9 % infusion ( Intravenous $New Bag 6/6/24 0657)   cefTRIAXone (ROCEPHIN) 2 g vial to attach to  ml bag for ADULTS or NS 50 ml bag for PEDS (2 g Intravenous $New Bag 6/6/24 0858)   azithromycin (ZITHROMAX) 500 mg in sodium chloride 0.9 % 250 mL intermittent infusion (has no administration in time range)   hydrALAZINE (APRESOLINE) injection 10 mg (has no administration in time range)   HYDROmorphone (DILAUDID) injection 0.2-0.4 mg (has no administration in time range)   cefTRIAXone (ROCEPHIN) 1 g vial to attach to  mL bag for ADULTS or NS 50 mL bag for PEDS (has no administration in time range)   doxycycline (VIBRAMYCIN) 100 mg vial to attach to  mL bag (has no administration in time range)   ipratropium - albuterol 0.5 mg/2.5 mg/3 mL (DUONEB) neb solution 3 mL (has no administration in time range)   acetaminophen (TYLENOL) tablet 650 mg (has no administration in time range)     Or   acetaminophen (TYLENOL) Suppository 650 mg (has no administration in time range)   melatonin tablet 3 mg (has no administration in time range)   senna-docusate (SENOKOT-S/PERICOLACE) 8.6-50 MG per tablet 1 tablet (has no administration in time range)     Or   senna-docusate (SENOKOT-S/PERICOLACE) 8.6-50 MG per tablet 2 tablet (has no administration in time range)   polyethylene glycol (MIRALAX) Packet 17 g (has no administration in time range)   ondansetron (ZOFRAN ODT)  ODT tab 4 mg (has no administration in time range)     Or   ondansetron (ZOFRAN) injection 4 mg (has no administration in time range)   prochlorperazine (COMPAZINE) injection 5 mg (has no administration in time range)     Or   prochlorperazine (COMPAZINE) tablet 5 mg (has no administration in time range)     Or   prochlorperazine (COMPAZINE) suppository 12.5 mg (has no administration in time range)   calcium carbonate (TUMS) chewable tablet 1,000 mg (has no administration in time range)   fentaNYL (PF) (SUBLIMAZE) injection 50 mcg (50 mcg Intravenous $Given 6/6/24 0654)   carvedilol (COREG) tablet 6.25 mg (6.25 mg Oral $Given 6/6/24 0732)   losartan (COZAAR) tablet 50 mg (50 mg Oral $Given 6/6/24 0732)   morphine (PF) injection 2 mg (2 mg Intravenous $Given 6/6/24 9686)   ondansetron (ZOFRAN) injection 4 mg (4 mg Intravenous $Given 6/6/24 0754)       NEW PRESCRIPTIONS STARTED AT TODAY'S ER VISIT  New Prescriptions    No medications on file          =================================================================    HPI      Ruthy Man is a 89 year old female with PMHx of HTN who presents to the ED today via EMS with right hip pain.    EMS reports that patient's son called 911 because she called her son who lives 2 houses away from her, reporting that she had fallen and was not able to get up, he went to her house and found her on the ground unable to get up, called 911.    Patient reports that about an hour ago she got up to walk to the bathroom to use the bathroom and then lost her balance and fell. No syncope, no chest pain or shortness of breath or abdominal pain or sickness, no cough, no lightheadedness or vertigo. When she fell, she fell to the floor from standing height and landed on her right hip with immediate pain to right hip. It doesn't hurt when she is still, but when she tries to put weight on it, the pain is 10/10. Medics helped her up but she was not able to walk due to the pain in her hip so  "they transported her to the ED. No medications administered by EMS.    She reports since the fall she noticed \"upper back pain also\" worse with deep breaths, moderately severe, worse with movement and deep breaths. No shortness of breath or chest pain.       REVIEW OF SYSTEMS   All other systems reviewed and are negative except as noted above in HPI.    PAST MEDICAL HISTORY:  Past Medical History:   Diagnosis Date    Hypertension     Senile osteoporosis 2019       PAST SURGICAL HISTORY:  Past Surgical History:   Procedure Laterality Date    HC REMOVAL GALLBLADDER      Description: Cholecystectomy;  Recorded: 2008;  Comments: stone    ZZC TOTAL ABDOM HYSTERECTOMY      Description: Hysterectomy;  Recorded: 2008;  Comments: benign disease       CURRENT MEDICATIONS:    aspirin 81 mg chewable tablet  atorvastatin (LIPITOR) 40 MG tablet  carvedilol (COREG) 6.25 MG tablet  cholecalciferol, vitamin D3, (VITAMIN D3) 1,000 unit capsule  clobetasol (TEMOVATE) 0.05 % external ointment  cyanocobalamin (VITAMIN B-12) 500 MCG tablet  DOCOSAHEXANOIC ACID/EPA (FISH OIL ORAL)  escitalopram (LEXAPRO) 10 MG tablet  hydroxychloroquine (PLAQUENIL) 200 MG tablet  lansoprazole (PREVACID) 15 MG capsule  levothyroxine (SYNTHROID/LEVOTHROID) 75 MCG tablet  losartan (COZAAR) 50 MG tablet        ALLERGIES:  Allergies   Allergen Reactions    Ace Inhibitors Cough    Amlodipine Unknown     Edema      Clarithromycin Nausea    Diltiazem Hcl [Diltiazem] Itching    Doxazosin Nausea    Penicillins Rash     Edema      Tetanus Toxoid Other (See Comments)     Local allergic reaction       FAMILY HISTORY:  Family History   Problem Relation Age of Onset    ALS Mother          age 75    Pulmonary Embolism Father          age 79    Other - See Comments Brother          age birth    Other - See Comments Daughter         has corticobasal syndrome       SOCIAL HISTORY:   Social History     Socioeconomic History    Marital status: " "   Tobacco Use    Smoking status: Never    Smokeless tobacco: Never   Vaping Use    Vaping status: Never Used   Substance and Sexual Activity    Alcohol use: Never    Drug use: Never   Social History Narrative    She is .  Her   in  suddenly, but he also had dementia. They were  for 60 years. She has 3 children, 4 grandchildren and 1 great grandchild. 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  One daughter lives with her and she has corticobasal degeneration.     Social Determinants of Health     Interpersonal Safety: Low Risk  (3/18/2024)    Interpersonal Safety     Do you feel physically and emotionally safe where you currently live?: Yes     Within the past 12 months, have you been hit, slapped, kicked or otherwise physically hurt by someone?: No     Within the past 12 months, have you been humiliated or emotionally abused in other ways by your partner or ex-partner?: No       VITALS:  Patient Vitals for the past 24 hrs:   BP Temp Temp src Pulse Resp SpO2 Height Weight   24 0732 (!) 227/103 -- -- -- -- -- -- --   24 0730 (!) 227/103 -- -- 92 -- 94 % -- --   24 0726 (!) 237/100 -- -- 90 -- 94 % -- --   24 0641 (!) 213/102 -- -- 87 (!) 37 96 % -- --   24 0629 -- -- -- 87 -- 95 % -- --   24 0626 (!) 194/114 98.1  F (36.7  C) Oral 90 24 (!) 85 % 1.626 m (5' 4\") 82.6 kg (182 lb)       PHYSICAL EXAM    VITAL SIGNS: BP (!) 227/103   Pulse 92   Temp 98.1  F (36.7  C) (Oral)   Resp (!) 37   Ht 1.626 m (5' 4\")   Wt 82.6 kg (182 lb)   LMP  (LMP Unknown)   SpO2 94%   BMI 31.24 kg/m     GENERAL: Awake, alert.  In no acute distress. GCS 15  HEENT: Normocephalic, atraumatic.  Pupils equal, round and reactive.  Conjunctiva normal.  EOMI. No treviño sign, no racoon eyes, no mastoid tenderness, no hemotympanum, no facial instability, no nasal bridge pain, no nasal septal " hematoma, no intraoral lacerations, no loose teeth, no mandible pain or deformity  NECK: No stridor or apparent deformity. No midline pain to palpation.  PULMONARY: Symmetrical breath sounds without distress.  Lungs clear to auscultation bilaterally without wheezes, rhonchi or rales.  CARDIO: Regular rate and rhythm.  No significant murmur, rub or gallop.  Radial pulses strong and symmetrical.  THORAX: No focal chest wall deformity or crepitus  BACK: Upper thoracic midline diffuse tenderness without step off, deformity or bruising  ABDOMINAL: Abdomen soft, non-distended and non-tender to palpation.  No CVAT, BL.  EXTREMITIES: No lower extremity swelling or edema. Pelvis stable and without focal tenderness. Bilateral pedal pulses 2+ and equal. Right greater trochanteric region of proximal femur tender to palpation without bruising. Right lower extremity slight external rotation. Bilateral pedal pulses 2+ and bounding  NEURO: Alert and oriented to person, place and time.  Cranial nerves grossly intact.  No focal motor deficit. Sensation globally intact.  PSYCH: Normal mood and affect  SKIN: No rashes       LAB:  All pertinent labs reviewed and interpreted.  Results for orders placed or performed during the hospital encounter of 06/06/24   CT Head w/o Contrast    Impression    IMPRESSION:    1.  No evidence of acute intracranial hemorrhage or mass effect.  2.  Mild nonspecific white matter changes.  3.  Mild brain parenchymal volume loss.   CT Cervical Spine w/o Contrast    Impression    IMPRESSION:  1.  No evidence of acute fracture or subluxation of the cervical spine by CT imaging.  2.  Degenerative cervical spondylosis as described above.   CT Lumbar Spine Reconstructed    Impression    IMPRESSION:    1.  No evidence of acute fracture or subluxation of the lumbar spine by CT imaging.  2.  Degenerative lumbar spondylosis as described above.   CT Thoracic Spine Reconstructed    Impression    IMPRESSION:    1.  No  evidence of acute fracture or subluxation of the thoracic spine by CT imaging.   CT Chest Abdomen Pelvis w/o Contrast    Impression    IMPRESSION:  1.  Acute right femoral neck fracture.  2.  No additional acute post traumatic findings in the chest, abdomen or pelvis.  3.  Mild cylindrical bronchiectasis with endobronchial mucosal thickening and secretions, peribronchial nodular opacities and congestion foci of consolidation in the posterior segment right upper lobe, lateral segment right middle lobe, lateral aspect   left upper lobe and inferior lingula. Findings could indicate chronic active atypical mycobacterial infection.   4.  Additional incidental findings as detailed above.   XR Knee Right 1/2 Views    Impression    IMPRESSION: Fracture through the right femoral neck without evidence for intertrochanteric extension. No dislocation. Mild degenerative change of both hip joints. Left hip negative for fracture. Pelvis negative for fracture. Distal to the hip, the right   femur is negative for fracture. There is degenerative change at the knee joint.   XR Femur Right 2 Views    Impression    IMPRESSION: Fracture through the right femoral neck without evidence for intertrochanteric extension. No dislocation. Mild degenerative change of both hip joints. Left hip negative for fracture. Pelvis negative for fracture. Distal to the hip, the right   femur is negative for fracture. There is degenerative change at the knee joint.   XR Pelvis 1/2 Views    Impression    IMPRESSION: Fracture through the right femoral neck without evidence for intertrochanteric extension. No dislocation. Mild degenerative change of both hip joints. Left hip negative for fracture. Pelvis negative for fracture. Distal to the hip, the right   femur is negative for fracture. There is degenerative change at the knee joint.   Result Value Ref Range    INR 1.04 0.85 - 1.15   Basic metabolic panel   Result Value Ref Range    Sodium 139 135 - 145  mmol/L    Potassium 4.1 3.4 - 5.3 mmol/L    Chloride 104 98 - 107 mmol/L    Carbon Dioxide (CO2) 24 22 - 29 mmol/L    Anion Gap 11 7 - 15 mmol/L    Urea Nitrogen 20.1 8.0 - 23.0 mg/dL    Creatinine 1.24 (H) 0.51 - 0.95 mg/dL    GFR Estimate 40 (L) >60 mL/min/1.73m2    Calcium 8.8 8.2 - 9.6 mg/dL    Glucose 151 (H) 70 - 99 mg/dL   Symptomatic Influenza A/B, RSV, & SARS-CoV2 PCR (COVID-19) Nasopharyngeal    Specimen: Nasopharyngeal; Swab   Result Value Ref Range    Influenza A PCR Negative Negative    Influenza B PCR Negative Negative    RSV PCR Negative Negative    SARS CoV2 PCR Negative Negative   CBC with platelets and differential   Result Value Ref Range    WBC Count 15.3 (H) 4.0 - 11.0 10e3/uL    RBC Count 4.26 3.80 - 5.20 10e6/uL    Hemoglobin 12.4 11.7 - 15.7 g/dL    Hematocrit 39.1 35.0 - 47.0 %    MCV 92 78 - 100 fL    MCH 29.1 26.5 - 33.0 pg    MCHC 31.7 31.5 - 36.5 g/dL    RDW 14.9 10.0 - 15.0 %    Platelet Count 234 150 - 450 10e3/uL    % Neutrophils 83 %    % Lymphocytes 10 %    % Monocytes 4 %    % Eosinophils 1 %    % Basophils 0 %    % Immature Granulocytes 2 %    NRBCs per 100 WBC 0 <1 /100    Absolute Neutrophils 12.6 (H) 1.6 - 8.3 10e3/uL    Absolute Lymphocytes 1.5 0.8 - 5.3 10e3/uL    Absolute Monocytes 0.7 0.0 - 1.3 10e3/uL    Absolute Eosinophils 0.2 0.0 - 0.7 10e3/uL    Absolute Basophils 0.0 0.0 - 0.2 10e3/uL    Absolute Immature Granulocytes 0.3 <=0.4 10e3/uL    Absolute NRBCs 0.0 10e3/uL   Lactic acid whole blood with 1x repeat in 2 hr when >2   Result Value Ref Range    Lactic Acid, Initial 1.0 0.7 - 2.0 mmol/L   Adult Type and Screen   Result Value Ref Range    ABO/RH(D) O POS     Antibody Screen Negative Negative    SPECIMEN EXPIRATION DATE [id number]        RADIOLOGY:  Reviewed all pertinent imaging. Please see official radiology report.  CT Lumbar Spine Reconstructed   Final Result   IMPRESSION:     1.  No evidence of acute fracture or subluxation of the lumbar spine by CT imaging.    2.  Degenerative lumbar spondylosis as described above.      CT Thoracic Spine Reconstructed   Final Result   IMPRESSION:     1.  No evidence of acute fracture or subluxation of the thoracic spine by CT imaging.      CT Chest Abdomen Pelvis w/o Contrast   Final Result   IMPRESSION:   1.  Acute right femoral neck fracture.   2.  No additional acute post traumatic findings in the chest, abdomen or pelvis.   3.  Mild cylindrical bronchiectasis with endobronchial mucosal thickening and secretions, peribronchial nodular opacities and congestion foci of consolidation in the posterior segment right upper lobe, lateral segment right middle lobe, lateral aspect    left upper lobe and inferior lingula. Findings could indicate chronic active atypical mycobacterial infection.    4.  Additional incidental findings as detailed above.      CT Cervical Spine w/o Contrast   Final Result   IMPRESSION:   1.  No evidence of acute fracture or subluxation of the cervical spine by CT imaging.   2.  Degenerative cervical spondylosis as described above.      CT Head w/o Contrast   Final Result   IMPRESSION:     1.  No evidence of acute intracranial hemorrhage or mass effect.   2.  Mild nonspecific white matter changes.   3.  Mild brain parenchymal volume loss.      XR Femur Right 2 Views   Final Result   IMPRESSION: Fracture through the right femoral neck without evidence for intertrochanteric extension. No dislocation. Mild degenerative change of both hip joints. Left hip negative for fracture. Pelvis negative for fracture. Distal to the hip, the right    femur is negative for fracture. There is degenerative change at the knee joint.      XR Knee Right 1/2 Views   Final Result   IMPRESSION: Fracture through the right femoral neck without evidence for intertrochanteric extension. No dislocation. Mild degenerative change of both hip joints. Left hip negative for fracture. Pelvis negative for fracture. Distal to the hip, the right    femur  is negative for fracture. There is degenerative change at the knee joint.      XR Pelvis 1/2 Views   Final Result   IMPRESSION: Fracture through the right femoral neck without evidence for intertrochanteric extension. No dislocation. Mild degenerative change of both hip joints. Left hip negative for fracture. Pelvis negative for fracture. Distal to the hip, the right    femur is negative for fracture. There is degenerative change at the knee joint.            EKG:    Reviewed and interpreted as: 0640 bifascicular block with HR 87, no ST abnormalities, morphologically similar to prior EKG from May 13, 2023      I have independently reviewed and interpreted the EKG(s) documented above.       Nicolle Leggett MD  06/06/24 [id number]

## 2024-06-06 NOTE — ED NOTES
Bed: JNED-04  Expected date: 6/6/24  Expected time:   Means of arrival:   Comments:  Dashawn  89 female  Fall  R leg pain,  No LOC  She did not hit her head and does not take blood thinners

## 2024-06-06 NOTE — ED NOTES
Pt will be moved to  37. Report given to mary grace Luis RN. Son with patient. Pt refused any IV pain medication before transport.

## 2024-06-06 NOTE — ED TRIAGE NOTES
Pt arrives with EMS for evaluation after a fall. Pt was getting up to the BR and fell on the way. She was unable to get up due to right leg pain. Pt also reports back pain. Denies hitting her head, no blood thinners. Pt was hypertensive en route.

## 2024-06-06 NOTE — ANESTHESIA CARE TRANSFER NOTE
Patient: Ruthy Man    Procedure: Procedure(s):  HEMIARTHROPLASTY, HIP, BIPOLAR       Diagnosis: Closed displaced fracture of right femoral neck (H) [S72.001A]  Diagnosis Additional Information: No value filed.    Anesthesia Type:   Spinal     Note:    Oropharynx: oropharynx clear of all foreign objects  Level of Consciousness: drowsy  Oxygen Supplementation: face mask    Independent Airway: airway patency satisfactory and stable  Dentition: dentition unchanged  Vital Signs Stable: post-procedure vital signs reviewed and stable  Report to RN Given: handoff report given            Vitals:  Vitals Value Taken Time   /113 06/06/24 1834   Temp     Pulse 85 06/06/24 1835   Resp 24 06/06/24 1835   SpO2 93 % 06/06/24 1835   Vitals shown include unfiled device data.    Electronically Signed By: JUSTIN Rowell CRNA  June 6, 2024  6:36 PM

## 2024-06-07 NOTE — PLAN OF CARE
Goal Outcome Evaluation:      Problem: Hip Fracture Surgical Repair  Goal: Optimal Functional Ability  6/7/2024 1536 by Josefina Cavazos RN  Outcome: Progressing  6/7/2024 1536 by Josefina Cavazos RN  Outcome: Progressing     Problem: Hip Fracture Surgical Repair  Goal: Effective Urinary Elimination  6/7/2024 1536 by Josefina Cavazos RN  Outcome: Progressing  6/7/2024 1536 by Josefina Cavazos RN  Outcome: Progressing     Problem: Hip Fracture Surgical Repair  Goal: Optimal Pain Control and Function  6/7/2024 1536 by Josefina Cavazos RN  Outcome: Progressing  6/7/2024 1536 by Josefina Cavazos RN  Outcome: Progressing   Patient is pleasant, cooperative, alert and oriented x 4.  Dressing CDI to right hip.  Hip precautions maintained.  Pain managed with scheduled tylenol and PRN oxycodone.  No void.  Bladder scanned at 1115 with 187 ml.  Asymptomatic.  Attempted to void again.  No discomfort reported.  Next bladder scan due at 1715.  Up to chair with heavy assist of 2, gait belt, walker, and encouragement.  Continues on 2-3 L oxygen via nasal canula.  IS encouraged.  Continues on 3 IV abx per orders.  Movement facilitated and encouraged.  Continue plan of care.

## 2024-06-07 NOTE — PROGRESS NOTES
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   General Observations Alert and cooperative   Type of Evaluation   Type of Evaluation Swallow Evaluation   Oral Motor   Mucosal Quality dry   Dentition (Oral Motor)   Dentition (Oral Motor) adequate dentition;dental appliance/dentures   Dental Appliance/Denture (Oral Motor) dentures, partial   Facial Symmetry (Oral Motor)   Facial Symmetry (Oral Motor) WNL   Lip Function (Oral Motor)   Lip Range of Motion (Oral Motor) WNL   Lip Strength (Oral Motor) WNL   Tongue Function (Oral Motor)   Tongue Strength (Oral Motor) WNL   Tongue Coordination/Speed (Oral Motor) WNL   Tongue ROM (Oral Motor) WNL   Jaw Function (Oral Motor)   Jaw Function (Oral Motor) WNL   Cough/Swallow/Gag Reflex (Oral Motor)   Soft Palate/Velum (Oral Motor) WNL   Volitional Throat Clear/Cough (Oral Motor) WNL   Volitional Swallow (Oral Motor) WNL   Vocal Quality/Secretion Management (Oral Motor)   Vocal Quality (Oral Motor) WNL   Secretion Management (Oral Motor) WNL   General Swallowing Observations   Past History of Dysphagia None per EMR. RN and pt report some difficulty swallowing large pills. Pt stated this has always been difficult for her.   Current Diet/Method of Nutritional Intake (General Swallowing Observations, NIS) regular diet;thin liquids (level 0) "   Swallowing Evaluation Clinical swallow evaluation   Clinical Swallow Evaluation   Clinical Swallow Evaluation Textures Trialed thin liquids;solid foods   Clinical Swallow Eval: Thin Liquid Texture Trial   Mode of Presentation, Thin Liquids cup;straw   Volume of Liquid or Food Presented 6oz   Oral Phase of Swallow WFL   Pharyngeal Phase of Swallow intact   Diagnostic Statement No overt s/s aspiration   Clinical Swallow Evaluation: Solid Food Texture Trial   Mode of Presentation self-fed   Volume Presented 1 cracker   Oral Phase WFL;residue in oral cavity   Oral Residue   (Diffuse, very dry mouth prior to trials.)   Pharyngeal Phase intact   Diagnostic Statement Pt used several liquid washes to clear stasis. She stated that her mouth is currently so dry she would not eat a cracker.   Esophageal Phase of Swallow   Patient reports or presents with symptoms of esophageal dysphagia No   Swallowing Recommendations   Diet Consistency Recommendations regular diet;thin liquids (level 0)   Mode of Delivery Recommendations food moistened   Swallowing Maneuver Recommendations alternate food and liquid intake   Recommended Feeding/Eating Techniques (Swallow Eval) maintain upright sitting position for eating   Medication Administration Recommendations, Swallowing (SLP) One at a time in puree   Instrumental Assessment Recommendations instrumental evaluation not recommended at this time   Comment, Swallowing Recommendations No overt s/s aspiration. Pt receptive to trying strategies for pills and dry mouth.   General Therapy Interventions   Planned Therapy Interventions Dysphagia Treatment   Clinical Impression   Criteria for Skilled Therapeutic Interventions Met (SLP Eval) Yes, treatment indicated   Risks & Benefits of therapy have been explained evaluation/treatment results reviewed;care plan/treatment goals reviewed;participants included;patient;participants voiced agreement with care plan   Clinical Impression Comments Clinical  Swallow Evaluation completed. Patient had no s/s aspiration with any intake. Oral motor function was WFL. Mastication was WFL. Pt had xerostomia resulting in residue of dry solid. She was able to clear with liquids. She stated she does not typically have such a dry mouth. Hyolaryngeal elevation appears present upon visualization and palpation. Recommend diet of regular and thin with strategies of upright posture, moisten dry solids, liquids after solids, and pills with puree.   SLP Total Evaluation Time   Eval: oral/pharyngeal swallow function, clinical swallow Minutes (87925) 10   SLP Goals   Therapy Frequency (SLP Eval) one time eval and treatment only   SLP Predicted Duration/Target Date for Goal Attainment 06/07/24   SLP Goals Swallow   SLP: Safely tolerate diet without signs/symptoms of aspiration Regular diet;Thin liquids;With use of swallow precautions;With use of compensatory swallow strategies;Independently   Interventions   Interventions Quick Adds Swallowing Dysfunction   Swallowing Dysfunction &/or Oral Function for Feeding   Treatment of Swallowing Dysfunction &/or Oral Function for Feeding Minutes (79419) 10   SLP Discharge Planning   SLP Rationale for DC Rec No anticipated SLP services at dc   SLP Brief overview of current status  Recommend diet of regular and thin with strategies of upright posture, moisten dry solids, liquids after solids, and pills with puree.

## 2024-06-07 NOTE — PROGRESS NOTES
Speech Language Therapy Discharge Summary    Reason for therapy discharge:    All goals and outcomes met, no further needs identified.    Progress towards therapy goal(s). See goals on Care Plan in [facility] electronic health record for goal details.  Goals met    Therapy recommendation(s):    No further therapy is recommended.Recommend diet of regular and thin with strategies of upright posture, moisten dry solids, liquids after solids, and pills with puree.

## 2024-06-07 NOTE — PROGRESS NOTES
Occupational Therapy      06/07/24 0900   Appointment Info   Signing Clinician's Name / Credentials (OT) BHUPINDER Teresa   Living Environment   People in Home alone   Current Living Arrangements house   Home Accessibility stairs to enter home;stairs within home   Number of Stairs, Main Entrance 3   Stair Railings, Main Entrance railings safe and in good condition   Number of Stairs, Within Home, Primary greater than 10 stairs   Stair Railings, Within Home, Primary railings safe and in good condition   Transportation Anticipated agency   Living Environment Comments Pt lives in 4 level home. Pt laundry in basement, bedroom/bathroom 2nd level, kitchen/living room 3rd level, additional bedrooms 4th. Pt's daughter & son live nearby, able to provide A as-needed.   Self-Care   Equipment Currently Used at Home cane, straight;grab bar, toilet;grab bar, tub/shower   Fall history within last six months yes   Number of times patient has fallen within last six months 1   Activity/Exercise/Self-Care Comment Pt is I w/ ADLs @ baseline   Instrumental Activities of Daily Living (IADL)   IADL Comments Pt is I w/ IADLs @ baseline   General Information   Onset of Illness/Injury or Date of Surgery 06/06/24   Referring Physician Doretha Waterman PA-C   Patient/Family Therapy Goal Statement (OT) none stated   Additional Occupational Profile Info/Pertinent History of Current Problem Ruthy Man is a 89 year old female with history of CKD3, HTN, hypothyroidism, occipital neuralgia, palindromic rheumatism involving multiple sites, and 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   Existing Precautions/Restrictions fall;no hip IR;no hip ADD past midline;90 degree hip flexion;weight bearing   Left Lower Extremity (Weight-bearing Status) full weight-bearing (FWB)   Right Lower Extremity  (Weight-bearing Status) weight-bearing as tolerated (WBAT)   Cognitive Status Examination   Orientation Status orientation to person, place and time   Affect/Mental Status (Cognitive) anxious   Follows Commands WFL   Visual Perception   Visual Impairment/Limitations WFL   Pain Assessment   Patient Currently in Pain Yes, see Vital Sign flowsheet  (Pt reports pain 1/10 when not mobilizing, 10/10 when moving)   Range of Motion Comprehensive   General Range of Motion no range of motion deficits identified  (hip precautions)   Bed Mobility   Bed Mobility supine-sit   Supine-Sit [place] (Bed Mobility) 2 person assist;moderate assist (50% patient effort);verbal cues   Assistive Device (Bed Mobility) bed rails   Transfers   Transfers sit-stand transfer   Sit-Stand Transfer   Sit-Stand [place] (Transfers) maximum assist (25% patient effort);2 person assist   Assistive Device (Sit-Stand Transfers) walker, front-wheeled   Sit/Stand Transfer Comments from elevated seat EOB   Balance   Balance Assessment sitting static balance   Sitting Balance: Static supervision   Position, Sitting Balance sitting edge of bed   Activities of Daily Living   BADL Assessment/Intervention lower body dressing   Lower Body Dressing Assessment/Training   Position (Lower Body Dressing) supine   [place] Level (Lower Body Dressing) don;socks;dependent (less than 25% patient effort)   Clinical Impression   Criteria for Skilled Therapeutic Interventions Met (OT) Yes, treatment indicated   OT Diagnosis weakness/pain impacting functional mobiltiy   OT Problem List-Impairments impacting ADL problems related to;activity tolerance impaired;mobility;pain;post-surgical precautions   Assessment of Occupational Performance 3-5 Performance Deficits   Identified Performance Deficits trsfrs, bed mobility, toilet trsfr, standing g/h, LB dressing   Planned Therapy Interventions (OT) ADL retraining;bed mobility training;transfer training   Clinical  Decision Making Complexity (OT) detailed assessment/moderate complexity   Risk & Benefits of therapy have been explained evaluation/treatment results reviewed;care plan/treatment goals reviewed   OT Total Evaluation Time   OT Eval, Moderate Complexity Minutes (58325) 15   OT Goals   Therapy Frequency (OT) 5 times/week   OT Predicted Duration/Target Date for Goal Attainment 06/14/24   OT Goals Lower Body Dressing;Transfers;Bed Mobility;Toilet Transfer/Toileting   OT: Lower Body Dressing Supervision/stand-by assist;using adaptive equipment;within precautions   OT: Bed Mobility Minimal assist;within precautions   OT: Transfer Minimal assist;with assistive device;within precautions   OT: Toilet Transfer/Toileting Minimal assist;using adaptive equipment;within precautions   Interventions   Interventions Quick Adds Therapeutic Activity   Therapeutic Activities   Therapeutic Activity Minutes (08585) 8   Symptoms noted during/after treatment increased pain;other (see comments)  (nausea)   Treatment Detail/Skilled Intervention Eval completed, intervention started. Pt seated static standing EOB w/ mod x2, FWW - ~1 min. Pt stand/sit on EOB w/ mod x2, vocal cues for safe body mechanics, & FWW. Pt seated rest break, ~2 min w/ SBA - pt reported nausea. Pt reported anxiety surrounding mobilization. Pt sit/stand w/ max x2, vocal cues, & arm in arm technique - pt static stand w/ min x2, FWW, & vocal cues for safe body mechanics, ~30 sec. Pt attempted weight shifting/steps in place w/ min/mod x2 for stability & FWW - noted R leg buckle, 2 person max A slowed decent to EOB sitting. Seated rest break, ~2 min w/ SBA - pt reported nausea, required motivation for continued OOB mobilization. Pt sit/stand w/ max x2, arm in arm technique, & vocal cues for safe body mechanics - sligthly elevated seat. Pt pivot trsfred & stand/sit in recliner w/ max x2, arm in arm, & vocal cues for safe body mechanics. Sling under pt, chair alarm activated &  call light w/in reach.   OT Discharge Planning   OT Plan trsfrs, bed mobility, LB dressing w/ AE   OT Discharge Recommendation (DC Rec) Transitional Care Facility   OT Rationale for DC Rec Pt lives in a 4-level home & is I w/ ADLs/IADLs @ baseline. Pt currently requring max x2 for OOB activity. Recommend TCU to increase functional mobility for ADLs prior to return home.   OT Brief overview of current status max x2 sit/stand, min/mod x2 standing - 1 noted leg buckle   Total Session Time   Timed Code Treatment Minutes 8   Total Session Time (sum of timed and untimed services) 23

## 2024-06-07 NOTE — ANESTHESIA POSTPROCEDURE EVALUATION
Patient: Ruthy Man    Procedure: Procedure(s):  HEMIARTHROPLASTY, HIP, BIPOLAR       Anesthesia Type:  Spinal    Note:  Disposition: Inpatient   Postop Pain Control: Uneventful            Sign Out: Well controlled pain   PONV: No   Neuro/Psych: Uneventful            Sign Out: Acceptable/Baseline neuro status   Airway/Respiratory: Uneventful            Sign Out: Acceptable/Baseline resp. status   CV/Hemodynamics: Uneventful            Sign Out: Acceptable CV status; No obvious hypovolemia; No obvious fluid overload   Other NRE: NONE   DID A NON-ROUTINE EVENT OCCUR? No           Last vitals:  Vitals Value Taken Time   /55 06/06/24 1945   Temp 37  C (98.6  F) 06/06/24 1945   Pulse 86 06/06/24 1944   Resp 24 06/06/24 1945   SpO2 93 % 06/06/24 1944   Vitals shown include unfiled device data.    Electronically Signed By: Twan Maxwell MD  June 6, 2024  8:00 PM

## 2024-06-07 NOTE — PROGRESS NOTES
"Orthopedic Progress Note      Assessment: 1 Day Post-Op  S/P Procedure(s):  HEMIARTHROPLASTY, HIP, BIPOLAR       Plan:   - Continue PT/OT.   - Weightbearing status: WBAT right lower extremity   - Anticoagulation: ASA 81 mg BID x30 days, in addition to SCDs, alexandria stockings and early ambulation.  - Hospitalist medicine team following, appreciate recommendations and management  - Discharge planning: Discharge pending progression with therapy, medical clearance, and pain control  - Follow up with Dr. Tripp in clinic in 2 weeks.    Anticipate she may need home therapy vs TCU upon discharge depending on how she progresses over the weekend.     Subjective:  Pain: Well controlled on Tylenol, aspirin, and oxycodone 2.5 mg tablets   Nausea, Vomiting:  No  Chest pain: No  Lightheadedness, Dizziness:  No  Neuro:  Patient denies new onset numbness or paresthesias     Patient doing well on POD #1. Getting up in the room only to chair with heavy assist x2, tolerating oral intake, voiding & pain is controlled with oral medications. She is also undergoing treatment for hypoxemic respiratory failure with possible pneumonia. Hgb 10.7 (12.4 pre-op).     Objective:  /70 (BP Location: Left arm)   Pulse 88   Temp 99  F (37.2  C) (Oral)   Resp 18   Ht 1.626 m (5' 4\")   Wt 82.6 kg (182 lb)   LMP  (LMP Unknown)   SpO2 93%   BMI 31.24 kg/m    The patient is A&Ox3. Appears comfortable, sitting up at bedside in the chair.  Calves are soft and non-tender bilaterally  Brisk capillary refill in the toes.   Palpable right dorsalis pedis pulse. Foot warm & well-perfused.  Sensation is intact to light touch & equal bilaterally in the femoral, DP, SP & tibial nerve distributions.  ROM: Flexes at right hip. Appropriately flexes & extends all toes bilaterally.   Motor: +5/5 dorsiflexion, plantar flexion & EHL bilaterally. Fires quad.   Leg lengths equal.  Right hip dressing C/D/I without strikethrough, no surrounding erythema. "       Pertinent Labs   Lab Results: personally reviewed.   Lab Results   Component Value Date    INR 1.04 06/06/2024     Lab Results   Component Value Date    WBC 9.8 06/07/2024    HGB 10.7 (L) 06/07/2024    HCT 33.6 (L) 06/07/2024    MCV 93 06/07/2024     06/07/2024     Lab Results   Component Value Date     06/07/2024    CO2 24 06/07/2024         Report completed by:  Susannah Lowery PA-C/Dr[facility]    Date: 6/7/2024  Time: 12:58 PM

## 2024-06-07 NOTE — PROGRESS NOTES
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        Right femoral neck fracture, sustained after a fall  -- s/p right hip hemiarthroplasty 6/6/21  -- post op cares per surgery        Acute hypoxemic respiratory failure  Question if secondary to pneumonia given productive cough with green sputum last 2-3 weeks  CT imaging findings of mild cylindrical bronchiectasis with endobronchial mucosal thickening and secretions, peribronchial nodular opacities and congestion foci of consolidation in the posterior segment right upper lobe and right middle lobe appeared on previous CT 2 years ago  Influenza/covid/rsv negative  Procal not elevated  Legionella and strep pneumo Ag negative  -- f/u sputum and blood cultures  -- continue ceftriaxone and doxycycline  -- continue metanebs, mucomyst, duonebs, RCAT  -- SLP eval ordered          CKD3: GFR stable, trend closely         HTN: continue home coreg and losartan and have IV hydralazine available        Leukocytosis: resolved        Hypothyroidism: continue home replacement         History of RA: continue home plaquenil        Ischemic optic neuropathy of the left eye:  -- continue home statin and now on ASA 81mg BID for DVT PPX. Can resume home baby ASA when this course of BID ASA is finished        Occipital neuralgia: continue tylenol         GERD: continue home PPI        Mood disorder: continue home lexapro          Diet: Advance Diet as Tolerated: Regular Diet Adult  Discharge Instruction - Regular Diet Adult    DVT Prophylaxis: ASA 81mg PO BID per ortho    Garcia  "Catheter: Not present  Lines: None     Cardiac Monitoring: None  Code Status: Full Code      Clinically Significant Risk Factors                  # Hypertension: Noted on problem list              # Obesity: Estimated body mass index is 31.24 kg/m  as calculated from the following:    Height as of this encounter: 1.626 m (5' 4\").    Weight as of this encounter: 82.6 kg (182 lb)., PRESENT ON ADMISSION            Disposition Plan   Medically Ready for Discharge: Anticipated in 2-4 Days      Ted Lara DO  Hospitalist Service  [facility]  Securely message with [employer] (more info)  Text page via [employer] Paging/Directory   ______________________________________________________________________    Interval History   NAD. Breathing stable and pain currently controlled at rest    Physical Exam   Vital Signs: Temp: 99  F (37.2  C) Temp src: Oral BP: 138/70 Pulse: 88   Resp: 18 SpO2: 94 % O2 Device: Nasal cannula Oxygen Delivery: 2 LPM  Weight: 182 lbs 0 oz  General: NAD  RESPIRATORY: Breathing nonlabored  CARDIOVASCULAR: Trace nonpitting le edema bilat.   NEUROLOGIC: Motor and sensory intact, speech clear         >50 MINUTES SPENT BY ME on the date of service doing chart review, history, exam, documentation & further activities per the note.  Data       "

## 2024-06-07 NOTE — PLAN OF CARE
Problem: Adult Inpatient Plan of Care  Goal: Absence of Hospital-Acquired Illness or Injury  Intervention: Identify and Manage Fall Risk  Recent Flowsheet Documentation  Taken 6/6/2024 2000 by Jane Gooden RN  Safety Promotion/Fall Prevention: activity supervised     Problem: Adult Inpatient Plan of Care  Goal: Absence of Hospital-Acquired Illness or Injury  Intervention: Prevent Skin Injury  Recent Flowsheet Documentation  Taken 6/6/2024 2000 by Jane Gooden RN  Body Position: position maintained   Goal Outcome Evaluation:       Patient arrived to the unit from PACU, She was alert on arrival, she denied any pain. Surgical dressing on right hip clean, dry and intact. No complain of nausea or vomit. On 2 L oxygen via NC, oxygen saturation 92-93%. IV infusing.

## 2024-06-07 NOTE — PROGRESS NOTES
06/07/24 0920   Appointment Info   Signing Clinician's Name / Credentials (PT) Patti Nuñez DPT   Living Environment   People in Home alone   Current Living Arrangements house   Home Accessibility stairs to enter home;stairs within home   Number of Stairs, Main Entrance 3   Stair Railings, Main Entrance railings safe and in good condition   Number of Stairs, Within Home, Primary greater than 10 stairs   Stair Railings, Within Home, Primary railings safe and in good condition   Transportation Anticipated health plan transportation   Self-Care   Current Activity Tolerance poor   Equipment Currently Used at Home cane, straight   Fall history within last six months yes   Number of times patient has fallen within last six months 1   General Information   Onset of Illness/Injury or Date of Surgery 06/06/24   Referring Physician Doretha Waterman PA-C   Patient/Family Therapy Goals Statement (PT) none   Pertinent History of Current Problem (include personal factors and/or comorbidities that impact the POC) Right hip femoral neck fracture. S/p Right hip hemiarthroplasty.   Existing Precautions/Restrictions (S)  no hip IR;no hip ADD past midline;90 degree hip flexion;fall   Strength (Manual Muscle Testing)   Strength (Manual Muscle Testing) Deficits observed during functional mobility   Bed Mobility   Comment, (Bed Mobility) Pt sitting eob when PT arrived.   Transfers   Transfers sit-stand transfer   Sit-Stand Transfer   Sit-Stand [place] (Transfers) maximum assist (25% patient effort);2 person assist   Assistive Device (Sit-Stand Transfers) walker, front-wheeled   Gait/Stairs (Locomotion)   Comment, (Gait/Stairs) n/a- pt unable to ambulate at this time.   Clinical Impression   Criteria for Skilled Therapeutic Intervention Yes, treatment indicated   PT Diagnosis (PT) Impaired functional mobility   Influenced by the following impairments Weakness, pain, post-op   Functional limitations due to impairments  Impaired strength, transfers, gait   Clinical Presentation (PT Evaluation Complexity) stable   Clinical Presentation Rationale Presents as diagnosed   Clinical Decision Making (Complexity) moderate complexity   Planned Therapy Interventions (PT) bed mobility training;balance training;gait training;home exercise program;strengthening;transfer training   Risk & Benefits of therapy have been explained patient   PT Total Evaluation Time   PT Eval, Moderate Complexity Minutes (12628) 10   Physical Therapy Goals   PT Frequency 5x/week   PT Predicted Duration/Target Date for Goal Attainment 06/14/24   PT Goals Transfers;Gait   PT: Transfers Minimal assist;Sit to/from stand;Bed to/from chair;Assistive device   PT: Gait Moderate assist;Rolling walker;25 feet   PT Discharge Planning   PT Plan progress standing thi, transfers as able, hip protocol ex   PT Discharge Recommendation (DC Rec) Transitional Care Facility   PT Rationale for DC Rec Ax2 with mobility at this time.   PT Brief overview of current status Sit <> stand x4 with mod-max Ax2. Pivot to recliner, max Ax2.   PT Equipment Needed at Discharge walker, rolling;wheelchair   Total Session Time   Total Session Time (sum of timed and untimed services) 10       Patti Nuñez, PT, DPT  6/7/2024

## 2024-06-07 NOTE — PLAN OF CARE
Problem: Adult Inpatient Plan of Care  Goal: Absence of Hospital-Acquired Illness or Injury  Outcome: Progressing  Intervention: Identify and Manage Fall Risk  Recent Flowsheet Documentation  Taken 6/7/2024 0022 by Gregory Campos, RN  Safety Promotion/Fall Prevention: activity supervised  Intervention: Prevent Infection  Recent Flowsheet Documentation  Taken 6/7/2024 0022 by Gregory Campos, RN  Infection Prevention:   hand hygiene promoted   rest/sleep promoted   Goal Outcome Evaluation:       A&Ox3. Disoriented to time. Vitals stable on 2L via nasal cannula. R hip dressing dry and intact. Abduction pillow on in bed. Weightbearing as tolerated. Pt unable to void and feeling bladder fullness. Bladder scanned for 490 mL. Straight cath for 600 mL. UA sample collected.

## 2024-06-08 NOTE — PLAN OF CARE
Patient is A&Ox4. She continues on 3L O2. O2 sats 93 to 94%. Patient using IS but is only able to get to 500. Bladder scanned several times. Patient is voiding in Purewick but unable to fully void. She continues to be on 3L O2 via NC.  New IV placed d/t pain at site. Pain is controlled with oral pain meds.  Problem: Adult Inpatient Plan of Care  Goal: Optimal Comfort and Wellbeing  Outcome: Progressing  Intervention: Monitor Pain and Promote Comfort  Recent Flowsheet Documentation  Taken 6/7/2024 1730 by Georgie Voss, RN  Pain Management Interventions:   medication (see MAR)   pillow support provided   repositioned     Problem: Hip Fracture Surgical Repair  Goal: Optimal Functional Ability  Outcome: Progressing  Intervention: Promote Optimal Functional Status  Recent Flowsheet Documentation  Taken 6/7/2024 1736 by Georgie Voss RN  Activity Management: activity adjusted per tolerance  Taken 6/7/2024 1730 by Georgie Voss RN  Activity Management: activity adjusted per tolerance  Goal: Optimal Pain Control and Function  Outcome: Progressing  Intervention: Prevent or Manage Pain  Recent Flowsheet Documentation  Taken 6/7/2024 1730 by Georgie Voss RN  Pain Management Interventions:   medication (see MAR)   pillow support provided   repositioned  Goal: Effective Oxygenation and Ventilation  Outcome: Progressing  Intervention: Optimize Oxygenation and Ventilation  Recent Flowsheet Documentation  Taken 6/7/2024 1736 by Georgie Voss RN  Head of Bed (HOB) Positioning: HOB at 20-30 degrees     Problem: Gas Exchange Impaired  Goal: Optimal Gas Exchange  Outcome: Progressing  Intervention: Optimize Oxygenation and Ventilation  Recent Flowsheet Documentation  Taken 6/7/2024 1736 by Georgie Voss RN  Head of Bed (HOB) Positioning: HOB at 20-30 degrees     Problem: Risk for Delirium  Goal: Improved Behavioral Control  Outcome: Progressing  Intervention: Minimize Safety Risk  Recent Flowsheet Documentation  Taken  6/7/2024 1736 by Georgie Voss RN  Enhanced Safety Measures: pain management     Problem: Adult Inpatient Plan of Care  Goal: Absence of Hospital-Acquired Illness or Injury  Intervention: Identify and Manage Fall Risk  Recent Flowsheet Documentation  Taken 6/7/2024 1736 by Georgie Voss RN  Safety Promotion/Fall Prevention:   activity supervised   nonskid shoes/slippers when out of bed   supervised activity   safety round/check completed  Intervention: Prevent Skin Injury  Recent Flowsheet Documentation  Taken 6/7/2024 1736 by Georgie Voss RN  Body Position: position changed independently  Intervention: Prevent and Manage VTE (Venous Thromboembolism) Risk  Recent Flowsheet Documentation  Taken 6/7/2024 1736 by Georgie Voss RN  VTE Prevention/Management: SCDs (sequential compression devices) on  Intervention: Prevent Infection  Recent Flowsheet Documentation  Taken 6/7/2024 1736 by Georgie Voss RN  Infection Prevention: hand hygiene promoted     Problem: Hip Fracture Surgical Repair  Goal: Effective Urinary Elimination  Intervention: Monitor and Manage Urinary Retention  Recent Flowsheet Documentation  Taken 6/7/2024 1736 by Georgie Voss RN  Urinary Elimination Promotion:   bladder volume assessed by ultrasound   toileting offered   voiding relaxation promoted     Problem: Comorbidity Management  Goal: Blood Pressure in Desired Range  Intervention: Maintain Blood Pressure Management  Recent Flowsheet Documentation  Taken 6/7/2024 1736 by Georgie Voss RN  Medication Review/Management: medications reviewed   Goal Outcome Evaluation:

## 2024-06-08 NOTE — PROGRESS NOTES
Pt is alert & oriented x 4, cooperative, in a positive mood, and did very well today. Pt previously has been very anxious about position changes, and any activity - however, she had significant improvement in activity compared to yesterday. Pt's O2 has been fluctuating throughout the day, but is generally stable around 92.   Pt has been instructed on breathing techniques, as well as incentive spirometer, but appears unmotivated to use the incentive spirometer. Pt has a large area of redness at the surgical site (right hip area), but ortho surgeon is unconcerned. Pt's pain is well controlled. Pt has been having issues with voiding today. Pt is an absolute gem, and is very thankful for care.

## 2024-06-08 NOTE — PROGRESS NOTES
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        Right femoral neck fracture, sustained after a fall  -- s/p right hip hemiarthroplasty 6/6/21  -- post op cares per surgery        Acute hypoxemic respiratory failure  Question if secondary to pneumonia given productive cough with green sputum last 2-3 weeks  CT imaging findings of mild cylindrical bronchiectasis with endobronchial mucosal thickening and secretions, peribronchial nodular opacities and congestion foci of consolidation in the posterior segment right upper lobe and right middle lobe appeared on previous CT 2 years ago  Influenza/covid/rsv negative  Procal not elevated  Legionella and strep pneumo Ag negative  -- f/u sputum and blood cultures  -- continue ceftriaxone and doxycycline  -- continue metanebs, mucomyst, duonebs, RCAT  -- SLP saw and has no concerns  -- wean oxygen as tolerated        BEBETO on CKD3:   -- start IVF for today  -- trend in AM        HTN: continue home coreg and losartan and have IV hydralazine available        Leukocytosis: resolved        Hypothyroidism: continue home replacement         History of RA: continue home plaquenil        Ischemic optic neuropathy of the left eye:  -- continue home statin and now on ASA 81mg BID for DVT PPX. Can resume home baby ASA when this course of BID ASA is finished        Occipital neuralgia: continue tylenol         GERD: continue home PPI        Mood disorder: continue home lexapro          Diet: Advance Diet as Tolerated: Regular Diet Adult  Discharge Instruction - Regular Diet  "Adult    DVT Prophylaxis: ASA 81mg PO BID per ortho    Garcia Catheter: Not present  Lines: None     Cardiac Monitoring: None  Code Status: Full Code      Clinically Significant Risk Factors                  # Hypertension: Noted on problem list           #Precipitous drop in Hgb/Hct: Lowest Hgb this hospitalization: 9.4 g/dL. Will continue to monitor and treat/transfuse as appropriate.     # Obesity: Estimated body mass index is 31.22 kg/m  as calculated from the following:    Height as of this encounter: 1.626 m (5' 4.02\").    Weight as of this encounter: 82.6 kg (182 lb)., PRESENT ON ADMISSION            Disposition Plan   Medically Ready for Discharge: Anticipated in 2-4 Days      Ted Lara DO  Hospitalist Service  [facility]  Securely message with [employer] (more info)  Text page via [employer] Paging/Directory   ______________________________________________________________________    Interval History   NAD. Denies any specific complaints    Physical Exam   Vital Signs: Temp: 98.4  F (36.9  C) Temp src: Oral BP: 116/57 Pulse: 70   Resp: 18 SpO2: 93 % O2 Device: Nasal cannula Oxygen Delivery: 1 LPM  Weight: 182 lbs 0 oz  General: NAD  RESPIRATORY: Breathing nonlabored  CARDIOVASCULAR: Trace nonpitting le edema bilat.   NEUROLOGIC: Motor and sensory intact, speech clear         >45 MINUTES SPENT BY ME on the date of service doing chart review, history, exam, documentation & further activities per the note.  Data       "

## 2024-06-08 NOTE — PROGRESS NOTES
Redness surround incision site and dressing on right hip.  Orthopedic surgeon rounded, saw this site and had no concerns or infection at the site.  Continue to monitor site.

## 2024-06-08 NOTE — TREATMENT PLAN
"/60 (BP Location: Left arm)   Pulse 75   Temp 98.7  F (37.1  C) (Oral)   Resp 19   Ht 1.626 m (5' 4.02\")   Wt 82.6 kg (182 lb)   LMP  (LMP Unknown)   SpO2 92%   BMI 31.22 kg/m          Allergies   Allergen Reactions    Ace Inhibitors Cough    Amlodipine Unknown     Edema      Clarithromycin Nausea    Diltiazem Hcl [Diltiazem] Itching    Doxazosin Nausea    Penicillins Rash     Edema      Tetanus Toxoid Other (See Comments)     Local allergic reaction    RCAT Treatment Plan    Patient Score: 5  Patient Acuity: 4    Clinical Indication for Therapy: prevent atelectasis    Therapy Ordered: IS QID - pt reports using on own. Aerobika QID- pt using on own. O2 1 lpm via nc.      Assessment Summary: Good voicing. Pt denies SOB.  Pt alert. Pt speaks in complete sentences. No accessory muscle use. No nasal flaring. No retractions. Breath sounds decreased few crackles - scattered. Will have pt continue use QID.     Hipolito Echevarria, RT  6/8/2024   "

## 2024-06-08 NOTE — PLAN OF CARE
Problem: Adult Inpatient Plan of Care  Goal: Optimal Comfort and Wellbeing  Outcome: Progressing  Intervention: Monitor Pain and Promote Comfort  Recent Flowsheet Documentation  Taken 6/8/2024 0015 by Deanna Castro RN  Pain Management Interventions:   medication (see MAR)   repositioned     Problem: Hip Fracture Surgical Repair  Goal: Effective Urinary Elimination  Outcome: Progressing     Problem: Hip Fracture Surgical Repair  Goal: Optimal Pain Control and Function  Outcome: Progressing  Intervention: Prevent or Manage Pain  Recent Flowsheet Documentation  Taken 6/8/2024 0015 by Deanna Castro RN  Pain Management Interventions:   medication (see MAR)   repositioned   Goal Outcome Evaluation:  Pt pleasant and quiet this shift.  Able to sleep between cares.  Pt reports pain 10/10 with movement but then subsides to 0/10.  Has taken scheduled tylenol but refused any stronger pain meds.  Offered pt ice packs but once again refused.  Right hip reddened and swollen.  Pt was using the purewick but did not have much urine output.  She had been incontinent of a large amount of urine.  Bladder scan was 628.  Pt straight cathed for 550.  Tolerated well.  Continues to utilized the abductor wedge.  Turned and repositioned for comfort.

## 2024-06-08 NOTE — PLAN OF CARE
"Goal Outcome Evaluation:      Plan of Care Reviewed With: patient    Overall Patient Progress: improving    Overall Patient Progress: improving         Problem: Hip Fracture Surgical Repair  Goal: Effective Urinary Elimination  Outcome: Not Progressing  Intervention: Monitor and Manage Urinary Retention  Recent Flowsheet Documentation  Taken 6/8/2024 0800 by Katey Gaona RN  Urinary Elimination Promotion:   voiding relaxation promoted   toileting offered   absorbent pad/diaper use encouraged      Problem: Adult Inpatient Plan of Care  Goal: Optimal Comfort and Wellbeing  Outcome: Progressing  Intervention: Monitor Pain and Promote Comfort  Recent Flowsheet Documentation  Taken 6/8/2024 0800 by Katey Gaona RN  Pain Management Interventions:   medication (see MAR)   ambulation/increased activity   emotional support   pillow support provided     Problem: Hip Fracture Surgical Repair  Goal: Optimal Functional Ability  Outcome: Progressing  Intervention: Promote Optimal Functional Status  Recent Flowsheet Documentation  Taken 6/8/2024 0800 by Katey Gaona RN  Self-Care Promotion: independence encouraged  Activity Management:   activity adjusted per tolerance   activity encouraged   ambulated in room   up in chair   up to bedside commode      Pt progress notes under \"notes\".        "

## 2024-06-08 NOTE — PROGRESS NOTES
Orthopedic surgery progress note:  Virginia is doing quite well this morning.  She is up in her chair eating breakfast.  There was an area of redness around her surgical site that the nursing staff wanted us to investigate but she reports she has no pain or concerning feature.  She does have expected soreness when mobilizing.  She denies any numbness, ting or weakness within the right lower extremity.        Vital stable on room air    Full examination of the right lower extremity demonstrates a Mepilex in place, clean dry and intact overlying the right hip.  There is widespread redness which is more reactive and not concerning for cellulitis or any infectious process.  This is nontender and I suspect secondary to the prep and skin irritation.  There is no fluctuance, or significant pain with palpation to suggest any stigmata of infection.  She fires her gastrosoleus complex, EHL and tibialis anterior.  2+ DP pulse          89-year-old female status post right hip hemiarthroplasty with Dr. Tripp that is overall doing well.      Weightbearing as tolerated right lower extremity  Aspirin 81 mg twice daily  Maintain dressing  Follow-up with Dr. Tripp in 2 weeks for repeat evaluation    Fabio Sheppard MD  [facility]

## 2024-06-08 NOTE — PROGRESS NOTES
Patient continues to have difficulty voiding.  Up to bedside commode and was able to void 100 ml. Walked in the smith and bladder scanned when returned to bed.  Bladder scan 306 ml.  Straight cathed for 310 ml of clear cullen odorous urine. Continue to follow voiding protcol.

## 2024-06-09 NOTE — PLAN OF CARE
Goal Outcome Evaluation:         Problem: Hip Fracture Surgical Repair  Goal: Effective Urinary Elimination  Outcome: Progressing  Intervention: Monitor and Manage Urinary Retention  Recent Flowsheet Documentation  Taken 6/9/2024 0900 by Katey Gaona RN  Urinary Elimination Promotion:   voiding relaxation promoted   toileting offered   absorbent pad/diaper use encouraged    Pt is a 89 y/o woman post op day 3 (fractured right hip). Pt is alert, oriented x 4, pleasant and cooperative. Pt has had significant improvement in terms of pain level, as well as activity level. Pt received a toney at 10:30 am this morning, after struggling with urinary retention & issues with voiding the last few days (MD ordered). Immediately 350 mls came out. Pt also was up to commode today, and had a small bowel movement. Pt's family visited today, seems supportive.

## 2024-06-09 NOTE — PROGRESS NOTES
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        Right femoral neck fracture, sustained after a fall  -- s/p right hip hemiarthroplasty 6/6/21  -- post op cares per surgery        Acute hypoxemic respiratory failure  Question if secondary to pneumonia given productive cough with green sputum last 2-3 weeks  CT imaging findings of mild cylindrical bronchiectasis with endobronchial mucosal thickening and secretions, peribronchial nodular opacities and congestion foci of consolidation in the posterior segment right upper lobe and right middle lobe appeared on previous CT 2 years ago  Influenza/covid/rsv negative  Procal not elevated  Legionella and strep pneumo Ag negative  Sputum culture unable to be obtained  -- f/u blood cultures  -- changed to cefdinir and po doxy from IV ceftriaxone and IV doxycycline and plan course of ABX as ordered   -- continue duonebs prn  -- SLP saw and has no concerns  -- wean oxygen as tolerated      Acute urinary retention: UA negative  -- place toney 6/9  -- continue toney for 48 hours and then attempt TOV        BEBETO on CKD3:   -- resolved after IVF  -- trend         HTN: continue home coreg and losartan and IV hydralazine is available        Leukocytosis: resolved        Hypothyroidism: continue home replacement         History of RA: continue home plaquenil        Ischemic optic neuropathy of the left eye:  -- continue home statin and now on ASA 81mg BID for DVT PPX. Can resume home baby ASA when this course of BID ASA is finished        Occipital neuralgia: continue  "tylenol         GERD: continue home PPI        Mood disorder: continue home lexapro          Diet: Advance Diet as Tolerated: Regular Diet Adult  Discharge Instruction - Regular Diet Adult    DVT Prophylaxis: ASA 81mg PO BID per ortho    Garcia Catheter: Not present  Lines: None     Cardiac Monitoring: None  Code Status: Full Code      Clinically Significant Risk Factors                  # Hypertension: Noted on problem list           #Precipitous drop in Hgb/Hct: Lowest Hgb this hospitalization: 9.4 g/dL. Will continue to monitor and treat/transfuse as appropriate.     # Obesity: Estimated body mass index is 31.22 kg/m  as calculated from the following:    Height as of this encounter: 1.626 m (5' 4.02\").    Weight as of this encounter: 82.6 kg (182 lb)., PRESENT ON ADMISSION            Disposition Plan   Medically Ready for Discharge: Anticipated in 2-4 Days      Ted Lara DO  Hospitalist Service  [facility]  Securely message with [employer] (more info)  Text page via [employer] Paging/Directory   ______________________________________________________________________    Interval History   NAD. Endorses poor sleep last PM    Physical Exam   Vital Signs: Temp: 98.1  F (36.7  C) Temp src: Oral BP: (!) 177/80 Pulse: 83   Resp: 18 SpO2: 97 % O2 Device: Nasal cannula Oxygen Delivery: 1 LPM  Weight: 182 lbs 0 oz  General: NAD  RESPIRATORY: Breathing nonlabored  CARDIOVASCULAR: Trace le edema bilat.   NEUROLOGIC: Motor and sensory intact, speech clear         >45 MINUTES SPENT BY ME on the date of service doing chart review, history, exam, documentation & further activities per the note.  Data       "

## 2024-06-09 NOTE — PROGRESS NOTES
Care Management Follow Up    Length of Stay (days): 3    Expected Discharge Date: 06/10/2024     Concerns to be Addressed: discharge planning     Patient plan of care discussed at interdisciplinary rounds: Yes    Anticipated Discharge Disposition:  TCU vs home with home care     Anticipated Discharge Services:  TBD  Anticipated Discharge DME:  TBD    Patient/family educated on Medicare website which has current facility and service quality ratings:  yes  Education Provided on the Discharge Plan:  yes  Patient/Family in Agreement with the Plan:  yes    Referrals Placed by CM/SW:  TCU  Private pay costs discussed: Not applicable    Additional Information:  CM met with patient and patient's son and daughter in patient's room.    Patient wishes to go home with home care if she is able to go up and down the stairs on her own (bathroom is upstairs). Patient understands she may need TCU pending medical progression.   PT last worked with patient 6/7, currently not on the schedule to work with PT today or tomorrow.  OT scheduled for 6/10.    Son had gone to look at "[facility] and [facility] [facility] and [facility].  Son will tour more facilities and provide CM with additional choices.     If patient needs TCU prior to going home, son provided two choices:  1) "[facility]  2) [facility]    Will need  [facility] at discharge.    Social Hx:  Patient lives in a 4 level, split entry house alone. Need to go upstairs to get to bathroom and bedroom. Patient's son and daughter both live in houses within a few blocks from patient.  Patient is independent with ADLs and IADLs. She still drives.     Madeleine Cervantes RN

## 2024-06-09 NOTE — PLAN OF CARE
Problem: Hip Fracture Surgical Repair  Goal: Optimal Pain Control and Function  Outcome: Progressing     Problem: Hip Fracture Surgical Repair  Goal: Effective Urinary Elimination  Outcome: Progressing     Problem: Hip Fracture Surgical Repair  Goal: Effective Oxygenation and Ventilation  Intervention: Optimize Oxygenation and Ventilation  Recent Flowsheet Documentation  Taken 6/9/2024 0445 by Deanna Castro RN  Head of Bed (HOB) Positioning: HOB at 30 degrees  Taken 6/9/2024 0055 by Deanna Castro RN  Head of Bed (HOB) Positioning: HOB at 20-30 degrees   Goal Outcome Evaluation:  Pt reports she slept poorly.  States the alarms across the smith kept her awake though she declined having the door shut.  Continues to require oxygen at night, endorses shortness of breath with repositioning.  Lung sounds diminished.  Pain improved this shift.  Only took scheduled tylenol.  Right continues to be reddened though diffuse.  Voiding remains an issue.  Although pt is voiding in larger amounts her PVR was 492.  Will recheck again before morning.  Declined wanting to get up to the bedside commode at night.    0645-No  further void this shift.  Straight cathed at this time for 550cc clear yellow urine.

## 2024-06-09 NOTE — PLAN OF CARE
Patient is A&Ox4. She denies pain except with movement. O2 sats 90% on 1L. Oxygen increased to 2L and patient satting around 94 to 96%. Bladder scanned at 9 PM for 435. Patient voided 200 ml and recheck at 1030 PM was 495. Straight cathed for 520 ml. Patient asleep at end of shift.  Problem: Adult Inpatient Plan of Care  Goal: Optimal Comfort and Wellbeing  Outcome: Progressing     Problem: Hip Fracture Surgical Repair  Goal: Optimal Functional Ability  Outcome: Progressing  Intervention: Promote Optimal Functional Status  Recent Flowsheet Documentation  Taken 6/8/2024 1600 by Georgie Voss RN  Activity Management:   activity adjusted per tolerance   activity encouraged   ambulated in room   up in chair   up to bedside commode  Goal: Optimal Pain Control and Function  Outcome: Progressing  Goal: Effective Urinary Elimination  Outcome: Progressing  Intervention: Monitor and Manage Urinary Retention  Recent Flowsheet Documentation  Taken 6/8/2024 1600 by Georgie Voss RN  Urinary Elimination Promotion:   voiding relaxation promoted   toileting offered   absorbent pad/diaper use encouraged     Problem: Adult Inpatient Plan of Care  Goal: Absence of Hospital-Acquired Illness or Injury  Intervention: Identify and Manage Fall Risk  Recent Flowsheet Documentation  Taken 6/8/2024 1600 by Georgie Voss RN  Safety Promotion/Fall Prevention:   activity supervised   nonskid shoes/slippers when out of bed   supervised activity   safety round/check completed  Intervention: Prevent Skin Injury  Recent Flowsheet Documentation  Taken 6/8/2024 1712 by Georgie Voss, RN  Body Position:   left   turned  Taken 6/8/2024 1600 by Georgie Voss, RN  Body Position: supine  Intervention: Prevent Infection  Recent Flowsheet Documentation  Taken 6/8/2024 1600 by Georgie Voss, RN  Infection Prevention:   single patient room provided   equipment surfaces disinfected     Problem: Hip Fracture Surgical Repair  Goal: Effective Oxygenation  and Ventilation  Intervention: Optimize Oxygenation and Ventilation  Recent Flowsheet Documentation  Taken 6/8/2024 1712 by Georgie Voss RN  Head of Bed ([facility]) Positioning: HOB at 20-30 degrees  Taken 6/8/2024 1600 by Georgie Voss RN  Head of Bed ([facility]) Positioning: HOB at 20-30 degrees     Problem: Comorbidity Management  Goal: Blood Pressure in Desired Range  Intervention: Maintain Blood Pressure Management  Recent Flowsheet Documentation  Taken 6/8/2024 1600 by Georgie Voss RN  Medication Review/Management: medications reviewed     Problem: Gas Exchange Impaired  Goal: Optimal Gas Exchange  Intervention: Optimize Oxygenation and Ventilation  Recent Flowsheet Documentation  Taken 6/8/2024 1712 by Georgie Voss RN  Head of Bed ([facility]) Positioning: HOB at 20-30 degrees  Taken 6/8/2024 1600 by Georgie Voss RN  Head of Bed ([facility]) Positioning: HOB at 20-30 degrees     Problem: Risk for Delirium  Goal: Improved Behavioral Control  Intervention: Minimize Safety Risk  Recent Flowsheet Documentation  Taken 6/8/2024 1600 by Georgie Voss RN  Enhanced Safety Measures: pain management   Goal Outcome Evaluation:

## 2024-06-10 NOTE — PLAN OF CARE
Problem: Hip Fracture Surgical Repair  Goal: Effective Oxygenation and Ventilation  Intervention: Optimize Oxygenation and Ventilation  Recent Flowsheet Documentation  Taken 6/10/2024 0845 by Leeann Kevin RN  Head of Bed ([facility]) Positioning: HOB at 30 degrees     Problem: Hip Fracture Surgical Repair  Goal: Effective Oxygenation and Ventilation  Intervention: Optimize Oxygenation and Ventilation  Recent Flowsheet Documentation  Taken 6/10/2024 0845 by Leeann Kevin RN  Head of Bed ([facility]) Positioning: HOB at 30 degrees     Problem: Urinary Retention  Goal: Effective Urinary Elimination  Outcome: Progressing   Goal Outcome Evaluation:         Patient is alert and orientated times 4, forgetful at times. Garcia in place for urinary retention. Denies pain. Up to chair with physical therapy. Patient reports cough at times. No coughing noted when in room with patient. Encouraged I/S. Patient does poorly with and had difficult following directions on how to use. Continue to educate. Family visiting and supportive. Chair alarm on. Patient has call light and uses if she needs assistance.

## 2024-06-10 NOTE — PROGRESS NOTES
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        Right femoral neck fracture, sustained after a fall  -- s/p right hip hemiarthroplasty 6/6/21  -- post op cares per surgery        Acute hypoxemic respiratory failure  Question if secondary to pneumonia given productive cough with green sputum last 2-3 weeks  CT imaging findings of mild cylindrical bronchiectasis with endobronchial mucosal thickening and secretions, peribronchial nodular opacities and congestion foci of consolidation in the posterior segment right upper lobe and right middle lobe appeared on previous CT 2 years ago  Influenza/covid/rsv negative  Procal not elevated  Legionella and strep pneumo Ag negative  Sputum culture unable to be obtained  -- f/u blood cultures  -- changed to cefdinir and po doxy from IV ceftriaxone and IV doxycycline and plan course of ABX as ordered   -- continue duonebs prn  -- SLP saw and has no concerns  -- wean oxygen as tolerated      Acute urinary retention: UA negative  -- placed toney 6/9  -- continue toney for 48 hours and then attempt TOV 6/11      Multiple loose stools:  -- stopped scheduled laxatives  -- monitor  -- consider further workup pending clinical course         BEBETO on CKD3:   -- resolved after IVF          HTN: continue home coreg and losartan and IV hydralazine is available. Start scheduled oral hydralazine if needed for persistently elevated BP's        Leukocytosis: resolved        Hypothyroidism: continue home replacement         History of RA: continue home plaquenil        Ischemic optic  "neuropathy of the left eye:  -- continue home statin and now on ASA 81mg BID for DVT PPX. Can resume home baby ASA when this course of BID ASA is finished        Occipital neuralgia: continue tylenol         GERD: continue home PPI        Mood disorder: continue home lexapro          Diet: Advance Diet as Tolerated: Regular Diet Adult  Discharge Instruction - Regular Diet Adult    DVT Prophylaxis: ASA 81mg PO BID per ortho    Garcia Catheter: PRESENT, indication: Acute retention or obstruction  Lines: None     Cardiac Monitoring: None  Code Status: Full Code      Clinically Significant Risk Factors                  # Hypertension: Noted on problem list           #Precipitous drop in Hgb/Hct: Lowest Hgb this hospitalization: 9.4 g/dL. Will continue to monitor and treat/transfuse as appropriate.     # Obesity: Estimated body mass index is 31.22 kg/m  as calculated from the following:    Height as of this encounter: 1.626 m (5' 4.02\").    Weight as of this encounter: 82.6 kg (182 lb).             Disposition Plan   Medically Ready for Discharge: Anticipated Tomorrow      Ted Lara DO  Hospitalist Service  [facility]  Securely message with [employer] (more info)  Text page via [employer] Paging/Directory   ______________________________________________________________________    Interval History   NAD. Endorses poor sleep last PM again despite melatonin due to frequent BP checks    Physical Exam   Vital Signs: Temp: 99.2  F (37.3  C) Temp src: Oral BP: (!) 153/70 Pulse: 106   Resp: 18 SpO2: 95 % O2 Device: Nasal cannula Oxygen Delivery: 1 LPM  Weight: 182 lbs 0 oz  General: NAD  RESPIRATORY: Breathing nonlabored  CARDIOVASCULAR: Trace le edema bilat.   NEUROLOGIC: Motor and sensory intact, speech clear         >45 MINUTES SPENT BY ME on the date of service doing chart review, history, exam, documentation & further activities per the note.  Data       "

## 2024-06-10 NOTE — PROGRESS NOTES
"Orthopedic Progress Note      Assessment: 4 Day Post-Op  S/P Procedure(s):  HEMIARTHROPLASTY, HIP, BIPOLAR       Plan:   - Continue PT/OT.   - Weightbearing status: WBAT right lower extremity   - Anticoagulation: ASA 81 mg BID x30 days, in addition to SCDs, alexandria stockings and early ambulation.  - Hospitalist medicine team following, appreciate recommendations and management  - Discharge planning: Discharge pending TCU placement, medical stability.  Okay to discharge from an orthopedic standpoint.  - Follow up with Dr. Tripp in clinic in 2 weeks.     Subjective:  Pain: Well controlled on Tylenol, aspirin, and oxycodone 2.5 mg tablets   Nausea, Vomiting:  No  Chest pain: No  Lightheadedness, Dizziness:  No  Neuro:  Patient denies new onset numbness or paresthesias     Patient doing well on POD #4. Getting up in the room only to chair with heavy assist x2, tolerating oral intake, voiding & pain is controlled with oral medications. She is also undergoing treatment for hypoxemic respiratory failure with possible pneumonia. Hgb 9.4 (12.4 pre-op).     Objective:  BP (!) 153/70 (BP Location: Left arm)   Pulse 106   Temp 99.2  F (37.3  C) (Oral)   Resp 18   Ht 1.626 m (5' 4.02\")   Wt 82.6 kg (182 lb)   LMP  (LMP Unknown)   SpO2 95%   BMI 31.22 kg/m    The patient is A&Ox3. Appears comfortable, sitting up at bedside in the chair.  Calves are soft and non-tender bilaterally  Brisk capillary refill in the toes.   Palpable right dorsalis pedis pulse. Foot warm & well-perfused.  Sensation is intact to light touch & equal bilaterally in the femoral, DP, SP & tibial nerve distributions.  ROM: Flexes at right hip. Appropriately flexes & extends all toes bilaterally.   Motor: +5/5 dorsiflexion, plantar flexion & EHL bilaterally. Fires quad.   Leg lengths equal.  Right hip dressing mild distal strikethrough      Pertinent Labs   Lab Results: personally reviewed.   Lab Results   Component Value Date    INR 1.04 06/06/2024 "     Lab Results   Component Value Date    WBC 8.8 06/09/2024    HGB 9.4 (L) 06/09/2024    HCT 29.4 (L) 06/09/2024    MCV 92 06/09/2024     06/09/2024     Lab Results   Component Value Date     06/10/2024    CO2 24 06/10/2024         RAYNE JOHNSON PA-C  06/10/2024

## 2024-06-10 NOTE — PLAN OF CARE
Problem: Adult Inpatient Plan of Care  Goal: Optimal Comfort and Wellbeing  Outcome: Progressing     Problem: Hip Fracture Surgical Repair  Goal: Optimal Pain Control and Function  Outcome: Progressing     Problem: Hip Fracture Surgical Repair  Goal: Effective Urinary Elimination  Outcome: Progressing     Problem: Hip Fracture Surgical Repair  Goal: Effective Oxygenation and Ventilation  Outcome: Progressing  Intervention: Optimize Oxygenation and Ventilation  Recent Flowsheet Documentation  Taken 6/10/2024 0505 by Deanna Castro RN  Head of Bed ([facility]) Positioning: HOB at 20-30 degrees  Taken 6/10/2024 0030 by Deanna Castro RN  Head of Bed ([facility]) Positioning: HOB at 30 degrees   Goal Outcome Evaluation:  Pt sleeping better this shift.  Was up late due to multiple loose stools.  Garcia patent.  Reports no pain this shift.  Bps' have been elevated but managed with prn hydralazine.  Dressing to right hip has a small amount of dried drainage.  Redness in hip is improving.

## 2024-06-10 NOTE — PROGRESS NOTES
Care Management Follow Up    Length of Stay (days): 4    Expected Discharge Date: 06/11/2024     Concerns to be Addressed: discharge planning     Patient plan of care discussed at interdisciplinary rounds: Yes    Anticipated Discharge Disposition:  TCU vs home with home care     Anticipated Discharge Services:  TBD  Anticipated Discharge DME:  TBD    Patient/family educated on Medicare website which has current facility and service quality ratings:  yes  Education Provided on the Discharge Plan:  yes  Patient/Family in Agreement with the Plan:  yes    Referrals Placed by CM/SW:  TCU  Private pay costs discussed: Not applicable    Additional Information:    Patient wishes to go home with home care if she is able to go up and down the stairs on her own (bathroom is upstairs). Patient understands she may need TCU pending medical progression.     6/10: Rec continue to be TCU.      Patient accepted to both "[facility]" (for Thursday) and [facility] (for Tuesday)    CM submitted clinical information for insurance authorization to [employer].  Request ID: [id number]     Will need  Health Transport at discharge.    Social Hx:  Patient lives in a 4 level, split entry house alone. Need to go upstairs to get to bathroom and bedroom. Patient's son and daughter both live in houses within a few blocks from patient.  Patient is independent with ADLs and IADLs. She still drives.     Madeleine Cervantes RN

## 2024-06-10 NOTE — PLAN OF CARE
Patient is A&Ox4. She is very sleepy this shift. She stated that she worked hard during the day and did not want to walk this evening. Pain is controlled with scheduled Tylenol. Garcia draining clear yellow urine. Patient continues on 2L O2.  Problem: Adult Inpatient Plan of Care  Goal: Absence of Hospital-Acquired Illness or Injury  Outcome: Progressing  Intervention: Identify and Manage Fall Risk  Recent Flowsheet Documentation  Taken 6/9/2024 1639 by Georgie Voss RN  Safety Promotion/Fall Prevention:   activity supervised   supervised activity   safety round/check completed   room door open   nonskid shoes/slippers when out of bed   patient and family education   assistive device/personal items within reach  Intervention: Prevent Skin Injury  Recent Flowsheet Documentation  Taken 6/9/2024 1639 by Georgie Voss RN  Body Position: position changed independently  Skin Protection: protective footwear used  Device Skin Pressure Protection: pressure points protected  Intervention: Prevent and Manage VTE (Venous Thromboembolism) Risk  Recent Flowsheet Documentation  Taken 6/9/2024 1639 by Georgie Voss RN  VTE Prevention/Management: SCDs (sequential compression devices) on  Intervention: Prevent Infection  Recent Flowsheet Documentation  Taken 6/9/2024 1639 by Georgie Voss, RN  Infection Prevention:   single patient room provided   equipment surfaces disinfected  Goal: Optimal Comfort and Wellbeing  Outcome: Progressing     Problem: Hip Fracture Surgical Repair  Goal: Optimal Functional Ability  Outcome: Progressing  Intervention: Promote Optimal Functional Status  Recent Flowsheet Documentation  Taken 6/9/2024 1639 by Georgie Voss, RN  Activity Management: activity adjusted per tolerance  Goal: Optimal Pain Control and Function  Outcome: Progressing  Goal: Effective Oxygenation and Ventilation  Outcome: Progressing  Intervention: Optimize Oxygenation and Ventilation  Recent Flowsheet Documentation  Taken  6/9/2024 1639 by Georgie Voss RN  Head of Bed ([facility]) Positioning: [facility] at 30-45 degrees     Problem: Gas Exchange Impaired  Goal: Optimal Gas Exchange  Outcome: Progressing  Intervention: Optimize Oxygenation and Ventilation  Recent Flowsheet Documentation  Taken 6/9/2024 1639 by Georgie Voss RN  Head of Bed ([facility]) Positioning: [facility] at 30-45 degrees     Problem: Risk for Delirium  Goal: Improved Behavioral Control  Outcome: Progressing  Intervention: Prevent and Manage Agitation  Recent Flowsheet Documentation  Taken 6/9/2024 1639 by Georgie Voss RN  Environment Familiarity/Consistency:   daily routine followed   familiar objects from home provided   personal clothing/items utilized  Intervention: Minimize Safety Risk  Recent Flowsheet Documentation  Taken 6/9/2024 1639 by Georgie Voss RN  Enhanced Safety Measures: pain management     Problem: Hip Fracture Surgical Repair  Goal: Effective Urinary Elimination  Intervention: Monitor and Manage Urinary Retention  Recent Flowsheet Documentation  Taken 6/9/2024 1639 by Georgie Voss RN  Urinary Elimination Promotion:   voiding relaxation promoted   toileting offered   absorbent pad/diaper use encouraged     Problem: Comorbidity Management  Goal: Blood Pressure in Desired Range  Intervention: Maintain Blood Pressure Management  Recent Flowsheet Documentation  Taken 6/9/2024 1639 by Georgie Voss RN  Medication Review/Management: medications reviewed     Problem: Risk for Delirium  Goal: Optimal Coping  Intervention: Optimize Psychosocial Adjustment to Delirium  Recent Flowsheet Documentation  Taken 6/9/2024 1639 by Georgie Voss RN  Supportive Measures:   active listening utilized   positive reinforcement provided   relaxation techniques promoted   self-care encouraged   Goal Outcome Evaluation:

## 2024-06-11 NOTE — PLAN OF CARE
Problem: Hip Fracture Surgical Repair  Goal: Optimal Functional Ability  Outcome: Progressing  Intervention: Promote Optimal Functional Status  Recent Flowsheet Documentation  Taken 6/11/2024 0838 by Leeann Kevin, RN  Activity Management:   activity adjusted per tolerance   activity encouraged  Goal: Optimal Pain Control and Function  Outcome: Progressing  Goal: Effective Oxygenation and Ventilation  Outcome: Progressing  Intervention: Optimize Oxygenation and Ventilation  Recent Flowsheet Documentation  Taken 6/11/2024 0838 by Leeann Kevin RN  Head of Bed ([facility]) Positioning: HOB at 30 degrees     Problem: Gas Exchange Impaired  Goal: Optimal Gas Exchange  Intervention: Optimize Oxygenation and Ventilation  Recent Flowsheet Documentation  Taken 6/11/2024 0838 by Leeann Kevin, RN  Head of Bed ([facility]) Positioning: HOB at 30 degrees     Problem: Urinary Retention  Goal: Effective Urinary Elimination  Outcome: Progressing   Goal Outcome Evaluation:       Patient up to chair with assist of 1. Gait belt, walker. Garcia d/cd per order. Will monitor output. Denies pain. Right hip dressing is intact with old drainage on dressing. Right hip 2+ edema and slightly red ortho dr aware. Cms intact. Patient able to make her needs know. O2 1L will wean o2 if patient tolerates.

## 2024-06-11 NOTE — PROGRESS NOTES
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        Right femoral neck fracture, sustained after a fall  -- s/p right hip hemiarthroplasty 6/6/21  -- post op cares per surgery  -- TCU authorization pending for today, now bed is available but patient not comfortable with discharge pending TOV and trying to wean off oxygen        Acute hypoxemic respiratory failure  Question if secondary to pneumonia given productive cough with green sputum last 2-3 weeks  CT imaging findings of mild cylindrical bronchiectasis with endobronchial mucosal thickening and secretions, peribronchial nodular opacities and congestion foci of consolidation in the posterior segment right upper lobe and right middle lobe appeared on previous CT 2 years ago  Influenza/covid/rsv negative  Procal not elevated  Legionella and strep pneumo Ag negative  Sputum culture unable to be obtained  -- f/u blood cultures  -- changed to cefdinir and po doxy from IV ceftriaxone and IV doxycycline and plan course of ABX as ordered   -- continue duonebs prn  -- SLP saw and has no concerns  -- wean oxygen as tolerated  -- will need outpatient pulmonary referral  -- discharge on oxygen if cannot wean while inpatient  -- push IS      Acute urinary retention: UA negative  -- placed toney 6/9  --  attempt TOV 6/11      Multiple loose stools: resolved  -- stopped scheduled laxatives        BEBETO on CKD3:   -- resolved after IVF          HTN: continue home coreg and losartan and IV hydralazine is available. Start scheduled oral hydralazine 6/11, hold for  "SBP<130        Leukocytosis: resolved        Hypothyroidism: continue home replacement         History of RA: continue home plaquenil        Ischemic optic neuropathy of the left eye:  -- continue home statin and now on ASA 81mg BID for DVT PPX. Can resume home baby ASA when this course of BID ASA is finished        Occipital neuralgia: continue tylenol         GERD: continue home PPI        Mood disorder: continue home lexapro          Diet: Advance Diet as Tolerated: Regular Diet Adult  Discharge Instruction - Regular Diet Adult    DVT Prophylaxis: ASA 81mg PO BID per ortho    Garcia Catheter: Not present  Lines: None     Cardiac Monitoring: None  Code Status: Full Code      Clinically Significant Risk Factors                  # Hypertension: Noted on problem list           #Precipitous drop in Hgb/Hct: Lowest Hgb this hospitalization: 9.4 g/dL. Will continue to monitor and treat/transfuse as appropriate.     # Obesity: Estimated body mass index is 31.22 kg/m  as calculated from the following:    Height as of this encounter: 1.626 m (5' 4.02\").    Weight as of this encounter: 82.6 kg (182 lb).             Disposition Plan   Medically Ready for Discharge: Anticipated Tomorrow      Ted Lara DO  Hospitalist Service  [facility]  Securely message with [employer] (more info)  Text page via [employer] Paging/Directory   ______________________________________________________________________    Interval History   NAD. Denies any complaints besides fatigue    Physical Exam   Vital Signs: Temp: 97.8  F (36.6  C) Temp src: Oral BP: 123/61 Pulse: 63   Resp: 16 SpO2: 95 % O2 Device: Nasal cannula Oxygen Delivery: 1 LPM  Weight: 182 lbs 0 oz  General: NAD  RESPIRATORY: Breathing nonlabored  CARDIOVASCULAR: Trace le edema bilat.   NEUROLOGIC: Motor and sensory intact, speech clear         >45 MINUTES SPENT BY ME on the date of service doing chart review, history, exam, documentation & further activities " per the note.  Data

## 2024-06-11 NOTE — PROGRESS NOTES
"Care Management Follow Up    Length of Stay (days): 5    Expected Discharge Date: 06/11/2024    Anticipated Discharge Plan:   [facility] on Friday, or Thursday if available    Transportation: Anticipate Wheelchair. Transportation costs discussed? Yes. Discussed with dtthanh Hammer    PT Recommendations: Transitional Care Facility  OT Recommendations:  Transitional Care Facility     Barriers to Discharge: medically ready    Prior Living Situation: house (\"it is a 4 level house. It is a split entry. I have to use steps to get to the bathroom and bedroom, but once I am on that level I could stay on that level\".) with alone    Advanced Directive on File: no concerns identified     Patient/Spokesperson Updated: Yes. Who? Pt and dtr Jaquelin    Additional Information:  Pt has been accepted to [facility] for Friday discharge. If she can go sooner, they will let us know. Still need PAS and ride.   Patient made aware of private pay costs related to discharge plan. Cost for private room rate for TCU/LTC ,Transport cost, and/or non-covered or partially covered TCU/LTC stay.     CM called son, Ed at [phone], informed him of plans for discharge to [facility] tomorrow pending pt ability to urinate and oxygen use. Ride will be TBD by mobility and O2 use.       Patricio Campos RN      "

## 2024-06-11 NOTE — PLAN OF CARE
Problem: Adult Inpatient Plan of Care  Goal: Plan of Care Review  Outcome: Progressing  Flowsheets (Taken 6/10/2024 2236)  Plan of Care Reviewed With: patient     Problem: Adult Inpatient Plan of Care  Goal: Patient-Specific Goal (Individualized)  Outcome: Progressing     Problem: Adult Inpatient Plan of Care  Goal: Absence of Hospital-Acquired Illness or Injury  Outcome: Progressing  Intervention: Identify and Manage Fall Risk  Recent Flowsheet Documentation  Taken 6/10/2024 1615 by Adegun, Oluwadamilola, RN  Safety Promotion/Fall Prevention:   activity supervised   assistive device/personal items within reach   clutter free environment maintained     Problem: Adult Inpatient Plan of Care  Goal: Optimal Comfort and Wellbeing  Outcome: Progressing     Problem: Adult Inpatient Plan of Care  Goal: Readiness for Transition of Care  Outcome: Progressing     Problem: Hip Fracture Surgical Repair  Goal: Optimal Functional Ability  Outcome: Progressing  Intervention: Promote Optimal Functional Status  Recent Flowsheet Documentation  Taken 6/10/2024 1615 by Adegun, Oluwadamilola, RN  Activity Management: activity adjusted per tolerance     Problem: Hip Fracture Surgical Repair  Goal: Optimal Pain Control and Function  Outcome: Progressing     Problem: Hip Fracture Surgical Repair  Goal: Effective Urinary Elimination  Outcome: Progressing     Problem: Hip Fracture Surgical Repair  Goal: Effective Oxygenation and Ventilation  Outcome: Progressing  Intervention: Optimize Oxygenation and Ventilation  Recent Flowsheet Documentation  Taken 6/10/2024 1615 by Adegun, Oluwadamilola, RN  Head of Bed ([facility]) Positioning: [facility] at 20-30 degrees     Problem: Comorbidity Management  Goal: Blood Pressure in Desired Range  Outcome: Progressing     Problem: Risk for Delirium  Goal: Optimal Coping  Outcome: Progressing     Problem: Urinary Retention  Goal: Effective Urinary Elimination  Outcome: Progressing   Goal Outcome Evaluation: Pt is  alert and oriented. VSS, on O2 1L. Pt c/o SOB, PRN administered by RT. Pt denies pain. Dressing on R hip intact, old drainage present. Garcia in place. Pt had 1 BM on this shift.       Plan of Care Reviewed With: patient

## 2024-06-11 NOTE — PLAN OF CARE
Problem: Adult Inpatient Plan of Care  Goal: Optimal Comfort and Wellbeing  Outcome: Progressing   Goal Outcome Evaluation:  No new events overnight.  Slept well this shift.  Hypertensive at the start of the shift.  BP treated with hydralazine which was effective.  Denied pain.  Garcia patent.

## 2024-06-11 NOTE — PROGRESS NOTES
DONALD assisting with preadmission screening: [id number]    SUSHANT Bond on 6/11/2024 at 3:27 PM

## 2024-06-12 NOTE — PROGRESS NOTES
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  She states she did eat 100% of her breakfast the other day and tonight she is having food brought in-chicken and mashed potatoes.  She did have a [employer] sandwich brought in  previously also.  She may tray an ensure but doesn't want it until tomorrow (Chocolate)    CURRENT NUTRITION ORDERS  Diet: Regular  Intake/Tolerance: Ate 75% breakfast this am, ( 206 Calories and 7 g protein) 50% lunch on 6/11,( no records) 75% breakfast on 6/11 ( 206 Calories and 7 g protein)    LABS  Labs reviewed: Na 137, K 3.9, urea nitrogen 21.4, Cr 1.14 (H), Ca 8.6, Glu 99    MEDICATIONS  Medications reviewed: lipitor, omnicef, levothyroxine, pantoprazole, vit B 12, Vit D 3    ANTHROPOMETRICS  Height: 162.6 cm (5' 4.016\")  Most Recent Weight: 90.5 kg (199 lb 8.3 oz)    IBW: 54.5 kg ( 120 lb)  BMI: Obesity Grade I BMI 30-34.9  Weight History:   Wt Readings from Last 10 Encounters:   06/12/24 90.5 kg (199 lb 8.3 oz)   04/19/24 82.6 kg (182 lb)   03/26/24 82.6 kg (182 lb)   03/18/24 82.6 kg (182 lb)   05/13/23 82.6 kg (182 lb)   05/10/23 83.9 kg (185 lb)   04/04/22 83.9 kg (185 lb)   03/19/22 83.9 kg (185 lb)   10/22/21 85.3 kg (188 " lb)   06/18/21 85.3 kg (188 lb)       Dosing Weight: 63.5 kg/ adjusted weight    ASSESSED NUTRITION NEEDS  Estimated Energy Needs: 5164-5693 kcals/day (25 - 30 kcals/kg)  Justification: Maintenance  Estimated Protein Needs: 64 grams protein/day (1 g/Kg)  Justification: Maintenance  Estimated Fluid Needs: 0938-8345 mL/day (25 - 30 mL/kg)   Justification: Maintenance    PHYSICAL FINDINGS  See malnutrition section below.  Skin: Peeling right ankle and foot  Redness/blanchable hip/trochanter  Right hip incision  Nic score= 18, nutrition noted as adequate  GI: Fecal incontinence  Last BM 6/11/2024    MALNUTRITION:  % Weight Loss:  None noted  % Intake:  Decreased intake does not meet criteria for malnutrition   Subcutaneous Fat Loss:  None observed  Muscle Loss:  None observed  Fluid Retention:  Trace to mild 1+ to 2+    Malnutrition Diagnosis: Patient does not meet two of the above criteria necessary for diagnosing malnutrition    NUTRITION DIAGNOSIS  Inadequate oral intake related to disliking our hospital food as evidenced by ordering and eating very little at recent meals      INTERVENTIONS  Implementation  Pt will try some ensure but doesn't want any today-she will try it tomorrow-chocolate    Goals  Meet > 75% nutritional needs     Monitoring/Evaluation  Progress toward goals will be monitored and evaluated per protocol.

## 2024-06-12 NOTE — PROGRESS NOTES
"Orthopedic Progress Note      Assessment: 5 Day Post-Op  S/P Procedure(s):  HEMIARTHROPLASTY, HIP, BIPOLAR       Plan:   - Continue PT/OT.   - Weightbearing status: WBAT right lower extremity   - Anticoagulation: ASA 81 mg BID x30 days, in addition to SCDs, alexandria stockings and early ambulation.  - Hospitalist medicine team following, appreciate recommendations and management  - Discharge planning: Discharge pending TCU placement, medical stability.  Okay to discharge from an orthopedic standpoint.  - Follow up with Dr. Tripp in clinic in 2 weeks.     Subjective:  Pain: Well controlled on Tylenol, aspirin, and oxycodone 2.5 mg tablets   Nausea, Vomiting:  No  Chest pain: No  Lightheadedness, Dizziness:  No  Neuro:  Patient denies new onset numbness or paresthesias     Patient is having a hard time breathing today due to having underlying pneumonia. She is on a nasal canal for oxygen to improve her respirations. The patient reports that the pain in her hip is well controlled. She has been tolerating oral intake. Working on increasing her ambulation. She has been voiding and passing gas. All questions and concerns were answered.      Objective:  BP (!) 193/88 (BP Location: Left arm)   Pulse 107   Temp 98.5  F (36.9  C) (Oral)   Resp 20   Ht 1.626 m (5' 4.02\")   Wt 90.5 kg (199 lb 8.3 oz)   LMP  (LMP Unknown)   SpO2 92%   BMI 34.23 kg/m    The patient is A&Ox3. Appears comfortable, sitting up at bedside in the chair.  Calves are soft and non-tender bilaterally  Tenderness to palpation along the right hip with significant bruising down the lateral thigh.  Brisk capillary refill in the toes.   Palpable right dorsalis pedis pulse. Foot warm & well-perfused.  Sensation is intact to light touch & equal bilaterally in the femoral, DP, SP & tibial nerve distributions.  ROM: Flexes at right hip. Appropriately flexes & extends all toes bilaterally.   Motor: +5/5 dorsiflexion, plantar flexion & EHL bilaterally. Fires quad. "   Leg lengths equal.  Right hip dressing had increased serous drainage. Bandage removed and replaced with Aquacel. Will monitor drainage.       Pertinent Labs   Lab Results: personally reviewed.   Lab Results   Component Value Date    INR 1.04 06/06/2024     Lab Results   Component Value Date    WBC 8.8 06/09/2024    HGB 9.4 (L) 06/09/2024    HCT 29.4 (L) 06/09/2024    MCV 92 06/09/2024     06/09/2024     Lab Results   Component Value Date     06/11/2024    CO2 24 06/11/2024         Karlie Singh PA-C  06/12/2024

## 2024-06-12 NOTE — PLAN OF CARE
Problem: Adult Inpatient Plan of Care  Goal: Absence of Hospital-Acquired Illness or Injury  Intervention: Identify and Manage Fall Risk  Recent Flowsheet Documentation  Taken 6/11/2024 1609 by Yulia Rivera RN  Safety Promotion/Fall Prevention:   activity supervised   assistive device/personal items within reach   nonskid shoes/slippers when out of bed   patient and family education   safety round/check completed     Problem: Hip Fracture Surgical Repair  Goal: Optimal Functional Ability  Intervention: Promote Optimal Functional Status  Recent Flowsheet Documentation  Taken 6/11/2024 2008 by Yulia Rivera RN  Activity Management:   up to bedside commode   back to bed     Problem: Comorbidity Management  Goal: Blood Pressure in Desired Range  Intervention: Maintain Blood Pressure Management  Recent Flowsheet Documentation  Taken 6/11/2024 1609 by Yulia Rivera RN  Medication Review/Management: medications reviewed     Problem: Risk for Delirium  Goal: Improved Behavioral Control  Intervention: Minimize Safety Risk  Recent Flowsheet Documentation  Taken 6/11/2024 1609 by Yulia Rivera RN  Enhanced Safety Measures:   assistive devices when indicated   review medications for side effects with activity   room near unit station   Goal Outcome Evaluation:             Aox4, calm and cooperative. Denies pain. Right hip dressing with old drainage, intact, CMS intact. 1 person assist with transfers. High bp, reports feeling nervous and having anxiety with blood pressure checks, MD notified, prn atarax ordered and given, BP improved. Dyspnea on exertion. O2 stable on 1 lpm. Pt voiding post toney removal, PVR in 200-300s, continue to monitor.

## 2024-06-12 NOTE — PROGRESS NOTES
"Care Management Follow Up    Length of Stay (days): 6    Expected Discharge Date: 06/12/2024    Anticipated Discharge Plan:       Transportation: Anticipate medical transport    PT Recommendations: Transitional Care Facility  OT Recommendations:  Transitional Care Facility     Barriers to Discharge: medical stability, placement    Prior Living Situation: house (\"it is a 4 level house. It is a split entry. I have to use steps to get to the bathroom and bedroom, but once I am on that level I could stay on that level\".) with alone    Advanced Directive on File: no concerns identified     Patient/Spokesperson Updated: Yes. Who? Pt, dtr and son    Additional Information:  Pt not medically for discharge today. [facility] accepted but if pt doesn't go today, CM will have to look for another bed.  Long discussion w/family about TCU placement. Family Pushed back with a Friday disposition and confusion about transportation cost, complained that no one is working with pt and updating family. CM explained that CM is trying to communicate with them and keeping them updated but can only go with what is documented. CM discussed w/Jaquelin and pt at length and confirmed w/son that pt is her own decision make and alert and orient. Discussed transport cost w/both Jaquelin and Ed today on the difference w/WC and Stretcher ride costs. Both agreed that the safety of the patient is more important than the cost and either is ok, as long as pt is safe. CM discussed that pt would have to be able to support herself in WC for wheelchair, otherwise it will have to be stretcher. Both are in agreement that it would be whichever is safest for pt.   Patient made aware of private pay costs related to discharge plan. Cost for private room rate for TCU/LTC ,Transport cost, and/or non-covered or partially covered TCU/LTC stay .       CM discussed that since pt is making some progress in regards to her voiding post toney removal, pt is getting closer " to discharge. Both Jaquelin and Ed were upset that CM is discharging pt without informing them and CM is not updating family. CM acknowledge their concerns and explain CM work flow and process, at length and emphasis on facilitating discharge and finding placement is goal. The medical discharge comes from the doctor.     If pt is not ready to leave today, CM needs to resubmit or follow up with [facility] to review chart for a Friday discharge, they may not have beds.     Patricio Campos RN

## 2024-06-12 NOTE — CONSULTS
Pulmonary Service Consult Note  Date of Service: 06/12/2024    Reason for Consultation: Concern for atypical Mycobacterium    Assessment:     Ruthy Man is a 89 year old female admitted with hip fracture for which she underwent right hip hemiarthroplasty and treated with a course of antibiotics for presumed pneumonia.  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  Patient is already on antibiotics for presumed pneumonia.  She is on minimal oxygen.     Plan:   Titrate FiO2  Nebs as needed  On antibiotics for presumed pneumonia  Follow up sputum for gram stain and AFB - no need for isolation.  Incentive spirometry  Flutter valve  Maintain in euvolemic state  Monitor I/O  PT/OT  Home O2 evaluation before discharge  Workup as outpatient for JAIDA    Patient is okay to discharge and can follow-up with pulmonary as outpatient in 4 to 6 weeks. Pulmonary and critical care will sign off. Please don't hesitate to call with questions.     TTS greater than 45 min, more than 50% on counseling and coordination of care    I appreciate the opportunity to participate in the care of Ruthy Man.  Please feel free to contact me at any time.    Estephania Quintero MD  Pulmonary and Critical Care Medicine    History:     HPI: Patient is a 89-year-old female with past medical his significant for hypertension, hypothyroidism, CKD, ischemic optic neuropathy left eye, palindromic rheumatism, admitted with right femoral neck fracture after a fall and is now status post right hip hemiarthroplasty.  During her hospital course, patient was treated for pneumonia.  She had a CT scan of the chest that showed mild bronchiectasis.  She also endorsed a productive cough with green sputum for 2 to 3 weeks on top of a chronic cough that is mostly nonproductive, after which she was started on IV ceftriaxone.  Her antibiotic  was changed to cefdinir and p.o. doxycycline.  She is currently on oxygen at 1 L via nasal cannula.  She is receiving DuoNebs.  She was evaluated by speech and there were no concerns.  Pulm was consulted for concerns of CT chest findings which could indicate chronic active atypical mycobacterial infection    PMHx/PSHx:  Past Medical History:   Diagnosis Date    Hypertension     Senile osteoporosis 2019     Past Surgical History:   Procedure Laterality Date    HC REMOVAL GALLBLADDER      Description: Cholecystectomy;  Recorded: 2008;  Comments: stone    OPEN REDUCTION INTERNAL FIXATION HIP BIPOLAR Right 2024    Procedure: HEMIARTHROPLASTY, HIP, BIPOLAR;  Surgeon: Eliud Tripp MD;  Location: [facility] [place] [facility]    [facility] TOTAL ABDOM HYSTERECTOMY      Description: Hysterectomy;  Recorded: 2008;  Comments: benign disease     Social History     Socioeconomic History    Marital status:      Spouse name: Not on file    Number of children: Not on file    Years of education: Not on file    Highest education level: Not on file   Occupational History    Not on file   Tobacco Use    Smoking status: Never    Smokeless tobacco: Never   Vaping Use    Vaping status: Never Used   Substance and Sexual Activity    Alcohol use: Never    Drug use: Never    Sexual activity: Not on file   Other Topics Concern    Not on file   Social History Narrative    She is .  Her   in  suddenly, but he also had dementia. They were  for 60 years. She has 3 children, 4 grandchildren and 1 great grandchild. 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  One daughter lives with her and she has corticobasal degeneration.     Social Determinants of Health     Financial Resource Strain: Not on file   Food Insecurity: Not on file   Transportation Needs: Not on file   Physical Activity: Not on file   Stress: Not on file   Social  "Connections: Not on file   Interpersonal Safety: Low Risk  (3/18/2024)    Interpersonal Safety     Do you feel physically and emotionally safe where you currently live?: Yes     Within the past 12 months, have you been hit, slapped, kicked or otherwise physically hurt by someone?: No     Within the past 12 months, have you been humiliated or emotionally abused in other ways by your partner or ex-partner?: No   Housing Stability: Not on file       Review of Systems - 10 point review of system negative except for what is mentioned in the HPI.    Exam/Data:   /59 (BP Location: Left arm)   Pulse 89   Temp 98.2  F (36.8  C) (Oral)   Resp 20   Ht 1.626 m (5' 4.02\")   Wt 90.5 kg (199 lb 8.3 oz)   LMP  (LMP Unknown)   SpO2 94%   BMI 34.23 kg/m      GEN: comfortable, NAD  HEENT: NCAT, EMOI, mmm  CVS: S1S2, RRR  Lung: good air entry bilaterally  Abd: Soft, NT, ND,  + BS.   Ext: no c/c/e  Neuro: nonfocal  Skin: no visible rash  Musculoskeletal: FROM all extremities  Psych: appropriate    DATA    Labs: Reviewed in EMR and outside records where pertinent.       IMAGING: Personally reviewed images. Formal radiology interpretation noted below.    XR Femur Port Right 2 Views    Result Date: 6/6/2024  EXAM: XR FEMUR PORT RIGHT 2 VIEWS LOCATION: [facility] DATE: 6/6/2024 INDICATION: Status post hip surgery. COMPARISON: 6/6/2024.     IMPRESSION: Postop changes right hip implant for treatment of a femoral neck fracture. Hardware is in place. No dislocation or distal femoral fracture. Bones are demineralized.    POC US Guidance Needle Placement    Result Date: 6/6/2024  Ultrasound was performed as guidance to an anesthesia procedure.  Click \"PACS images\" hyperlink below to view any stored images.  For specific procedure details, view procedure note authored by anesthesia.    CT Chest Abdomen Pelvis w/o Contrast    Result Date: 6/6/2024  EXAM: CT CHEST ABDOMEN PELVIS W/O CONTRAST [facility]ATION: [facility] DATE: 6/6/2024 INDICATION: fall with thoracic pain with sat 85  RAa COMPARISON: CT AP 2/22/2022 TECHNIQUE: CT scan of the chest, abdomen, and pelvis was performed without IV contrast. Multiplanar reformats were obtained. Dose reduction techniques were used. CONTRAST: None. FINDINGS: LUNGS AND PLEURA: Mild cylindrical bronchiectasis with endobronchial mucosal thickening and secretions, peribronchial nodular opacities and some foci of consolidation in the posterior segment right upper lobe, lateral segment right middle lobe, lateral aspect left upper lobe and inferior lingula. No pleural effusion. No pneumothorax. MEDIASTINUM/AXILLAE: Heart size normal with no pericardial effusion.  Normal caliber thoracic aorta and central pulmonary arteries. No lymphadenopathy. Small esophageal hiatal hernia. CORONARY ARTERY CALCIFICATION: Mild. HEPATOBILIARY: Liver is normal. No bile duct dilatation. Cholecystectomy. PANCREAS: Normal. SPLEEN: Spleen size normal. ADRENAL GLANDS: Normal. KIDNEY/BLADDER: Several benign renal cysts. No followup is needed. Small stable cystocele related to chronic pelvic floor weakness. Kidneys, ureters and bladder are otherwise normal. BOWEL: Colonic diverticulosis. Bowel is otherwise normal with no obstruction or inflammatory change. No free air. No free fluid. LYMPH NODES: No lymphadenopathy. VASCULATURE: Moderate to severe calcified atheromatous plaque throughout the normal caliber abdominal aorta, iliofemoral arteries and at the origin of the renal and mesenteric arteries. PELVIC ORGANS: Hysterectomy. MUSCULOSKELETAL: Acute right femoral neck fracture. No additional acute fractures identified. Advanced multilevel degenerative disc disease and degenerative facet arthritis lumbar spine. Bridging endplate osteophyte formation thoracic spine. Marked chronic erosive arthropathy right sternoclavicular joint.     IMPRESSION: 1.  Acute right femoral neck fracture. 2.  No  additional acute post traumatic findings in the chest, abdomen or pelvis. 3.  Mild cylindrical bronchiectasis with endobronchial mucosal thickening and secretions, peribronchial nodular opacities and congestion foci of consolidation in the posterior segment right upper lobe, lateral segment right middle lobe, lateral aspect left upper lobe and inferior lingula. Findings could indicate chronic active atypical mycobacterial infection. 4.  Additional incidental findings as detailed above.    CT Lumbar Spine Reconstructed    Result Date: 6/6/2024  EXAM: CT LUMBAR SPINE RECONSTRUCTED LOCATION: [facility] DATE: 6/6/2024 INDICATION: midline lumbar pain after fall COMPARISON: None. TECHNIQUE: Routine CT Lumbar Spine without IV contrast. Multiplanar reformats. Dose reduction techniques were used. FINDINGS: No evidence of acute fracture or subluxation of the lumbar spine by CT imaging. Anterior marginal osteophytes within the lower thoracic and lumbar spine. Multilevel degenerative disc disease of the lumbar spine with disc height loss, most pronounced at L3/L4, L4/L5 and L5/S1. T12/L1:  No posterior disc bulge or spinal canal narrowing. Mild bilateral neural foraminal narrowing. L1/L2:  Symmetric disc bulge without significant spinal canal narrowing. Mild bilateral neural foraminal narrowing. L2/L3:  Symmetric disc bulge and moderate facet arthropathy without significant spinal canal narrowing. Moderate right neural foraminal narrowing. No left neural foraminal narrowing. L3/L4:  Symmetric disc bulge, facet arthropathy and ligamentum flavum thickening contributing to severe spinal canal narrowing. Severe right and moderate left neural foraminal narrowing.  L4/L5:  Symmetric disc bulge, facet arthropathy and ligamentum flavum thickening with severe spinal canal narrowing. Severe bilateral neural foraminal narrowing. L5/S1: No posterior disc bulge or spinal canal narrowing. No neural foraminal  narrowing.     IMPRESSION:  1.  No evidence of acute fracture or subluxation of the lumbar spine by CT imaging. 2.  Degenerative lumbar spondylosis as described above.    CT Thoracic Spine Reconstructed    Result Date: 6/6/2024  EXAM: CT THORACIC SPINE RECONSTRUCTED LOCATION: [facility] DATE: 6/6/2024 INDICATION: midline upper thoracic pain after fall COMPARISON: None. TECHNIQUE: Routine CT Thoracic Spine without IV contrast. Multiplanar reformats. Dose reduction techniques were used. FINDINGS: No evidence of acute fracture or subluxation of the thoracic spine by CT imaging. Anterior marginal osteophytes at T1/T2-T6/T7 and T10/T11-T12/L1. The vertebral bodies of the thoracic spine otherwise have normal stature and alignment. Multilevel degenerative disc disease of the thoracic spine with disc height loss, most pronounced at T10/T11 and T11/T12. No large significant posterior disc bulge or spinal canal narrowing. Moderate multilevel neural foraminal narrowing is demonstrated throughout the thoracic spine.     IMPRESSION:  1.  No evidence of acute fracture or subluxation of the thoracic spine by CT imaging.    CT Cervical Spine w/o Contrast    Result Date: 6/6/2024  EXAM: CT CERVICAL SPINE W/O CONTRAST LOCATION: [facility] DATE: 6/6/2024 INDICATION: age 89, fall with distracting injury COMPARISON: None. TECHNIQUE: Routine CT Cervical Spine without IV contrast. Multiplanar reformats. Dose reduction techniques were used. FINDINGS: No evidence of acute fracture or subluxation of the cervical spine by CT imaging. Anterolisthesis of T1 and T2 measuring 2 mm and C4 on C5 measuring 2 mm. The vertebral bodies of the cervical spine otherwise have normal stature and alignment. Multilevel degenerative disc disease of the cervical spine with disc height loss, most pronounced at C3/C4. No extraspinal abnormality. Craniovertebral junction and C1-C2: The odontoid process is well  approximated with the anterior body of C1 and well aligned between the lateral masses of C1. C2-C3: No posterior disc bulge or spinal canal narrowing. No neural foraminal narrowing. C3-C4: No posterior disc bulge or spinal canal narrowing. Uncovertebral joint disease and facet arthropathy with mild bilateral neural foraminal narrowing. C4-C5: No posterior disc bulge or spinal canal narrowing. Uncovertebral joint disease and facet arthropathy with severe bilateral neural foraminal narrowing. C5-C6: No posterior disc bulge or spinal canal narrowing. No neural foraminal narrowing. C6-C7: No posterior disc bulge or spinal canal narrowing. Uncovertebral joint disease and facet arthropathy with severe bilateral neural foraminal narrowing. C7-T1: No posterior disc bulge or spinal canal narrowing. No neural foraminal narrowing.     IMPRESSION: 1.  No evidence of acute fracture or subluxation of the cervical spine by CT imaging. 2.  Degenerative cervical spondylosis as described above.    CT Head w/o Contrast    Result Date: 6/6/2024  EXAM: CT HEAD W/O CONTRAST LOCATION: [facility] DATE: 6/6/2024 INDICATION: age 89, fall with distracting injury COMPARISON: None. TECHNIQUE: Routine CT Head without IV contrast. Multiplanar reformats. Dose reduction techniques were used. FINDINGS: INTRACRANIAL CONTENTS: No evidence of acute intracranial hemorrhage or mass effect. Few scattered foci of decreased attenuation within the cerebral hemispheric white matter which are nonspecific, though most commonly ascribed to chronic small vessel ischemic disease. The ventricles and sulci are normal for age. Normal gray-white matter differentiation. The basilar cisterns are patent. VISUALIZED ORBITS/SINUSES/MASTOIDS: The globes are unremarkable. The partially imaged paranasal sinuses, mastoid air cells and middle ear cavities are unremarkable. BONES/SOFT TISSUES: The visualized skull base and calvarium are unremarkable.      IMPRESSION:  1.  No evidence of acute intracranial hemorrhage or mass effect. 2.  Mild nonspecific white matter changes. 3.  Mild brain parenchymal volume loss.    XR Knee Right 1/2 Views    Result Date: 6/6/2024  EXAM: XR PELVIS 1/2 VIEWS, XR FEMUR RIGHT 2 VIEWS, XR KNEE RIGHT 1/2 VIEWS LOCATION: [facility] DATE: 6/6/2024 INDICATION: right hip pain after fall COMPARISON: None.     IMPRESSION: Fracture through the right femoral neck without evidence for intertrochanteric extension. No dislocation. Mild degenerative change of both hip joints. Left hip negative for fracture. Pelvis negative for fracture. Distal to the hip, the right femur is negative for fracture. There is degenerative change at the knee joint.    XR Femur Right 2 Views    Result Date: 6/6/2024  EXAM: XR PELVIS 1/2 VIEWS, XR FEMUR RIGHT 2 VIEWS, XR KNEE RIGHT 1/2 VIEWS LOCATION: [facility] DATE: 6/6/2024 INDICATION: right hip pain after fall COMPARISON: None.     IMPRESSION: Fracture through the right femoral neck without evidence for intertrochanteric extension. No dislocation. Mild degenerative change of both hip joints. Left hip negative for fracture. Pelvis negative for fracture. Distal to the hip, the right femur is negative for fracture. There is degenerative change at the knee joint.    XR Pelvis 1/2 Views    Result Date: 6/6/2024  EXAM: XR PELVIS 1/2 VIEWS, XR FEMUR RIGHT 2 VIEWS, XR KNEE RIGHT 1/2 VIEWS LOCATION: [facility] DATE: 6/6/2024 INDICATION: right hip pain after fall COMPARISON: None.     IMPRESSION: Fracture through the right femoral neck without evidence for intertrochanteric extension. No dislocation. Mild degenerative change of both hip joints. Left hip negative for fracture. Pelvis negative for fracture. Distal to the hip, the right femur is negative for fracture. There is degenerative change at the knee joint.    Family History   Problem Relation Age  of Onset    ALS Mother          age 75    Pulmonary Embolism Father          age 79    Other - See Comments Brother          age birth    Other - See Comments Daughter         has corticobasal syndrome     Allergies   Allergen Reactions    Ace Inhibitors Cough    Amlodipine Unknown     Edema      Clarithromycin Nausea    Diltiazem Hcl [Diltiazem] Itching    Doxazosin Nausea    Penicillins Rash     Edema      Tetanus Toxoid Other (See Comments)     Local allergic reaction       Medications:     Current Outpatient Medications   Medication Sig Dispense Refill    acetaminophen (TYLENOL) 325 MG tablet Take 2 tablets (650 mg) by mouth every 4 hours as needed for other (For optimal non-opioid multimodal pain management to improve pain control.) 100 tablet 0    aspirin 81 MG EC tablet Take 1 tablet (81 mg) by mouth 2 times daily 60 tablet 0    oxyCODONE (ROXICODONE) 5 MG tablet Take 0.5 tablets (2.5 mg) by mouth every 6 hours as needed for severe pain 5 tablet 0    senna-docusate (SENOKOT-S/PERICOLACE) 8.6-50 MG tablet Take 1 tablet by mouth 2 times daily 60 tablet 0       Much or all of the text in this note was generated through the use of the Dragon Dictate voice-to-text software. Errors in spelling or words which seem out of context are unintentional. Sound alike errors, in particular, may have escaped editing.

## 2024-06-12 NOTE — PLAN OF CARE
Problem: Adult Inpatient Plan of Care  Goal: Plan of Care Review  Description: The Plan of Care Review/Shift note should be completed every shift.  The Outcome Evaluation is a brief statement about your assessment that the patient is improving, declining, or no change.  This information will be displayed automatically on your shift  note.  Outcome: Progressing  Flowsheets (Taken 6/12/2024 0716)  Plan of Care Reviewed With: patient  Overall Patient Progress: improving    Pt pleasant, plan of care reviewed, med, walking, oxygenation pt verbal understanding.     Problem: Hip Fracture Surgical Repair  Goal: Optimal Functional Ability  Outcome: Progressing  Intervention: Promote Optimal Functional Status  Recent Flowsheet Documentation  Taken 6/12/2024 0100 by Bonita Gilliam, RN  Activity Management:   activity adjusted per tolerance   activity encouraged    Pt complained of muscle cramping x1 and anxious medications given, pt was able to rest.  Goal: Effective Oxygenation and Ventilation  Outcome: Progressing  Intervention: Optimize Oxygenation and Ventilation  Recent Flowsheet Documentation  Taken 6/12/2024 0506 by Bonita Gilliam, RN  Head of Bed (HOB) Positioning: HOB at 30 degrees  Taken 6/12/2024 0312 by Bonita Gilliam RN  Head of Bed (HOB) Positioning: HOB at 30 degrees    Pt continues on oxygen, uses oxygen at home , no distress will monitor.     Problem: Comorbidity Management  Goal: Blood Pressure in Desired Range  Outcome: Progressing  Intervention: Maintain Blood Pressure Management  Recent Flowsheet Documentation  Taken 6/12/2024 0200 by Bonita Gilliam, RN  Medication Review/Management: medications reviewed    BP was in 170s, medication given with good relieve in 140s.     Problem: Urinary Retention  Goal: Effective Urinary Elimination  Outcome: Progressing  Intervention: Promote Effective Urine Elimination  Recent Flowsheet Documentation  Taken 6/12/2024 0200 by Bonita Gilliam, RN  Urinary  Elimination Promotion:   toileting offered   voiding relaxation promoted  Taken 6/12/2024 0100 by Bonita Gilliam, RN  Urinary Elimination Promotion: toileting offered    Pt sat on commode but did not go bladder scan was 400 ml straight cath was done.    Goal Outcome Evaluation:      Plan of Care Reviewed With: patient    Overall Patient Progress: improvingOverall Patient Progress: improving       Pt A/OX3, right hip dressing dry drainage, ice has been used. Does not complain much of pain. Pt has anxiety and does not sleep well and BP get high. Atarax was given with good result.

## 2024-06-12 NOTE — PROGRESS NOTES
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         Assessment/Plan    #Right femoral neck fracture, sustained after a fall  -- s/p right hip hemiarthroplasty 6/6/21  -- post op cares per surgery  -- TCU authorization pending for today, now bed is available but patient not comfortable with discharge pending TOV and trying to wean off oxygen        #Acute hypoxemic respiratory failure  #bronchiectasis  #CAP   -CT findings suggest possible atypical mycobacterial infection.    -pulm consult.  -completing omnicef&doxy  -SLP saw and has no concerns  -- wean oxygen as tolerated      #Acute urinary retention: UA negative  -- placed toney 6/9  --  attempt TOV 6/11.  Bladder management     #Reduced oral intake - RD     Multiple loose stools: resolved  -- stopped scheduled laxatives        BEBETO on CKD3:   -- resolved after IVF.       HTN: continue home coreg.  Losartan (hold for now) and IV hydralazine is available. Start scheduled oral hydralazine 6/11, hold for SBP<130        Leukocytosis: resolved        Hypothyroidism: continue home replacement         History of RA: continue home plaquenil        Ischemic optic neuropathy of the left eye:  -- continue home statin and now on ASA 81mg BID for DVT PPX. Can resume home baby ASA when this course of BID ASA is finished        Occipital neuralgia: continue tylenol         GERD: continue home PPI        Mood disorder: continue home lexapro    Checklist:  Code Status: Full Code    Diet: Advance Diet as Tolerated: Regular Diet Adult  Discharge Instruction - Regular Diet  "Adult  Snacks/Supplements Adult: Ensure Enlive; Between Meals        Disposition and Discharge Planning    Medically Ready for Discharge: likely 1-2 days      System Identified Risk Variables  Auto-populated based on system request - if needs to be addressed in treatment plan they will be addressed above:  \"  Clinically Significant Risk Factors                  # Hypertension: Noted on problem list           #Precipitous drop in Hgb/Hct: Lowest Hgb this hospitalization: 9.4 g/dL. Will continue to monitor and treat/transfuse as appropriate.     # Obesity: Estimated body mass index is 34.23 kg/m  as calculated from the following:    Height as of this encounter: 1.626 m (5' 4.02\").    Weight as of this encounter: 90.5 kg (199 lb 8.3 oz).             \"    Interval Events/Subjective/Notable results:    Several weeks of productive cough.  Seems to be improving.  Reduced PO intake.  Hasn't urinated yet today, little on bladder scan.  Encourage PO    Reviewed: CT, BMP, CBC  Discussed with: pt's son      Objective    Vital signs in last 24 hours  Temp:  [97.9  F (36.6  C)-98.6  F (37  C)] 98.2  F (36.8  C)  Pulse:  [] 89  Resp:  [16-20] 20  BP: ()/(52-88) 125/59  SpO2:  [92 %-95 %] 94 % O2 Device: Nasal cannula    Weight:   199 lbs 8.26 oz  Body mass index is 34.23 kg/m .    Intake/Output last 3 shifts  I/O last 3 completed shifts:  In: 375 [P.O.:375]  Out: 700 [Urine:700]    Physical Exam  General:  Alert, cooperative, no distress    Neurologic:  oriented, facial symmetry preserved, fluent speech.   Psych: calm  HEENT:  Anicteric, MMM  CV: RRR no MRG, normal S1 and S2, no edema  Lungs: CTAB.  Easyrespirations  Abd: soft, NT  Skin: no rashes noted on exposed skin.    Garcia Catheter: Not present  Lines: None          Medical Decision Making               Sofia Davenport MD, [facility]  Internal Medicine Hospitalist    "

## 2024-06-12 NOTE — PLAN OF CARE
Problem: Adult Inpatient Plan of Care  Goal: Plan of Care Review  Description: The Plan of Care Review/Shift note should be completed every shift.  The Outcome Evaluation is a brief statement about your assessment that the patient is improving, declining, or no change.  This information will be displayed automatically on your shift  note.  Outcome: Progressing   Goal Outcome Evaluation:  Pt alert and oriented x4. BP high in am 190s-all AM BP meds given-later BP dropped to 88/50.  Pt was sitting in chair at time-assisted back to bed and BP improved to 126/59. Pt eating very poorly -says she is nauseated-zofran 4mg IV given with improvement. Pt drinking some pop brought in by family at this time. Pt had loose brown bm this am on commode-did not void. Bladder scan at 1240 for 31mls urine-Dr Davenport up dated as to BP and low urine.RD notified of poor intake and enliven supplememnt ahas been ordered. Sepsis protocol fired this am -protocol followed and lactic acid was 1.1.

## 2024-06-13 NOTE — PLAN OF CARE
Problem: Hip Fracture Surgical Repair  Goal: Optimal Functional Ability  Outcome: Progressing  Intervention: Promote Optimal Functional Status  Recent Flowsheet Documentation  Taken 6/13/2024 0900 by Ursula Jansen RN  Activity Management: (with PT) ambulated outside room  Goal: Optimal Pain Control and Function  Outcome: Progressing  Goal: Effective Urinary Elimination  Outcome: Progressing  Intervention: Monitor and Manage Urinary Retention  Recent Flowsheet Documentation  Taken 6/13/2024 0900 by Ursula Jansen RN  Urinary Elimination Promotion:   absorbent pad/diaper use encouraged   bladder volume assessed by ultrasound   frequent voiding encouraged   positioned for ease of voiding  Goal: Effective Oxygenation and Ventilation  Outcome: Progressing     Problem: Gas Exchange Impaired  Goal: Optimal Gas Exchange  Outcome: Progressing     Problem: Urinary Retention  Goal: Effective Urinary Elimination  Outcome: Progressing  Intervention: Promote Effective Urine Elimination  Recent Flowsheet Documentation  Taken 6/13/2024 0900 by Ursula Jansen RN  Urinary Elimination Promotion:   absorbent pad/diaper use encouraged   bladder volume assessed by ultrasound   frequent voiding encouraged   positioned for ease of voiding     Problem: Fall Injury Risk  Goal: Absence of Fall and Fall-Related Injury  Outcome: Progressing  Intervention: Identify and Manage Contributors  Recent Flowsheet Documentation  Taken 6/13/2024 0900 by Ursula Jansen RN  Medication Review/Management: medications reviewed  Intervention: Promote Injury-Free Environment  Recent Flowsheet Documentation  Taken 6/13/2024 0900 by Ursula Jansen RN  Safety Promotion/Fall Prevention:   activity supervised   clutter free environment maintained   lighting adjusted   nonskid shoes/slippers when out of bed   room organization consistent   safety round/check completed   Goal Outcome Evaluation:             Pt lethargic/sleepy today. Did  ambulate hallway with PT this am. No c/o hip pain. Voided after bladder scan, pad occasionally moist. Pt needs encouragement to void on BSC. Family brings food for pt. No anxiety this shift.

## 2024-06-13 NOTE — PLAN OF CARE
Problem: Adult Inpatient Plan of Care  Goal: Plan of Care Review  Description: The Plan of Care Review/Shift note should be completed every shift.  The Outcome Evaluation is a brief statement about your assessment that the patient is improving, declining, or no change.  This information will be displayed automatically on your shift  note.  Outcome: Progressing  Flowsheets (Taken 6/13/2024 0608)  Plan of Care Reviewed With: patient  Overall Patient Progress: improving     Problem: Hip Fracture Surgical Repair  Goal: Optimal Functional Ability  Outcome: Progressing  Intervention: Promote Optimal Functional Status  Recent Flowsheet Documentation  Taken 6/13/2024 0300 by Bonita Gilliam RN  Activity Management:   activity adjusted per tolerance   activity encouraged  Goal: Optimal Pain Control and Function  Outcome: Progressing  Goal: Effective Urinary Elimination  Outcome: Progressing  Intervention: Monitor and Manage Urinary Retention  Recent Flowsheet Documentation  Taken 6/13/2024 0300 by Bonita Gilliam RN  Urinary Elimination Promotion: toileting offered  Goal: Effective Oxygenation and Ventilation  Outcome: Progressing  Intervention: Optimize Oxygenation and Ventilation  Recent Flowsheet Documentation  Taken 6/13/2024 0512 by Bonita Gilliam RN  Head of Bed (HOB) Positioning: HOB at 30 degrees  Taken 6/13/2024 0312 by Bonita Gilliam RN  Head of Bed (HOB) Positioning: HOB at 30 degrees  Taken 6/13/2024 0112 by Bonita Gilliam RN  Head of Bed (HOB) Positioning: HOB at 30 degrees  Taken 6/12/2024 2312 by Bonita Gilliam RN  Head of Bed (HOB) Positioning: HOB at 30 degrees     Problem: Comorbidity Management  Goal: Blood Pressure in Desired Range  Outcome: Progressing  Intervention: Maintain Blood Pressure Management  Recent Flowsheet Documentation  Taken 6/13/2024 0300 by Bonita Gilliam RN  Medication Review/Management: medications reviewed     Problem: Gas Exchange Impaired  Goal: Optimal Gas  Exchange  Outcome: Progressing  Intervention: Optimize Oxygenation and Ventilation  Recent Flowsheet Documentation  Taken 6/13/2024 0512 by Bonita Gilliam RN  Head of Bed (HOB) Positioning: HOB at 30 degrees  Taken 6/13/2024 0312 by Bonita Gilliam RN  Head of Bed (HOB) Positioning: HOB at 30 degrees  Taken 6/13/2024 0112 by Bonita Gilliam RN  Head of Bed (HOB) Positioning: HOB at 30 degrees  Taken 6/12/2024 2312 by Bonita Gilliam RN  Head of Bed (HOB) Positioning: HOB at 30 degrees     Problem: Urinary Retention  Goal: Effective Urinary Elimination  Outcome: Progressing  Intervention: Promote Effective Urine Elimination  Recent Flowsheet Documentation  Taken 6/13/2024 0300 by Bonita Gilliam, RN  Urinary Elimination Promotion: toileting offered   Goal Outcome Evaluation:      Plan of Care Reviewed With: patient    Overall Patient Progress: improvingOverall Patient Progress: improving       Pt A/OX4, denies distress, pt slept well last night BP within normal limites. Pt bladder scan was 189 says did not have urge to void . Plan of care reviewed pt verbal understanding.

## 2024-06-13 NOTE — PROGRESS NOTES
"Orthopedic Progress Note      Assessment: 5 Day Post-Op  S/P Procedure(s):  HEMIARTHROPLASTY, HIP, BIPOLAR       Plan:   - Continue PT/OT.   - Weightbearing status: WBAT right lower extremity   - Anticoagulation: ASA 81 mg BID x30 days, in addition to SCDs, alexandria stockings and early ambulation.  - Hospitalist medicine team following, appreciate recommendations and management  - Discharge planning: Discharge pending TCU placement, medical stability.  Okay to discharge from an orthopedic standpoint.  - Follow up with Dr. Tripp in clinic in 2 weeks.     Subjective:  Pain: Well controlled on Tylenol, aspirin, and oxycodone 2.5 mg tablets   Nausea, Vomiting:  No  Chest pain: No  Lightheadedness, Dizziness:  No  Neuro:  Patient denies new onset numbness or paresthesias     Patient is having a hard time breathing today due to having underlying pneumonia. She is on a nasal canal for oxygen to improve her respirations. The patient reports that the pain in her hip is well controlled. She has been tolerating oral intake. Working on increasing her ambulation. She has been voiding and passing gas. All questions and concerns were answered.      Objective:  BP (!) 155/69 (BP Location: Right arm)   Pulse 84   Temp 98.5  F (36.9  C) (Oral)   Resp 18   Ht 1.626 m (5' 4.02\")   Wt 90.5 kg (199 lb 8.3 oz)   LMP  (LMP Unknown)   SpO2 92%   BMI 34.23 kg/m    The patient is A&Ox3. Appears comfortable, sitting up at bedside in the chair.  Calves are soft and non-tender bilaterally  Tenderness to palpation along the right hip with significant bruising down the lateral thigh.  Brisk capillary refill in the toes.   Palpable right dorsalis pedis pulse. Foot warm & well-perfused.  Sensation is intact to light touch & equal bilaterally in the femoral, DP, SP & tibial nerve distributions.  ROM: Flexes at right hip. Appropriately flexes & extends all toes bilaterally.   Motor: +5/5 dorsiflexion, plantar flexion & EHL bilaterally. Fires quad. "   Leg lengths equal.  Right hip dressing had increased serous drainage. Bandage removed and replaced with Aquacel. Will monitor drainage.       Pertinent Labs   Lab Results: personally reviewed.   Lab Results   Component Value Date    INR 1.04 06/06/2024     Lab Results   Component Value Date    WBC 8.8 06/09/2024    HGB 9.4 (L) 06/09/2024    HCT 29.4 (L) 06/09/2024    MCV 92 06/09/2024     06/09/2024     Lab Results   Component Value Date     06/13/2024    CO2 20 (L) 06/13/2024         RAYNE JOHNSON PA-C  06/13/2024

## 2024-06-13 NOTE — PROGRESS NOTES
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         Assessment/Plan    #Right femoral neck fracture, sustained after a fall  -- s/p right hip hemiarthroplasty 6/6/21  -- post op cares per surgery     #Acute hypoxemic respiratory failure  #bronchiectasis  #CAP   -CT findings suggest possible atypical mycobacterial infection.    -pulm consult - outpatient work-up for atypical JAIDA.  Obtain sputum for AFB if able.  -pulm hygiene.    -completing omnicef&doxy  -SLP saw and has no concerns  - wean oxygen as tolerated, currently with minimal requirement (1L sating 93%).  Home O2 eval prior to discharge from TCU.     #Acute urinary retention: UA negative  -- placed toney 6/9  --  attempt TOV 6/11.  Bladder management     #Reduced oral intake - RD.  Better intake yesterday per patient    #Multiple loose stools: resolved  -- stopped scheduled laxatives     #BEBETO on CKD3: resolved after IVF.  At baseline.       #HTN: continue home coreg.  Losartan (hold for now pending BMP).  Oral hydralazine was started 06/11 - don't think needs both losartan and hydralazine on discharge.     #Hypothyroidism: continue home replacement       #History of RA: continue home plaquenil     #Ischemic optic neuropathy of the left eye:  -- continue home statin and now on ASA 81mg BID for DVT PPX. Can resume home baby ASA when this course of BID ASA is finished     #Occipital neuralgia: continue tylenol      #GERD: continue home PPI     #Mood disorder: continue home lexapro    Checklist:  Code Status: Full Code    Diet: Advance Diet as Tolerated:  "Regular Diet Adult  Discharge Instruction - Regular Diet Adult  Snacks/Supplements Adult: Ensure Enlive; Between Meals        Disposition and Discharge Planning    Medically Ready for Discharge:later today vs. Tomorrow.       System Identified Risk Variables  Auto-populated based on system request - if needs to be addressed in treatment plan they will be addressed above:  \"  Clinically Significant Risk Factors                  # Hypertension: Noted on problem list           #Precipitous drop in Hgb/Hct: Lowest Hgb this hospitalization: 9.4 g/dL. Will continue to monitor and treat/transfuse as appropriate.     # Obesity: Estimated body mass index is 34.23 kg/m  as calculated from the following:    Height as of this encounter: 1.626 m (5' 4.02\").    Weight as of this encounter: 90.5 kg (199 lb 8.3 oz).             \"    Interval Events/Subjective/Notable results:    Feels comfortable and safe in hospital and doesn't want to leave until Friday.  Down to 1L.  Ate better yesterday.  Working with PT when I saw.  Wet brief per therapist    Reviewed: pulm note, BMP  Discussed with care management    Objective    Vital signs in last 24 hours  Temp:  [97.9  F (36.6  C)-98.5  F (36.9  C)] 98.5  F (36.9  C)  Pulse:  [] 84  Resp:  [18-20] 18  BP: ()/(52-88) 155/69  SpO2:  [92 %-95 %] 93 % O2 Device: Nasal cannula    Weight:   199 lbs 8.26 oz  Body mass index is 34.23 kg/m .    Intake/Output last 3 shifts  I/O last 3 completed shifts:  In: 400 [P.O.:400]  Out: 450 [Urine:450]    Physical Exam  General:  Alert, cooperative, no distress    Neurologic:  oriented, facial symmetry preserved, fluent speech.   Psych: calm  HEENT:  Anicteric, MMM  CV: no edema  Lungs: CTAB.  Mild dyspnea when working with PT  Garcia Catheter: Not present  Lines: None          Medical Decision Making               Sofia Davenport MD, [facility]  Internal Medicine Hospitalist    "

## 2024-06-13 NOTE — PLAN OF CARE
Problem: Adult Inpatient Plan of Care  Goal: Absence of Hospital-Acquired Illness or Injury  Intervention: Identify and Manage Fall Risk  Recent Flowsheet Documentation  Taken 6/12/2024 1624 by Yulia Rivera RN  Safety Promotion/Fall Prevention:   activity supervised   assistive device/personal items within reach   nonskid shoes/slippers when out of bed   patient and family education   safety round/check completed  Intervention: Prevent and Manage VTE (Venous Thromboembolism) Risk  Recent Flowsheet Documentation  Taken 6/12/2024 1624 by Yulia Rivera RN  VTE Prevention/Management: SCDs (sequential compression devices) on     Problem: Hip Fracture Surgical Repair  Goal: Optimal Functional Ability  Intervention: Promote Optimal Functional Status  Recent Flowsheet Documentation  Taken 6/12/2024 1700 by Yulia Rivera RN  Activity Management:   activity adjusted per tolerance   activity encouraged  Goal: Effective Urinary Elimination  Intervention: Monitor and Manage Urinary Retention  Recent Flowsheet Documentation  Taken 6/12/2024 1624 by Yulia Rivera RN  Urinary Elimination Promotion: (pt refused toileting stating she is unable to void)   bladder volume assessed by ultrasound   toileting offered   other (see comments)     Problem: Comorbidity Management  Goal: Blood Pressure in Desired Range  Intervention: Maintain Blood Pressure Management  Recent Flowsheet Documentation  Taken 6/12/2024 1624 by Yulia Rivera RN  Medication Review/Management: medications reviewed     Problem: Risk for Delirium  Goal: Improved Behavioral Control  Intervention: Minimize Safety Risk  Recent Flowsheet Documentation  Taken 6/12/2024 1624 by Yulia Rivera RN  Enhanced Safety Measures:   assistive devices when indicated   review medications for side effects with activity   room near unit station     Problem: Urinary Retention  Goal: Effective Urinary Elimination  Intervention: Promote Effective Urine Elimination  Recent  "Flowsheet Documentation  Taken 2024 1624 by Yulia Rivera, RN  Urinary Elimination Promotion: (pt refused toileting stating she is unable to void)   bladder volume assessed by ultrasound   toileting offered   other (see comments)   Goal Outcome Evaluation:             Aox4, calm and cooperative. Denies pain, sob/. Vitals stable this shift, on 1 lpm O2.  Needing sputum sample, nonproductive cough. Straight cathedx1 this shift at 4:45 pm, urge felt but unable to void. UA negative. Bladder scan low at 2145, continue to monitor. IV fluids given per order, encouraged po intake. Family reported good appetite for dinner, pt stating \"most I've eaten in days.\"            "

## 2024-06-13 NOTE — CONSULTS
"SPIRITUAL HEALTH SERVICES - Consult Note  [facility]    Referral Source: Length of Stay    Pt Ashlie was pleasant, expressed no need for  care because of being on the prayer list at her [facility] [place] She said she is a \"born again [employer]\" and \"loves the Lord.\"    Plan: SH available as needed/requested.        Jade Enrique Mdiv. '25   Intern    SHS available 24/7 for emergent requests/referrals, either by paging the on-call  or by entering an ASAP/STAT consult in Epic (this will also page the on-call ).       "

## 2024-06-14 NOTE — PLAN OF CARE
Occupational Therapy Discharge Summary    Reason for therapy discharge:    Discharged to transitional care facility.    Progress towards therapy goal(s). See goals on Care Plan in [facility] electronic health record for goal details.  Goals partially met.  Barriers to achieving goals:   discharge from facility.    Therapy recommendation(s):    Continued therapy is recommended.  Rationale/Recommendations:  Continue OT in TCU facility to improve pt's activity tolerance and IND w/ ADLs.    Cait Marquez OTR/L. 6/14/2024, 1:39 PM

## 2024-06-14 NOTE — PROGRESS NOTES
"Care Management Follow Up    Length of Stay (days): 8    Expected Discharge Date: 06/14/2024    Anticipated Discharge Plan:       Transportation: Confirmed Wheelchair. Transportation costs discussed? Yes. Discussed with Patient/ Family previously    PT Recommendations: Transitional Care Facility  OT Recommendations:  Transitional Care Facility     Barriers to Discharge:  NA    Prior Living Situation: house (\"it is a 4 level house. It is a split entry. I have to use steps to get to the bathroom and bedroom, but once I am on that level I could stay on that level\".) with alone    Advanced Directive on File: no concerns identified     Patient/Spokesperson Updated: Yes. Who? Patient    Additional Information:  Discussed with TCU liaison, able to accept patient this evening. RNCM discussed with MD believes will be clear to discharge today.     RNCM arranged wheelchair transportation, discussed with RN on patient's needs. Transportation arranged for [id number] with oxygen for transport to TCU.    RNCM met with patient at bedside to provide and update.     RNCM placed call to sonMat to provide update on discharge plan. Mat became emotional stating it has been very hard to see his mother like this and he wants her to be able to get back home. RNCM offered emotional support and active listening. Answered questions.     Guadalupe Vidal RN      "

## 2024-06-14 NOTE — DISCHARGE SUMMARY
[facility] MEDICINE  DISCHARGE SUMMARY     Primary Care Physician: Selma Meneses  Admission Date: 6/6/2024   Discharge Provider: Sofia Davenport MD Discharge Date: 6/14/2024   Diet:   Active Diet and Nourishment Order   Procedures     Snacks/Supplements Adult: Ensure Enlive; Between Meals     Advance Diet as Tolerated: Regular Diet Adult     Discharge Instruction - Regular Diet Adult     Diet       Code Status: Full Code   Activity: DCACTIVITY: Activity as tolerated        Condition at Discharge: Stable     REASON FOR PRESENTATION(See Admission Note for Details)     Fall    PRINCIPAL & ACTIVE DISCHARGE DIAGNOSES     Principal Problem:    Closed displaced fracture of right femoral neck (H)  Active Problems:    Acute respiratory failure with hypoxia (H)    Fall at home, initial encounter    Community acquired pneumonia of right upper lobe of lung    Sepsis, due to unspecified organism, unspecified whether acute organ dysfunction present (H)      PENDING LABS     Unresulted Labs Ordered in the Past 30 Days of this Admission       No orders found from 5/7/2024 to 6/7/2024.              PROCEDURES ( this hospitalization only)      Procedure(s):  HEMIARTHROPLASTY, HIP, BIPOLAR    RECOMMENDATIONS TO OUTPATIENT PROVIDER FOR F/U VISIT     Follow-up Appointments     Follow Up Care      Please follow-up with Dr. Tripp's team in 2 weeks at [facility].   Call our scheduling line at [phone] to make an appointment, if you do   not already have one scheduled.        Follow Up and recommended labs and tests      Follow up with detention physician.  The following labs/tests are   recommended: BMP in 1 week.              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        Assessment/Plan     #Right femoral neck fracture, sustained after a fall  -- s/p right hip hemiarthroplasty 6/6/21  --asa 81 BID for DVT px     #Acute hypoxemic respiratory failure  #bronchiectasis  #CAP   -CT findings suggest possible atypical mycobacterial infection.     -pulm consulted and follow-up in 4-6 weeks for work-up for an atypical JAIDA infection.  Obtain sputum for AFB if able.  -pulm hygiene.    -completed omnicef&doxy  -SLP saw and has no concerns  -wean oxygen as tolerated, currently with minimal requirement (1L sating 93%).  Home O2 eval prior to discharge from TCU.     #Acute urinary retention: UA negative  -- placed toney 6/9  --  attempt TOV 6/11.    --Intermittent need for straight cath.  Only with 220cc in bladder today.  Continue bladder scan q8h and PRN and TCU.  If continues to retain would place Toney rather than straight cath given recurrent retention.  Discussed with TCU and they are able to manage bladder and place Toney there if needed.     #Reduced oral intake - RD.  Better intake yesterday per patient     #Multiple loose stools: resolved     #BEBETO on CKD3: resolved after IVF.  At baseline.       #HTN: continue home coreg.  Hold losartan for with variable oral intake.  Started hydralazine instead.       #Hypothyroidism: continue home replacement       #History of RA: continue home plaquenil     #Ischemic optic neuropathy of the left eye:  -- continue home statin and now on ASA 81mg BID for DVT PPX. Can resume home baby ASA when this course of BID ASA is finished     #Occipital neuralgia: continue tylenol      #GERD: continue home PPI     #Mood disorder: continue home lexapro    #moderate Aortic stenosis - outpatient follow-up       Discharge Medications with Med changes:     Current Discharge Medication List         START taking these medications    Details   acetaminophen (TYLENOL) 325 MG tablet Take 2 tablets (650 mg) by mouth every 4 hours as needed for other (For optimal non-opioid multimodal pain management to improve pain control.)  Qty: 100 tablet, Refills: 0    Associated Diagnoses: Closed displaced fracture of right femoral neck (H); History of right hip hemiarthroplasty      aspirin 81 MG EC tablet Take 1 tablet (81 mg) by mouth 2 times daily  Qty: 60 tablet, Refills: 0    Associated Diagnoses: Closed displaced fracture of right femoral neck (H); History of right hip hemiarthroplasty      hydrALAZINE (APRESOLINE) 25 MG tablet Take 1 tablet (25 mg) by mouth every 8 hours    Associated Diagnoses: Essential hypertension      oxyCODONE (ROXICODONE) 5 MG tablet Take 0.5 tablets (2.5 mg) by mouth every 6 hours as needed for severe pain  Qty: 5 tablet, Refills: 0    Associated Diagnoses: Closed displaced fracture of right femoral neck (H); History of right hip hemiarthroplasty      senna-docusate (SENOKOT-S/PERICOLACE) 8.6-50 MG tablet Take 1 tablet by mouth 2 times daily  Qty: 60 tablet, Refills: 0    Associated Diagnoses: Closed displaced fracture of right femoral neck (H); History of right hip hemiarthroplasty           CONTINUE these medications which have NOT CHANGED    Details   atorvastatin (LIPITOR) 40 MG tablet TAKE 1 TABLET(40 MG) BY MOUTH DAILY  Qty: 90 tablet, Refills: 3    Associated Diagnoses: Right thalamic stroke (H)      carvedilol (COREG) 6.25 MG tablet Take 1 tablet (6.25 mg) by mouth 2 times daily (with meals)  Qty: 180 tablet, Refills: 3    Associated Diagnoses: Primary hypertension      cholecalciferol, vitamin D3, (VITAMIN D3) 1,000 unit capsule [CHOLECALCIFEROL, VITAMIN D3, (VITAMIN D3) 1,000 UNIT CAPSULE] Take 1,000 Units by mouth daily.      clobetasol (TEMOVATE) 0.05 % external ointment [CLOBETASOL (TEMOVATE) 0.05 % OINTMENT] Apply 0.5 mg daily as needed  Qty: 60 g, Refills: 4    Associated  Diagnoses: Lichen sclerosus      cyanocobalamin (VITAMIN B-12) 500 MCG tablet [CYANOCOBALAMIN (VITAMIN B-12) 500 MCG TABLET] Take 500 mcg by mouth daily.      DOCOSAHEXANOIC ACID/EPA (FISH OIL ORAL) [DOCOSAHEXANOIC ACID/EPA (FISH OIL ORAL)] Take 1,200 mg by mouth daily.      escitalopram (LEXAPRO) 10 MG tablet Take 1 tablet (10 mg) by mouth daily  Qty: 90 tablet, Refills: 3    Associated Diagnoses: MADDY (generalized anxiety disorder)      hydroxychloroquine (PLAQUENIL) 200 MG tablet Take 1 tablet (200 mg) by mouth daily  Qty: 90 tablet, Refills: 1    Associated Diagnoses: Palindromic rheumatism involving multiple sites      lansoprazole (PREVACID) 15 MG capsule Take 15 mg by mouth every evening      levothyroxine (SYNTHROID/LEVOTHROID) 75 MCG tablet TAKE 1 TABLET BY MOUTH DAILY FOR 5 DAYS A WEEK, SKIP DOSE 2 DAYS PER WEEK  Qty: 90 tablet, Refills: 0    Associated Diagnoses: Acquired hypothyroidism           STOP taking these medications       aspirin 81 mg chewable tablet Comments:   Reason for Stopping:         losartan (COZAAR) 50 MG tablet Comments:   Reason for Stopping:                     Rationale for medication changes:              Consults     PHYSICAL THERAPY ADULT IP CONSULT  OCCUPATIONAL THERAPY ADULT IP CONSULT  ORTHOPEDIC SURGERY IP CONSULT  CARE MANAGEMENT / SOCIAL WORK IP CONSULT  SPEECH LANGUAGE PATH ADULT IP CONSULT  CARE MANAGEMENT / SOCIAL WORK IP CONSULT  PHYSICAL THERAPY ADULT IP CONSULT  OCCUPATIONAL THERAPY ADULT IP CONSULT  HOSPITALIST IP CONSULT  PULMONARY IP CONSULT  NUTRITION SERVICES ADULT IP CONSULT  PHYSICAL THERAPY ADULT IP CONSULT  OCCUPATIONAL THERAPY ADULT IP CONSULT    Immunizations given this encounter     Most Recent Immunizations   Administered Date(s) Administered     COVID-19 MONOVALENT 12+ (Pfizer) 11/23/2021     Flu 65+ Years 12/15/2018     Flu, Unspecified 01/06/2014     Influenza (High Dose) 3 valent vaccine 11/04/2020     Influenza (IIV3) PF 12/12/2014     Influenza  Vaccine 65+ (Fluzone HD) 12/09/2022     Influenza, seasonal, injectable, PF 01/08/2016     Pneumo Conj 13-V (2010&after) 02/25/2015     Pneumococcal 23 valent 05/23/2001     Td (Adult), Adsorbed 01/01/1930     Td,adult,historic,unspecified 01/01/1930     Zoster vaccine, live 05/05/2010           Anticoagulation Information            SIGNIFICANT IMAGING FINDINGS     Results for orders placed or performed during the hospital encounter of 06/06/24   CT Head w/o Contrast    Impression    IMPRESSION:    1.  No evidence of acute intracranial hemorrhage or mass effect.  2.  Mild nonspecific white matter changes.  3.  Mild brain parenchymal volume loss.   CT Cervical Spine w/o Contrast    Impression    IMPRESSION:  1.  No evidence of acute fracture or subluxation of the cervical spine by CT imaging.  2.  Degenerative cervical spondylosis as described above.   CT Lumbar Spine Reconstructed    Impression    IMPRESSION:    1.  No evidence of acute fracture or subluxation of the lumbar spine by CT imaging.  2.  Degenerative lumbar spondylosis as described above.   CT Thoracic Spine Reconstructed    Impression    IMPRESSION:    1.  No evidence of acute fracture or subluxation of the thoracic spine by CT imaging.   CT Chest Abdomen Pelvis w/o Contrast    Impression    IMPRESSION:  1.  Acute right femoral neck fracture.  2.  No additional acute post traumatic findings in the chest, abdomen or pelvis.  3.  Mild cylindrical bronchiectasis with endobronchial mucosal thickening and secretions, peribronchial nodular opacities and congestion foci of consolidation in the posterior segment right upper lobe, lateral segment right middle lobe, lateral aspect   left upper lobe and inferior lingula. Findings could indicate chronic active atypical mycobacterial infection.   4.  Additional incidental findings as detailed above.   XR Knee Right 1/2 Views    Impression    IMPRESSION: Fracture through the right femoral neck without evidence for  "intertrochanteric extension. No dislocation. Mild degenerative change of both hip joints. Left hip negative for fracture. Pelvis negative for fracture. Distal to the hip, the right   femur is negative for fracture. There is degenerative change at the knee joint.   XR Femur Right 2 Views    Impression    IMPRESSION: Fracture through the right femoral neck without evidence for intertrochanteric extension. No dislocation. Mild degenerative change of both hip joints. Left hip negative for fracture. Pelvis negative for fracture. Distal to the hip, the right   femur is negative for fracture. There is degenerative change at the knee joint.   XR Pelvis 1/2 Views    Impression    IMPRESSION: Fracture through the right femoral neck without evidence for intertrochanteric extension. No dislocation. Mild degenerative change of both hip joints. Left hip negative for fracture. Pelvis negative for fracture. Distal to the hip, the right   femur is negative for fracture. There is degenerative change at the knee joint.   XR Femur Port Right 2 Views    Impression    IMPRESSION: Postop changes right hip implant for treatment of a femoral neck fracture. Hardware is in place. No dislocation or distal femoral fracture. Bones are demineralized.         Discharge Orders        Reason for your hospital stay    Right hip fracture with subsequent surgery.     When to call - Contact Surgeon Team    You may experience symptoms that require follow-up before your scheduled appointment. Refer to the \"Stoplight Tool\" for instructions on when to contact your Surgeon Team if you are concerned about pain control, blood clots, constipation, or if you are unable to urinate.     When to call - Reach out to Urgent Care    If you are not able to reach your Surgeon Team and you need immediate care, go to the Orthopedic Walk-in Clinic or Urgent Care at your Surgeon's office.  Do NOT go to the Emergency Room unless you have shortness of breath, chest pain, or " other signs of a medical emergency.     When to call - Reasons to Call 911    Call 911 immediately if you experience sudden-onset chest pain, arm weakness/numbness, slurred speech, or shortness of breath     Discharge Instruction - Breathing exercises    Perform breathing exercises using your Incentive Spirometer 10 times per hour while awake for 2 weeks.     Symptoms - Fever Management    A low grade fever can be expected after surgery.  Use acetaminophen (TYLENOL) as needed for fever management.  Contact your Surgeon Team if you have a fever greater than 101.5 F, chills, and/or night sweats.     Symptoms - Constipation management    Constipation (hard, dry bowel movements) is expected after surgery due to the combination of being less active, the anesthetic, and the opioid pain medication.  You can do the following to help reduce constipation:  ~  FLUIDS:  Drink clear liquids (water or Gatorade), or juice (apple/prune).  ~  DIET:  Eat a fiber rich diet.    ~  ACTIVITY:  Get up and move around several times a day.  Increase your activity as you are able.  MEDICATIONS:  Reduce the risk of constipation by starting medications before you are constipated.  You can take Miralax   (1 packet as directed) and/or a stool softener (Senokot 1-2 tablets 1-2 times a day).  If you already have constipation and these medications are not working, you can get magnesium citrate and use as directed.  If you continue to have constipation you can try an over the counter suppository or enema.  Call your Surgeon Team if it has been greater than 3 days since your last bowel movement.     Symptoms - Reduced Urine Output    Changes in the amount of fluids you drank before and after surgery may result in problems urinating.  It is important to stay well-hydrated after surgery and drink plenty of water. If it has been greater than 8 hours since you have urinated despite drinking plenty of water, call your Surgeon Team.     Activity -  Exercises to prevent blood clots    Unless otherwise directed by your Surgeon team, perform the following exercises at least three times per day for the first four weeks after surgery to prevent blood clots in your legs: 1) Point and flex your feet (Ankle Pumps), 2) Move your ankle around in big circles, 3) Wiggle your toes, 4) Walk, even for short distances, several times a day, will help decrease the risk of blood clots.     Comfort and Pain Management - Pain after Surgery    Pain after surgery is normal and expected.  You will have some amount of pain for several weeks after surgery.  Your pain will improve with time.  There are several things you can do to help reduce your pain including: rest, ice, elevation, and using pain medications as needed. Contact your Surgeon Team if you have pain that persists or worsens after surgery despite rest, ice, elevation, and taking your medication(s) as prescribed. Contact your Surgeon Team if you have new numbness, tingling, or weakness in your operative extremity.     Comfort and Pain Management - Swelling after Surgery    Swelling and/or bruising of the surgical extremity is common and may persist for several months after surgery. In addition to frequent icing and elevation, gentle compressive support with an ACE wrap or tubigrip may help with swelling. Apply compression regularly, removing at least twice daily to perform skin checks. Contact your Surgeon Team if your swelling increases and is NOT associated with an increase in your activity level, or if your swelling increases and is associated with redness and pain.     Comfort and Pain Management - Cold therapy    Ice can be used to control swelling and discomfort after surgery. Place a thin towel over your operative site and apply the ice pack overtop. Leave ice pack in place for 20 minutes, then remove for 20 minutes. Repeat this 20 minutes on/20 minutes off routine as often as tolerated.     Medication Instructions -  "Acetaminophen (TYLENOL) Instructions    You were discharged with acetaminophen (TYLENOL) for pain management after surgery. Acetaminophen most effectively manages pain symptoms when it is taken on a schedule without missing doses (every four, six, or eight hours). Your Provider will prescribe a safe daily dose between 3000 - 4000 mg.  Do NOT exceed this daily dose. Most patients use acetaminophen for pain control for the first four weeks after surgery.  You can wean from this medication as your pain decreases.     Medication Instructions - Opioids - Tapering Instructions    In the first three days following surgery, your symptoms may warrant use of the narcotic pain medication every four to six hours as prescribed. This is normal. As your pain symptoms improve, focus your efforts on decreasing (tapering) use of narcotic medications. The most successful tapering strategy is to first, decrease the number of tablets you take every 4-6 hours to the minimum prescribed. Then, increase the amount of time between doses.  For example:  First, taper to   or 1 tablet every 4-6 hours.  Then, taper to   or 1 tablet every 6-8 hours.  Then, taper to   or 1 tablet every 8-10 hours.  Then, taper to   or 1 tablet every 10-12 hours.  Then, taper to   or 1 tablet at bedtime.  The bedtime dose can help with comfort during sleep and is typically the last dose to be discontinued after surgery.     Medication instructions -  Anticoagulation - aspirin    Take the aspirin as prescribed for a total of four weeks after surgery.  This is given to help minimize your risk of blood clot.     Comfort and Pain Management - LOWER Extremity Elevation    Swelling is expected for several months after surgery. This type of swelling is usually associated with gravity and activity, and can be improved with elevation.   The best way to do this is to get your \"toes above your nose\" by laying down and placing several pillows lengthwise under your calf and " "heel. When elevating your leg keep your knee completely straight. Perform this elevation as often as possible especially for the first two weeks after surgery.     Medication Instructions - Opioid Instructions (Greater than or equal to 65 years)    You were discharged with an opioid medication (hydromorphone, oxycodone, hydrocodone, or tramadol). This medication should only be taken for breakthrough pain that is not controlled with acetaminophen (TYLENOL). If you rate your pain less than 3 you do not need this medication.  Pain rating 0-3:  You do not need this medication  Pain rating 4-6:  Take 1/2 tablet every 4-6 hours as needed  Pain rating 7-10:  Take 1 tablet every 4-6 hours as needed  Do not exceed 4 tablets per day     Activity - Total Hip Arthroplasty    Refer to the [facility] \"Your Guide to Total Joint Replacement\" for recommendations on activities and Exercises.     Return to Driving    Return to driving - Driving is NOT permitted until directed by your provider. Under no circumstance are you permitted to drive while using narcotic pain medications.     Dressing / Wound Care - Wound    You have a clean dressing on your surgical wound. Dressing change instructions as follows: Leave dressing intact for 2 weeks/until your post op appt.  Contact your Surgeon Team if you have increased redness, warmth around the surgical wound, and/or drainage from the surgical wound.     Dressing / Wound Care - NO Tub Bathing    Tub bathing, swimming, or any other activities that will cause your incision to be submerged in water should be avoided until allowed by your Surgeon.     No flexion past 90 degrees    No bending at waist past 90 degrees.     No internal rotation    No pivoting on your operative leg.     Weight bearing as tolerated    Weight bearing as tolerated on your operative extremity.     Dressing Wound Care - Shower with wound/dressing NOT covered    You do not need to cover your dressing or incision in " the shower, you may allow water and soap to run over top of the surgical dressing or incision. You may shower 2 days after surgery.  You are strictly prohibited from soaking or submerging the surgical wound underwater.     Follow Up Care    Please follow-up with Dr. Tripp's team in 2 weeks at [facility]. Call our scheduling line at [phone] to make an appointment, if you do not already have one scheduled.     General info for SNF    Length of Stay Estimate: Short Term Care: Estimated # of Days <30  Condition at Discharge: Stable  Level of care:skilled   Rehabilitation Potential: Excellent  Admission H&P remains valid and up-to-date: Yes  Recent Chemotherapy: N/A  Use Nursing Home Standing Orders: Yes     Mantoux instructions    Give two-step Mantoux (PPD) Per Facility Policy Yes     Follow Up and recommended labs and tests    Follow up with residential physician.  The following labs/tests are recommended: BMP in 1 week.     Reason for your hospital stay    You were admitted with hip fracture     Bladder scan    Q 6-8 hrs.    Place Garcia catheter if >500ml and unable to void.     Physical Therapy Adult Consult    Evaluate and treat as clinically indicated.    Reason:  Strengthening     Occupational Therapy Adult Consult    Evaluate and treat as clinically indicated.    Reason:  ADLs     Oxygen (SNF/TCU) Discharge     Cane DME    DME Documentation: Describe the reason for need to support medical necessity: Impaired gait status post hip surgery. I, the undersigned, certify that the above prescribed supplies are medically necessary for this patient and is both reasonable and necessary in reference to accepted standards of medical practice in the treatment of this patient's condition and is not prescribed as a convenience.     Walker DME    DME Documentation: Describe the reason for need to support medical necessity: Impaired gait status post hip surgery. I, the undersigned, certify that the above  prescribed supplies are medically necessary for this patient and is both reasonable and necessary in reference to accepted standards of medical practice in the treatment of this patient's condition and is not prescribed as a convenience.     Discharge Instruction - Regular Diet Adult    Return to your pre-surgery diet unless instructed otherwise     Diet    Follow this diet upon discharge: Orders Placed This Encounter      Snacks/Supplements Adult: Ensure Enlive; Between Meals      Advance Diet as Tolerated: Regular Diet Adult      Discharge Instruction - Regular Diet Adult       Examination   Physical Exam   Temp:  [98.3  F (36.8  C)-98.6  F (37  C)] 98.6  F (37  C)  Pulse:  [76-86] 86  Resp:  [18-20] 18  BP: (116-174)/(57-74) 159/70  SpO2:  [93 %-95 %] 94 %  Wt Readings from Last 1 Encounters:   06/12/24 90.5 kg (199 lb 8.3 oz)       Doing well.    No SOB.  Anxious about leaving hospital.  Noted straight cath overnight, not clear if attempted to void.  Currently trying to void.  Care management d/w TCU and they could place Garcia and manage bladder as needed.      Please see EMR for more detailed significant labs, imaging, consultant notes etc.    ISofia MD, personally saw the patient today and spent greater than 30 minutes discharging this patient.    Sofia Davenport MD  [facility]    CC:Selma Meneses

## 2024-06-14 NOTE — PLAN OF CARE
Physical Therapy Discharge Summary    Reason for therapy discharge:    Discharged to transitional care facility.    Progress towards therapy goal(s). See goals on Care Plan in [facility] electronic health record for goal details.  Goals partially met.  Barriers to achieving goals:   discharge from facility.    Therapy recommendation(s):    Recommend continued therapies to improve overall strength, balance and mobility.       Patti Nuñez, PT, DPT  6/14/2024

## 2024-06-14 NOTE — PROGRESS NOTES
Care Management Discharge Note    Discharge Date: 06/14/2024       Discharge Disposition: Transitional Care    Discharge Services: None    Discharge DME: None    Discharge Transportation: health plan transportation    Private pay costs discussed: transportation costs    Does the patient's insurance plan have a 3 day qualifying hospital stay waiver?  No    PAS Confirmation Code: [id number]  Patient/family educated on Medicare website which has current facility and service quality ratings:      Education Provided on the Discharge Plan: Yes  Persons Notified of Discharge Plans: Patient   Patient/Family in Agreement with the Plan: yes    Handoff Referral Completed: Yes    Additional Information:  Patient to discharge to [facility] wheelchair transport with oxygen.    Guadalupe Vidal RN

## 2024-06-14 NOTE — PLAN OF CARE
Problem: Adult Inpatient Plan of Care  Goal: Optimal Comfort and Wellbeing  Outcome: Progressing  Pt slept well this shift.  Denied pain.  Dressing to right hip clean and intact.     Problem: Hip Fracture Surgical Repair  Goal: Effective Urinary Elimination  Outcome: Progressing  Intervention: Monitor and Manage Urinary Retention  Recent Flowsheet Documentation  Taken 6/14/2024 0107 by Deanna Castro RN  Urinary Elimination Promotion: toileting offered  Pt did not spontaneously void this shift.  Bladder scanned for 365.  Straight cathed an hour later for 450cc.  Tolerated well.  Noted perineal area red with rash.     Problem: Hip Fracture Surgical Repair  Goal: Effective Oxygenation and Ventilation  Intervention: Optimize Oxygenation and Ventilation  Recent Flowsheet Documentation  Taken 6/14/2024 0500 by Deanna Castro RN  Head of Bed (HOB) Positioning: HOB at 20-30 degrees  Taken 6/14/2024 0107 by Deanna Castro RN  Head of Bed (HOB) Positioning: HOB at 30 degrees   Pt has required oxygen this shift to maintain sats.  Continues on 1 liter overnight and sats in the mid 90's.    Goal Outcome Evaluation

## 2024-06-14 NOTE — PLAN OF CARE
Patient is alert and lethargic. Patient bladder scanned twice and voided. Family brought food for patient.     Gretta Unger RN    Problem: Adult Inpatient Plan of Care  Goal: Plan of Care Review  Description: The Plan of Care Review/Shift note should be completed every shift.  The Outcome Evaluation is a brief statement about your assessment that the patient is improving, declining, or no change.  This information will be displayed automatically on your shift  note.  6/13/2024 2258 by Gretta Unger, RN  Outcome: Progressing  6/13/2024 2258 by Gretta Unger, RN  Outcome: Progressing   Goal Outcome Evaluation:

## 2024-06-17 PROBLEM — K21.9 ACID REFLUX: Status: ACTIVE | Noted: 2024-01-01

## 2024-06-17 PROBLEM — K57.92 DIVERTICULITIS: Status: ACTIVE | Noted: 2024-01-01

## 2024-06-17 PROBLEM — K55.039 ACUTE ISCHEMIC COLITIS (H): Status: ACTIVE | Noted: 2024-01-01

## 2024-06-17 PROBLEM — I49.1 PREMATURE ATRIAL CONTRACTION: Status: ACTIVE | Noted: 2024-01-01

## 2024-06-17 NOTE — LETTER
[facility]       To:  [facility] SW            Please give to facility      From:   Ca Leach [facility]  Care Coordinator   [facility]   P: [phone]  [email or web address]        Patient Name:  Ruthy Man     YOB: 1930   Admit date:   6-        Information Needed:  Please contact me when the patient will discharge (or if they will move to long term care)- include the discharge date, disposition, and main diagnosis       If the patient is discharged with home care services, please provide the name of the agency    Also- Please inform me if a care conference is being held.     Phone or Email with information                              Thank you

## 2024-06-17 NOTE — PROGRESS NOTES
Clinic Care Coordination Contact  Care Coordination Transition Communication    Clinical Data: Patient was hospitalized at [facility]     PRINCIPAL & ACTIVE DISCHARGE DIAGNOSES      Principal Problem:    Closed displaced fracture of right femoral neck (H)  Active Problems:    Acute respiratory failure with hypoxia (H)    Fall at home, initial encounter    Community acquired pneumonia of right upper lobe of lung    Sepsis, due to unspecified organism, unspecified whether acute organ dysfunction present (H)  Assessment: Patient has transitioned to TCU/ARU for short term rehabilitation:    Facility Name: [facility]  Transition Communication:  Notified facility of Primary Care- Care Coordination support via Epic In-Basket message.    Plan: Care Coordinator will await notification from facility staff informing of patient's discharge plans/needs. Care Coordinator will review chart and outreach to facility staff every 4 weeks and as needed.     Ca Leach,   [facility]  [phone]

## 2024-06-17 NOTE — PROGRESS NOTES
[facility] GERIATRIC SERVICES    Code Status:  FULL CODE   Visit Type:   Chief Complaint   Patient presents with    TCU Admission     [facility] 6/6/2024 - 6/14/2024     Facility:  [facility] ([facility]) [[id number]]         HPI: Ruthy Man is a 89 year old female I am seeing today for admit to the TCU.  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  Patient recently hospitalized post fall and found to have an acute right femoral neck fracture.  She underwent right Heema arthroplasty on 6/6/2024.  She was also found to have acute hypoxic respiratory failure with CT finding suggestive of PNA versus atypical mycobacterial infection.  Recommendations were to see pulmonary for outpatient workup.  She completed oral antibiotics.  She was seen by speech with no concerns.  Discharged on O2 at 2 L.    Transitional Care Course: Today patient lying in bed.  Both her son and daughter are present on exam.  I was phoned over the weekend due to her right hip with serosanguineous drainage.  Today she continues with slight drainage from the distal aspect.  Moderate edema noted.  No redness or warmth.  She is afebrile.  CBC without leukocytosis.  Dressing reinforced.  Ice applied.  She continues on as needed Tylenol for pain.  She does not want to take narcotics.  We discussed scheduling Tylenol but she would rather ask for it.  She continues on oxygen at 0.5 L.  She denies any cough.  Some shortness of breath with exertion.  Reports being fatigued.  Hemoglobin today returned at 9.7.  Blood pressure stable.  She is emptying her bladder and reporting regular bowel movements.      Assessment/Plan:     Right femoral neck fracture, sustained after a fall  -s/p right hip hemiarthroplasty 6/6/21  -asa 81 BID for DVT px  -slight serosanguineous drainage.  -Surgical dressing reinforced.  Orthopedic surgeon  updated.  -Ice between therapies.   -Tylenol 650 every 6 hours as needed for pain.    Acute hypoxemic respiratory failure  bronchiectasis  CAP   Questionable atypical mycobacterial infection  -CT findings suggest possible atypical mycobacterial infection.     -pulm consulted and follow-up in 4-6 weeks for work-up for an atypical JAIDA infection.    -Unable to obtain sputum culture during hospitalization.   -completed omnicef&doxy  -Continue incentive spirometer/Aerobika.  -wean oxygen as tolerated, currently with minimal requirement (1L sating 93%).      Acute urinary retention  -Catheter removed on 6/11/2024  -No evidence of urinary retention.     BEBETO on CKD3  -Follow-up BMP.       HTN  -Losartan discontinued during hospitalization secondary to dehydration.  -Continues on home Coreg.  -Patient started on hydralazine 3 times daily during hospitalization. (Will obtain follow-up BMP and attempt to wean off and resume her losartan).     moderate Aortic stenosis   -currently on O2.   -SOB with exertion.   -Follow up out pt with Cardiology.      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 Can resume home baby ASA when this course of BID ASA is finished  Occipital neuralgia- continue tylenol   GERD-continue home PPI  Mood disorder-continue home lexapro     -Ok for PT, OT to eval and treat.     Active Ambulatory Problems     Diagnosis Date Noted    Ischemic optic neuropathy     Impaired Fasting Glucose     Hypothyroidism     Premature Ventricular Contractions     Vitamin D deficiency     Skin Cancer     Adjustment disorder with anxiety     Essential hypertension     Hiatal Hernia     Colon polyps 02/25/2015    Senile osteoporosis 02/21/2019    Right thalamic stroke (H) 04/11/2019    Left knee pain 04/11/2019    Acute ischemic VBA thalamic stroke, left (H)  04/12/2019    Chronic renal failure, stage 3 (moderate) (H) 08/06/2019    Palindromic rheumatism 10/15/2019    Primary osteoarthritis involving multiple joints 10/15/2019    Chronic right shoulder pain 01/21/2020    Palpitations 09/15/2020    Dermatitis 04/23/2021    Renal insufficiency 04/23/2021    Diverticular disease of colon 12/04/2008    Gastroesophageal reflux disease without esophagitis 08/03/2022    Chronic renal failure, stage 3b (H) 03/18/2024    Occipital neuralgia of left side 04/24/2024    Acute respiratory failure with hypoxia (H) 06/06/2024    Fall at home, initial encounter 06/06/2024    Closed displaced fracture of right femoral neck (H) 06/06/2024    Community acquired pneumonia of right upper lobe of lung 06/06/2024    Sepsis, due to unspecified organism, unspecified whether acute organ dysfunction present (H) 06/06/2024    Premature atrial contraction 06/17/2024    Acute ischemic colitis (H24) 06/17/2024    Diverticulitis 06/17/2024    Acid reflux 06/17/2024     Resolved Ambulatory Problems     Diagnosis Date Noted    Depression     Chronic Bronchitis     Polymyalgia (H24) 11/04/2020     Past Medical History:   Diagnosis Date    Hypertension      Allergies   Allergen Reactions    Ace Inhibitors Cough    Amlodipine Unknown     Edema      Clarithromycin Nausea    Diltiazem Hcl [Diltiazem] Itching    Doxazosin Nausea    Penicillins Swelling and Rash    Tetanus Toxoid Other (See Comments)     Local allergic reaction       All Meds and Allergies reviewed in the record at the facility and is the most up-to-date.    Post Discharge Medication Reconciliation Status: discharge medications reconciled, continue medications without change  Current Outpatient Medications   Medication Sig Dispense Refill    acetaminophen (TYLENOL) 325 MG tablet Take 2 tablets (650 mg) by mouth every 4 hours as needed for other (For optimal non-opioid multimodal pain management to improve pain control.) 100 tablet 0    aspirin  81 MG EC tablet Take 1 tablet (81 mg) by mouth 2 times daily 60 tablet 0    atorvastatin (LIPITOR) 40 MG tablet TAKE 1 TABLET(40 MG) BY MOUTH DAILY 90 tablet 3    carvedilol (COREG) 6.25 MG tablet Take 1 tablet (6.25 mg) by mouth 2 times daily (with meals) 180 tablet 3    cholecalciferol, vitamin D3, (VITAMIN D3) 1,000 unit capsule [CHOLECALCIFEROL, VITAMIN D3, (VITAMIN D3) 1,000 UNIT CAPSULE] Take 1,000 Units by mouth daily.      clobetasol (TEMOVATE) 0.05 % external ointment [CLOBETASOL (TEMOVATE) 0.05 % OINTMENT] Apply 0.5 mg daily as needed 60 g 4    cyanocobalamin (VITAMIN B-12) 500 MCG tablet [CYANOCOBALAMIN (VITAMIN B-12) 500 MCG TABLET] Take 500 mcg by mouth daily.      DOCOSAHEXANOIC ACID/EPA (FISH OIL ORAL) [DOCOSAHEXANOIC ACID/EPA (FISH OIL ORAL)] Take 1,200 mg by mouth daily.      escitalopram (LEXAPRO) 10 MG tablet Take 1 tablet (10 mg) by mouth daily 90 tablet 3    hydrALAZINE (APRESOLINE) 25 MG tablet Take 1 tablet (25 mg) by mouth every 8 hours      hydroxychloroquine (PLAQUENIL) 200 MG tablet Take 1 tablet (200 mg) by mouth daily 90 tablet 1    levothyroxine (SYNTHROID/LEVOTHROID) 75 MCG tablet TAKE 1 TABLET BY MOUTH DAILY FOR 5 DAYS A WEEK, SKIP DOSE 2 DAYS PER WEEK 90 tablet 0    oxyCODONE (ROXICODONE) 5 MG tablet Take 0.5 tablets (2.5 mg) by mouth every 6 hours as needed for severe pain 5 tablet 0    pantoprazole (PROTONIX) 20 MG EC tablet Take 40 mg by mouth at bedtime      senna-docusate (SENOKOT-S/PERICOLACE) 8.6-50 MG tablet Take 1 tablet by mouth 2 times daily 60 tablet 0    lansoprazole (PREVACID) 15 MG capsule Take 15 mg by mouth every evening (Patient not taking: Reported on 6/17/2024)       Current Facility-Administered Medications   Medication Dose Route Frequency Provider Last Rate Last Admin    denosumab (PROLIA) injection 60 mg  60 mg Subcutaneous Once Cassidy Grimm MD           REVIEW OF SYSTEMS:   10 point review of systems reviewed and pertinent positives in the HPI.  "    PHYSICAL EXAMINATION:  Physical Exam     Vital signs: /81   Pulse 105   Temp (!) 96.7  F (35.9  C)   Resp 16   Ht 1.626 m (5' 4\")   Wt 93.7 kg (206 lb 9.6 oz)   LMP  (LMP Unknown)   SpO2 94%   BMI 35.46 kg/m    General: Awake, Alert, oriented x3, lying in bed, appropriately, follows simple commands, conversant  HEENT: Pink conjunctiva,moist oral mucosa  NECK: Supple  CVS:  S1  S2, without murmur or gallop.   LUNG: Clear to auscultation, No wheezes, rales or rhonci. O2 on @ 0.5 liters.   BACK: No kyphosis of the thoracic spine  ABDOMEN: Soft, obese, nontender to palpation, with positive bowel sounds  EXTREMITIES: Moves both upper and lower extremities with generalized weakness, no pedal edema, no calf tenderness.   SKIN: Incision to right hip with small amount of serosanguineous drainage.  Moderate edema and bruising noted.  No redness or warmth.  NEUROLOGIC: Intact, pulses palpable  PSYCHIATRIC: Flat affect. Anxious.       Labs:  All labs reviewed in the nursing home record and Epic   @  Lab Results   Component Value Date    WBC 8.8 06/09/2024     Lab Results   Component Value Date    RBC 3.20 06/09/2024     Lab Results   Component Value Date    HGB 9.4 06/09/2024     Lab Results   Component Value Date    HCT 29.4 06/09/2024     Lab Results   Component Value Date    MCV 92 06/09/2024     Lab Results   Component Value Date    MCH 29.4 06/09/2024     Lab Results   Component Value Date    MCHC 32.0 06/09/2024     Lab Results   Component Value Date    RDW 14.9 06/09/2024     Lab Results   Component Value Date     06/09/2024        @Last Comprehensive Metabolic Panel:  Sodium   Date Value Ref Range Status   06/17/2024 134 (L) 135 - 145 mmol/L Final     Comment:     Reference intervals for this test were updated on 09/26/2023 to more accurately reflect our healthy population. There may be differences in the flagging of prior results with similar values performed with this method. Interpretation of " those prior results can be made in the context of the updated reference intervals.      Potassium   Date Value Ref Range Status   06/17/2024 3.9 3.4 - 5.3 mmol/L Final   06/06/2022 4.4 3.5 - 5.0 mmol/L Final     Chloride   Date Value Ref Range Status   06/17/2024 100 98 - 107 mmol/L Final   06/06/2022 103 98 - 107 mmol/L Final     Carbon Dioxide (CO2)   Date Value Ref Range Status   06/17/2024 25 22 - 29 mmol/L Final   06/06/2022 25 22 - 31 mmol/L Final     Anion Gap   Date Value Ref Range Status   06/17/2024 9 7 - 15 mmol/L Final   06/06/2022 12 5 - 18 mmol/L Final     Glucose   Date Value Ref Range Status   06/17/2024 94 70 - 99 mg/dL Final   06/06/2022 99 70 - 125 mg/dL Final     GLUCOSE BY METER POCT   Date Value Ref Range Status   06/07/2024 117 (H) 70 - 99 mg/dL Final     Urea Nitrogen   Date Value Ref Range Status   06/17/2024 15.9 8.0 - 23.0 mg/dL Final   06/06/2022 26 8 - 28 mg/dL Final     Creatinine   Date Value Ref Range Status   06/17/2024 1.14 (H) 0.51 - 0.95 mg/dL Final     GFR Estimate   Date Value Ref Range Status   06/17/2024 44 (L) >60 mL/min/1.73m2 Final   04/23/2021 38 (L) >60 mL/min/1.73m2 Final     Calcium   Date Value Ref Range Status   06/17/2024 8.4 8.2 - 9.6 mg/dL Final     Greater than 45 minutes spent for this visit which included preparing for the visit, reviewing medications, labs, imaging and consults as well as face-to-face time spent with patient and her family members.    This note has been dictated using voice recognition software. Any grammatical or context distortions are unintentional and inherent to the software    Electronically signed by: Darcie Bolaños CNP

## 2024-06-17 NOTE — LETTER
6/17/2024      Ruthy Man  [address] [facility] [address]        [facility]    Code Status:  FULL CODE   Visit Type:   Chief Complaint   Patient presents with     TCU Admission     [facility] 6/6/2024 - 6/14/2024     Facility:  [facility] ([facility]) [[id number]]         HPI: Ruthy Man is a 89 year old female I am seeing today for admit to the TCU.  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  Patient recently hospitalized post fall and found to have an acute right femoral neck fracture.  She underwent right Heema arthroplasty on 6/6/2024.  She was also found to have acute hypoxic respiratory failure with CT finding suggestive of PNA versus atypical mycobacterial infection.  Recommendations were to see pulmonary for outpatient workup.  She completed oral antibiotics.  She was seen by speech with no concerns.  Discharged on O2 at 2 L.    Transitional Care Course: Today patient lying in bed.  Both her son and daughter are present on exam.  I was phoned over the weekend due to her right hip with serosanguineous drainage.  Today she continues with slight drainage from the distal aspect.  Moderate edema noted.  No redness or warmth.  She is afebrile.  CBC without leukocytosis.  Dressing reinforced.  Ice applied.  She continues on as needed Tylenol for pain.  She does not want to take narcotics.  We discussed scheduling Tylenol but she would rather ask for it.  She continues on oxygen at 0.5 L.  She denies any cough.  Some shortness of breath with exertion.  Reports being fatigued.  Hemoglobin today returned at 9.7.  Blood pressure stable.  She is emptying her bladder and reporting regular bowel movements.      Assessment/Plan:     Right femoral neck fracture, sustained after a fall  -s/p right hip hemiarthroplasty 6/6/21  -asa 81 BID for DVT px  -slight  serosanguineous drainage.  -Surgical dressing reinforced.  Orthopedic surgeon updated.  -Ice between therapies.   -Tylenol 650 every 6 hours as needed for pain.    Acute hypoxemic respiratory failure  bronchiectasis  CAP   Questionable atypical mycobacterial infection  -CT findings suggest possible atypical mycobacterial infection.     -pulm consulted and follow-up in 4-6 weeks for work-up for an atypical JAIDA infection.    -Unable to obtain sputum culture during hospitalization.   -completed omnicef&doxy  -Continue incentive spirometer/Aerobika.  -wean oxygen as tolerated, currently with minimal requirement (1L sating 93%).      Acute urinary retention  -Catheter removed on 6/11/2024  -No evidence of urinary retention.     BEBETO on CKD3  -Follow-up BMP.       HTN  -Losartan discontinued during hospitalization secondary to dehydration.  -Continues on home Coreg.  -Patient started on hydralazine 3 times daily during hospitalization. (Will obtain follow-up BMP and attempt to wean off and resume her losartan).     moderate Aortic stenosis   -currently on O2.   -SOB with exertion.   -Follow up out pt with Cardiology.      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 Can resume home baby ASA when this course of BID ASA is finished  Occipital neuralgia- continue tylenol   GERD-continue home PPI  Mood disorder-continue home lexapro     -Ok for PT, OT to eval and treat.     Active Ambulatory Problems     Diagnosis Date Noted     Ischemic optic neuropathy      Impaired Fasting Glucose      Hypothyroidism      Premature Ventricular Contractions      Vitamin D deficiency      Skin Cancer      Adjustment disorder with anxiety      Essential hypertension      Hiatal Hernia      Colon polyps 02/25/2015     Senile osteoporosis 02/21/2019     Right thalamic stroke (H)  04/11/2019     Left knee pain 04/11/2019     Acute ischemic VBA thalamic stroke, left (H) 04/12/2019     Chronic renal failure, stage 3 (moderate) (H) 08/06/2019     Palindromic rheumatism 10/15/2019     Primary osteoarthritis involving multiple joints 10/15/2019     Chronic right shoulder pain 01/21/2020     Palpitations 09/15/2020     Dermatitis 04/23/2021     Renal insufficiency 04/23/2021     Diverticular disease of colon 12/04/2008     Gastroesophageal reflux disease without esophagitis 08/03/2022     Chronic renal failure, stage 3b (H) 03/18/2024     Occipital neuralgia of left side 04/24/2024     Acute respiratory failure with hypoxia (H) 06/06/2024     Fall at home, initial encounter 06/06/2024     Closed displaced fracture of right femoral neck (H) 06/06/2024     Community acquired pneumonia of right upper lobe of lung 06/06/2024     Sepsis, due to unspecified organism, unspecified whether acute organ dysfunction present (H) 06/06/2024     Premature atrial contraction 06/17/2024     Acute ischemic colitis (H24) 06/17/2024     Diverticulitis 06/17/2024     Acid reflux 06/17/2024     Resolved Ambulatory Problems     Diagnosis Date Noted     Depression      Chronic Bronchitis      Polymyalgia (H24) 11/04/2020     Past Medical History:   Diagnosis Date     Hypertension      Allergies   Allergen Reactions     Ace Inhibitors Cough     Amlodipine Unknown     Edema       Clarithromycin Nausea     Diltiazem Hcl [Diltiazem] Itching     Doxazosin Nausea     Penicillins Swelling and Rash     Tetanus Toxoid Other (See Comments)     Local allergic reaction       All Meds and Allergies reviewed in the record at the facility and is the most up-to-date.    Post Discharge Medication Reconciliation Status: discharge medications reconciled, continue medications without change  Current Outpatient Medications   Medication Sig Dispense Refill     acetaminophen (TYLENOL) 325 MG tablet Take 2 tablets (650 mg) by mouth every 4 hours  as needed for other (For optimal non-opioid multimodal pain management to improve pain control.) 100 tablet 0     aspirin 81 MG EC tablet Take 1 tablet (81 mg) by mouth 2 times daily 60 tablet 0     atorvastatin (LIPITOR) 40 MG tablet TAKE 1 TABLET(40 MG) BY MOUTH DAILY 90 tablet 3     carvedilol (COREG) 6.25 MG tablet Take 1 tablet (6.25 mg) by mouth 2 times daily (with meals) 180 tablet 3     cholecalciferol, vitamin D3, (VITAMIN D3) 1,000 unit capsule [CHOLECALCIFEROL, VITAMIN D3, (VITAMIN D3) 1,000 UNIT CAPSULE] Take 1,000 Units by mouth daily.       clobetasol (TEMOVATE) 0.05 % external ointment [CLOBETASOL (TEMOVATE) 0.05 % OINTMENT] Apply 0.5 mg daily as needed 60 g 4     cyanocobalamin (VITAMIN B-12) 500 MCG tablet [CYANOCOBALAMIN (VITAMIN B-12) 500 MCG TABLET] Take 500 mcg by mouth daily.       DOCOSAHEXANOIC ACID/EPA (FISH OIL ORAL) [DOCOSAHEXANOIC ACID/EPA (FISH OIL ORAL)] Take 1,200 mg by mouth daily.       escitalopram (LEXAPRO) 10 MG tablet Take 1 tablet (10 mg) by mouth daily 90 tablet 3     hydrALAZINE (APRESOLINE) 25 MG tablet Take 1 tablet (25 mg) by mouth every 8 hours       hydroxychloroquine (PLAQUENIL) 200 MG tablet Take 1 tablet (200 mg) by mouth daily 90 tablet 1     levothyroxine (SYNTHROID/LEVOTHROID) 75 MCG tablet TAKE 1 TABLET BY MOUTH DAILY FOR 5 DAYS A WEEK, SKIP DOSE 2 DAYS PER WEEK 90 tablet 0     oxyCODONE (ROXICODONE) 5 MG tablet Take 0.5 tablets (2.5 mg) by mouth every 6 hours as needed for severe pain 5 tablet 0     pantoprazole (PROTONIX) 20 MG EC tablet Take 40 mg by mouth at bedtime       senna-docusate (SENOKOT-S/PERICOLACE) 8.6-50 MG tablet Take 1 tablet by mouth 2 times daily 60 tablet 0     lansoprazole (PREVACID) 15 MG capsule Take 15 mg by mouth every evening (Patient not taking: Reported on 6/17/2024)       Current Facility-Administered Medications   Medication Dose Route Frequency Provider Last Rate Last Admin     denosumab (PROLIA) injection 60 mg  60 mg Subcutaneous  "Once Cassidy Grimm MD           REVIEW OF SYSTEMS:   10 point review of systems reviewed and pertinent positives in the HPI.     PHYSICAL EXAMINATION:  Physical Exam     Vital signs: /81   Pulse 105   Temp (!) 96.7  F (35.9  C)   Resp 16   Ht 1.626 m (5' 4\")   Wt 93.7 kg (206 lb 9.6 oz)   LMP  (LMP Unknown)   SpO2 94%   BMI 35.46 kg/m    General: Awake, Alert, oriented x3, lying in bed, appropriately, follows simple commands, conversant  HEENT: Pink conjunctiva,moist oral mucosa  NECK: Supple  CVS:  S1  S2, without murmur or gallop.   LUNG: Clear to auscultation, No wheezes, rales or rhonci. O2 on @ 0.5 liters.   BACK: No kyphosis of the thoracic spine  ABDOMEN: Soft, obese, nontender to palpation, with positive bowel sounds  EXTREMITIES: Moves both upper and lower extremities with generalized weakness, no pedal edema, no calf tenderness.   SKIN: Incision to right hip with small amount of serosanguineous drainage.  Moderate edema and bruising noted.  No redness or warmth.  NEUROLOGIC: Intact, pulses palpable  PSYCHIATRIC: Flat affect. Anxious.       Labs:  All labs reviewed in the nursing home record and Epic   @  Lab Results   Component Value Date    WBC 8.8 06/09/2024     Lab Results   Component Value Date    RBC 3.20 06/09/2024     Lab Results   Component Value Date    HGB 9.4 06/09/2024     Lab Results   Component Value Date    HCT 29.4 06/09/2024     Lab Results   Component Value Date    MCV 92 06/09/2024     Lab Results   Component Value Date    MCH 29.4 06/09/2024     Lab Results   Component Value Date    MCHC 32.0 06/09/2024     Lab Results   Component Value Date    RDW 14.9 06/09/2024     Lab Results   Component Value Date     06/09/2024        @Last Comprehensive Metabolic Panel:  Sodium   Date Value Ref Range Status   06/17/2024 134 (L) 135 - 145 mmol/L Final     Comment:     Reference intervals for this test were updated on 09/26/2023 to more accurately reflect our healthy " population. There may be differences in the flagging of prior results with similar values performed with this method. Interpretation of those prior results can be made in the context of the updated reference intervals.      Potassium   Date Value Ref Range Status   06/17/2024 3.9 3.4 - 5.3 mmol/L Final   06/06/2022 4.4 3.5 - 5.0 mmol/L Final     Chloride   Date Value Ref Range Status   06/17/2024 100 98 - 107 mmol/L Final   06/06/2022 103 98 - 107 mmol/L Final     Carbon Dioxide (CO2)   Date Value Ref Range Status   06/17/2024 25 22 - 29 mmol/L Final   06/06/2022 25 22 - 31 mmol/L Final     Anion Gap   Date Value Ref Range Status   06/17/2024 9 7 - 15 mmol/L Final   06/06/2022 12 5 - 18 mmol/L Final     Glucose   Date Value Ref Range Status   06/17/2024 94 70 - 99 mg/dL Final   06/06/2022 99 70 - 125 mg/dL Final     GLUCOSE BY METER POCT   Date Value Ref Range Status   06/07/2024 117 (H) 70 - 99 mg/dL Final     Urea Nitrogen   Date Value Ref Range Status   06/17/2024 15.9 8.0 - 23.0 mg/dL Final   06/06/2022 26 8 - 28 mg/dL Final     Creatinine   Date Value Ref Range Status   06/17/2024 1.14 (H) 0.51 - 0.95 mg/dL Final     GFR Estimate   Date Value Ref Range Status   06/17/2024 44 (L) >60 mL/min/1.73m2 Final   04/23/2021 38 (L) >60 mL/min/1.73m2 Final     Calcium   Date Value Ref Range Status   06/17/2024 8.4 8.2 - 9.6 mg/dL Final     Greater than 45 minutes spent for this visit which included preparing for the visit, reviewing medications, labs, imaging and consults as well as face-to-face time spent with patient and her family members.    This note has been dictated using voice recognition software. Any grammatical or context distortions are unintentional and inherent to the software    Electronically signed by: Darcie Bolaños CNP       Sincerely,        Darcie Bolaños NP

## 2024-06-19 NOTE — LETTER
" 6/19/2024      Ruthy Man  [address] [facility] [id number]        [facility]    Code Status:  FULL CODE   Visit Type:   Chief Complaint   Patient presents with     TCU Follow Up     Facility:  [facility] ([facility]) [[id number]]         HPI: Ruthy Man is a 89 year old female I am seeing today for follow up on the TCU.  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  Patient recently hospitalized post fall and found to have an acute right femoral neck fracture.  She underwent right Heema arthroplasty on 6/6/2024.  She was also found to have acute hypoxic respiratory failure with CT finding suggestive of PNA versus atypical mycobacterial infection.  Recommendations were to see pulmonary for outpatient workup.  She completed oral antibiotics.  She was seen by speech with no concerns.  Discharged on O2 at 2 L.    Transitional Care Course: Today patient lying in bed. Both her son and daughter are present on exam. Family reporting pt had choking episode after eating lunch. Pt reporting \"feeling like something stuck in her throat. She is continues to cough to clear. She is breathing adequately. With tongue depressor unable to see object in back of throat. She is swallowing. Milk and small bite of moistened bread given to help pass object deep down in throat which was effective. She has had some ongoing swallowing issues with meals here. She denies any hx of dysphagia. She is being followed by ST. We also talked today about getting OOB and sitting up in chair for meals. Recent pneumonia, questionable aspiration. She has completed her antibiotics. Continued attempts to wean off O2. She is down to 1L. She denies any pain in her hip today. She continues on prn tylenol. Drainage to hip has slowed. Dressing without strike through. CBC without leukocytosis. " Hemoglobin 9.7.     Assessment/Plan:     Choking Episode  Dysphagia  -ST following.   -chocking episode after lunch today.   -OOB for all meals seated up right.   -Drink liquid after each bite.     Right femoral neck fracture, sustained after a fall  -s/p right hip hemiarthroplasty 6/6/21  -asa 81 BID for DVT px  -slight serosanguineous drainage.  -Surgical dressing reinforced.  Orthopedic surgeon updated. Monitor.   -Ice between therapies.   -Tylenol 650 every 6 hours as needed for pain.  -Follow up CBC without leukocytosis.     Acute hypoxemic respiratory failure  bronchiectasis  CAP   Questionable atypical mycobacterial infection  -CT findings suggest possible atypical mycobacterial infection.     -pulm consulted and follow-up in 4-6 weeks for work-up for an atypical JAIDA infection.    -Unable to obtain sputum culture during hospitalization.   -completed omnicef&doxy  -Continue incentive spirometer/Aerobika.  -wean oxygen as tolerated, currently with minimal requirement (1L sating 93%).      Acute urinary retention  -Catheter removed on 6/11/2024  -No evidence of urinary retention.     BEBETO on CKD3  -Follow-up BMP with Creatine of 1.14.   -Monitor.        HTN  -Losartan discontinued during hospitalization secondary to dehydration.  -Continues on home Coreg.  -Patient started on hydralazine 3 times daily during hospitalization.     moderate Aortic stenosis   -currently on O2.   -SOB with exertion.   -Follow up out pt with Cardiology.      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 Can resume home baby ASA when this course of BID ASA is finished  Occipital neuralgia- continue tylenol   GERD-continue home PPI  Mood disorder-continue home lexapro       Active Ambulatory Problems     Diagnosis Date Noted     Ischemic optic neuropathy      Impaired Fasting  Glucose      Hypothyroidism      Premature Ventricular Contractions      Vitamin D deficiency      Skin Cancer      Adjustment disorder with anxiety      Essential hypertension      Hiatal Hernia      Colon polyps 02/25/2015     Senile osteoporosis 02/21/2019     Right thalamic stroke (H) 04/11/2019     Left knee pain 04/11/2019     Acute ischemic VBA thalamic stroke, left (H) 04/12/2019     Chronic renal failure, stage 3 (moderate) (H) 08/06/2019     Palindromic rheumatism 10/15/2019     Primary osteoarthritis involving multiple joints 10/15/2019     Chronic right shoulder pain 01/21/2020     Palpitations 09/15/2020     Dermatitis 04/23/2021     Renal insufficiency 04/23/2021     Diverticular disease of colon 12/04/2008     Gastroesophageal reflux disease without esophagitis 08/03/2022     Chronic renal failure, stage 3b (H) 03/18/2024     Occipital neuralgia of left side 04/24/2024     Acute respiratory failure with hypoxia (H) 06/06/2024     Fall at home, initial encounter 06/06/2024     Closed displaced fracture of right femoral neck (H) 06/06/2024     Community acquired pneumonia of right upper lobe of lung 06/06/2024     Sepsis, due to unspecified organism, unspecified whether acute organ dysfunction present (H) 06/06/2024     Premature atrial contraction 06/17/2024     Acute ischemic colitis (H24) 06/17/2024     Diverticulitis 06/17/2024     Acid reflux 06/17/2024     Resolved Ambulatory Problems     Diagnosis Date Noted     Depression      Chronic Bronchitis      Polymyalgia (H24) 11/04/2020     Past Medical History:   Diagnosis Date     Hypertension      Allergies   Allergen Reactions     Ace Inhibitors Cough     Amlodipine Unknown     Edema       Clarithromycin Nausea     Diltiazem Hcl [Diltiazem] Itching     Doxazosin Nausea     Penicillins Swelling and Rash     Tetanus Toxoid Other (See Comments)     Local allergic reaction       All Meds and Allergies reviewed in the record at the facility and is the  most up-to-date.    Current Outpatient Medications   Medication Sig Dispense Refill     acetaminophen (TYLENOL) 325 MG tablet Take 2 tablets (650 mg) by mouth every 4 hours as needed for other (For optimal non-opioid multimodal pain management to improve pain control.) 100 tablet 0     aspirin 81 MG EC tablet Take 1 tablet (81 mg) by mouth 2 times daily 60 tablet 0     atorvastatin (LIPITOR) 40 MG tablet TAKE 1 TABLET(40 MG) BY MOUTH DAILY 90 tablet 3     carvedilol (COREG) 6.25 MG tablet Take 1 tablet (6.25 mg) by mouth 2 times daily (with meals) 180 tablet 3     cholecalciferol, vitamin D3, (VITAMIN D3) 1,000 unit capsule [CHOLECALCIFEROL, VITAMIN D3, (VITAMIN D3) 1,000 UNIT CAPSULE] Take 1,000 Units by mouth daily.       clobetasol (TEMOVATE) 0.05 % external ointment [CLOBETASOL (TEMOVATE) 0.05 % OINTMENT] Apply 0.5 mg daily as needed 60 g 4     cyanocobalamin (VITAMIN B-12) 500 MCG tablet [CYANOCOBALAMIN (VITAMIN B-12) 500 MCG TABLET] Take 500 mcg by mouth daily.       DOCOSAHEXANOIC ACID/EPA (FISH OIL ORAL) [DOCOSAHEXANOIC ACID/EPA (FISH OIL ORAL)] Take 1,200 mg by mouth daily.       escitalopram (LEXAPRO) 10 MG tablet Take 1 tablet (10 mg) by mouth daily 90 tablet 3     hydrALAZINE (APRESOLINE) 25 MG tablet Take 1 tablet (25 mg) by mouth every 8 hours       hydroxychloroquine (PLAQUENIL) 200 MG tablet Take 1 tablet (200 mg) by mouth daily 90 tablet 1     lansoprazole (PREVACID) 15 MG capsule Take 15 mg by mouth every evening (Patient not taking: Reported on 6/17/2024)       levothyroxine (SYNTHROID/LEVOTHROID) 75 MCG tablet TAKE 1 TABLET BY MOUTH DAILY FOR 5 DAYS A WEEK, SKIP DOSE 2 DAYS PER WEEK 90 tablet 0     oxyCODONE (ROXICODONE) 5 MG tablet Take 0.5 tablets (2.5 mg) by mouth every 6 hours as needed for severe pain 5 tablet 0     pantoprazole (PROTONIX) 20 MG EC tablet Take 40 mg by mouth at bedtime       senna-docusate (SENOKOT-S/PERICOLACE) 8.6-50 MG tablet Take 1 tablet by mouth 2 times daily 60 tablet  "0     Current Facility-Administered Medications   Medication Dose Route Frequency Provider Last Rate Last Admin     denosumab (PROLIA) injection 60 mg  60 mg Subcutaneous Once Cassidy Grimm MD           REVIEW OF SYSTEMS:   10 point review of systems reviewed and pertinent positives in the HPI.     PHYSICAL EXAMINATION:  Physical Exam     Vital signs: /69   Pulse 107   Temp 97.2  F (36.2  C)   Resp 20   Ht 1.626 m (5' 4\")   Wt 93.7 kg (206 lb 9.6 oz)   LMP  (LMP Unknown)   SpO2 93%   BMI 35.46 kg/m    General: Awake, Alert, oriented x3, lying in bed, appropriately, follows simple commands, conversant  HEENT: Pink conjunctiva,moist oral mucosa. No food visualized in back of throat or oral cavity. Continues to attempt to clear.   NECK: Supple  CVS:  S1  S2, without murmur or gallop.   LUNG: Clear to auscultation. O2 on @ 0.1 liter.   BACK: No kyphosis of the thoracic spine  ABDOMEN: Soft, obese, nontender to palpation, with positive bowel sounds  EXTREMITIES: Moves both upper and lower extremities with generalized weakness, no pedal edema, no calf tenderness.   SKIN: Bandage to right hip without strike through. Moderate edema and bruising noted.  No redness or warmth.  NEUROLOGIC: Intact, pulses palpable  PSYCHIATRIC: Pleasant affect.       Labs:  All labs reviewed in the nursing home record and Epic   @  Lab Results   Component Value Date    WBC 8.8 06/09/2024     Lab Results   Component Value Date    RBC 3.20 06/09/2024     Lab Results   Component Value Date    HGB 9.4 06/09/2024     Lab Results   Component Value Date    HCT 29.4 06/09/2024     Lab Results   Component Value Date    MCV 92 06/09/2024     Lab Results   Component Value Date    MCH 29.4 06/09/2024     Lab Results   Component Value Date    MCHC 32.0 06/09/2024     Lab Results   Component Value Date    RDW 14.9 06/09/2024     Lab Results   Component Value Date     06/09/2024        @Last Comprehensive Metabolic Panel:  Sodium "   Date Value Ref Range Status   06/17/2024 134 (L) 135 - 145 mmol/L Final     Comment:     Reference intervals for this test were updated on 09/26/2023 to more accurately reflect our healthy population. There may be differences in the flagging of prior results with similar values performed with this method. Interpretation of those prior results can be made in the context of the updated reference intervals.      Potassium   Date Value Ref Range Status   06/17/2024 3.9 3.4 - 5.3 mmol/L Final   06/06/2022 4.4 3.5 - 5.0 mmol/L Final     Chloride   Date Value Ref Range Status   06/17/2024 100 98 - 107 mmol/L Final   06/06/2022 103 98 - 107 mmol/L Final     Carbon Dioxide (CO2)   Date Value Ref Range Status   06/17/2024 25 22 - 29 mmol/L Final   06/06/2022 25 22 - 31 mmol/L Final     Anion Gap   Date Value Ref Range Status   06/17/2024 9 7 - 15 mmol/L Final   06/06/2022 12 5 - 18 mmol/L Final     Glucose   Date Value Ref Range Status   06/17/2024 94 70 - 99 mg/dL Final   06/06/2022 99 70 - 125 mg/dL Final     GLUCOSE BY METER POCT   Date Value Ref Range Status   06/07/2024 117 (H) 70 - 99 mg/dL Final     Urea Nitrogen   Date Value Ref Range Status   06/17/2024 15.9 8.0 - 23.0 mg/dL Final   06/06/2022 26 8 - 28 mg/dL Final     Creatinine   Date Value Ref Range Status   06/17/2024 1.14 (H) 0.51 - 0.95 mg/dL Final     GFR Estimate   Date Value Ref Range Status   06/17/2024 44 (L) >60 mL/min/1.73m2 Final   04/23/2021 38 (L) >60 mL/min/1.73m2 Final     Calcium   Date Value Ref Range Status   06/17/2024 8.4 8.2 - 9.6 mg/dL Final     > 35 minutes spent face to face with pt, family and nurse attempting to clear stuck food in throat. Pt was able to clear. Plan of care reviewed with both pt and family.     This note has been dictated using voice recognition software. Any grammatical or context distortions are unintentional and inherent to the software    Electronically signed by: Darcie Bolaños CNP       Sincerely,        Darcie  Miky, NP

## 2024-06-20 PROBLEM — I26.93 SINGLE SUBSEGMENTAL PULMONARY EMBOLISM WITHOUT ACUTE COR PULMONALE (H): Status: ACTIVE | Noted: 2024-01-01

## 2024-06-20 NOTE — MEDICATION SCRIBE - ADMISSION MEDICATION HISTORY
"Medication Scribe Admission Medication History    Admission medication history is complete. The information provided in this note is only as accurate as the sources available at the time of the update.    Information Source(s): Patient, Family member, Facility (TCU/[facility]/) medication list/MAR, and CareEverywhere/SureScripts via in-person    Pertinent Information: pt has medications managed by [facility],  Atorvastatin  -pt received a PM dose instead of an regularly scheduled AM dose on 6/19/24 due to nurse administration comment of \"time change\". Unclear whether it will be PM dose here on or just a one time change.   Losartan 50mg  -no administration hx on MAR sent with pt, but fill hx of 90ds 180 tabs 4/24/24. Not added to PTA.  Blood pressure med  -pt currently on Carvedilol 6.25mg BID, and Hydralazine 25mg q8h.  -Pt received most recent doses at 1630 6/19/24 and 0000 6/20/24 respectively.  -pt mentions in interview that they gave her too much blood pressure medication, unable to verify beyond what MAR states. May think she got extra due to the irregular PM Atorvastatin that was missed that morning.  Deleted meds not listed on MAR sent with pt.    Changes made to PTA medication list:  Added: None  Deleted: Lansoprazole  Changed: Senna-S to 2 BID,    Allergies reviewed with patient and updates made in EHR: yes    Medication History Completed By: Felice Holbrook 6/20/2024 2:19 AM    Current Facility-Administered Medications for the 6/20/24 encounter (Hospital Encounter)   Medication    denosumab (PROLIA) injection 60 mg     PTA Med List   Medication Sig Note Last Dose    acetaminophen (TYLENOL) 325 MG tablet Take 2 tablets (650 mg) by mouth every 4 hours as needed for other (For optimal non-opioid multimodal pain management to improve pain control.)  6/17/2024 at 0855    aspirin 81 MG EC tablet Take 1 tablet (81 mg) by mouth 2 times daily  6/19/2024 at 2000    atorvastatin (LIPITOR) 40 MG tablet Take 40 mg by mouth " every morning  6/19/2024 at AM & PM dose per MARpt    carvedilol (COREG) 6.25 MG tablet Take 1 tablet (6.25 mg) by mouth 2 times daily (with meals)  6/19/2024 at 1630    cholecalciferol, vitamin D3, (VITAMIN D3) 1,000 unit capsule [CHOLECALCIFEROL, VITAMIN D3, (VITAMIN D3) 1,000 UNIT CAPSULE] Take 1,000 Units by mouth daily.  6/19/2024 at AM    clobetasol (TEMOVATE) 0.05 % external ointment Apply topically daily as needed (AS NEEDED)  Unknown at PRN    cyanocobalamin (VITAMIN B-12) 500 MCG tablet Take 500 mcg by mouth every morning  6/19/2024 at AM    DOCOSAHEXANOIC ACID/EPA (FISH OIL ORAL) Take 1,200 mg by mouth every morning  6/19/2024 at AM    escitalopram (LEXAPRO) 10 MG tablet Take 1 tablet (10 mg) by mouth daily  6/19/2024 at AM    hydrALAZINE (APRESOLINE) 25 MG tablet Take 1 tablet (25 mg) by mouth every 8 hours  6/20/2024 at 0000    hydroxychloroquine (PLAQUENIL) 200 MG tablet Take 1 tablet (200 mg) by mouth daily  6/19/2024 at AM    levothyroxine (SYNTHROID/LEVOTHROID) 75 MCG tablet Take 75 mcg by mouth See Admin Instructions Take 75mcg daily on Mon, Tue, Wed, Thur, and Sat. Skip Friday and Sunday. 6/20/2024: Take 75mcg daily on Mon, Tue, Wed, Thur, and Sat. Skip Friday and Sunday.   6/19/2024 at AM    oxyCODONE (ROXICODONE) 5 MG tablet Take 2.5 mg by mouth every 6 hours as needed for severe pain  Unknown at prn    pantoprazole (PROTONIX) 20 MG EC tablet Take 40 mg by mouth at bedtime  6/19/2024 at HS    senna-docusate (SENOKOT-S/PERICOLACE) 8.6-50 MG tablet Take 2 tablets by mouth 2 times daily  6/19/2024 at PM

## 2024-06-20 NOTE — ED PROVIDER NOTES
EMERGENCY DEPARTMENT ENCOUNTER      NAME: Ruthy Man  AGE: 89 year old female  YOB: 1930  MRN: [id number]  EVALUATION DATE & TIME: 6/20/2024  1:10 AM    PCP: Selma Meneses    ED PROVIDER: Makenna Frazier MD      Chief Complaint   Patient presents with    Shortness of Breath         FINAL IMPRESSION:  1. Sepsis, due to unspecified organism, unspecified whether acute organ dysfunction present (H)    2. Acute respiratory failure with hypoxia (H)    3. Single subsegmental pulmonary embolism without acute cor pulmonale (H)          ED COURSE & MEDICAL DECISION MAKING:    Pertinent Labs & Imaging studies reviewed. (See chart for details)    1:35 AM I introduced myself to the patient, obtained patient history, performed a physical exam, and discussed plan for ED workup including potential diagnostic laboratory/imaging studies and interventions.    89 year old female with a pertinent history of hypertension, stage 3 chronic kidney failure, hypothyroidism, hiatal hernia, CVA, fall and recent hip fracture with surgical repair and then pneumonia treated during admission on 6/6 who presents to this ED for evaluation of syncope and shortness of breath.  See discussion with the [facility] staff below who states that she did not lose a pulse and was breathing the entire time and had a brief syncopal episode that lasted about a minute.  On exam here she is mildly tachypneic but satting normally on 2 L.  She is mildly tachycardic.  She is ill-appearing in general.  With her recent fracture and repair did have concern for the potential of PE.  She was also recently treated for pneumonia per record review while in the hospital and had completed that treatment.  But family reports that she still been having a cough and actually may have briefly choked on some Posta yesterday and had a cough after that.  Broad laboratory workup was initiated including sepsis order set.  White blood cell count is elevated at 25.6  and her lactic acid is 6.5 which is concerning for sepsis.  Code sepsis was initiated by talking to the bedside nurse as well as the charge nurse.  She was given broad-spectrum antibiotics with IV cefepime as well as IV vancomycin due to her allergy to penicillin.  She was given 2 L of IV fluids as initially her blood pressure was within normal limits but then dropped into the 80s.  With the fluids her blood pressures responded appropriately.  She did not have any worsening of respiratory distress with the fluids.  EKG was obtained which does not reveal any evidence of acute ischemia.    Hemoglobin is 8.8 and it looks like her recent baseline was 9.7.  CMP reveals a creatinine 1.49 with GFR of 32.  Bicarb is 17 with a gap of 18.  VBG reveals a pCO2 of 31 with a bicarb of 19 his pH is 7.39.  BNP within normal limits at 523.  2 peripheral blood cultures obtained.  UA ordered.  Troponin is 51 and on repeat down to 48.  No signs of acute ischemia on EKG. CT of the chest reveals acute pulmonary emboli within 2 segmental pulmonary arteries in the right lower lobe.  No sign of right heart strain in discussion with the radiologist.  A few clusters of small irregular shaped patchy and nodular opacities scattered within the periphery of both lungs.  These are nonspecific but unchanged since the sixth which was the suspected pneumonia.  Heparin bolus and drip started.  She was given oral Tylenol for postoperative pain.  She has no sign of respiratory distress with supplemental oxygen at this time.  Discussed the results with patient and her family.  They were in agreement with admission.  Spoke with the hospitalist who accepted the patient for admission.  She was admitted in stable condition.    Patient daughter and son report that she is full code.     ED Course as of 06/21/24 1247   Thu Jun 20, 2024   0138 Code sepsis called   0153 Discussed with the nurse at [facility] who witnessed this incident and she states that she did  not lose a pulse but it was somewhat weak.  She states she was breathing spontaneously.  She had been called to the room because she was feeling more short of breath and then she noted the patient to lose consciousness for about a minute.  However again did have a pulse and was breathing spontaneously.       At the conclusion of the encounter I discussed the results of all of the tests and the disposition. The questions were answered. The patient or family acknowledged understanding and was agreeable with the care plan.     Critical Care  Performed by: Makenna Frazier MD  Authorized by: Makenna Frazier MD     Total critical care time: 30 minutes  Critical care time was exclusive of separately billable procedures and treating other patients.  Critical care was necessary to treat or prevent imminent or life-threatening deterioration of the following conditions: sepsis  Critical care was time spent personally by me on the following activities: development of treatment plan with patient or surrogate, discussions with consultants, examination of patient, evaluation of patient's response to treatment, obtaining history from patient or surrogate, ordering and performing treatments and interventions, ordering and review of laboratory studies, ordering and review of radiographic studies and re-evaluation of patient's condition, this excludes any separately billable procedures.         Medical Decision Making  Obtained supplemental history:Supplemental history obtained?: Documented in chart, Family Member/Significant Other, and EMS  Reviewed external records: External records reviewed?: Documented in chart  Care impacted by chronic illness:Chronic Kidney Disease and Hypertension  Care significantly affected by social determinants of health:N/A  Did you consider but not order tests?: Work up considered but not performed and documented in chart, if applicable  Did you interpret images independently?: Independent  interpretation of ECG and images noted in documentation, when applicable.  Consultation discussion with other provider:Did you involve another provider (consultant, , pharmacy, etc.)?: I discussed the care with another health care provider, see documentation for details.  Admit.      MEDICATIONS GIVEN IN THE EMERGENCY:  Medications   sodium chloride (PF) 0.9% PF flush 3 mL (has no administration in time range)   heparin 25,000 units in 0.45% NaCl 250 mL ANTICOAGULANT infusion (0 Units/hr Intravenous Stopped 6/20/24 2005)   atorvastatin (LIPITOR) tablet 40 mg (40 mg Oral $Given 6/21/24 0808)   carvedilol (COREG) tablet 6.25 mg ( Oral Automatically Held 6/26/24 1800)   escitalopram (LEXAPRO) tablet 10 mg (10 mg Oral $Given 6/21/24 0808)   hydrALAZINE (APRESOLINE) tablet 25 mg ( Oral Automatically Held 6/26/24 2200)   hydroxychloroquine (PLAQUENIL) tablet 200 mg (200 mg Oral $Given 6/21/24 0808)   levothyroxine (SYNTHROID/LEVOTHROID) tablet 75 mcg (75 mcg Oral $Given 6/20/24 0938)   pantoprazole (PROTONIX) EC tablet 40 mg (40 mg Oral $Given 6/20/24 2148)   lidocaine 1 % 0.1-1 mL (has no administration in time range)   lidocaine (LMX4) cream (has no administration in time range)   sodium chloride (PF) 0.9% PF flush 3 mL (3 mLs Intracatheter $Given 6/21/24 0639)   sodium chloride (PF) 0.9% PF flush 3 mL (has no administration in time range)   senna-docusate (SENOKOT-S/PERICOLACE) 8.6-50 MG per tablet 1 tablet (has no administration in time range)     Or   senna-docusate (SENOKOT-S/PERICOLACE) 8.6-50 MG per tablet 2 tablet (has no administration in time range)   calcium carbonate (TUMS) chewable tablet 1,000 mg (has no administration in time range)   Patient is already receiving anticoagulation with heparin, enoxaparin (LOVENOX), warfarin (COUMADIN)  or other anticoagulant medication (has no administration in time range)   acetaminophen (TYLENOL) tablet 650 mg (has no administration in time range)     Or   acetaminophen  (TYLENOL) Suppository 650 mg (has no administration in time range)   ipratropium - albuterol 0.5 mg/2.5 mg/3 mL (DUONEB) neb solution 3 mL (has no administration in time range)   menthol-zinc oxide (CALMOSEPTINE) 0.44-20.6 % ointment OINT ( Topical $Given 6/20/24 4776)   hydrALAZINE (APRESOLINE) injection 10 mg (has no administration in time range)   heparin ANTICOAGULANT injection 5,000 Units (5,000 Units Subcutaneous $Given 6/21/24 1225)   sodium chloride 0.9% BOLUS 1,000 mL (0 mLs Intravenous Stopped 6/20/24 0215)   ceFEPIme (MAXIPIME) 2 g vial to attach to  mL bag for ADULTS or 50 mL bag for PEDS (0 g Intravenous Stopped 6/20/24 0215)   sodium chloride 0.9% BOLUS 1,000 mL (0 mLs Intravenous Stopped 6/20/24 0503)   vancomycin (VANCOCIN) 2,000 mg in sodium chloride 0.9 % 500 mL intermittent infusion (0 mg Intravenous Stopped 6/20/24 0457)   iopamidol (ISOVUE-370) solution 70 mL (70 mLs Intravenous $Given 6/20/24 0225)   heparin ANTICOAGULANT Loading dose for HIGH INTENSITY TREATMENT * Give BEFORE starting heparin infusion (7,450 Units Intravenous $Given 6/20/24 0322)   acetaminophen (TYLENOL) tablet 1,000 mg (1,000 mg Oral $Given 6/20/24 0345)       NEW PRESCRIPTIONS STARTED AT TODAY'S ER VISIT  Current Discharge Medication List             =================================================================    HPI    Patient information was obtained from: patient, patient's son and daughter, ES    Use of : N/A       Ruthy Man is a 89 year old female with a pertinent history of hypertension, stage 3 chronic kidney failure, hypothyroidism, hiatal hernia, CVA, fall and recent hip fracture with surgical repair and then pneumonia treated during admission on 6/6 who presents to this ED for evaluation of syncope and shortness of breath.    Per the patient's son and daughter, patient broke her hip on 6/6/2024 and was admitted with surgical repair and developed pneumonia that was treated and was  "discharged to TCU at [facility]. Tonight, she had a syncopal episode when she was laying in bed.     Per the patient, staff were turning her around when she felt short of breath and lightheaded. Says that she does not remember passing out and that she was awake the whole time. She has been feeling short of breath and intermittently on oxygen, most recently was on 0.5 L per daughter. Patient says that she has been feeling like she needs to vomit for a week now but has not actually vomited. She further has not been eating and drinking well. Currently, she endorses being still short of breath along with noticing that both her ankles are swollen. Says that she thinks her blood pressure medications has been affecting her breathing. She reports she was given hypertension medication prior to this episode. Denies loss of consciousness, being on anticoagulation other than aspirin, any past DVT/PE, fevers, chest pain, and abdominal pain.     Patient had a coughing episode yesterday at TCU as there was pasta stuck in her throat per family but the coughing has gotten better since yesterday.     Per EMS, staff reported that patient \"did not have a pulse\" but when EMS got there, patient was alert/talking at her baseline and no interventions had been performed prior to their arrival.       REVIEW OF SYSTEMS   Pertinent positives and negatives are documented in the HPI. All other systems reviewed and are negative.      PAST MEDICAL HISTORY:  Past Medical History:   Diagnosis Date    Hypertension     Senile osteoporosis 2/21/2019       PAST SURGICAL HISTORY:  Past Surgical History:   Procedure Laterality Date    HC REMOVAL GALLBLADDER      Description: Cholecystectomy;  Recorded: 11/30/2008;  Comments: stone    OPEN REDUCTION INTERNAL FIXATION HIP BIPOLAR Right 6/6/2024    Procedure: HEMIARTHROPLASTY, HIP, BIPOLAR;  Surgeon: Eliud Tripp MD;  [place] [facility] [place]    [facility] TOTAL ABDOM HYSTERECTOMY      Description: " "Hysterectomy;  Recorded: 2008;  Comments: benign disease           CURRENT MEDICATIONS:    No current outpatient medications on file.      ALLERGIES:  Allergies   Allergen Reactions    Ace Inhibitors Cough    Amlodipine Unknown     Edema      Clarithromycin Nausea    Diltiazem Hcl [Diltiazem] Itching    Doxazosin Nausea    Penicillins Swelling and Rash    Tetanus Toxoid Other (See Comments)     Local allergic reaction       FAMILY HISTORY:  Family History   Problem Relation Age of Onset    ALS Mother          age 75    Pulmonary Embolism Father          age 79    Other - See Comments Brother          age birth    Other - See Comments Daughter         has corticobasal syndrome       SOCIAL HISTORY:   Social History     Socioeconomic History    Marital status:    Tobacco Use    Smoking status: Never    Smokeless tobacco: Never   Vaping Use    Vaping status: Never Used   Substance and Sexual Activity    Alcohol use: Never    Drug use: Never   Social History Narrative    She is .  Her   in  suddenly, but he also had dementia. They were  for 60 years. She has 3 children, 4 grandchildren and 1 great grandchild. 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  One daughter lives with her and she has corticobasal degeneration.     Social Determinants of Health     Interpersonal Safety: Low Risk  (3/18/2024)    Interpersonal Safety     Do you feel physically and emotionally safe where you currently live?: Yes     Within the past 12 months, have you been hit, slapped, kicked or otherwise physically hurt by someone?: No     Within the past 12 months, have you been humiliated or emotionally abused in other ways by your partner or ex-partner?: No       VITALS:  /57   Pulse 104   Temp 98.4  F (36.9  C) (Oral)   Resp 24   Ht 1.626 m (5' 4\")   Wt 93.4 kg (206 lb)   LMP  (LMP Unknown)   SpO2 " 94%   BMI 35.36 kg/m      PHYSICAL EXAM    Physical Exam  Constitutional: ill appearing, awake and alert  HENT: Normocephalic, Atraumatic, Bilateral external ears normal, Oropharynx normal, mucous membranes moist, Nose normal. Neck-  Normal range of motion, No tenderness, Supple, No stridor.    Eyes: PERRL, EOMI, Conjunctiva normal, No discharge.   Respiratory: Diminished breath sounds bilaterally, no wheezing, mildly tachypnic but satting normally on 2 L NC  Cardiovascular: Mildly tachycardic, Regular rhythm, No murmurs, No rubs, No gallops. 2+ radial pulses bilaterally  GI: Bowel sounds normal, Soft, No tenderness, No masses, No rebound or guarding.   Musculoskeletal: 2+ DP pulses. Possible trace lower extremity edema bilaterally.   Integument: Warm, Dry, No erythema, No rash. No petechiae.  Neurologic: Alert & oriented x 3, 5/5 strength in all 4 extremities bilaterally. Sensation intact to light touch in all 4 extremities and the face bilaterally. No focal deficits noted.   Psychiatric: Affect normal, Judgment normal, Mood normal. Cooperative.      LAB:  All pertinent labs reviewed and interpreted.  Results for orders placed or performed during the hospital encounter of 06/20/24   CT Chest Pulmonary Embolism w Contrast   Result Value Ref Range    Radiologist flags New diagnosis of pulmonary embolism (AA)     Impression    IMPRESSION:   1.  Acute pulmonary emboli within two segmental pulmonary arteries in the right lower lobe.  2.  A few clusters of small irregular-shaped patchy and nodular opacities scattered within the periphery of both lungs. These are nonspecific, but unchanged since 6/6/2024.       [Critical Result: New diagnosis of pulmonary embolism]    Finding was identified on 6/20/2024 2:37 AM CDT.     Dr. Frazier was contacted by me on 6/20/2024 2:47 AM CDT and verbalized understanding of the critical result.    US Lower Extremity Venous Duplex Bilateral    Impression    IMPRESSION:  1.  No deep  venous thrombosis in the bilateral lower extremities.  2.  Large complex fluid collection left inguinal region measuring up to 10.7 cm in diameter is new since 6/6/2024, most consistent with hematoma.   Extra Blue Top Tube   Result Value Ref Range    Hold Specimen JIC    Extra Green Top (Lithium Heparin) Tube   Result Value Ref Range    Hold Specimen JIC    Extra Purple Top Tube   Result Value Ref Range    Hold Specimen JIC    Extra Heparinized Syringe   Result Value Ref Range    Hold Specimen     Comprehensive metabolic panel   Result Value Ref Range    Sodium 132 (L) 135 - 145 mmol/L    Potassium 4.5 3.4 - 5.3 mmol/L    Carbon Dioxide (CO2) 17 (L) 22 - 29 mmol/L    Anion Gap 18 (H) 7 - 15 mmol/L    Urea Nitrogen 15.2 8.0 - 23.0 mg/dL    Creatinine 1.49 (H) 0.51 - 0.95 mg/dL    GFR Estimate 32 (L) >60 mL/min/1.73m2    Calcium 8.1 (L) 8.2 - 9.6 mg/dL    Chloride 97 (L) 98 - 107 mmol/L    Glucose 246 (H) 70 - 99 mg/dL    Alkaline Phosphatase 127 40 - 150 U/L    AST 29 0 - 45 U/L    ALT 10 0 - 50 U/L    Protein Total 5.2 (L) 6.4 - 8.3 g/dL    Albumin 2.7 (L) 3.5 - 5.2 g/dL    Bilirubin Total 0.5 <=1.2 mg/dL   Result Value Ref Range    Troponin T, High Sensitivity 51 (H) <=14 ng/L   Nt probnp inpatient (BNP)   Result Value Ref Range    N terminal Pro BNP Inpatient 523 0 - 1,800 pg/mL   Lactic acid whole blood with 1x repeat in 2 hr when >2   Result Value Ref Range    Lactic Acid, Initial 6.5 (HH) 0.7 - 2.0 mmol/L   Blood gas venous   Result Value Ref Range    pH Venous 7.39 7.32 - 7.43    pCO2 Venous 31 (L) 40 - 50 mm Hg    pO2 Venous 49 (H) 25 - 47 mm Hg    Bicarbonate Venous 19 (L) 21 - 28 mmol/L    Base Excess/Deficit Venous -6.5 (L) -3.0 - 3.0 mmol/L    FIO2 28     Oxyhemoglobin Venous 82 (H) 70 - 75 %    O2 Sat, Venous 83.3 (H) 70.0 - 75.0 %   UA with Microscopic reflex to Culture    Specimen: Urine, Midstream   Result Value Ref Range    Color Urine Yellow Colorless, Straw, Light Yellow, Yellow    Appearance Urine  Clear Clear    Glucose Urine Negative Negative mg/dL    Bilirubin Urine Negative Negative    Ketones Urine Negative Negative mg/dL    Specific Gravity Urine 1.019 1.001 - 1.030    Blood Urine Negative Negative    pH Urine 5.5 5.0 - 7.0    Protein Albumin Urine 30 (A) Negative mg/dL    Urobilinogen Urine <2.0 <2.0 mg/dL    Nitrite Urine Negative Negative    Leukocyte Esterase Urine 75 Mulu/uL (A) Negative    Bacteria Urine Few (A) None Seen /HPF    Mucus Urine Present (A) None Seen /LPF    RBC Urine <1 <=2 /HPF    WBC Urine 6 (H) <=5 /HPF   CBC with platelets and differential   Result Value Ref Range    WBC Count 25.6 (H) 4.0 - 11.0 10e3/uL    RBC Count 2.98 (L) 3.80 - 5.20 10e6/uL    Hemoglobin 8.8 (L) 11.7 - 15.7 g/dL    Hematocrit 27.6 (L) 35.0 - 47.0 %    MCV 93 78 - 100 fL    MCH 29.5 26.5 - 33.0 pg    MCHC 31.9 31.5 - 36.5 g/dL    RDW 14.9 10.0 - 15.0 %    Platelet Count 444 150 - 450 10e3/uL    % Neutrophils 77 %    % Lymphocytes 14 %    % Monocytes 5 %    % Eosinophils 3 %    % Basophils 0 %    % Immature Granulocytes 2 %    NRBCs per 100 WBC 0 <1 /100    Absolute Neutrophils 19.7 (H) 1.6 - 8.3 10e3/uL    Absolute Lymphocytes 3.5 0.8 - 5.3 10e3/uL    Absolute Monocytes 1.3 0.0 - 1.3 10e3/uL    Absolute Eosinophils 0.7 0.0 - 0.7 10e3/uL    Absolute Basophils 0.1 0.0 - 0.2 10e3/uL    Absolute Immature Granulocytes 0.4 <=0.4 10e3/uL    Absolute NRBCs 0.0 10e3/uL   Lactic acid whole blood   Result Value Ref Range    Lactic Acid 2.6 (H) 0.7 - 2.0 mmol/L   Result Value Ref Range    Troponin T, High Sensitivity 48 (H) <=14 ng/L   Basic metabolic panel   Result Value Ref Range    Sodium 134 (L) 135 - 145 mmol/L    Potassium 4.2 3.4 - 5.3 mmol/L    Chloride 103 98 - 107 mmol/L    Carbon Dioxide (CO2) 20 (L) 22 - 29 mmol/L    Anion Gap 11 7 - 15 mmol/L    Urea Nitrogen 16.8 8.0 - 23.0 mg/dL    Creatinine 1.52 (H) 0.51 - 0.95 mg/dL    GFR Estimate 31 (L) >60 mL/min/1.73m2    Calcium 7.3 (L) 8.2 - 9.6 mg/dL    Glucose 146  (H) 70 - 99 mg/dL   CBC with platelets   Result Value Ref Range    WBC Count 19.4 (H) 4.0 - 11.0 10e3/uL    RBC Count 2.50 (L) 3.80 - 5.20 10e6/uL    Hemoglobin 7.2 (L) 11.7 - 15.7 g/dL    Hematocrit 23.0 (L) 35.0 - 47.0 %    MCV 92 78 - 100 fL    MCH 28.8 26.5 - 33.0 pg    MCHC 31.3 (L) 31.5 - 36.5 g/dL    RDW 14.9 10.0 - 15.0 %    Platelet Count 358 150 - 450 10e3/uL   Result Value Ref Range    Anti Xa Unfractionated Heparin >1.10 (HH) For Reference Range, See Comment IU/mL   Result Value Ref Range    Anti Xa Unfractionated Heparin >1.10 (HH) For Reference Range, See Comment IU/mL   Symptomatic Influenza A/B, RSV, & SARS-CoV2 PCR (COVID-19) Nose    Specimen: Nose; Swab   Result Value Ref Range    Influenza A PCR Negative Negative    Influenza B PCR Negative Negative    RSV PCR Negative Negative    SARS CoV2 PCR Negative Negative   Extra Green Top (Lithium Heparin) Tube   Result Value Ref Range    Hold Specimen JIC    Lactic acid whole blood   Result Value Ref Range    Lactic Acid 1.6 0.7 - 2.0 mmol/L   CBC with platelets   Result Value Ref Range    WBC Count 21.7 (H) 4.0 - 11.0 10e3/uL    RBC Count 1.88 (L) 3.80 - 5.20 10e6/uL    Hemoglobin 5.6 (LL) 11.7 - 15.7 g/dL    Hematocrit 17.5 (L) 35.0 - 47.0 %    MCV 93 78 - 100 fL    MCH 29.8 26.5 - 33.0 pg    MCHC 32.0 31.5 - 36.5 g/dL    RDW 15.3 (H) 10.0 - 15.0 %    Platelet Count 339 150 - 450 10e3/uL   Basic metabolic panel   Result Value Ref Range    Sodium 134 (L) 135 - 145 mmol/L    Potassium 4.2 3.4 - 5.3 mmol/L    Chloride 102 98 - 107 mmol/L    Carbon Dioxide (CO2) 20 (L) 22 - 29 mmol/L    Anion Gap 12 7 - 15 mmol/L    Urea Nitrogen 20.2 8.0 - 23.0 mg/dL    Creatinine 1.90 (H) 0.51 - 0.95 mg/dL    GFR Estimate 24 (L) >60 mL/min/1.73m2    Calcium 7.5 (L) 8.2 - 9.6 mg/dL    Glucose 122 (H) 70 - 99 mg/dL   Result Value Ref Range    Procalcitonin 0.57 (H) <0.50 ng/mL   Result Value Ref Range    Hemoglobin 5.8 (LL) 11.7 - 15.7 g/dL   Iron and iron binding capacity    Result Value Ref Range    Iron 17 (L) 37 - 145 ug/dL    Iron Binding Capacity 144 (L) 240 - 430 ug/dL    Iron Sat Index 12 (L) 15 - 46 %   Extra Blue Top Tube (LAB USE ONLY)   Result Value Ref Range    Hold Specimen JIC    Extra Green Top Tube (LAB USE ONLY)   Result Value Ref Range    Hold Specimen JIC    Echocardiogram Complete   Result Value Ref Range    LVEF  60-65%    Adult Type and Screen   Result Value Ref Range    ABO/RH(D) O POS     Antibody Screen Negative Negative    SPECIMEN EXPIRATION DATE [id number]    Prepare red blood cells (unit)   Result Value Ref Range    Blood Component Type Red Blood Cells     Product Code S4247J08     Unit Status Transfused     Unit Number [id number]     CROSSMATCH Compatible     CODING SYSTEM [id number]     ISSUE DATE AND TIME [id number]     UNIT ABO/RH O+     UNIT TYPE ISBT 5100    Prepare red blood cells (unit)   Result Value Ref Range    Blood Component Type Red Blood Cells     Product Code X2172Y08     Unit Status Transfused     Unit Number [id number]     CROSSMATCH Compatible     CODING SYSTEM [id number]     ISSUE DATE AND TIME [id number]     UNIT ABO/RH O+     UNIT TYPE ISBT 5100    Blood Culture Peripheral Blood    Specimen: Peripheral Blood   Result Value Ref Range    Culture No growth after 1 day    Blood Culture Peripheral Blood    Specimen: Peripheral Blood   Result Value Ref Range    Culture No growth after 1 day        RADIOLOGY:  Reviewed all pertinent imaging. Please see official radiology report.  US Lower Extremity Venous Duplex Bilateral   Final Result   IMPRESSION:   1.  No deep venous thrombosis in the bilateral lower extremities.   2.  Large complex fluid collection left inguinal region measuring up to 10.7 cm in diameter is new since 6/6/2024, most consistent with hematoma.      Echocardiogram Complete   Final Result      CT Chest Pulmonary Embolism w Contrast   Final Result   Abnormal   IMPRESSION:    1.  Acute pulmonary emboli within two  segmental pulmonary arteries in the right lower lobe.   2.  A few clusters of small irregular-shaped patchy and nodular opacities scattered within the periphery of both lungs. These are nonspecific, but unchanged since 6/6/2024.          [Critical Result: New diagnosis of pulmonary embolism]      Finding was identified on 6/20/2024 2:37 AM CDT.       Dr. Frazier was contacted by me on 6/20/2024 2:47 AM CDT and verbalized understanding of the critical result.           EKG:    Performed at: 1:31 AM    Impression: EKG reveals sinus tachycardia.  Incomplete right bundle branch block.  This does appear similar to prior EKG on 6 June.  No obvious differences noted.  No obvious sign of acute ischemia.    I have independently reviewed and interpreted the EKG(s) documented above.    PROCEDURES:   None      [facility] Consultant [employer] Documentation:   CMS Diagnoses: The patient has signs of Severe Sepsis        If one the following conditions is present, a 30 mL/kg bolus is recommended as part of the 6 hour bundle (IBW can be used for BMI >30, or document refusal/contraindication):      1.   Initial hypotension  defined as 2 bps < 90 or map < 65 in the 6hrs before or 3hrs after time zero.     2.  Lactate >4.      The patient has signs of Severe Sepsis as evidenced by:    1. 2 SIRS criteria, AND  2. Suspected infection, AND   3. Organ dysfunction: Lactic Acidosis with value >2.0    Time severe sepsis diagnosis confirmed: 1:40 am  06/20/24 as this was the time when Lactate resulted, and the level was > 2.0    3 Hour Severe Sepsis Bundle Completion:    1. Initial Lactic Acid Result:   Recent Labs   Lab Test 06/21/24  0637 06/20/24  0336 06/20/24  0121   LACT 1.6 2.6* 6.5*     2. Blood Cultures before Antibiotics: Yes  3. Broad Spectrum Antibiotics Administered:  yes       Anti-infectives (From admission through now)      Start     Dose/Rate Route Frequency Ordered Stop    06/20/24 0800  hydroxychloroquine (PLAQUENIL) tablet 200 mg         Note to Pharmacy: PTA Sig:Take 1 tablet (200 mg) by mouth daily      200 mg Oral DAILY 06/20/24 0542      06/20/24 0230  vancomycin (VANCOCIN) 2,000 mg in sodium chloride 0.9 % 500 mL intermittent infusion         2,000 mg  over 2 Hours Intravenous ONCE 06/20/24 0200 06/20/24 0457    06/20/24 0200  ceFEPIme (MAXIPIME) 2 g vial to attach to  mL bag for ADULTS or 50 mL bag for PEDS         2 g  over 30 Minutes Intravenous ONCE 06/20/24 0140 06/20/24 0215            4. Is initial hypotension present?     Yes. (Definition - 2 SBPs <90, MAP <65, or decrease > 40 from baseline)   Full 30 mL/kg bolus given (see amount below).    BMI Readings from Last 1 Encounters:   06/20/24 35.36 kg/m      30 mL/kg fluids based on weight: 2,800 mL  30 mL/kg fluids based on IBW (must be >= 60 inches tall): 1,640 mL                    Severe Sepsis reassessment:  1. Repeat Lactic Acid Level within 6 hours of time zero: 2.6  2. MAP>65 after initial IVF bolus, will continue to monitor fluid status and vital signs    I attest to having performed a repeat sepsis exam and assessment of perfusion at 2:40 am and the results demonstrate improved perfusion.             I, Rose Tate, am serving as a scribe to document services personally performed by Makenna Frazier MD based on my observation and the provider's statements to me. I, Makenna Frazier MD, attest that Rose Tate is acting in a scribe capacity, has observed my performance of the services and has documented them in accordance with my direction.    Makenna Frazier MD  [facility] EMERGENCY DEPARTMENT  [facility] [facility] [id number] [phone]      Makenna Frazier MD  06/21/24 [id number]

## 2024-06-20 NOTE — ED NOTES
Patient incontinent of urine and stool x2.  Patient was changed, baldemar care performed, and straight cath for urine.

## 2024-06-20 NOTE — ED NOTES
Bed: JNED-02  Expected date: 6/20/24  Expected time: 1:05 AM  Means of arrival: Ambulance  Comments:  WBL  89 F--SOB, 2 L/NC

## 2024-06-20 NOTE — CONSULTS
Pulmonary/Critical Care Consult Team Note    Ruthy Man,  1930, MRN [id number]  Admitting Dx: Sepsis, due to unspecified organism, unspecified whether acute organ dysfunction present (H)  Acute respiratory failure with hypoxia (H)  Single subsegmental pulmonary embolism without acute cor pulmonale (H)  Date / Time of Admission:  2024  1:10 AM    Recent Events:  Intake/Output last 3 shifts:  I/O last 3 completed shifts:  In: 1500 [IV Piggyback:1500]  Out: -     She has been titrated to room air  She has a little cough  She has some mild dyspnea  She has never smoked  She is very active (before she broke her hip)    Assessment/Plan: Ruthy Man is a 89 year old female with PMHx of CKD3, HTN, hypothyroidism, occipital neuralgia, palindromic rheumatism involving multiple sites, and 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    Pulmonary Emboli without right heart strain and not on O2  - would transition to Apixiban for treatment  - with co-morbidities would do lifelong anticoagulation unless risk/benefit with falls or other contraindication. She will discuss with her PCP.     A few clusters of small irregular-shaped patchy and nodular opacities scattered within the periphery of both lungs  - stable since     Medical Care Time excluding procedures and family discussions greater than: 1 Hour    Risk Factors Present on Admission:  Clinically Significant Risk Factors Present on Admission              # Hypoalbuminemia: Lowest albumin = 2.7 g/dL at 2024  1:21 AM, will monitor as appropriate   # Drug Induced Platelet Defect: home medication list includes an antiplatelet medication   # Hypertension: Noted on problem list    # Anemia: based on hgb <11           # Obesity: Estimated body mass index is  "35.36 kg/m  as calculated from the following:    Height as of this encounter: 1.626 m (5' 4\").    Weight as of this encounter: 93.4 kg (206 lb).           # Anemia: based on hgb <11           Micki Deras DO  Pulmonary and Critical Care Attending  pgr [phone]    Allergies   Allergen Reactions    Ace Inhibitors Cough    Amlodipine Unknown     Edema      Clarithromycin Nausea    Diltiazem Hcl [Diltiazem] Itching    Doxazosin Nausea    Penicillins Swelling and Rash    Tetanus Toxoid Other (See Comments)     Local allergic reaction       Meds: See MAR    Physical Exam:  /53 (BP Location: Right arm)   Pulse 85   Temp 97.8  F (36.6  C) (Oral)   Resp 27   Ht 1.626 m (5' 4\")   Wt 93.4 kg (206 lb)   LMP  (LMP Unknown)   SpO2 92%   BMI 35.36 kg/m    Intake/Output this shift:  No intake/output data recorded.  GEN: sitting up in bed, NAD  HEENT: MMM  CVS: regular rhythm, no murmurs  RESP: CTA BL   ABD: Soft, No abdominal pain with palpation, no guarding, no rigidity  EXT: Warm, well perfused, trace LE edema  NEURO: moving all extremities, nonfocal  PSYCH: pleasant    Pertinent Labs: Latest lab results in EHR personally reviewed.   CMP  Recent Labs   Lab 06/20/24  0840 06/20/24  0121 06/17/24  0550   * 132* 134*   POTASSIUM 4.2 4.5 3.9   CHLORIDE 103 97* 100   CO2 20* 17* 25   ANIONGAP 11 18* 9   * 246* 94   BUN 16.8 15.2 15.9   CR 1.52* 1.49* 1.14*   GFRESTIMATED 31* 32* 44*   SUE 7.3* 8.1* 8.4   PROTTOTAL  --  5.2*  --    ALBUMIN  --  2.7*  --    BILITOTAL  --  0.5  --    ALKPHOS  --  127  --    AST  --  29  --    ALT  --  10  --      CBC  Recent Labs   Lab 06/20/24  0840 06/20/24  0121 06/17/24  0550   WBC 19.4* 25.6* 9.8   RBC 2.50* 2.98* 3.22*   HGB 7.2* 8.8* 9.7*   HCT 23.0* 27.6* 30.2*   MCV 92 93 94   MCH 28.8 29.5 30.1   MCHC 31.3* 31.9 32.1   RDW 14.9 14.9 15.0    444 348     INRNo lab results found in last 7 days.  Arterial Blood Gas  Recent Labs   Lab 06/20/24  0121   O2PER " 28       Cultures: not yet available.      Imaging: personally reviewed.   Results for orders placed during the hospital encounter of 05/13/23    XR Chest Port 1 View    Narrative  EXAM: XR CHEST PORT 1 VIEW  LOCATION: [facility]  DATE/TIME: 5/13/2023 8:27 PM CDT    INDICATION: palpitations  COMPARISON: 04/11/2019    Impression  IMPRESSION: Calcified aortic atherosclerosis. Mild degenerative change thoracic spine. Chest otherwise negative. No change from prior.    Results for orders placed during the hospital encounter of 06/20/24    CT Chest Pulmonary Embolism w Contrast    Narrative  EXAM: CT CHEST WITH CONTRAST - PULMONARY EMBOLISM PROTOCOL  LOCATION: [facility]  DATE/TIME: 6/20/2024 2:34 AM CDT    INDICATION: Recent femur fracture. Hypoxia.  COMPARISON: 6/6/2024 - CT chest, abdomen and pelvis.    TECHNIQUE: CT angiogram chest during arterial phase injection IV contrast. 2D and 3D MIP reconstructions were performed by the CT technologist. Dose reduction techniques were used.  CONTRAST: 70 mL Isovue-370.    FINDINGS:    ANGIOGRAM CHEST: Filling defects are present within two segmental pulmonary arteries in the right lower lobe (for example, series 6 image 165), consistent with acute pulmonary emboli. No other visualized pulmonary emboli. Atherosclerotic calcification in  the thoracic aorta. The thoracic aorta is normal in caliber without dissection.    LUNGS AND PLEURA: A few clusters of small irregular-shaped patchy nodular opacities are scattered within the periphery of both lungs, not convincingly changed since 6/6/2024. Tiny calcified granuloma in the left lower lobe. Mild bronchiectasis scattered  in both lungs.    MEDIASTINUM/AXILLAE: Unremarkable.    CORONARY ARTERY CALCIFICATION: Likely present.    MUSCULOSKELETAL: No acute findings.    UPPER ABDOMEN: Unremarkable.    Impression  IMPRESSION:  1.  Acute pulmonary emboli within two segmental pulmonary  arteries in the right lower lobe.  2.  A few clusters of small irregular-shaped patchy and nodular opacities scattered within the periphery of both lungs. These are nonspecific, but unchanged since 6/6/2024.      [Critical Result: New diagnosis of pulmonary embolism]    Finding was identified on 6/20/2024 2:37 AM CDT.    Dr. Frazier was contacted by me on 6/20/2024 2:47 AM CDT and verbalized understanding of the critical result.    Results for orders placed during the hospital encounter of 02/22/22    CT Abdomen Pelvis w/o Contrast    Narrative  EXAM: CT ABDOMEN PELVIS W/O CONTRAST  LOCATION: [facility]  DATE/TIME: 2/22/2022 4:09 PM    INDICATION: Abdominal distension.  COMPARISON: CTs AP 06/28/2018, 04/02/2015 and 12/20/2012.  TECHNIQUE: CT scan of the abdomen and pelvis was performed without oral or IV contrast. Multiplanar reformats were obtained. Dose reduction techniques were used.  CONTRAST: None.    FINDINGS:  LOWER CHEST: Mild cylindrical bronchiectasis with endobronchial mucosal thickening and secretions. Peribronchial nodular opacities could indicate chronic atypical mycobacterial infection. No pleural effusion. Heart size normal with no pericardial  effusion.  Small esophageal hiatal hernia.    HEPATOBILIARY: Liver is normal. No bile duct dilatation. Cholecystectomy.    PANCREAS: Normal.    SPLEEN: Spleen size normal.    ADRENAL GLANDS: Normal.    KIDNEY/BLADDER: Several benign renal cysts. No followup is needed. Descent of the vesicourethral junction related to chronic pelvic floor weakness described below. Kidneys, ureters and bladder are otherwise normal.    BOWEL: Small esophageal hiatal hernia. Colonic diverticulosis. Bowel is otherwise normal with no obstruction or inflammatory change.    LYMPH NODES: No lymphadenopathy.    VASCULATURE: Moderate to severe calcified atheromatous plaque throughout the normal caliber abdominal aorta, iliofemoral arteries and at the origin of  the renal and mesenteric arteries.    PELVIC ORGANS: Hysterectomy. Widening of the levator hiatus and descent of the vesicourethral and anorectal junctions system with chronic pelvic floor weakness.    MUSCULOSKELETAL: Bones are demineralized. Advanced spondylotic change throughout the visualized spine.    Impression  IMPRESSION:  1.  Colonic diverticulosis with no diverticulitis or colitis.  2.  Widening of the levator hiatus and descent of the vesicourethral and anorectal junctions system with chronic pelvic floor weakness.  3.  Mild cylindrical bronchiectasis with endobronchial mucosal thickening and secretions peribronchial nodular opacities could indicate chronic atypical mycobacterial infection.    No results found for this or any previous visit.      Patient Active Problem List   Diagnosis    Ischemic optic neuropathy    Impaired Fasting Glucose    Hypothyroidism    Premature Ventricular Contractions    Vitamin D deficiency    Skin Cancer    Adjustment disorder with anxiety    Essential hypertension    Hiatal Hernia    Colon polyps    Senile osteoporosis    Right thalamic stroke (H)    Left knee pain    Acute ischemic VBA thalamic stroke, left (H)    Chronic renal failure, stage 3 (moderate) (H)    Palindromic rheumatism    Primary osteoarthritis involving multiple joints    Chronic right shoulder pain    Palpitations    Dermatitis    Renal insufficiency    Diverticular disease of colon    Gastroesophageal reflux disease without esophagitis    Chronic renal failure, stage 3b (H)    Occipital neuralgia of left side    Acute respiratory failure with hypoxia (H)    Fall at home, initial encounter    Closed displaced fracture of right femoral neck (H)    Community acquired pneumonia of right upper lobe of lung    Sepsis, due to unspecified organism, unspecified whether acute organ dysfunction present (H)    Premature atrial contraction    Acute ischemic colitis (H24)    Diverticulitis    Acid reflux    Single  subsegmental pulmonary embolism without acute cor pulmonale (H)         Surgical History  She  has a past surgical history that includes TOTAL ABDOM HYSTERECTOMY; REMOVAL GALLBLADDER; and Open reduction internal fixation hip bipolar (Right, 6/6/2024).    Family History  Reviewed, and family history includes ALS in her mother; Other - See Comments in her brother and daughter; Pulmonary Embolism in her father.    Social History  Reviewed, and she  reports that she has never smoked. She has never used smokeless tobacco. She reports that she does not drink alcohol and does not use drugs.    Allergies  Allergies   Allergen Reactions    Ace Inhibitors Cough    Amlodipine Unknown     Edema      Clarithromycin Nausea    Diltiazem Hcl [Diltiazem] Itching    Doxazosin Nausea    Penicillins Swelling and Rash    Tetanus Toxoid Other (See Comments)     Local allergic reaction              Micki Deras, DO  Pulmonary and Critical Care Attending  pgr [phone]    Securely message with the Vocera Web Console (learn more here)

## 2024-06-20 NOTE — H&P
[facility]    History and Physical - Hospitalist Service       Date of Admission:  6/20/2024    Assessment & Plan    Ruthy Man is a 89 year old female admitted on 6/20/2024. She presented from TCU after having a syncopal episode.  Past medical history significant for hypertension, CKD stage III, hypothyroidism, bronchiectasis.    Syncope secondary to most likely pulmonary embolism and /or Pneumonia   -Patient was discharged 6 days ago after being treated for hip fracture.  -Patient passed out at the TCU today.  -She also reported having worsening of shortness of breath, dry cough and chest discomfort.  -Patient was hypoxic requiring supplemental oxygen at 2 L via nasal cannula upon arrival.  -Labs are significant for lactic acidosis and leukocytosis  -CTA chest reported with Acute pulmonary emboli within two segmental pulmonary arteries in the right lower lobe. A few clusters of small irregular-shaped patchy and nodular opacities scattered within the periphery of both lungs. These are nonspecific, but unchanged since 6/6/2024.   -Heparin GTT  -Follow echocardiogram result  - telemetry    Health Care Associated pneumonia  Lactic acidosis  Leukocytosis  -A dose of cefepime and vancomycin was given in the ER.  Will continue same  -Added doxycycline 100 mg IV twice daily  -Trend lactate  -Supplemental oxygen.  Taper as able    Bronchiectasis  -During previous admission patient was getting evaluation for atypical JAIDA infection.   -Pulmonary consult    Hypertension  -PTA carvedilol 6.25 mg oral twice daily with parameters        Diet:  Regular diet  DVT Prophylaxis: Heparin GTT  Garcia Catheter: Not present  Lines: None     Cardiac Monitoring: None  Code Status:  Full code    Clinically Significant Risk Factors Present on Admission              # Hypoalbuminemia: Lowest albumin = 2.7 g/dL at 6/20/2024  1:21 AM, will monitor as appropriate   # Drug Induced Platelet Defect: home medication  "list includes an antiplatelet medication   # Hypertension: Noted on problem list    # Anemia: based on hgb <11           # Obesity: Estimated body mass index is 35.36 kg/m  as calculated from the following:    Height as of this encounter: 1.626 m (5' 4\").    Weight as of this encounter: 93.4 kg (206 lb).              Disposition Plan     Medically Ready for Discharge: Anticipated in 2-4 Days           Lupillo Vela MD  Hospitalist Service  [facility]  Securely message with Trada (more info)  Text page via [facility] Paging/Directory     ______________________________________________________________________    Chief Complaint   Syncope    History is obtained from the patient    History of Present Illness   Ruthy Man is a 89 year old female who presented with the above complaint.Past medical history significant for hypertension, CKD stage III, hypothyroidism, bronchiectasis.  He was recently discharged from [facility] after being treated for femoral fracture.  Today at TCU she syncopized.  She also reports having worsening of shortness of breath, chest discomfort, nonproductive cough.  She denies having fever chills or rigors.  Patient was found to have new PE and persistent lung infiltrates which are concerning for ongoing pneumonia.  Plan to be admitted for management of PE and healthcare associated pneumonia.      Past Medical History    Past Medical History:   Diagnosis Date    Hypertension     Senile osteoporosis 2/21/2019       Past Surgical History   Past Surgical History:   Procedure Laterality Date    HC REMOVAL GALLBLADDER      Description: Cholecystectomy;  Recorded: 11/30/2008;  Comments: stone    OPEN REDUCTION INTERNAL FIXATION HIP BIPOLAR Right 6/6/2024    Procedure: HEMIARTHROPLASTY, HIP, BIPOLAR;  Surgeon: Eliud Tripp MD;  Location: [facility] OR    [facility] TOTAL ABDOM HYSTERECTOMY      Description: Hysterectomy;  Recorded: 11/30/2008;  Comments: " benign disease       Prior to Admission Medications   Prior to Admission Medications   Prescriptions Last Dose Informant Patient Reported? Taking?   DOCOSAHEXANOIC ACID/EPA (FISH OIL ORAL) 6/19/2024 at AM  Yes Yes   Sig: Take 1,200 mg by mouth every morning   acetaminophen (TYLENOL) 325 MG tablet 6/17/2024 at 0855  No Yes   Sig: Take 2 tablets (650 mg) by mouth every 4 hours as needed for other (For optimal non-opioid multimodal pain management to improve pain control.)   aspirin 81 MG EC tablet 6/19/2024 at 2000  No Yes   Sig: Take 1 tablet (81 mg) by mouth 2 times daily   atorvastatin (LIPITOR) 40 MG tablet 6/19/2024 at AM & PM dose per MARpt  Yes Yes   Sig: Take 40 mg by mouth every morning   carvedilol (COREG) 6.25 MG tablet 6/19/2024 at 1630  No Yes   Sig: Take 1 tablet (6.25 mg) by mouth 2 times daily (with meals)   cholecalciferol, vitamin D3, (VITAMIN D3) 1,000 unit capsule 6/19/2024 at AM  Yes Yes   Sig: [CHOLECALCIFEROL, VITAMIN D3, (VITAMIN D3) 1,000 UNIT CAPSULE] Take 1,000 Units by mouth daily.   clobetasol (TEMOVATE) 0.05 % external ointment Unknown at PRN  Yes Yes   Sig: Apply topically daily as needed (AS NEEDED)   cyanocobalamin (VITAMIN B-12) 500 MCG tablet 6/19/2024 at AM  Yes Yes   Sig: Take 500 mcg by mouth every morning   escitalopram (LEXAPRO) 10 MG tablet 6/19/2024 at AM  No Yes   Sig: Take 1 tablet (10 mg) by mouth daily   hydrALAZINE (APRESOLINE) 25 MG tablet 6/20/2024 at 0000  No Yes   Sig: Take 1 tablet (25 mg) by mouth every 8 hours   hydroxychloroquine (PLAQUENIL) 200 MG tablet 6/19/2024 at AM  No Yes   Sig: Take 1 tablet (200 mg) by mouth daily   levothyroxine (SYNTHROID/LEVOTHROID) 75 MCG tablet 6/19/2024 at AM  Yes Yes   Sig: Take 75 mcg by mouth See Admin Instructions Take 75mcg daily on Mon, Tue, Wed, Thur, and Sat. Skip Friday and Sunday.   oxyCODONE (ROXICODONE) 5 MG tablet Unknown at prn  Yes Yes   Sig: Take 2.5 mg by mouth every 6 hours as needed for severe pain   pantoprazole  (PROTONIX) 20 MG EC tablet 6/19/2024 at HS  Yes Yes   Sig: Take 40 mg by mouth at bedtime   senna-docusate (SENOKOT-S/PERICOLACE) 8.6-50 MG tablet 6/19/2024 at PM  Yes Yes   Sig: Take 2 tablets by mouth 2 times daily      Facility-Administered Medications Last Administration Doses Remaining   denosumab (PROLIA) injection 60 mg None recorded 1              Physical Exam   Vital Signs: Temp: 99.1  F (37.3  C) Temp src: Rectal BP: 127/57 Pulse: 94   Resp: 28 SpO2: 97 %      Weight: 206 lbs 0 oz    General Appearance: No distress noted  Respiratory: Good air entry bilaterally  Cardiovascular: S1 and S2 well heard, no murmur or gallop  GI: Soft abdomen, no tenderness, normoactive bowel sounds  Skin: Intact and warm      Medical Decision Making       75 MINUTES SPENT BY ME on the date of service doing chart review, history, exam, documentation & further activities per the note.      Data

## 2024-06-20 NOTE — ED NOTES
Patients surgical incision on right hip from surgery is clean and dry and approximated. Staples present.

## 2024-06-20 NOTE — PROGRESS NOTES
"[facility] GERIATRIC SERVICES    Code Status:  FULL CODE   Visit Type:   Chief Complaint   Patient presents with    TCU Follow Up     Facility:  [facility] ([facility]) [[id number]]         HPI: Ruthy Man is a 89 year old female I am seeing today for follow up on the TCU.  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  Patient recently hospitalized post fall and found to have an acute right femoral neck fracture.  She underwent right Heema arthroplasty on 6/6/2024.  She was also found to have acute hypoxic respiratory failure with CT finding suggestive of PNA versus atypical mycobacterial infection.  Recommendations were to see pulmonary for outpatient workup.  She completed oral antibiotics.  She was seen by speech with no concerns.  Discharged on O2 at 2 L.    Transitional Care Course: Today patient lying in bed. Both her son and daughter are present on exam. Family reporting pt had choking episode after eating lunch. Pt reporting \"feeling like something stuck in her throat. She is continues to cough to clear. She is breathing adequately. With tongue depressor unable to see object in back of throat. She is swallowing. Milk and small bite of moistened bread given to help pass object deep down in throat which was effective. She has had some ongoing swallowing issues with meals here. She denies any hx of dysphagia. She is being followed by ST. We also talked today about getting OOB and sitting up in chair for meals. Recent pneumonia, questionable aspiration. She has completed her antibiotics. Continued attempts to wean off O2. She is down to 1L. She denies any pain in her hip today. She continues on prn tylenol. Drainage to hip has slowed. Dressing without strike through. CBC without leukocytosis. Hemoglobin 9.7.     Assessment/Plan:     Choking Episode  Dysphagia  -ST following. "   -chocking episode after lunch today.   -OOB for all meals seated up right.   -Drink liquid after each bite.     Right femoral neck fracture, sustained after a fall  -s/p right hip hemiarthroplasty 6/6/21  -asa 81 BID for DVT px  -slight serosanguineous drainage.  -Surgical dressing reinforced.  Orthopedic surgeon updated. Monitor.   -Ice between therapies.   -Tylenol 650 every 6 hours as needed for pain.  -Follow up CBC without leukocytosis.     Acute hypoxemic respiratory failure  bronchiectasis  CAP   Questionable atypical mycobacterial infection  -CT findings suggest possible atypical mycobacterial infection.     -pulm consulted and follow-up in 4-6 weeks for work-up for an atypical JAIDA infection.    -Unable to obtain sputum culture during hospitalization.   -completed omnicef&doxy  -Continue incentive spirometer/Aerobika.  -wean oxygen as tolerated, currently with minimal requirement (1L sating 93%).      Acute urinary retention  -Catheter removed on 6/11/2024  -No evidence of urinary retention.     BEBETO on CKD3  -Follow-up BMP with Creatine of 1.14.   -Monitor.        HTN  -Losartan discontinued during hospitalization secondary to dehydration.  -Continues on home Coreg.  -Patient started on hydralazine 3 times daily during hospitalization.     moderate Aortic stenosis   -currently on O2.   -SOB with exertion.   -Follow up out pt with Cardiology.      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 Can resume home baby ASA when this course of BID ASA is finished  Occipital neuralgia- continue tylenol   GERD-continue home PPI  Mood disorder-continue home lexapro       Active Ambulatory Problems     Diagnosis Date Noted    Ischemic optic neuropathy     Impaired Fasting Glucose     Hypothyroidism     Premature Ventricular Contractions     Vitamin D  deficiency     Skin Cancer     Adjustment disorder with anxiety     Essential hypertension     Hiatal Hernia     Colon polyps 02/25/2015    Senile osteoporosis 02/21/2019    Right thalamic stroke (H) 04/11/2019    Left knee pain 04/11/2019    Acute ischemic VBA thalamic stroke, left (H) 04/12/2019    Chronic renal failure, stage 3 (moderate) (H) 08/06/2019    Palindromic rheumatism 10/15/2019    Primary osteoarthritis involving multiple joints 10/15/2019    Chronic right shoulder pain 01/21/2020    Palpitations 09/15/2020    Dermatitis 04/23/2021    Renal insufficiency 04/23/2021    Diverticular disease of colon 12/04/2008    Gastroesophageal reflux disease without esophagitis 08/03/2022    Chronic renal failure, stage 3b (H) 03/18/2024    Occipital neuralgia of left side 04/24/2024    Acute respiratory failure with hypoxia (H) 06/06/2024    Fall at home, initial encounter 06/06/2024    Closed displaced fracture of right femoral neck (H) 06/06/2024    Community acquired pneumonia of right upper lobe of lung 06/06/2024    Sepsis, due to unspecified organism, unspecified whether acute organ dysfunction present (H) 06/06/2024    Premature atrial contraction 06/17/2024    Acute ischemic colitis (H24) 06/17/2024    Diverticulitis 06/17/2024    Acid reflux 06/17/2024     Resolved Ambulatory Problems     Diagnosis Date Noted    Depression     Chronic Bronchitis     Polymyalgia (H24) 11/04/2020     Past Medical History:   Diagnosis Date    Hypertension      Allergies   Allergen Reactions    Ace Inhibitors Cough    Amlodipine Unknown     Edema      Clarithromycin Nausea    Diltiazem Hcl [Diltiazem] Itching    Doxazosin Nausea    Penicillins Swelling and Rash    Tetanus Toxoid Other (See Comments)     Local allergic reaction       All Meds and Allergies reviewed in the record at the facility and is the most up-to-date.    Current Outpatient Medications   Medication Sig Dispense Refill    acetaminophen (TYLENOL) 325 MG tablet  Take 2 tablets (650 mg) by mouth every 4 hours as needed for other (For optimal non-opioid multimodal pain management to improve pain control.) 100 tablet 0    aspirin 81 MG EC tablet Take 1 tablet (81 mg) by mouth 2 times daily 60 tablet 0    atorvastatin (LIPITOR) 40 MG tablet TAKE 1 TABLET(40 MG) BY MOUTH DAILY 90 tablet 3    carvedilol (COREG) 6.25 MG tablet Take 1 tablet (6.25 mg) by mouth 2 times daily (with meals) 180 tablet 3    cholecalciferol, vitamin D3, (VITAMIN D3) 1,000 unit capsule [CHOLECALCIFEROL, VITAMIN D3, (VITAMIN D3) 1,000 UNIT CAPSULE] Take 1,000 Units by mouth daily.      clobetasol (TEMOVATE) 0.05 % external ointment [CLOBETASOL (TEMOVATE) 0.05 % OINTMENT] Apply 0.5 mg daily as needed 60 g 4    cyanocobalamin (VITAMIN B-12) 500 MCG tablet [CYANOCOBALAMIN (VITAMIN B-12) 500 MCG TABLET] Take 500 mcg by mouth daily.      DOCOSAHEXANOIC ACID/EPA (FISH OIL ORAL) [DOCOSAHEXANOIC ACID/EPA (FISH OIL ORAL)] Take 1,200 mg by mouth daily.      escitalopram (LEXAPRO) 10 MG tablet Take 1 tablet (10 mg) by mouth daily 90 tablet 3    hydrALAZINE (APRESOLINE) 25 MG tablet Take 1 tablet (25 mg) by mouth every 8 hours      hydroxychloroquine (PLAQUENIL) 200 MG tablet Take 1 tablet (200 mg) by mouth daily 90 tablet 1    lansoprazole (PREVACID) 15 MG capsule Take 15 mg by mouth every evening (Patient not taking: Reported on 6/17/2024)      levothyroxine (SYNTHROID/LEVOTHROID) 75 MCG tablet TAKE 1 TABLET BY MOUTH DAILY FOR 5 DAYS A WEEK, SKIP DOSE 2 DAYS PER WEEK 90 tablet 0    oxyCODONE (ROXICODONE) 5 MG tablet Take 0.5 tablets (2.5 mg) by mouth every 6 hours as needed for severe pain 5 tablet 0    pantoprazole (PROTONIX) 20 MG EC tablet Take 40 mg by mouth at bedtime      senna-docusate (SENOKOT-S/PERICOLACE) 8.6-50 MG tablet Take 1 tablet by mouth 2 times daily 60 tablet 0     Current Facility-Administered Medications   Medication Dose Route Frequency Provider Last Rate Last Admin    denosumab (PROLIA)  "injection 60 mg  60 mg Subcutaneous Once Cassidy Grimm MD           REVIEW OF SYSTEMS:   10 point review of systems reviewed and pertinent positives in the HPI.     PHYSICAL EXAMINATION:  Physical Exam     Vital signs: /69   Pulse 107   Temp 97.2  F (36.2  C)   Resp 20   Ht 1.626 m (5' 4\")   Wt 93.7 kg (206 lb 9.6 oz)   LMP  (LMP Unknown)   SpO2 93%   BMI 35.46 kg/m    General: Awake, Alert, oriented x3, lying in bed, appropriately, follows simple commands, conversant  HEENT: Pink conjunctiva,moist oral mucosa. No food visualized in back of throat or oral cavity. Continues to attempt to clear.   NECK: Supple  CVS:  S1  S2, without murmur or gallop.   LUNG: Clear to auscultation. O2 on @ 0.1 liter.   BACK: No kyphosis of the thoracic spine  ABDOMEN: Soft, obese, nontender to palpation, with positive bowel sounds  EXTREMITIES: Moves both upper and lower extremities with generalized weakness, no pedal edema, no calf tenderness.   SKIN: Bandage to right hip without strike through. Moderate edema and bruising noted.  No redness or warmth.  NEUROLOGIC: Intact, pulses palpable  PSYCHIATRIC: Pleasant affect.       Labs:  All labs reviewed in the nursing home record and Epic   @  Lab Results   Component Value Date    WBC 8.8 06/09/2024     Lab Results   Component Value Date    RBC 3.20 06/09/2024     Lab Results   Component Value Date    HGB 9.4 06/09/2024     Lab Results   Component Value Date    HCT 29.4 06/09/2024     Lab Results   Component Value Date    MCV 92 06/09/2024     Lab Results   Component Value Date    MCH 29.4 06/09/2024     Lab Results   Component Value Date    MCHC 32.0 06/09/2024     Lab Results   Component Value Date    RDW 14.9 06/09/2024     Lab Results   Component Value Date     06/09/2024        @Last Comprehensive Metabolic Panel:  Sodium   Date Value Ref Range Status   06/17/2024 134 (L) 135 - 145 mmol/L Final     Comment:     Reference intervals for this test were updated on " 09/26/2023 to more accurately reflect our healthy population. There may be differences in the flagging of prior results with similar values performed with this method. Interpretation of those prior results can be made in the context of the updated reference intervals.      Potassium   Date Value Ref Range Status   06/17/2024 3.9 3.4 - 5.3 mmol/L Final   06/06/2022 4.4 3.5 - 5.0 mmol/L Final     Chloride   Date Value Ref Range Status   06/17/2024 100 98 - 107 mmol/L Final   06/06/2022 103 98 - 107 mmol/L Final     Carbon Dioxide (CO2)   Date Value Ref Range Status   06/17/2024 25 22 - 29 mmol/L Final   06/06/2022 25 22 - 31 mmol/L Final     Anion Gap   Date Value Ref Range Status   06/17/2024 9 7 - 15 mmol/L Final   06/06/2022 12 5 - 18 mmol/L Final     Glucose   Date Value Ref Range Status   06/17/2024 94 70 - 99 mg/dL Final   06/06/2022 99 70 - 125 mg/dL Final     GLUCOSE BY METER POCT   Date Value Ref Range Status   06/07/2024 117 (H) 70 - 99 mg/dL Final     Urea Nitrogen   Date Value Ref Range Status   06/17/2024 15.9 8.0 - 23.0 mg/dL Final   06/06/2022 26 8 - 28 mg/dL Final     Creatinine   Date Value Ref Range Status   06/17/2024 1.14 (H) 0.51 - 0.95 mg/dL Final     GFR Estimate   Date Value Ref Range Status   06/17/2024 44 (L) >60 mL/min/1.73m2 Final   04/23/2021 38 (L) >60 mL/min/1.73m2 Final     Calcium   Date Value Ref Range Status   06/17/2024 8.4 8.2 - 9.6 mg/dL Final     > 35 minutes spent face to face with pt, family and nurse attempting to clear stuck food in throat. Pt was able to clear. Plan of care reviewed with both pt and family.     This note has been dictated using voice recognition software. Any grammatical or context distortions are unintentional and inherent to the software    Electronically signed by: Darcie Bolaños, CNP

## 2024-06-20 NOTE — ED NOTES
Bed: [place] [address]  Expected date:   Expected time:   Means of arrival:   Comments:  Room 2 when boarder orders

## 2024-06-20 NOTE — PLAN OF CARE
Problem: VTE (Venous Thromboembolism)  Goal: Tissue Perfusion  Outcome: Progressing   Goal Outcome Evaluation:  Her heparin is infusing at 1700 unit(s)/hour.  Her 1st anti Xa will be at 0852.  Her lungs are diminished.  Her sats were hovering around 90% on room air, so placed her on a liter for 92%.  Problem: Pneumonia  Goal: Resolution of Infection Signs and Symptoms  Outcome: Progressing   She has been afebrile.  She received vanco & cefepime earlier.

## 2024-06-20 NOTE — PROGRESS NOTES
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  [facility] to [facility] and now presents 6/20/24 with acute PE.       Acute PE:  CTA shows acute pulmonary emboli within two segmental pulmonary arteries in the right lower lobe   -- transition from IV heparin to eliquis  -- US bilat LE to exclude DVT       Acute hypoxemic respiratory failure: likely related to PE and chronic underlying lung disease  Previously treated for pneumonia on last hospitalization  CT on admission shows few clusters of small irregular-shaped patchy and nodular opacities scattered within the periphery of both lungs that pulmonary feels are stable since 2022   -- check Influenza/covid/rsv   -- pulmonary recommends stopping IV abx started on admission  -- continue duonebs prn  -- SLP saw last hospitalization and had no concerns  -- wean oxygen as tolerated  -- already has outpatient pulmonary referral, no further workup needed per inpatient pulm consult 6/20/24  -- push IS          Right femoral neck fracture, sustained after a fall  -- s/p right hip hemiarthroplasty 6/6/21  -- family asked to consult ortho to see tomorrow regarding need for stable removal  -- likely will need TCU placement again, will have PT/OT re-assess             CKD3:   -- GFR stable, trend        HTN: continue home coreg and hydralazine          Leukocytosis and mild lactic acidemia: improved   --trend        Hypothyroidism: continue home replacement         History of RA: continue home plaquenil        Ischemic optic neuropathy of the left eye:  --  "continue home statin and currently holding home baby ASA now that she is on therapeutic anticoagulation        Occipital neuralgia: continue tylenol         GERD: continue home PPI        Mood disorder: continue home lexapro             Diet: Combination Diet Regular Diet Adult    DVT Prophylaxis: DOAC  Garcia Catheter: Not present  Lines: None     Cardiac Monitoring: None  Code Status: Full Code      Clinically Significant Risk Factors Present on Admission              # Hypoalbuminemia: Lowest albumin = 2.7 g/dL at 6/20/2024  1:21 AM, will monitor as appropriate   # Drug Induced Platelet Defect: home medication list includes an antiplatelet medication   # Hypertension: Noted on problem list    # Anemia: based on hgb <11           # Obesity: Estimated body mass index is 35.36 kg/m  as calculated from the following:    Height as of this encounter: 1.626 m (5' 4\").    Weight as of this encounter: 93.4 kg (206 lb).              Disposition Plan   Medically Ready for Discharge: Anticipated Tomorrow      Ted Lara DO  Hospitalist Service  [facility]  Securely message with [employer] (more info)  Text page via [employer] Paging/Directory   ______________________________________________________________________    Interval History   NAD. Denies any complaints besides VIVAS    Physical Exam   Vital Signs: Temp: 97.8  F (36.6  C) Temp src: Oral BP: 122/63 Pulse: 100   Resp: 24 SpO2: 99 % O2 Device: Nasal cannula Oxygen Delivery: 5 LPM  Weight: 206 lbs 0 oz  General: NAD  RESPIRATORY: Breathing nonlabored  CARDIOVASCULAR: 1+ le edema bilat.   NEUROLOGIC: Motor and sensory intact, speech clear         >50 MINUTES SPENT BY ME on the date of service doing chart review, history, exam, documentation & further activities per the note.  Data       "

## 2024-06-20 NOTE — ED TRIAGE NOTES
Patient is a resident at [facility] in [facility]. Staff reported that she did not have a pulse or breathing though no interventions were preformed, when EMS got there she was alert and talking and at her baseline.     Patient has complaints of SOB with unremarkable 12 Lead            Triage Assessment (Adult)       Row Name 06/20/24 0111          Triage Assessment    Airway WDL WDL        Skin Circulation/Temperature WDL    Skin Circulation/Temperature WDL WDL        Cardiac WDL    Cardiac WDL WDL        Peripheral/Neurovascular WDL    Peripheral Neurovascular WDL WDL        Cognitive/Neuro/Behavioral WDL    Cognitive/Neuro/Behavioral WDL WDL

## 2024-06-20 NOTE — PHARMACY-VANCOMYCIN DOSING SERVICE
Pharmacy Vancomycin Initial Note  Date of Service 2024  Patient's  1930  89 year old, female    Indication: Sepsis    Current estimated CrCl = Estimated Creatinine Clearance: 25.6 mL/min (A) (based on SCr of 1.49 mg/dL (H)).    Creatinine for last 3 days  2024:  5:50 AM Creatinine 1.14 mg/dL  2024:  1:21 AM Creatinine 1.49 mg/dL    Recent Vancomycin Level(s) for last 3 days  No results found for requested labs within last 3 days.      Vancomycin IV Administrations (past 72 hours)        No vancomycin orders with administrations in past 72 hours.                    Nephrotoxins and other renal medications (From now, onward)      Start     Dose/Rate Route Frequency Ordered Stop    24 0230  vancomycin (VANCOCIN) 2,000 mg in sodium chloride 0.9 % 500 mL intermittent infusion         2,000 mg  over 2 Hours Intravenous ONCE 24 0200              Contrast Orders - past 72 hours (72h ago, onward)      None           Plan:  Start vancomycin  2000 mg IV x1 dose to ER.   Please reorder dosing consult if vancomycin therapy is to be continued.      Chandrika Tim [facility]

## 2024-06-21 NOTE — PROGRESS NOTES
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  Discharged 6/14/24 to [facility] and presents 6/20/24 with syncope and collapse and found to have acute PE.   Hospital course complicated by acute left groin hematoma in setting of therapeutic AC      Acute PE:  CTA shows acute pulmonary emboli within two segmental pulmonary arteries in the right lower lobe   US bilat LE negative for DVT  -- transitioned from IV heparin to eliquis 6/20  -- eliquis stopped 6/21 due to left groin hematoma  -- discussed with heme, will plan to hold eliquis for today, monitor hematoma and if stable, start low intensity heparin gtt without bolus 6/22  -- if there are further complications with AC then will need IVC filter      Left groin hematoma: likely related to recent fall and therapeutic AC  Incidentally found on US LLE that shows large complex fluid collection left inguinal region measuring up to 10.7 cm in diameter and most consistent with hematoma. Patient has associated 2G drop in Hgb overnight 6/20-6/21  -- hold eliquis  -- monitor for now  -- general surgery ok with subcutaneous heparin however she does not need further DVT PPX until tomorrow      Acute anemia: due to left groin hematoma. Also noted to be iron deficient   -- transfuse 2 U PRBC 6/21 which will provide good iron load  -- start PO iron replacement likely tomorrow       Acute hypoxemic respiratory failure: likely related to PE and chronic underlying lung disease and now acute anemia  Previously treated for pneumonia on last hospitalization  CT on  admission shows few clusters of small irregular-shaped patchy and nodular opacities scattered within the periphery of both lungs that pulmonary feels are stable since 2022   Influenza/covid/rsv negative on admission  -- pulmonary recommends stopping IV abx started on admission  -- continue duonebs prn  -- SLP saw last hospitalization and had no concerns  -- wean oxygen as tolerated  -- monitor for improvement after PRBC transfusion   -- already has outpatient pulmonary referral, no further workup needed per inpatient pulm consult 6/20/24  -- push IS      Leukocytosis: with mild procalcitonin elevation.   Lactic acidemia resolved  Blood cultures negative on admission  UA not consistent with UTI  Pulmonary does not feel patient has active pneumonia  -- question if related to hematoma vs excoriated baldemar area however no evidence of purulent sores currently, does have some mild inflammation but not entirely consistent with cellulitis  -- will have WOC assess for local skin cares  -- monitor WBC and fever curve for now  -- follow up blood cultures  -- consider empiric abx pending clinical course. Wound consider Augmentin and doxycycline since possible soft tissue infection would be the source.        BEBETO on CKD3:   -- likely related to acute anemia and relative hypotension  -- monitor for improvement after PRBC transfusion  -- hold home coreg and hydralazine            Right femoral neck fracture, sustained after a fall prior to last hospitalization  -- s/p right hip hemiarthroplasty 6/6/21  -- ortho following, wound does not appear infected, has non-purulent drainage so will leave staples in place another week  -- likely will need TCU placement again, will have PT/OT re-assess           HTN: continue home coreg and hydralazine when BP improved       Hypothyroidism: continue home replacement         History of RA: continue home plaquenil        Ischemic optic neuropathy of the left eye:  -- continue home statin and  "currently holding home baby ASA now that she is on therapeutic anticoagulation        Occipital neuralgia: continue tylenol         GERD: continue home PPI        Mood disorder: continue home lexapro             Diet: Combination Diet Regular Diet Adult    DVT Prophylaxis: start SCD's and subcutaneous heparin while DOAC on hold  Garcia Catheter: Not present  Lines: None     Cardiac Monitoring: None  Code Status: Full Code      Clinically Significant Risk Factors              # Hypoalbuminemia: Lowest albumin = 2.7 g/dL at 6/20/2024  1:21 AM, will monitor as appropriate    # Acute Kidney Injury, unspecified: based on a >150% or 0.3 mg/dL increase in last creatinine compared to past 90 day average, will monitor renal function  # Hypertension: Noted on problem list               #Precipitous drop in Hgb/Hct: Lowest Hgb this hospitalization: 5.6 g/dL. Will continue to monitor and treat/transfuse as appropriate.     # Obesity: Estimated body mass index is 35.36 kg/m  as calculated from the following:    Height as of this encounter: 1.626 m (5' 4\").    Weight as of this encounter: 93.4 kg (206 lb)., PRESENT ON ADMISSION            Disposition Plan   Medically Ready for Discharge: Anticipated Tomorrow      Ted Lara DO  Hospitalist Service  [facility]  Securely message with [employer] (more info)  Text page via [employer] Paging/Directory   ______________________________________________________________________    Interval History   NAD. Denies any complaints besides mild left groin pain    Physical Exam   Vital Signs: Temp: 98.5  F (36.9  C) Temp src: Oral BP: 111/51 Pulse: 100   Resp: 20 SpO2: 95 % O2 Device: Nasal cannula Oxygen Delivery: 2 LPM  Weight: 206 lbs 0 oz  General: NAD  RESPIRATORY: Breathing nonlabored  CARDIOVASCULAR: 1+ le edema bilat.   NEUROLOGIC: Motor and sensory intact, speech clear         >50 MINUTES SPENT BY ME on the date of service doing chart review, history, exam, " documentation & further activities per the note.  Data

## 2024-06-21 NOTE — PROGRESS NOTES
Assumed care for this patient at 2300. Patient was sleeping. Patient did wake up too voice. Complain of pain at the left leg. US of the left leg ordered.   US will be here to do imaging. On 2 liters oxygen via NC with oxygen saturtion in the mid 90's. No shortness of breath noted at rest.  Patient endorsed shortness of breath with activity. Bilateral lower extremity edema

## 2024-06-21 NOTE — PLAN OF CARE
Shift from 1500 to 2330-      Problem: VTE (Venous Thromboembolism)  Goal: Tissue Perfusion  Intervention: Optimize Tissue Perfusion  Recent Flowsheet Documentation  Taken 6/20/2024 1615 by Yanely Beckwith RN  VTE Prevention/Management: (Heparin gtt) other (see comments)     Problem: Skin Injury Risk Increased  Goal: Skin Health and Integrity  Outcome: Progressing  Intervention: Plan: Nurse Driven Intervention: Moisture Management  Recent Flowsheet Documentation  Taken 6/20/2024 2022 by Yanely Beckwith RN  Bathing/Skin Care: incontinence care  Taken 6/20/2024 1615 by Yanely Beckwith RN  Moisture Interventions:   Encourage regular toileting   Incontinence pad   Barrier ointment (CriticAid, Triad paste)  Taken 6/20/2024 1602 by Yanely Beckwith RN  Bathing/Skin Care: incontinence care  Intervention: Plan: Nurse Driven Intervention: Friction and Shear  Recent Flowsheet Documentation  Taken 6/20/2024 1615 by Yanely Beckwith RN  Friction/Shear Interventions: HOB 30 degrees or less  Intervention: Optimize Skin Protection  Recent Flowsheet Documentation  Taken 6/20/2024 2022 by Yanely Beckwith RN  Activity Management:   activity encouraged   patient refuses activity   bedrest  Head of Bed (HOB) Positioning: HOB at 30-45 degrees  Taken 6/20/2024 1615 by Yanely Beckwith RN  Head of Bed (HOB) Positioning: HOB at 20-30 degrees  Taken 6/20/2024 1602 by Yanely Beckwith RN  Activity Management:   activity encouraged   patient refuses activity   bedrest  Head of Bed (HOB) Positioning: HOB at 30-45 degrees     Problem: Urinary Retention  Goal: Effective Urinary Elimination  Outcome: Progressing       Goal Outcome Evaluation:    Patient arrived from ED at 1607 in WC with family. Upon arrival, very SOB with activity. Placed on 2 liters oxygen but had to briefly titrate to 5 liters then back to 2 liters once in bed and HOB elevated.   Continuous pulse oximeter in place. Lungs clear. Patient on heparin gtt for PE. Later heparin gtt  "titrated off after eliquis started.    Rule out covid triggered and pt placed on special precautions until test came back negative.     Patient concerned that at TCU she was encouraged to work too hard during PT when she was SOB. She wants to go \"slower tomorrow.\" Refused to get OOB for toileting. Incontinent. Unable to use purewick due to excoriation. Refused commode. Used bedpan. Note left for WOC nurse order.    Later unable to void. Bladder scanned for 624ml. Updated Dr. Heredia and straight cathed for 475ml.     Family very anxious and at bedside. Informed RN right away that they refuse to have her return to [facility]. Family wants SW to talk to them in am. Son is  and would like daily updates.    Patient refused to go down to [facility] so they will come to perform USG on nights.    Right hip staples intact; painful and limited motion. LLE also is painful. Refused tylenol when offered twice.    Family also states that pt coughs at home sometimes when swallows. RN didn't see this. Had pt not use straws and HOB elevated when ate.   Family would like swallow study ordered while her.                    "

## 2024-06-21 NOTE — CONSULTS
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  Discharged 6/14/24 to [facility] and presents 6/20/24 with syncope and collapse and found to have acute PE.   Hospital course complicated by acute left groin hematoma in setting of therapeutic AC    Assessment:      Skin Injury Location: Perineum       6/21    Last photo: 6/21  Skin injury due to: Irritant contact dermatitis due to fecal, urinary or dual incontinence   Skin history and plan of care:   unclear, patient is incontinent but not a great historian  Affected area:      Skin assessment: Denudement, Erosion of epidermis, and Erythema     Measurements (length x width x depth, in cm) bilateral inner thighs, buttocks where skin touches      Color: pink     Temperature  warm     Drainage: none .      Color: none      Odor: mild  Pain: mild, aching and tender  Pain interventions prior to dressing change: slow and gentle cares   Treatment goal: Infection control/prevention, Decrease moisture, and Protection  STATUS: initial assessment  Supplies ordered: at bedside      Calmoseptine was already ordered for patient.      Treatment Plan:     Buttocks/Thighs  Apply Calmoseptine to redness of thighs and buttocks  Once soiled, gently wipe away soiled Calmoseptine only, do not scrub all off  Apply additional Calmoseptine to keep skin covered and protected  Apply BID + PRN after each baldemar cares    Pressure Injury Prevention (PIP) Plan:  If patient is declining pressure injury prevention interventions: Explore reason why and address  "patient's concerns, Educate on pressure injury risk and prevention intervention(s), If patient is still declining, document \"informed refusal\" , and Ensure Care team is aware ( provider, charge nurse, etc)  Mattress: Follow bed algorithm, reassess daily and order specialty mattress, if indicated.  HOB: Maintain at or below 30 degrees, unless contraindicated  Repositioning in bed: Every 1-2 hours  and Raise foot of bed prior to raising head of bed, to reduce patient sliding down (shear)  Heels: Keep elevated off mattress and Pillows under calves  Protective Dressing: Sacral Mepilex for prevention (#979745),  especially for the agitated patient   Positioning Equipment:Positioning wedges (#280507) to help maintain 30 degree side lying position   Chair positioning: Chair cushion (#[id number])    If patient has a buttock pressure injury, or high risk for PI use chair cushion or SPS.  Moisture Management: Perineal cleansing /protection: Follow Incontinence Protocol, Avoid brief in bed, Clean and dry skin folds with bathing , Consider InterDry (#[id number]) between folds, and Moisturize dry skin  Under Devices: Inspect skin under all medical devices during skin inspection , Ensure tubes are stabilized without tension, and Ensure patient is not lying on medical devices or equipment when repositioned  Ask provider to discontinue device when no longer needed.      Orders: Written    RECOMMEND PRIMARY TEAM ORDER: None, at this time  Education provided: plan of care, Moisture management, Hygiene, and Off-loading pressure  Discussed plan of care with: Patient and Nurse  WOC nurse follow-up plan: weekly  Notify WOC if wound(s) deteriorate.  Nursing to notify the Provider(s) and re-consult the WOC Nurse if new skin concern.    DATA:     Current support surface: Standard  Standard Isoflex gel  Containment of urine/stool: Incontinence Protocol  BMI: Body mass index is 35.36 kg/m .   Active diet order: Orders Placed This Encounter      " Combination Diet Regular Diet Adult     Output: I/O last 3 completed shifts:  In: 240 [P.O.:240]  Out: 475 [Urine:475]     Labs:   Recent Labs   Lab 06/21/24  0755 06/21/24  0637 06/20/24  0840 06/20/24  0121   ALBUMIN  --   --   --  2.7*   HGB 5.8* 5.6*   < > 8.8*   WBC  --  21.7*   < > 25.6*    < > = values in this interval not displayed.     Pressure injury risk assessment:   Sensory Perception: 4-->no impairment  Moisture: 3-->occasionally moist  Activity: 1-->bedfast  Mobility: 3-->slightly limited  Nutrition: 3-->adequate  Friction and Shear: 3-->no apparent problem  Nic Score: 17    LUANN SylvesterN RN CWOCN  Pager no longer in use, please contact through [employer] group: [facility]

## 2024-06-21 NOTE — PLAN OF CARE
Consent obtained for blood products. VSS, heart rate elevated at 107. Blood now transfusing, no new symptoms. Only pain is in left leg near groin. no dvt per ultrasound. Redness in baldemar area, calmoseptine in place. Right hip and thigh have firmness and color and swelling, and there is a dressing in place and appears to have some swelling in the area as the skin looks very shiny at right hip with swelling, and some firmness below at right thigh. Tolerated first unit of blood. Poor appetite, but drank a V8 with family encouragement. Tired today. ICE applied to left hip pain where hematoma is present. Refused tylenol. SCD's on, then discontinued. VSS after second unit of blood. HGB recheck to be drawn at 315. Pt denied symptoms.

## 2024-06-21 NOTE — PROGRESS NOTES
Patient has a history of urine retention. Bladder scan this shift 145 ml. No abdominal discomfort. Follow bladder protocol.

## 2024-06-21 NOTE — DISCHARGE INSTRUCTIONS
"WOC DISCHARGE INSTRUCTIONS:  Buttocks/Thighs  Apply Calmoseptine to redness of thighs and buttocks  Once soiled, gently wipe away soiled Calmoseptine only, do not scrub all off  Apply additional Calmoseptine to keep skin covered and protected  Apply BID + PRN after each baldemar cares     Pressure Injury Prevention (PIP) Plan:  If patient is declining pressure injury prevention interventions: Explore reason why and address patient's concerns, Educate on pressure injury risk and prevention intervention(s), If patient is still declining, document \"informed refusal\" , and Ensure Care team is aware ( provider, charge nurse, etc)  Mattress: Follow bed algorithm, reassess daily and order specialty mattress, if indicated.  HOB: Maintain at or below 30 degrees, unless contraindicated  Repositioning in bed: Every 1-2 hours  and Raise foot of bed prior to raising head of bed, to reduce patient sliding down (shear)  Heels: Keep elevated off mattress and Pillows under calves  Protective Dressing: Sacral Mepilex for prevention (#080699),  especially for the agitated patient   Positioning Equipment:Positioning wedges (#207709) to help maintain 30 degree side lying position   Chair positioning: Chair cushion (#[id number])    If patient has a buttock pressure injury, or high risk for PI use chair cushion or SPS.  Moisture Management: Perineal cleansing /protection: Follow Incontinence Protocol, Avoid brief in bed, Clean and dry skin folds with bathing , Consider InterDry (#[id number]) between folds, and Moisturize dry skin  Under Devices: Inspect skin under all medical devices during skin inspection , Ensure tubes are stabilized without tension, and Ensure patient is not lying on medical devices or equipment when repositioned  Ask provider to discontinue device when no longer needed.  "

## 2024-06-21 NOTE — PLAN OF CARE
Problem: Urinary Retention  Goal: Effective Urinary Elimination  Outcome: Progressing   Goal Outcome Evaluation:    Pt is alert and oriented. Slightly forgetful. Admitted inpatient with acute respiratory failure with hypoxia. Patient is on 2 liters of oxygen via nc. Oxygen use is not baseline for this patient. Shortness of breath with and without exertion.  US today of left lower extremity due to pain of the left leg.  Patient complain of pain of the left leg but refused medication.  Turned and repositioned.  Ortho surgery, PT and OT consults pending.

## 2024-06-21 NOTE — CONSULTS
General Surgery Consultation  Ruthy Man MRN# [id number]   Age/Sex: 89 year old female YOB: 1930     Reason for consult: 1. Sepsis, due to unspecified organism, unspecified whether acute organ dysfunction present (H)    2. Acute respiratory failure with hypoxia (H)    3. Single subsegmental pulmonary embolism without acute cor pulmonale (H)            Requesting physician: Ted Lara DO                   Assessment and Plan:   Assessment:  Left groin hematoma.  This does not appear to be causing any overlying skin ischemia, and does not appear to be rapidly expanding.  The acuity of this hematoma is unclear, unclear if this began after her anticoagulation started or was a result of her fall.  No role for aspiration or incision and drainage at this point in time.  Given the patient's pulmonary emboli, she clearly needs anticoagulation.  I think if at any point her pulmonary emboli appear to be causing pulmonary or cardiovascular stress she should be started back on anticoagulation and spite of the hematoma.  Therapeutic anticoagulation might make this hematoma worse though I think it unlikely, I doubt that prophylactic anticoagulation dose would cause any problems.    Plan:  No role for aspiration or surgical drainage at this point in time          Chief Complaint:     Chief Complaint   Patient presents with    Shortness of Breath        History is obtained from the patient    HPI:   Ruthy Man is a 89 year old female who presents through the [facility] emergency department yesterday with shortness of breath and syncope.  She underwent an open reduction internal hip fixation on the right side weeks ago due to a broken right hip.  On evaluation in the emergency department she underwent CT imaging of the chest which did demonstrate right-sided segmental pulmonary emboli.  She was started on heparin yesterday morning.  As part of her evaluation for the pulmonary emboli she underwent  lower extremity DVT ultrasound, which demonstrated a hematoma in the left groin, 4 x 6 x 10 cm in size.  She is not complaining any significant pain in this area.          Past Medical History:     Past Medical History:   Diagnosis Date    Hypertension     Senile osteoporosis 2019              Past Surgical History:     Past Surgical History:   Procedure Laterality Date    HC REMOVAL GALLBLADDER      Description: Cholecystectomy;  Recorded: 2008;  Comments: stone    OPEN REDUCTION INTERNAL FIXATION HIP BIPOLAR Right 2024    Procedure: HEMIARTHROPLASTY, HIP, BIPOLAR;  Surgeon: Eliud Tripp MD;  Location: [facility] OR    [facility] TOTAL ABDOM HYSTERECTOMY      Description: Hysterectomy;  Recorded: 2008;  Comments: benign disease             Social History:    reports that she has never smoked. She has never used smokeless tobacco. She reports that she does not drink alcohol and does not use drugs.           Family History:     Family History   Problem Relation Age of Onset    ALS Mother          age 75    Pulmonary Embolism Father          age 79    Other - See Comments Brother          age birth    Other - See Comments Daughter         has corticobasal syndrome              Allergies:     Allergies   Allergen Reactions    Ace Inhibitors Cough    Amlodipine Unknown     Edema      Clarithromycin Nausea    Diltiazem Hcl [Diltiazem] Itching    Doxazosin Nausea    Penicillins Swelling and Rash    Tetanus Toxoid Other (See Comments)     Local allergic reaction              Medications:     Prior to Admission medications    Medication Sig Start Date End Date Taking? Authorizing Provider   acetaminophen (TYLENOL) 325 MG tablet Take 2 tablets (650 mg) by mouth every 4 hours as needed for other (For optimal non-opioid multimodal pain management to improve pain control.) 24  Yes Susannah Lowery PA-C   aspirin 81 MG EC tablet Take 1 tablet (81 mg) by mouth 2 times daily 24  Yes  Susannah Lowery PA-C   atorvastatin (LIPITOR) 40 MG tablet Take 40 mg by mouth every morning   Yes Reported, Patient   carvedilol (COREG) 6.25 MG tablet Take 1 tablet (6.25 mg) by mouth 2 times daily (with meals) 4/19/24  Yes Selma Meneses MD   cholecalciferol, vitamin D3, (VITAMIN D3) 1,000 unit capsule [CHOLECALCIFEROL, VITAMIN D3, (VITAMIN D3) 1,000 UNIT CAPSULE] Take 1,000 Units by mouth daily. 2/25/15  Yes Leeann Enriquez MD   clobetasol (TEMOVATE) 0.05 % external ointment Apply topically daily as needed (AS NEEDED)   Yes Reported, Patient   cyanocobalamin (VITAMIN B-12) 500 MCG tablet Take 500 mcg by mouth every morning 4/11/19  Yes Provider, Historical   DOCOSAHEXANOIC ACID/EPA (FISH OIL ORAL) Take 1,200 mg by mouth every morning 11/28/14  Yes Provider, Historical   escitalopram (LEXAPRO) 10 MG tablet Take 1 tablet (10 mg) by mouth daily 7/20/23  Yes Selma Meneses MD   hydrALAZINE (APRESOLINE) 25 MG tablet Take 1 tablet (25 mg) by mouth every 8 hours 6/15/24  Yes Sofia Davenport MD   hydroxychloroquine (PLAQUENIL) 200 MG tablet Take 1 tablet (200 mg) by mouth daily 6/4/24  Yes Marianne Rey MBBS   levothyroxine (SYNTHROID/LEVOTHROID) 75 MCG tablet Take 75 mcg by mouth See Admin Instructions Take 75mcg daily on Mon, Tue, Wed, Thur, and Sat. Skip Friday and Sunday.   Yes Reported, Patient   oxyCODONE (ROXICODONE) 5 MG tablet Take 2.5 mg by mouth every 6 hours as needed for severe pain   Yes Reported, Patient   pantoprazole (PROTONIX) 20 MG EC tablet Take 40 mg by mouth at bedtime 6/17/24  Yes Reported, Patient   senna-docusate (SENOKOT-S/PERICOLACE) 8.6-50 MG tablet Take 2 tablets by mouth 2 times daily   Yes Reported, Patient              Review of Systems:   The Review of Systems is negative other than noted in the HPI            Physical Exam:   Patient Vitals for the past 24 hrs:   BP Temp Temp src Pulse Resp SpO2   06/21/24 0940 123/56 98.5  F (36.9  C) Oral 105 20 92 %   06/21/24 0855 (!)  144/65 98.3  F (36.8  C) Oral 106 20 94 %   06/21/24 0840 133/56 98.3  F (36.8  C) Oral 107 19 97 %   06/21/24 0720 111/51 98.5  F (36.9  C) Oral 100 20 95 %   06/21/24 0639 114/57 -- -- -- -- --   06/20/24 2317 102/58 98.8  F (37.1  C) Oral 95 20 94 %   06/20/24 2137 116/58 -- -- 94 -- --   06/20/24 1950 108/59 98.5  F (36.9  C) Oral 94 22 97 %   06/20/24 1615 -- -- -- -- -- 96 %   06/20/24 1607 122/63 97.8  F (36.6  C) Oral 100 24 99 %   06/20/24 1600 -- -- -- -- -- 93 %   06/20/24 1550 -- -- -- -- -- (!) 86 %   06/20/24 1420 118/58 -- -- 92 30 94 %   06/20/24 1300 -- -- -- 85 25 92 %   06/20/24 1118 107/53 97.8  F (36.6  C) Oral 85 27 92 %   06/20/24 1100 -- -- -- 92 24 94 %          Intake/Output Summary (Last 24 hours) at 6/21/2024 1005  Last data filed at 6/20/2024 2200  Gross per 24 hour   Intake 240 ml   Output 475 ml   Net -235 ml      Constitutional:   awake, alert, cooperative, no apparent distress, and appears stated age       Eyes:   PERRL, conjunctiva/corneas clear, EOM's intact; no scleral edema or icterus noted        ENT:   Normocephalic, without obvious abnormality, atraumatic, Lips, mucosa, and tongue normal        Lungs:   Normal respiratory effort, no accessory muscle use       Cardiovascular:   Regular rate and rhythm       Musculoskeletal:   No obvious swelling, bruising or deformity       Skin:   Erythematous rash present in the left groin.  Skin overlying the hematoma is not firm, taut, only slight discomfort.  Hematoma lies medially in the left groin, does not appear to be causing any potential compromise to the femoral nerve or lateral femoral cutaneous nerves.             Data:         All imaging studies reviewed by me.    Results for orders placed or performed during the hospital encounter of 06/20/24 (from the past 24 hour(s))   Echocardiogram Complete   Result Value Ref Range    LVEF  60-65%     Narrative    [id number]  [id number]  [id number]  [id number]^CASTRO^CHANTEL     [facility] [place] [address]     Name: HARDIK VENEGAS  MRN: [id number]  : 1930  Study Date: 2024 09:49 AM  Age: 89 yrs  Gender: Female  Patient Location: [facility]  Reason For Study: Pulmonary Embolism  Ordering Physician: CHANTEL ERAZO  Performed By: INES     BSA: 2.0 m2  Height: 64 in  Weight: 206 lb  HR: 99  BP: 125/57 mmHg  ______________________________________________________________________________  Procedure  Complete Echo Adult.  ______________________________________________________________________________  Interpretation Summary     Left ventricular size, wall motion and function are normal. The ejection  fraction is 60-65%.  Normal right ventricle size and systolic function.  Mild valvular aortic stenosis.  The study was technically difficult.  ______________________________________________________________________________  Left Ventricle  Left ventricular size, wall motion and function are normal. The ejection  fraction is 60-65%. There is mild concentric left ventricular hypertrophy.  Left ventricular diastolic function is normal. No regional wall motion  abnormalities noted.     Right Ventricle  Normal right ventricle size and systolic function.     Atria  Normal left atrial size. Right atrial size is normal. There is no color  Doppler evidence of an atrial shunt.     Mitral Valve  Mitral valve leaflets appear normal. There is no evidence of mitral stenosis  or clinically significant mitral regurgitation.     Tricuspid Valve  Tricuspid valve leaflets appear normal. Right ventricular systolic pressure  could not be approximated due to inadequate tricuspid regurgitation.     Aortic Valve  The aortic valve is trileaflet. Mild aortic valve calcification is present.  Mild valvular aortic stenosis.     Pulmonic Valve  The pulmonic valve is not well seen, but is grossly normal. This degree of  valvular regurgitation is within normal limits.     Vessels  The aorta root  is normal. Normal size ascending aorta. Inferior vena cava not  well visualized for estimation of right atrial pressure.     Pericardium  There is no pericardial effusion.     ______________________________________________________________________________  MMode/2D Measurements & Calculations  IVSd: 1.3 cm  LVIDd: 3.6 cm  LVIDs: 2.2 cm  LVPWd: 1.3 cm  FS: 37.9 %     LV mass(C)d: 153.8 grams  LV mass(C)dI: 77.6 grams/m2  Ao root diam: 3.1 cm  asc Aorta Diam: 3.5 cm  LVOT diam: 1.9 cm  LVOT area: 2.8 cm2  Ao root diam index Ht(cm/m): 1.9  Ao root diam index BSA (cm/m2): 1.6  Asc Ao diam index BSA (cm/m2): 1.8  Asc Ao diam index Ht(cm/m): 2.2  EF Biplane: 63.9 %  RWT: 0.71     Time Measurements  MM HR: 103.0 BPM     Doppler Measurements & Calculations  MV E max jamey: 64.6 cm/sec  MV A max jamey: 131.0 cm/sec  MV E/A: 0.49     MV dec slope: 826.0 cm/sec2  MV dec time: 0.08 sec  Ao V2 max: 234.5 cm/sec  Ao max P.0 mmHg  Ao V2 mean: 166.0 cm/sec  Ao mean P.0 mmHg  Ao V2 VTI: 34.2 cm  RIANA(I,D): 1.6 cm2  RIANA(V,D): 1.9 cm2  LV V1 max P.9 mmHg  LV V1 max: 157.0 cm/sec  LV V1 VTI: 19.3 cm  SV(LVOT): 54.7 ml  SI(LVOT): 27.6 ml/m2  PA acc time: 0.09 sec  AV Jamey Ratio (DI): 0.67  RIANA Index (cm2/m2): 0.81  E/E': 11.1  E/E' av.7  Lateral E/e': 6.3  Medial E/e': 11.2  Peak E' Jamey: 5.8 cm/sec     ______________________________________________________________________________  Report approved by: Kendall Espino 2024 10:37 AM         Extra Tube    Narrative    The following orders were created for panel order Extra Tube.  Procedure                               Abnormality         Status                     ---------                               -----------         ------                     Extra Green Top (Lithium...[[id number]]                      Final result                 Please view results for these tests on the individual orders.   Extra Green Top (Lithium Heparin) Tube   Result Value Ref Range     Hold Specimen JI    Heparin Unfractionated Anti Xa Level   Result Value Ref Range    Anti Xa Unfractionated Heparin >1.10 (HH) For Reference Range, See Comment IU/mL    Narrative    Therapeutic Range: UFH: 0.25-0.50 IU/mL for low intensity dosing,  0.30-0.70 IU/mL for high intensity dosing DVT and PE.  This test is not validated for other direct factor X inhibitors (e.g. rivaroxaban, apixaban, edoxaban, betrixaban, fondaparinux) and should not be used for monitoring of other medications.   Symptomatic Influenza A/B, RSV, & SARS-CoV2 PCR (COVID-19) Nose    Specimen: Nose; Swab   Result Value Ref Range    Influenza A PCR Negative Negative    Influenza B PCR Negative Negative    RSV PCR Negative Negative    SARS CoV2 PCR Negative Negative    Narrative    Testing was performed using the Xpert Xpress CoV2/Flu/RSV Assay on the [employer] Gene[employer]pert Instrument. This test should be ordered for the detection of SARS-CoV-2, influenza, and RSV viruses in individuals who meet clinical and/or epidemiological criteria. Test performance is unknown in asymptomatic patients. This test is for in vitro diagnostic use under the FDA EUA for laboratories certified under CLIA to perform high or moderate complexity testing. This test has not been FDA cleared or approved. A negative result does not rule out the presence of PCR inhibitors in the specimen or target RNA in concentration below the limit of detection for the assay. If only one viral target is positive but coinfection with multiple targets is suspected, the sample should be re-tested with another FDA cleared, approved, or authorized test, if coinfection would change clinical management. This test was validated by the [facility] [employer]. These laboratories are certified under the Clinical Laboratory Improvement Amendments of 1988 (CLIA-88) as qualified to perform high complexity laboratory testing.   Lactic acid whole blood   Result Value Ref Range    Lactic Acid 1.6 0.7  - 2.0 mmol/L   CBC with platelets   Result Value Ref Range    WBC Count 21.7 (H) 4.0 - 11.0 10e3/uL    RBC Count 1.88 (L) 3.80 - 5.20 10e6/uL    Hemoglobin 5.6 (LL) 11.7 - 15.7 g/dL    Hematocrit 17.5 (L) 35.0 - 47.0 %    MCV 93 78 - 100 fL    MCH 29.8 26.5 - 33.0 pg    MCHC 32.0 31.5 - 36.5 g/dL    RDW 15.3 (H) 10.0 - 15.0 %    Platelet Count 339 150 - 450 10e3/uL   Basic metabolic panel   Result Value Ref Range    Sodium 134 (L) 135 - 145 mmol/L    Potassium 4.2 3.4 - 5.3 mmol/L    Chloride 102 98 - 107 mmol/L    Carbon Dioxide (CO2) 20 (L) 22 - 29 mmol/L    Anion Gap 12 7 - 15 mmol/L    Urea Nitrogen 20.2 8.0 - 23.0 mg/dL    Creatinine 1.90 (H) 0.51 - 0.95 mg/dL    GFR Estimate 24 (L) >60 mL/min/1.73m2    Calcium 7.5 (L) 8.2 - 9.6 mg/dL    Glucose 122 (H) 70 - 99 mg/dL   Procalcitonin   Result Value Ref Range    Procalcitonin 0.57 (H) <0.50 ng/mL   Iron and iron binding capacity   Result Value Ref Range    Iron 17 (L) 37 - 145 ug/dL    Iron Binding Capacity 144 (L) 240 - 430 ug/dL    Iron Sat Index 12 (L) 15 - 46 %   ABO/Rh type and screen    Narrative    The following orders were created for panel order ABO/Rh type and screen.  Procedure                               Abnormality         Status                     ---------                               -----------         ------                     Adult Type and Screen[[id number]]                            Final result                 Please view results for these tests on the individual orders.   Adult Type and Screen   Result Value Ref Range    ABO/RH(D) O POS     Antibody Screen Negative Negative    SPECIMEN EXPIRATION DATE [id number]    US Lower Extremity Venous Duplex Bilateral    Narrative    EXAM: US LOWER EXTREMITY VENOUS DUPLEX BILATERAL  LOCATION: [facility]  DATE: 6/21/2024    INDICATION: Lower extremity edema, known PE, eval for DVT  COMPARISON: CT chest, abdomen, and pelvis 6/6/2024  TECHNIQUE: Venous Duplex ultrasound  of bilateral lower extremities with and without compression, augmentation and duplex. Color flow and spectral Doppler with waveform analysis performed.    FINDINGS: Exam includes the common femoral, femoral, popliteal veins as well as segmentally visualized deep calf veins and greater saphenous vein.     RIGHT: No deep vein thrombosis. No superficial thrombophlebitis. No popliteal cyst.    LEFT: No deep vein thrombosis. No superficial thrombophlebitis. No popliteal cyst. However, there is a complex heterogeneous fluid collection in the left inguinal region which extends for length of 10.7 cm. In transverse dimensions are 4.2 x 6.1 cm. No   internal color Doppler blood flow demonstrated. This was not evident on the CT of 6/6/2024.      Impression    IMPRESSION:  1.  No deep venous thrombosis in the bilateral lower extremities.  2.  Large complex fluid collection left inguinal region measuring up to 10.7 cm in diameter is new since 6/6/2024, most consistent with hematoma.   Lab Blood Morphology Pathologist Review *Canceled*    Narrative    The following orders were created for panel order Lab Blood Morphology Pathologist Review.  Procedure                               Abnormality         Status                     ---------                               -----------         ------                       Please view results for these tests on the individual orders.   Prepare red blood cells (unit)   Result Value Ref Range    Blood Component Type Red Blood Cells     Product Code O6439D79     Unit Status Transfused     Unit Number [id number]     CROSSMATCH Compatible     CODING SYSTEM [id number]     ISSUE DATE AND TIME [id number]     UNIT ABO/RH O+     UNIT TYPE ISBT 5100    Prepare red blood cells (unit)   Result Value Ref Range    Blood Component Type Red Blood Cells     Product Code S4406X13     Unit Status Ready for issue     Unit Number [id number]     CROSSMATCH Compatible     CODING SYSTEM [id number]     Hemoglobin   Result Value Ref Range    Hemoglobin 5.8 (LL) 11.7 - 15.7 g/dL   Extra Blue Top Tube (LAB USE ONLY)   Result Value Ref Range    Hold Specimen JIC    Extra Green Top Tube (LAB USE ONLY)   Result Value Ref Range    Hold Specimen JIC         Suresh Pfeiffer MD

## 2024-06-21 NOTE — PROGRESS NOTES
CM called [facility] and spoke with Griselda in admissions.  Patient does not have a bed hold at [facility] came [facility].  [facility] would [facility] happy to accept patient back when she is medically ready.

## 2024-06-21 NOTE — PROVIDER NOTIFICATION
Notified MD at 0930 AM regarding  order to maintain 2 PIVs and increase in blood pressure .      Spoke with: Dr. Lara    Orders were obtained.    Comments: PRN hydralazine for SBP >160 q4h

## 2024-06-21 NOTE — CONSULTS
ORTHOPEDIC CONSULTATION    Consultation  Ruthy Man,  1930, MRN [id number]    Acute respiratory failure with hypoxia (H) [J96.01]  Single subsegmental pulmonary embolism without acute cor pulmonale (H) [I26.93]  Sepsis, due to unspecified organism, unspecified whether acute organ dysfunction present (H) [A41.9]    PCP: Selma Meneses, [phone]   Code status:  Full Code       Extended Emergency Contact Information  Primary Emergency Contact: Mat Man   [place]  Mobile Phone: [phone]  Relation: Son  Secondary Emergency Contact: Jaquelin Callahan  Mobile Phone: [phone]  Relation: Daughter         IMPRESSION:  2 weeks status post right hip hemiarthroplasty with some persistent serous drainage from the right hip incision.  No purulence or concern for postoperative infection of the right hip at this time.  No signs of hematoma in the right hip. she has shortness of breath and found to have a pulmonary embolism, lower extremities negative for DVT but hematoma found in the left groin     PLAN:  This patient was discussed with Dr. Evans, on-call surgeon for [facility] and they are in agreement with the following plan.   -Right hip incision inspected and does not appear concerning for postoperative infection.  Patient does have some leukocytosis but would not expect that this is related to the right hip at this time.  -Compression dressing placed over the right hip incision by myself this morning.  Compression dressing to remain in place, change as needed for saturation.  Will continue to monitor the incision and change to a regular dressing if drainage slows down.  Staples remain in place for the time being.  -Anticoagulation per primary team.  I did discuss the patient with Dr. Lara this morning who is holding anticoagulation at this time due to the noted anemia and hematoma in the left groin.  Possibly will start on heparin tomorrow  -General surgery to evaluate left  groin hematoma  -Weightbearing as tolerated right lower extremity  -PT/OT as able    Thank you for including [facility] in the care of Ruthy Man. It has been a pleasure participating in their care.        CHIEF COMPLAINT: <principal problem not specified>    HISTORY OF PRESENT ILLNESS:  The patient is seen in orthopedic consultation at the request of Ted Lara DO for right hip evaluation.  The patient is a 89 year old female    Patient is seen today following right hip hemiarthroplasty on 6/6/2024.  She was readmitted yesterday due to shortness of breath at her transitional care unit.  Found to have a pulmonary embolism.  She had been using ASA 81 twice daily for DVT prophylaxis.  Regarding the right hip she notes minimal pain in this area and feels that she is moving her right leg fairly well.  She does have a saturated right hip dressing and is unsure of how long this dressing has been in place for her.  No increasing pain in the right hip.  Denies fever/chills this morning.  She does note that she still has some shortness of breath.    ALLERGIES:   Review of patient's allergies indicates   Allergies   Allergen Reactions    Ace Inhibitors Cough    Amlodipine Unknown     Edema      Clarithromycin Nausea    Diltiazem Hcl [Diltiazem] Itching    Doxazosin Nausea    Penicillins Swelling and Rash    Tetanus Toxoid Other (See Comments)     Local allergic reaction         MEDICATIONS UPON ADMISSION:  Medications were reviewed.  They include:   Facility-Administered Medications Prior to Admission   Medication Dose Route Frequency Provider Last Rate Last Admin    denosumab (PROLIA) injection 60 mg  60 mg Subcutaneous Once Cassidy Grimm MD         Medications Prior to Admission   Medication Sig Dispense Refill Last Dose    acetaminophen (TYLENOL) 325 MG tablet Take 2 tablets (650 mg) by mouth every 4 hours as needed for other (For optimal non-opioid multimodal pain management to improve pain  control.) 100 tablet 0 6/17/2024 at 0855    aspirin 81 MG EC tablet Take 1 tablet (81 mg) by mouth 2 times daily 60 tablet 0 6/19/2024 at 2000    atorvastatin (LIPITOR) 40 MG tablet Take 40 mg by mouth every morning   6/19/2024 at AM & PM dose per MARpt    carvedilol (COREG) 6.25 MG tablet Take 1 tablet (6.25 mg) by mouth 2 times daily (with meals) 180 tablet 3 6/19/2024 at 1630    cholecalciferol, vitamin D3, (VITAMIN D3) 1,000 unit capsule [CHOLECALCIFEROL, VITAMIN D3, (VITAMIN D3) 1,000 UNIT CAPSULE] Take 1,000 Units by mouth daily.   6/19/2024 at AM    clobetasol (TEMOVATE) 0.05 % external ointment Apply topically daily as needed (AS NEEDED)   Unknown at PRN    cyanocobalamin (VITAMIN B-12) 500 MCG tablet Take 500 mcg by mouth every morning   6/19/2024 at AM    DOCOSAHEXANOIC ACID/EPA (FISH OIL ORAL) Take 1,200 mg by mouth every morning   6/19/2024 at AM    escitalopram (LEXAPRO) 10 MG tablet Take 1 tablet (10 mg) by mouth daily 90 tablet 3 6/19/2024 at AM    hydrALAZINE (APRESOLINE) 25 MG tablet Take 1 tablet (25 mg) by mouth every 8 hours   6/20/2024 at 0000    hydroxychloroquine (PLAQUENIL) 200 MG tablet Take 1 tablet (200 mg) by mouth daily 90 tablet 1 6/19/2024 at AM    levothyroxine (SYNTHROID/LEVOTHROID) 75 MCG tablet Take 75 mcg by mouth See Admin Instructions Take 75mcg daily on Mon, Tue, Wed, Thur, and Sat. Skip Friday and Sunday.   6/19/2024 at AM    oxyCODONE (ROXICODONE) 5 MG tablet Take 2.5 mg by mouth every 6 hours as needed for severe pain   Unknown at prn    pantoprazole (PROTONIX) 20 MG EC tablet Take 40 mg by mouth at bedtime   6/19/2024 at HS    senna-docusate (SENOKOT-S/PERICOLACE) 8.6-50 MG tablet Take 2 tablets by mouth 2 times daily   6/19/2024 at PM         SOCIAL HISTORY:   she  reports that she has never smoked. She has never used smokeless tobacco. She reports that she does not drink alcohol and does not use drugs.      FAMILY HISTORY:  family history includes ALS in her mother; Other  "- See Comments in her brother and daughter; Pulmonary Embolism in her father.      REVIEW OF SYSTEMS:   Reviewed with patient. See HPI, otherwise negative       PHYSICAL EXAMINATION:  Vitals: BP (!) 144/65 (BP Location: Right arm)   Pulse 106   Temp 98.3  F (36.8  C) (Oral)   Resp 19   Ht 1.626 m (5' 4\")   Wt 93.4 kg (206 lb)   LMP  (LMP Unknown)   SpO2 94%   BMI 35.36 kg/m    General: On examination, the patient is resting comfortably, NAD, awake, and alert and oriented to person, place, time, and, and general circumstances   SKIN: Right hip dressing saturated with what appears to be nonpurulent serosanguineous dressing.  Incision inspected and demonstrates well healing surgical incision, skin is well-approximated but not completely healed quite yet.  At the proximal third of the incision there is 1 small area draining clear serous fluid, no purulence noted.  There is no erythema surrounding the incision.  There is some ecchymosis around the incision but soft and compressible compartments.  Staples are still in place  Pulses:  Dorsalis pedis and posterior tibialis pulse is intact and equal bilaterally  Sensation: intact and equal bilaterally to the distal lower extremities.  Tenderness: NTTP of the right femur, knee, tibia, fibula, ankle, foot.  ROM: Good range of motion demonstrated regarding the right hip, knee, and ankle    Contralateral side= Full range of motion, Negative joint instability findings, 5/5 motor groups about the joint, Non-tender.       RADIOGRAPHIC EVALUATION:  Personally reviewed  EXAM: US LOWER EXTREMITY VENOUS DUPLEX BILATERAL  LOCATION: [facility]  DATE: 6/21/2024     INDICATION: Lower extremity edema, known PE, eval for DVT  COMPARISON: CT chest, abdomen, and pelvis 6/6/2024  TECHNIQUE: Venous Duplex ultrasound of bilateral lower extremities with and without compression, augmentation and duplex. Color flow and spectral Doppler with waveform analysis " performed.     FINDINGS: Exam includes the common femoral, femoral, popliteal veins as well as segmentally visualized deep calf veins and greater saphenous vein.      RIGHT: No deep vein thrombosis. No superficial thrombophlebitis. No popliteal cyst.     LEFT: No deep vein thrombosis. No superficial thrombophlebitis. No popliteal cyst. However, there is a complex heterogeneous fluid collection in the left inguinal region which extends for length of 10.7 cm. In transverse dimensions are 4.2 x 6.1 cm. No   internal color Doppler blood flow demonstrated. This was not evident on the CT of 6/6/2024.                                                                      IMPRESSION:  1.  No deep venous thrombosis in the bilateral lower extremities.  2.  Large complex fluid collection left inguinal region measuring up to 10.7 cm in diameter is new since 6/6/2024, most consistent with hematoma.    PERTINENT LABS:  Personally reviewed  Recent Labs   Lab Test 06/21/24  0755 06/21/24  0637 06/07/24  0624 06/06/24  0641   INR  --   --   --  1.04   HGB 5.8* 5.6*   < > 12.4   PLT  --  339   < > 234    < > = values in this interval not displayed.         RAYNE JOHNSON PA-C  Date: 6/21/2024  Time: 9:34 AM  [facility]    CC1:   Ted Lara DO

## 2024-06-22 NOTE — PLAN OF CARE
Problem: Skin Injury Risk Increased  Goal: Skin Health and Integrity  Intervention: Optimize Skin Protection  Recent Flowsheet Documentation  Taken 6/22/2024 0312 by Laxmi Montero, RN  Activity Management:   activity adjusted per tolerance   patient refuses activity  Head of Bed (HOB) Positioning: HOB at 60-90 degrees   Goal Outcome Evaluation:             Neuro: A&Ox4.   Cardiac: Afebrile, VSS.   Respiratory:  in 1 lit o2 via NC  GI/: Voiding spontaneously. No BM this shift.  Diet/appetite: Tolerating regular  diet. Denies nausea.  Activity: not out of bed  Pain: Denies   Skin: No new deficits noted.  Lines: piv SL  Drains: no  Replacements: no

## 2024-06-22 NOTE — CONSULTS
"CLINICAL NUTRITION SERVICES - ASSESSMENT NOTE     Nutrition Prescription    RECOMMENDATIONS FOR MDs/PROVIDERS TO ORDER:      Malnutrition Status:    Unable to determine today    Recommendations already ordered by Registered Dietitian (RD):  Magic cup - chocolate at lunch for pt to try    Future/Additional Recommendations:  Will monitor intake, wt, labs, and obtain nutrition hx and nfpe as able     REASON FOR ASSESSMENT  Ruthy Man is a/an 89 year old female assessed by the dietitian for Provider Order - nutrition risk    Per chart, pt with history of CKD3, HTN, hypothyroidism, occipital neuralgia, palindromic rheumatism involving multiple sites, and 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  Discharged 6/14/24 to TCU and presents 6/20/24 with syncope and collapse and found to have acute PE.   Hospital course complicated by acute left groin hematoma in setting of therapeutic AC    NUTRITION HISTORY  Pt sleeping when writer visited.  Has a chocolate frozen drink from outside on tray table, ~1/3 gone    Per chart review, some trouble swallowing food at TCU, episode of choking 6/19 nothing visualized in back of throat  Last hospitalization in June, pt eating 50-75% of some meals - did not like hospital food, family brought in [employer], and chicken and mashed potatoes.  A chocolate Ensure ordered for pt - unsure if she ever tried    CURRENT NUTRITION ORDERS  Diet: Regular, tray set up  Intake/Tolerance: 75% of crepes from [employer] last night, pt ordered fruit, yogurt and water for breakfast, intake not yet documented, has not ordered lunch    LABS  Labs reviewed  Na 134 L  Creat 1.53 H  Glucose 93  WBC 15.6 H  Hgb 8.3, transfused yesterday    MEDICATIONS  Medications reviewed  Levothyroxine  Pantoprazole    ANTHROPOMETRICS  Height: 162.6 cm (5' 4\")  Most Recent " Weight: 93.4 kg (206 lb)    IBW: 54.5 kg  BMI: Obesity Grade II BMI 35-39.9   35.36  Weight History:   Wt Readings from Last 10 Encounters:   06/20/24 93.4 kg (206 lb)   06/19/24 93.7 kg (206 lb 9.6 oz)   06/17/24 93.7 kg (206 lb 9.6 oz)   06/12/24 90.5 kg (199 lb 8.3 oz)   04/19/24 82.6 kg (182 lb)   03/26/24 82.6 kg (182 lb)   03/18/24 82.6 kg (182 lb)   05/13/23 82.6 kg (182 lb)   05/10/23 83.9 kg (185 lb)   04/04/22 83.9 kg (185 lb)      Dosing Weight: 64.3 kg, adjusted    ASSESSED NUTRITION NEEDS  Estimated Energy Needs: 1600 kcals/day (25 kcal/kg)  Justification: Obese, increased needs  Estimated Protein Needs: 65 grams protein/day (1 gm/kg)  Justification: Increased needs  Estimated Fluid Needs: 1600 mL/day (25 mL/kg), or per provider  Justification: Maintenance    PHYSICAL FINDINGS  Cheeks shade  Per chart,  Edema +1-+3 bilateral LE including hips  Surgical incision note  GI: normoactive BS  Fecal incontinence  Last BM today, brown, loose    MALNUTRITION:  % Weight Loss:  None noted (does have fluid accumulation)  % Intake:  unable to assess  Subcutaneous Fat Loss:  unable to assess  Muscle Loss:  unable to assess  Fluid Retention:  Moderate     Malnutrition Diagnosis: Unable to determine due to need nutrition hx and nfpe      NUTRITION DIAGNOSIS  Increased nutrient needs related to healing as evidenced by hip fracture earlier this month      INTERVENTIONS  Implementation  Medical food supplement therapy     Goals  Patient to consume % of nutritionally adequate meals three times per day, or the equivalent with supplements/snacks.     Monitoring/Evaluation  Progress toward goals will be monitored and evaluated per protocol.

## 2024-06-22 NOTE — PLAN OF CARE
Goal Outcome Evaluation:  Patient is [place] and forgetful.  Denies pain at rest but reports pain in left leg where hematoma is can increase significantly with any movement. In fact, patient refused PT today because the pain is too bad. Patient states pain in left leg is worse than right hip surgical pain. Patient has been using a ice pack to left upper leg.  Regular diet-poor appetite and fluid intake. Family brought in crepes from [employer] which is a favorite of hers and she ate 75%.  Urine output has been low. She has small amount of incontinent dribbling at times but otherwise has been retaining urine. Last st cath was last evening at 2200. Patient has been bladder scanned every couple of hours all day and never reached 500cc in bladder. MD notified tonight after straight cathing for 400. New order for small bolus to be given. Night shift will start. Periarea very red and denuded. Calmoseptine applied x2 this shift. Pillow case placed between thighs tonight to aide with wetness and reduce friction.  Sepsis protocol fired near change of shift. VSS stable. LA 1.2.  Q8 hour Hgb. At 1520 it was 8.8 and when rechecked again around 2200 it was 8.1.  Also notified MD regarding audible wheeze with cares and edema in lower extremities. Sating 89-90% on .5L so increased to 1L/NC and remained at 93-94%  Turn and reposition every 2 hours. SCDs in place.   Bed alarm on for pt safety.

## 2024-06-22 NOTE — PROGRESS NOTES
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  Discharged 6/14/24 to [facility] and presents 6/20/24 with syncope and collapse and found to have acute PE.   Hospital course complicated by acute left groin hematoma in setting of therapeutic AC      Acute PE:  CTA shows acute pulmonary emboli within two segmental pulmonary arteries in the right lower lobe   US bilat LE negative for DVT  -- transitioned from IV heparin to eliquis 6/20  -- eliquis stopped 6/21 due to left groin hematoma  -- discussed with heme, will plan to hold eliquis   -- hematoma stable 6/22 so started low intensity heparin gtt without bolus    -- if there are further complications with AC then will need IVC filter        Left groin hematoma: likely related to recent fall and therapeutic AC  Incidentally found on US LLE that shows large complex fluid collection left inguinal region measuring up to 10.7 cm in diameter and most consistent with hematoma. Patient has associated 2G drop in Hgb overnight 6/20-6/21  -- hold eliquis  -- monitor closely while resuming therapeutic AC  -- general surgery signed off      Acute anemia: due to left groin hematoma. Also noted to be iron deficient   -- transfused 2 U PRBC 6/21 which will provide good iron load  -- started PO iron replacement   -- follow Hgb and transfuse as needed       Acute hypoxemic respiratory failure: likely related to PE and chronic underlying lung disease and now acute anemia  Previously treated for pneumonia on last hospitalization  CT on admission shows few clusters of small  irregular-shaped patchy and nodular opacities scattered within the periphery of both lungs that pulmonary feels are stable since 2022   Influenza/covid/rsv negative on admission  -- pulmonary recommended stopping IV abx started on admission  -- continue duonebs prn  -- SLP saw last hospitalization and had no concerns  -- wean oxygen as tolerated  -- already has outpatient pulmonary referral, no further workup needed per inpatient pulm consult 6/20/24  -- push IS      Leukocytosis: with mild procalcitonin elevation.   Lactic acidemia resolved  Blood cultures negative on admission  UA not consistent with UTI  Pulmonary does not feel patient has active pneumonia  -- question if related to hematoma vs excoriated baldemar area however no evidence of purulent sores currently, does have some mild inflammation but not entirely consistent with cellulitis  -- will have WOC assess for local skin cares  -- monitor WBC and fever curve for now  -- follow up blood cultures  -- consider empiric abx pending clinical course. Wound consider Augmentin and doxycycline since possible soft tissue infection would be the source.        BEBETO on CKD3: improved after PRBC transfusion  -- likely related to acute anemia and relative hypotension  -- hold home coreg and hydralazine            Right femoral neck fracture, sustained after a fall prior to last hospitalization  -- s/p right hip hemiarthroplasty 6/6/21  -- ortho following, wound does not appear infected, has non-purulent drainage so will leave staples in place another week  -- likely will need TCU placement again, will have PT/OT re-assess           HTN: continue home coreg and hydralazine when BP improved       Hypothyroidism: continue home replacement         History of RA: continue home plaquenil        Ischemic optic neuropathy of the left eye:  -- continue home statin and currently holding home baby ASA now that she is on therapeutic anticoagulation        Occipital neuralgia: continue  "tylenol         GERD: continue home PPI        Mood disorder: continue home lexapro             Diet: Combination Diet Regular Diet Adult  Snacks/Supplements Adult: Magic Cup; With Meals    DVT Prophylaxis:IV heparin  Garcia Catheter: Not present  Lines: None     Cardiac Monitoring: None  Code Status: Full Code      Clinically Significant Risk Factors              # Hypoalbuminemia: Lowest albumin = 2.7 g/dL at 6/20/2024  1:21 AM, will monitor as appropriate     # Hypertension: Noted on problem list               #Precipitous drop in Hgb/Hct: Lowest Hgb this hospitalization: 5.6 g/dL. Will continue to monitor and treat/transfuse as appropriate.     # Obesity: Estimated body mass index is 35.36 kg/m  as calculated from the following:    Height as of this encounter: 1.626 m (5' 4\").    Weight as of this encounter: 93.4 kg (206 lb)., PRESENT ON ADMISSION            Disposition Plan   Medically Ready for Discharge: Anticipated Tomorrow      Ted Lara DO  Hospitalist Service  [facility]  Securely message with [employer] (more info)  Text page via [employer] Paging/Directory   ______________________________________________________________________    Interval History   NAD. Denies any complaints besides some mild SOB    Physical Exam   Vital Signs: Temp: 98  F (36.7  C) Temp src: Oral BP: 123/60 Pulse: 92   Resp: 18 SpO2: 93 % O2 Device: Nasal cannula Oxygen Delivery: 1/2 LPM  Weight: 206 lbs 0 oz  General: NAD  RESPIRATORY: Breathing nonlabored  CARDIOVASCULAR: 1+ le edema bilat.   NEUROLOGIC: Motor and sensory intact, speech clear         >50 MINUTES SPENT BY ME on the date of service doing chart review, history, exam, documentation & further activities per the note.  Data       "

## 2024-06-22 NOTE — PROGRESS NOTES
Orthopedic progress note:  Virginia is doing okay today.  She received transfusion yesterday for anemia.  She states that her right hip is not giving her any issues.  She is overall feeling weak given her systemic anticoagulation in setting of her pulmonary embolism.  Denies any current chest pain or shortness of breath.        Examination of the right lower extremity demonstrates silk tape in place.  Fires gastrosoleus complex, EHL, tibialis anterior.  Sensation intact to light touch in all distributions.  Palpable DP pulse.        89-year-old female status post right hip hemiarthroplasty that was readmitted for pulmonary embolism that is doing fine from an orthopedic perspective at this time.      Weightbearing as tolerated right lower extremity  Anticoagulation per primary team  Will require follow-up in 4 weeks with x-rays      Fabio Sheppard MD  [facility]

## 2024-06-22 NOTE — PROGRESS NOTES
General Surgery Progress Note:    Hospital Day # 2    ASSESSMENT:   1. Irritant contact dermatitis due to fecal, urinary or dual incontinence [L24.A2]    2. Sepsis, due to unspecified organism, unspecified whether acute organ dysfunction present (H)    3. Acute respiratory failure with hypoxia (H)    4. Single subsegmental pulmonary embolism without acute cor pulmonale (H)        Ruthy Man is a 89 year old female with PE and incidental findings of left groin hematoma    PLAN:   -Hgb and vitals stable this AM, continue to monitor  -Ok with anticoagulation as indicated for PE  -No current surgical intervention indicated for hematoma, we will sign off. If there is concern for continued bleeding, would recommend CTA for anything active and possible IR involvement if there is active bleeding. Please reach out if any further needs arise.    Chay Milner PA-C  [facility]  Surgery [address]  [address]  [place] [address]?  Office: [phone]      SUBJECTIVE:   Ruthy Man is doing fine, sore, but moving a little    Patient Vitals for the past 24 hrs:   BP Temp Temp src Pulse Resp SpO2   06/22/24 0709 123/60 98  F (36.7  C) Oral 92 18 93 %   06/22/24 0038 125/56 98  F (36.7  C) Oral 87 17 96 %   06/22/24 0005 122/58 97.8  F (36.6  C) Oral 91 17 97 %   06/21/24 2058 -- -- -- 99 -- 91 %   06/21/24 1637 (!) 145/67 98.2  F (36.8  C) Oral 88 22 94 %   06/21/24 1451 (!) 142/62 98.3  F (36.8  C) Oral 92 20 92 %   06/21/24 1401 135/71 98.5  F (36.9  C) Oral 97 22 92 %   06/21/24 1301 136/67 98.2  F (36.8  C) Oral 101 22 93 %   06/21/24 1216 123/57 98.4  F (36.9  C) Oral 104 24 94 %   06/21/24 1209 -- -- -- -- -- 93 %   06/21/24 1145 (!) 140/65 97.9  F (36.6  C) Oral 105 20 91 %   06/21/24 1140 137/64 98.3  F (36.8  C) Oral 107 20 92 %       Physical Exam:  General: NAD, pleasant  EXT: LLE - erythematous rash in left groin, thigh soft, no overlying skin changes,  minimally tender, CMS intact    Lab Results   Component Value Date    WBC 15.6 06/22/2024    HGB 8.3 06/22/2024    HCT 25.7 06/22/2024    MCV 93 06/22/2024     06/22/2024     INR/Prothrombin Time  Recent Labs   Lab 06/22/24  0616   *   CO2 21*   BUN 20.6     Lab Results   Component Value Date    ALT 10 06/20/2024    AST 29 06/20/2024    ALKPHOS 127 06/20/2024

## 2024-06-22 NOTE — PROGRESS NOTES
06/22/24 0905   Appointment Info   Signing Clinician's Name / Credentials (OT) Guadalupe UNDERWOOD edwardo SENA/L   Living Environment   People in Home alone   Current Living Arrangements house   Home Accessibility stairs to enter home;stairs within home   Number of Stairs, Main Entrance 3   Number of Stairs, Within Home, Primary seven   Living Environment Comments Pt livs in 4 level home.  Pt was at TCU since  last hospital stay.   Self-Care   Current Activity Tolerance good   General Information   Onset of Illness/Injury or Date of Surgery 06/20/24   Referring Physician Elida   Patient/Family Therapy Goal Statement (OT) home when better   Additional Occupational Profile Info/Pertinent History of Current Problem Pt admitted with syncope, PE, PNA, recent R hip surgery, L groin hematoma   Existing Precautions/Restrictions fall;no hip IR;90 degree hip flexion   Cognitive Status Examination   Cognitive Status Comments grossly intact   Visual Perception   Visual Impairment/Limitations WFL   Sensory   Sensory Quick Adds sensation intact   Range of Motion Comprehensive   General Range of Motion no range of motion deficits identified   Strength Comprehensive (MMT)   Comment, General Manual Muscle Testing (MMT) Assessment Generalized weakness B UE's   Bed Mobility   Comment (Bed Mobility) NT   Transfers   Transfer Comments NT, pt refused due to SOB   Balance   Balance Comments SBA sitting   Activities of Daily Living   BADL Assessment/Intervention   (Pt will need AE for LB dressing)   Clinical Impression   Criteria for Skilled Therapeutic Interventions Met (OT) Yes, treatment indicated   OT Diagnosis Impaired ADL independence   OT Problem List-Impairments impacting ADL activity tolerance impaired;balance;mobility;strength   Assessment of Occupational Performance 3-5 Performance Deficits   Planned Therapy Interventions (OT) ADL retraining;balance training;bed mobility training;strengthening;transfer training   Risk &  Benefits of therapy have been explained evaluation/treatment results reviewed;participants included;patient   Clinical Impression Comments PT seen bedside for OT eval and treatment.  Pt very limited by fatigue and SOB.  Pt demonstrates decreased act tolerance for and independence with ADLs and mobility.  OT to continue to address.  Recommend TCU.   OT Total Evaluation Time   OT Eval, Moderate Complexity Minutes (11940) 10   OT Goals   Therapy Frequency (OT) 5 times/week   OT Predicted Duration/Target Date for Goal Attainment 06/29/24   OT Goals Lower Body Dressing;Transfers;Toilet Transfer/Toileting;OT Goal 1   OT: Lower Body Dressing Minimal assist;using adaptive equipment   OT: Bed Mobility Minimal assist   OT: Transfer Minimal assist   OT: Toilet Transfer/Toileting Minimal assist   OT: Goal 1 Pt will tolerate 10 minutes Ue ex to increase endurance for ADLs.   OT Discharge Planning   OT Plan transfers, ex, LB dressing iwth AE   OT Discharge Recommendation (DC Rec) Transitional Care Facility   OT Rationale for DC Rec Recommend TCU as pt needs assist with all ADLs and mobility   OT Brief overview of current status Pt very limited by SOB, unable to complete mobiltiy due to SOB today.

## 2024-06-22 NOTE — PLAN OF CARE
Up to chair for breakfast one assist and stable with pivot. Less pain in left thigh today. No change in redness of baldemar area. Right thigh staples under dressing with no noticeable drainage on dressing, and no pain in area. IV heparin initiated. Ate a good breakfast of yogurt and fruit. Pt napped throughout day. Reported SOB with activity, but no problem breathing at rest. HGB improved.

## 2024-06-22 NOTE — PROCEDURES
Pt not needing vesicant/vasopressors at this time, and has 2 patent PIV's. Bedside RN placed additional PIV, MD aware and PICC not needed at this time.

## 2024-06-22 NOTE — PROVIDER NOTIFICATION
06/22/24 1731   Critical Test Results/Notification   Critical Lab Result (Lab Name and Value)    What Time Did The Lab Notify You? 1725   Provider Notified yes   Date of Provider Notification 06/22/24   Time of Provider Notification 1730   Mechanism of Provider notification page  (Vocera messaging)   What Provider Did You Notify? Dr. Lara   Response aware;other (see comment)  (heparin protocol followed, no other new orders)

## 2024-06-23 NOTE — PLAN OF CARE
Problem: VTE (Venous Thromboembolism)  Goal: Tissue Perfusion  Outcome: Progressing     Problem: VTE (Venous Thromboembolism)  Goal: Right Ventricular Function  Outcome: Progressing     Problem: Pneumonia  Goal: Effective Oxygenation and Ventilation  Outcome: Progressing     Problem: Orthopaedic Fracture  Goal: Optimal Pain Control and Function  Outcome: Progressing     Problem: Comorbidity Management  Goal: Blood Pressure in Desired Range  Outcome: Progressing     Problem: Urinary Retention  Goal: Effective Urinary Elimination  Outcome: Progressing   Goal Outcome Evaluation:    Patient slept between cares. Alert and oriented x4, able to make needs known. VSS on 0.5 oxygen through nasal canula.  Prn tylenol and ice pack given for discomfort. Pain tolerable at rest. Heparin gtt infusing at 600 units/hr. PTT due at 0645. Hemoglobin every 8 hrs. Hemoglobin 7.3 at midnight and 7.2 at 6 am. Incontinent of urine x1. Bladder scan was 143.

## 2024-06-23 NOTE — PROGRESS NOTES
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  [facility] and presents 6/20/24 with syncope and collapse and found to have acute PE.   Hospital course complicated by acute left groin hematoma in setting of therapeutic AC      Acute PE:  CTA shows acute pulmonary emboli within two segmental pulmonary arteries in the right lower lobe   US bilat LE negative for DVT  -- transitioned from IV heparin to eliquis 6/20  -- eliquis stopped 6/21 due to left groin hematoma   -- hematoma stable 6/22 so started low intensity heparin gtt without bolus    -- will plan to keep on IV heparin for today and likely transition to eliquis likely tomorrow  -- if there are further complications with AC then will need IVC filter        Left groin hematoma: likely related to recent fall and therapeutic AC  Incidentally found on US LLE that shows large complex fluid collection left inguinal region measuring up to 10.7 cm in diameter and most consistent with hematoma. Patient has associated 2G drop in Hgb overnight 6/20-6/21  -- monitor closely while resuming therapeutic AC  -- general surgery signed off      Acute anemia: due to left groin hematoma. Also noted to be iron deficient   -- transfused 2 U PRBC 6/21 which will provide good iron load  -- started PO iron replacement   -- Hgb slightly down again 6/23. Transfused additional 1U PRBC 6/23  -- follow Hgb and transfuse as needed       Acute hypoxemic respiratory failure: likely related to PE and chronic underlying lung disease and now acute  anemia  Previously treated for pneumonia on last hospitalization  CT on admission shows few clusters of small irregular-shaped patchy and nodular opacities scattered within the periphery of both lungs that pulmonary feels are stable since 2022   Influenza/covid/rsv negative on admission  -- pulmonary recommended stopping IV abx started on admission  -- continue duonebs prn  -- SLP saw last hospitalization and had no concerns  -- wean oxygen as tolerated  -- already has outpatient pulmonary referral, no further workup needed per inpatient pulm consult 6/20/24  -- push IS      Leukocytosis: with mild procalcitonin elevation.   Lactic acidemia resolved  Blood cultures negative on admission  UA not consistent with UTI  Pulmonary does not feel patient has active pneumonia  WBC now near normal  -- question if related to hematoma vs excoriated baldemar area however no evidence of purulent sores currently, does have some mild inflammation but not entirely consistent with cellulitis  -- WOC following for local skin cares  -- monitor WBC and fever curve for now  -- follow up blood cultures  -- consider empiric abx pending clinical course. Wound consider Augmentin and doxycycline since possible soft tissue infection would be the source.          BEBETO on CKD3: improved after PRBC transfusion  -- likely related to acute anemia and relative hypotension  -- BP's better, will resume home coreg  -- hold home hydralazine            Right femoral neck fracture, sustained after a fall prior to last hospitalization  -- s/p right hip hemiarthroplasty 6/6/21  -- ortho following, wound does not appear infected, has non-purulent drainage so will leave staples in place another week  -- likely will need TCU placement again, will have PT/OT re-assess           HTN: continue home coreg and then resume hydralazine pending BP trend       Hypothyroidism: continue home replacement         History of RA: continue home plaquenil        Ischemic optic  "neuropathy of the left eye:  -- continue home statin and currently holding home baby ASA now that she is on therapeutic anticoagulation        Occipital neuralgia: continue tylenol         GERD: continue home PPI        Mood disorder: continue home lexapro             Diet: Combination Diet Regular Diet Adult  Snacks/Supplements Adult: Magic Cup; With Meals    DVT Prophylaxis:IV heparin  Garcia Catheter: Not present  Lines: None     Cardiac Monitoring: None  Code Status: Full Code      Clinically Significant Risk Factors              # Hypoalbuminemia: Lowest albumin = 2.7 g/dL at 6/20/2024  1:21 AM, will monitor as appropriate     # Hypertension: Noted on problem list               #Precipitous drop in Hgb/Hct: Lowest Hgb this hospitalization: 5.6 g/dL. Will continue to monitor and treat/transfuse as appropriate.     # Obesity: Estimated body mass index is 35.36 kg/m  as calculated from the following:    Height as of this encounter: 1.626 m (5' 4\").    Weight as of this encounter: 93.4 kg (206 lb)., PRESENT ON ADMISSION     # Financial/Environmental Concerns: none         Disposition Plan   Medically Ready for Discharge: Anticipated in 2-4 Days      Ted Lara DO  Hospitalist Service  [facility]  Securely message with [employer] (more info)  Text page via [employer] Paging/Directory   ______________________________________________________________________    Interval History   NAD. Denies any complaints     Physical Exam   Vital Signs: Temp: 98.5  F (36.9  C) Temp src: Oral BP: 138/66 Pulse: 95   Resp: 20 SpO2: 93 % O2 Device: None (Room air) Oxygen Delivery: 1/2 LPM  Weight: 206 lbs 0 oz  General: NAD  RESPIRATORY: Breathing nonlabored  CARDIOVASCULAR: 1+ le edema bilat.   NEUROLOGIC: Motor and sensory intact, speech clear         >50 MINUTES SPENT BY ME on the date of service doing chart review, history, exam, documentation & further activities per the note.  Data       "

## 2024-06-23 NOTE — PLAN OF CARE
Goal Outcome Evaluation:      Plan of Care Reviewed With: patient (Nelson Rivero)    Overall Patient Progress: no changeOverall Patient Progress: no change    Outcome Evaluation: Anticipate discharge to pt home with home care/private duty care/family support vs discharge to her daughter's home with home care/private duty care

## 2024-06-23 NOTE — PROGRESS NOTES
Care Management Initial Consult    General Information  Assessment completed with: Patient, VM-chart review, Children (Son Mat),    Type of CM/SW Visit: Initial Assessment    Primary Care Provider verified and updated as needed: Yes (PCP Dr. Messina,)   Readmission within the last 30 days: other (see comments)      Reason for Consult: discharge planning  Advance Care Planning: Advance Care Planning Reviewed: no concerns identified          Communication Assessment  Patient's communication style: spoken language (English or Bilingual)    Hearing Difficulty or Deaf: yes   Wear Glasses or Blind: no    Cognitive  Cognitive/Neuro/Behavioral: WDL                      Living Environment:   People in home: alone     Current living Arrangements: house      Able to return to prior arrangements:  (TCU recommended however pt declining.  Requesting discharge to home with home care and family support)       Family/Social Support:  Care provided by: self  Provides care for: no one  Marital Status:    (Adult children Mat and Jaquelin)          Description of Support System: Supportive, Involved    Support Assessment: Adequate family and caregiver support    Current Resources:   Patient receiving home care services: No     Community Resources: None  Equipment currently used at home: none  Supplies currently used at home: None    Employment/Financial:  Employment Status: retired        Financial Concerns: none           Does the patient's insurance plan have a 3 day qualifying hospital stay waiver?  No    Lifestyle & Psychosocial Needs:  Social Determinants of Health     Food Insecurity: Not on file   Depression: Not at risk (3/18/2024)    PHQ-2     PHQ-2 Score: 0   Housing Stability: Not on file   Tobacco Use: Low Risk  (6/20/2024)    Patient History     Smoking Tobacco Use: Never     Smokeless Tobacco Use: Never     Passive Exposure: Not on file   Financial Resource Strain: Not on file   Alcohol Use: Not on file    Transportation Needs: Not on file   Physical Activity: Not on file   Interpersonal Safety: Low Risk  (3/18/2024)    Interpersonal Safety     Do you feel physically and emotionally safe where you currently live?: Yes     Within the past 12 months, have you been hit, slapped, kicked or otherwise physically hurt by someone?: No     Within the past 12 months, have you been humiliated or emotionally abused in other ways by your partner or ex-partner?: No   Stress: Not on file   Social Connections: Not on file   Health Literacy: Not on file       Functional Status:  Prior to admission patient needed assistance:   Dependent ADLs:: Independent  Dependent IADLs:: Independent       Additional Information:    Per provider note pt with a medical history significant for CKD3, HTN, hypothyroidism, occipital neuralgia, palindromic rheumatism involving multiple sites, and 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  [facility]charged 6/14/24 to [facility] and presents 6/20/24 with syncope and collapse and found to have acute PE.     Pt recently discharged to [facility].  PT/OT consulted recommending TCU.    Met with pt and her son Mat.  Pt and family do not want TCU prefer home plan.  Discussed home care services as well as private duty services.  Pt lives in a multi level home.  Bedroom and bathroom are on upper level.   Pt and son noted that they have also discussed pt going to her daughter Jaquelin's home as pt could be on one level, no stairs.  Pt daughter lives across the street from patient.   Provided pt/son with a list of home care agencies and private home care agencies to review.  Pt does not have equipment at home so will need DME recommendations from therapy.  Discussed transportation at discharge - stretcher vs w/c.      Will defer to primary CM team for on going  management    Deanna Hernandes, RN BSN, PHN, ACM-RN  Nurse Coordinator

## 2024-06-23 NOTE — PLAN OF CARE
Pt is alert and oriented, VSS. Denied pain this shift. Is on O2 at 0.5L via NC, sats stable. Heparin drip initially running at 1,100 units/hr. PTT value came back critical high at 176. Protocol follow and Dr. Lara updated. Paused heparin for one hour and restarted at 600 units/hr per protocol. Pt latest hgb 7.5. Pt unable to void, bladder scan showed 500ml. Pt was straight cathed and had 425ml of urine out. Pt had poor appetite ate a few bites of dinner family brought.     Problem: Adult Inpatient Plan of Care  Goal: Optimal Comfort and Wellbeing  Outcome: Progressing     Problem: Risk for Delirium  Goal: Improved Behavioral Control  Outcome: Progressing  Intervention: Prevent and Manage Agitation  Recent Flowsheet Documentation  Taken 6/22/2024 1600 by Herman Goodrich RN  Environment Familiarity/Consistency: daily routine followed  Intervention: Minimize Safety Risk  Recent Flowsheet Documentation  Taken 6/22/2024 1600 by Herman Goodrich RN  Communication Enhancement Strategies: call light answered in person  Enhanced Safety Measures:   pain management   review medications for side effects with activity     Problem: Pneumonia  Goal: Effective Oxygenation and Ventilation  Outcome: Progressing  Intervention: Promote Airway Secretion Clearance  Recent Flowsheet Documentation  Taken 6/22/2024 1600 by Herman Goodrich RN  Cough And Deep Breathing: done independently per patient     Problem: Orthopaedic Fracture  Goal: Optimal Pain Control and Function  Outcome: Progressing     Problem: Comorbidity Management  Goal: Blood Pressure in Desired Range  Outcome: Progressing  Intervention: Maintain Blood Pressure Management  Recent Flowsheet Documentation  Taken 6/22/2024 1600 by Herman Goodrich RN  Medication Review/Management: medications reviewed     Problem: Skin Injury Risk Increased  Goal: Skin Health and Integrity  Outcome: Progressing  Intervention: Plan: Nurse Driven Intervention: Moisture  Management  Recent Flowsheet Documentation  Taken 6/22/2024 1600 by Herman Goodrich RN  Moisture Interventions: Encourage regular toileting  Intervention: Plan: Nurse Driven Intervention: Friction and Shear  Recent Flowsheet Documentation  Taken 6/22/2024 1600 by Herman Goodrich RN  Friction/Shear Interventions: HOB 30 degrees or less     Problem: Urinary Retention  Goal: Effective Urinary Elimination  Outcome: Progressing     Problem: Adult Inpatient Plan of Care  Goal: Absence of Hospital-Acquired Illness or Injury  Intervention: Identify and Manage Fall Risk  Recent Flowsheet Documentation  Taken 6/22/2024 1600 by Herman Goodrich RN  Safety Promotion/Fall Prevention:   activity supervised   assistive device/personal items within reach   clutter free environment maintained   mobility aid in reach   nonskid shoes/slippers when out of bed   room door open   room near nurse's station   safety round/check completed  Intervention: Prevent Infection  Recent Flowsheet Documentation  Taken 6/22/2024 1600 by Herman Goodrich RN  Infection Prevention:   hand hygiene promoted   rest/sleep promoted   single patient room provided     Problem: Risk for Delirium  Goal: Optimal Coping  Intervention: Optimize Psychosocial Adjustment to Delirium  Recent Flowsheet Documentation  Taken 6/22/2024 1600 by Herman Goodrich RN  Supportive Measures: active listening utilized  Goal: Improved Attention and Thought Clarity  Intervention: Maximize Cognitive Function  Recent Flowsheet Documentation  Taken 6/22/2024 1600 by Herman Goodrich RN  Sensory Stimulation Regulation:   care clustered   quiet environment promoted  Reorientation Measures: clock in view   Goal Outcome Evaluation:

## 2024-06-23 NOTE — CONSULTS
[facility] Hematology and Oncology Inpatient Consult Note    Patient: Ruthy Man  MRN: [id number]  Date of Service: 6/23/2024      Reason for Visit  Syncope and fall      Assessment  Acute pulmonary emboli  Acute hypoxic respiratory failure  Syncope  Left groin hematoma  Anemia secondary to blood loss  Right femoral neck fracture status post right hip hemiarthroplasty on 6/6/2021    Plan:  I have reviewed her chart in detail.  I have reviewed her CT scan images and report personally and independently interpreted the results.  Recent right hip fracture following a fall status post hemiarthroplasty.  Was in the TCU and presented after syncopal episode.  Was found to be hypoxic.  CT angio chest showing segmental artery emboli on the right side.  Started on heparin initially and then transition to oral Eliquis.  Then developed left groin hematoma.  Etiology of the hematoma is not clear but thought to be secondary to fall.  Hemoglobin dropped to less than 6.  Has required transfusion support.  No evidence of any compartment syndrome.  No evacuation of the hematoma planned.  Eliquis was held a couple days ago.  Hemoglobin has mostly stabilized.  Around 7-8.  Has been restarted on low intensity heparin drip now.  No boluses.  Recommend continuing monitoring for worsening bleeding and hemoglobin levels.  If tolerates low intensity heparin then can transition to Eliquis at the time of discharge.  I recommend starting 5 mg twice daily.  No loading dose.    Staging History    Cancer Staging   No matching staging information was found for the patient.        History  Ms. Ruthy Man is a 89 year old female with recent history of right femoral neck fracture after a fall status post hemiarthroplasty on 6/6/2021 who is currently hospitalized after a syncopal episode resulting in fall and acute hypoxic respiratory failure, who has been consulted by the medicine team to discuss further evaluation management of PE in  the setting of active bleeding.    She was recently discharged after undergoing right hip hemiarthroplasty on 2021 to TCU.  She presented a couple days ago after sustaining a fall due to syncopal episode.  Also had significant hypoxia.  CT angio of the chest showed segmental pulmonary artery emboli within the right lower lobe.  Ultrasound of the bilateral remedies were negative for DVT.  No evidence of any infection.  It was unclear if the PET resulted in the syncopal episode and respiratory issues.  She was initially started on heparin drip and then transition to oral Eliquis.  Following this she developed left groin hematoma with drop in the hemoglobin level.  This was thought to be due to trauma.  Eliquis was stopped.  No evidence of compartment syndrome.  Hemoglobin has monitored closely.  She has been doing well otherwise.  Respiratory symptoms have significantly improved.  Hemodynamically stable.  We have been asked to manage anticoagulation in the setting of bleeding.  Denies any pain in the left hip area.    Review of systems.    A 14 point review of systems was obtained.  Positive findings noted in the history.  Rest of the review of system is otherwise negative.      Past History  Past Medical History:   Diagnosis Date    Hypertension     Senile osteoporosis 2019     Past Surgical History:   Procedure Laterality Date    HC REMOVAL GALLBLADDER      Description: Cholecystectomy;  Recorded: 2008;  Comments: stone    OPEN REDUCTION INTERNAL FIXATION HIP BIPOLAR Right 2024    Procedure: HEMIARTHROPLASTY, HIP, BIPOLAR;  Surgeon: Eliud Tripp MD;  Location: [facility] OR    [facility] TOTAL ABDOM HYSTERECTOMY      Description: Hysterectomy;  Recorded: 2008;  Comments: benign disease     Family History   Problem Relation Age of Onset    ALS Mother          age 75    Pulmonary Embolism Father          age 79    Other - See Comments Brother          age birth    Other - See  "Comments Daughter         has corticobasal syndrome     Social History     Socioeconomic History    Marital status:      Spouse name: None    Number of children: None    Years of education: None    Highest education level: None   Tobacco Use    Smoking status: Never    Smokeless tobacco: Never   Vaping Use    Vaping status: Never Used   Substance and Sexual Activity    Alcohol use: Never    Drug use: Never   Social History Narrative    She is .  Her   in  suddenly, but he also had dementia. They were  for 60 years. She has 3 children, 4 grandchildren and 1 great grandchild. 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  One daughter lives with her and she has corticobasal degeneration.     Social Determinants of Health     Interpersonal Safety: Low Risk  (3/18/2024)    Interpersonal Safety     Do you feel physically and emotionally safe where you currently live?: Yes     Within the past 12 months, have you been hit, slapped, kicked or otherwise physically hurt by someone?: No     Within the past 12 months, have you been humiliated or emotionally abused in other ways by your partner or ex-partner?: No       Allergies    Allergies   Allergen Reactions    Ace Inhibitors Cough    Amlodipine Unknown     Edema      Clarithromycin Nausea    Diltiazem Hcl [Diltiazem] Itching    Doxazosin Nausea    Penicillins Swelling and Rash    Tetanus Toxoid Other (See Comments)     Local allergic reaction          Physical Exam    BP (!) 141/65 (BP Location: Right arm)   Pulse 82   Temp 98.9  F (37.2  C) (Oral)   Resp 20   Ht 1.626 m (5' 4\")   Wt 93.4 kg (206 lb)   LMP  (LMP Unknown)   SpO2 93%   BMI 35.36 kg/m      GENERAL: Alert and oriented to time place and person. In no distress.    HEAD: Atraumatic and normocephalic.    EYES: AIDE, EOMI. No pallor. No icterus.    Oral cavity: Moist mucosa    NECK: " supple.    LYMPH NODES: No palpable, cervical, axillary or inguinal lymphadenopathy.    CHEST: clear to auscultation bilaterally.     CVS: S1 and S2 are Regular rate and rhythm.     ABDOMEN: Soft. Not tender.     EXTREMITIES: Warm.  Left groin hematoma    NEUROLOGICAL: Preserved orientation.  Neuromuscular system intact.  Cranial nerve appears to be intact.  No cerebellar deficit apparent.    SKIN: Bruising noted in the left groin area      Lab Results  Recent Results (from the past 24 hour(s))   Partial thromboplastin time    Collection Time: 06/22/24  4:54 PM   Result Value Ref Range    aPTT 176 (HH) 22 - 38 Seconds   Hemoglobin    Collection Time: 06/22/24  4:54 PM   Result Value Ref Range    Hemoglobin 7.5 (L) 11.7 - 15.7 g/dL   Lactic Acid Whole Blood w/ 1x repeat in 2 hrs when >2    Collection Time: 06/22/24  5:09 PM   Result Value Ref Range    Lactic Acid, Initial 0.9 0.7 - 2.0 mmol/L   Hemoglobin    Collection Time: 06/23/24 12:27 AM   Result Value Ref Range    Hemoglobin 7.3 (L) 11.7 - 15.7 g/dL   Partial thromboplastin time    Collection Time: 06/23/24 12:27 AM   Result Value Ref Range    aPTT 71 (H) 22 - 38 Seconds   Basic metabolic panel    Collection Time: 06/23/24  6:24 AM   Result Value Ref Range    Sodium 135 135 - 145 mmol/L    Potassium 4.0 3.4 - 5.3 mmol/L    Chloride 103 98 - 107 mmol/L    Carbon Dioxide (CO2) 21 (L) 22 - 29 mmol/L    Anion Gap 11 7 - 15 mmol/L    Urea Nitrogen 16.5 8.0 - 23.0 mg/dL    Creatinine 1.31 (H) 0.51 - 0.95 mg/dL    GFR Estimate 38 (L) >60 mL/min/1.73m2    Calcium 7.3 (L) 8.2 - 9.6 mg/dL    Glucose 86 70 - 99 mg/dL   CBC with platelets    Collection Time: 06/23/24  6:24 AM   Result Value Ref Range    WBC Count 11.4 (H) 4.0 - 11.0 10e3/uL    RBC Count 2.37 (L) 3.80 - 5.20 10e6/uL    Hemoglobin 7.2 (L) 11.7 - 15.7 g/dL    Hematocrit 21.9 (L) 35.0 - 47.0 %    MCV 92 78 - 100 fL    MCH 30.4 26.5 - 33.0 pg    MCHC 32.9 31.5 - 36.5 g/dL    RDW 15.0 10.0 - 15.0 %    Platelet  Count 254 150 - 450 10e3/uL   Partial thromboplastin time    Collection Time: 06/23/24  6:24 AM   Result Value Ref Range    aPTT 71 (H) 22 - 38 Seconds   Prepare red blood cells (unit)    Collection Time: 06/23/24  8:56 AM   Result Value Ref Range    Blood Component Type Red Blood Cells     Product Code [id number]     Unit Status Transfused     Unit Number [id number]     CROSSMATCH Compatible     CODING SYSTEM [id number]     ISSUE DATE AND TIME [id number]     UNIT ABO/RH O+     UNIT TYPE ISBT 5100         Imaging Results    US Lower Extremity Venous Duplex Bilateral    Result Date: 6/21/2024  EXAM: US LOWER EXTREMITY VENOUS DUPLEX BILATERAL LOCATION: [facility] DATE: 6/21/2024 INDICATION: Lower extremity edema, known PE, eval for DVT COMPARISON: CT chest, abdomen, and pelvis 6/6/2024 TECHNIQUE: Venous Duplex ultrasound of bilateral lower extremities with and without compression, augmentation and duplex. Color flow and spectral Doppler with waveform analysis performed. FINDINGS: Exam includes the common femoral, femoral, popliteal veins as well as segmentally visualized deep calf veins and greater saphenous vein. RIGHT: No deep vein thrombosis. No superficial thrombophlebitis. No popliteal cyst. LEFT: No deep vein thrombosis. No superficial thrombophlebitis. No popliteal cyst. However, there is a complex heterogeneous fluid collection in the left inguinal region which extends for length of 10.7 cm. In transverse dimensions are 4.2 x 6.1 cm. No internal color Doppler blood flow demonstrated. This was not evident on the CT of 6/6/2024.     IMPRESSION: 1.  No deep venous thrombosis in the bilateral lower extremities. 2.  Large complex fluid collection left inguinal region measuring up to 10.7 cm in diameter is new since 6/6/2024, most consistent with hematoma.    Echocardiogram Complete    Result Date: 6/20/2024  [id number]^CASTRO^[facility]  [address] [place] [address]  Name: HARDIK VENEGAS MRN: [id number] : 1930 Study Date: 2024 09:49 AM Age: 89 yrs Gender: Female Patient Location: [facility] Reason For Study: Pulmonary Embolism Ordering Physician: CHANTEL ERAZO Performed By: AD  BSA: 2.0 m2 Height: 64 in Weight: 206 lb HR: 99 BP: 125/57 mmHg ______________________________________________________________________________ Procedure Complete Echo Adult. ______________________________________________________________________________ Interpretation Summary  Left ventricular size, wall motion and function are normal. The ejection fraction is 60-65%. Normal right ventricle size and systolic function. Mild valvular aortic stenosis. The study was technically difficult. ______________________________________________________________________________ Left Ventricle Left ventricular size, wall motion and function are normal. The ejection fraction is 60-65%. There is mild concentric left ventricular hypertrophy. Left ventricular diastolic function is normal. No regional wall motion abnormalities noted.  Right Ventricle Normal right ventricle size and systolic function.  Atria Normal left atrial size. Right atrial size is normal. There is no color Doppler evidence of an atrial shunt.  Mitral Valve Mitral valve leaflets appear normal. There is no evidence of mitral stenosis or clinically significant mitral regurgitation.  Tricuspid Valve Tricuspid valve leaflets appear normal. Right ventricular systolic pressure could not be approximated due to inadequate tricuspid regurgitation.  Aortic Valve The aortic valve is trileaflet. Mild aortic valve calcification is present. Mild valvular aortic stenosis.  Pulmonic Valve The pulmonic valve is not well seen, but is grossly normal. This degree of valvular regurgitation is within normal limits.  Vessels The aorta root is normal. Normal size ascending aorta. Inferior vena cava not well visualized  for estimation of right atrial pressure.  Pericardium There is no pericardial effusion.  ______________________________________________________________________________ MMode/2D Measurements & Calculations IVSd: 1.3 cm LVIDd: 3.6 cm LVIDs: 2.2 cm LVPWd: 1.3 cm FS: 37.9 %  LV mass(C)d: 153.8 grams LV mass(C)dI: 77.6 grams/m2 Ao root diam: 3.1 cm asc Aorta Diam: 3.5 cm LVOT diam: 1.9 cm LVOT area: 2.8 cm2 Ao root diam index Ht(cm/m): 1.9 Ao root diam index BSA (cm/m2): 1.6 Asc Ao diam index BSA (cm/m2): 1.8 Asc Ao diam index Ht(cm/m): 2.2 EF Biplane: 63.9 % RWT: 0.71  Time Measurements MM HR: 103.0 BPM  Doppler Measurements & Calculations MV E max jamey: 64.6 cm/sec MV A max jamey: 131.0 cm/sec MV E/A: 0.49  MV dec slope: 826.0 cm/sec2 MV dec time: 0.08 sec Ao V2 max: 234.5 cm/sec Ao max P.0 mmHg Ao V2 mean: 166.0 cm/sec Ao mean P.0 mmHg Ao V2 VTI: 34.2 cm RIANA(I,D): 1.6 cm2 RIANA(V,D): 1.9 cm2 LV V1 max P.9 mmHg LV V1 max: 157.0 cm/sec LV V1 VTI: 19.3 cm SV(LVOT): 54.7 ml SI(LVOT): 27.6 ml/m2 PA acc time: 0.09 sec AV Jamey Ratio (DI): 0.67 RIANA Index (cm2/m2): 0.81 E/E': 11.1 E/E' av.7 Lateral E/e': 6.3 Medial E/e': 11.2 Peak E' Jamey: 5.8 cm/sec  ______________________________________________________________________________ Report approved by: Kendall Espino 2024 10:37 AM       CT Chest Pulmonary Embolism w Contrast    Result Date: 2024  EXAM: CT CHEST WITH CONTRAST - PULMONARY EMBOLISM PROTOCOL LOCATION: [facility] DATE/TIME: 2024 2:34 AM CDT INDICATION: Recent femur fracture. Hypoxia. COMPARISON: 2024 - CT chest, abdomen and pelvis. TECHNIQUE: CT angiogram chest during arterial phase injection IV contrast. 2D and 3D MIP reconstructions were performed by the CT technologist. Dose reduction techniques were used. CONTRAST: 70 mL Isovue-370. FINDINGS: ANGIOGRAM CHEST: Filling defects are present within two segmental pulmonary arteries in the right lower lobe (for  example, series 6 image 165), consistent with acute pulmonary emboli. No other visualized pulmonary emboli. Atherosclerotic calcification in  the thoracic aorta. The thoracic aorta is normal in caliber without dissection. LUNGS AND PLEURA: A few clusters of small irregular-shaped patchy nodular opacities are scattered within the periphery of both lungs, not convincingly changed since 6/6/2024. Tiny calcified granuloma in the left lower lobe. Mild bronchiectasis scattered in both lungs. MEDIASTINUM/AXILLAE: Unremarkable. CORONARY ARTERY CALCIFICATION: Likely present. MUSCULOSKELETAL: No acute findings. UPPER ABDOMEN: Unremarkable.     IMPRESSION: 1.  Acute pulmonary emboli within two segmental pulmonary arteries in the right lower lobe. 2.  A few clusters of small irregular-shaped patchy and nodular opacities scattered within the periphery of both lungs. These are nonspecific, but unchanged since 6/6/2024. [Critical Result: New diagnosis of pulmonary embolism] Finding was identified on 6/20/2024 2:37 AM CDT. Dr. Frazier was contacted by me on 6/20/2024 2:47 AM CDT and verbalized understanding of the critical result.        Signed by: Citlalli English MD

## 2024-06-23 NOTE — PROGRESS NOTES
"   06/23/24 0908   Appointment Info   Signing Clinician's Name / Credentials (PT) Perla Espana PT, DPT   Rehab Comments (PT) Pt's daughter Jaquelin present throughout and helps with subjective.   Living Environment   People in Home alone   Current Living Arrangements house   Home Accessibility stairs to enter home;stairs within home   Number of Stairs, Main Entrance 3   Stair Railings, Main Entrance railing on right side (ascending)   Number of Stairs, Within Home, Primary seven   Stair Railings, Within Home, Primary railing on right side (ascending)   Living Environment Comments 4 level home. 3 steps to enter main level & 7 steps up to bedroom, rail on R.  doesn't have to go to basement or up to family room. Daughter Jaquelin reports pt's adult son may stay at home with her to help. pt's son and daughter live on same street as her and are over frequently.   Self-Care   Usual Activity Tolerance good   Current Activity Tolerance poor   Equipment Currently Used at Home none   Fall history within last six months yes   Number of times patient has fallen within last six months 1   Activity/Exercise/Self-Care Comment no AD at home. states had walker ordered from PT but never received.   General Information   Onset of Illness/Injury or Date of Surgery 06/20/24   Referring Physician Ted Lara DO   Patient/Family Therapy Goals Statement (PT) none stated.   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  [facility]14/24 to [facility] and presents 6/20/24 with syncope and collapse and found to have acute PE.   Hospital " "course complicated by acute left groin hematoma in setting of therapeutic AC\"   Existing Precautions/Restrictions no hip IR;no hip ADD past midline;90 degree hip flexion;fall   Weight-Bearing Status - RLE weight-bearing as tolerated   Cognition   Affect/Mental Status (Cognition) WNL   Orientation Status (Cognition) oriented x 4   Pain Assessment   Patient Currently in Pain Yes, see Vital Sign flowsheet  (pain in L groin area)   Range of Motion (ROM)   Range of Motion ROM is WNL   Strength (Manual Muscle Testing)   Strength (Manual Muscle Testing) Deficits observed during functional mobility   Bed Mobility   Bed Mobility sit-supine   Sit-Supine [place] (Bed Mobility) 2 person assist;moderate assist (50% patient effort);verbal cues;nonverbal cues (demo/gesture)   Bed Mobility Limitations decreased ability to use arms for pushing/pulling;decreased ability to use legs for bridging/pushing;impaired ability to control trunk for mobility   Impairments Contributing to Impaired Bed Mobility impaired balance;decreased strength;pain   Assistive Device (Bed Mobility) bed rails;draw sheet   Transfers   Transfers sit-stand transfer   Transfer Safety Concerns Noted losing balance backward   Impairments Contributing to Impaired Transfers impaired balance;decreased strength;pain   Sit-Stand Transfer   Sit-Stand [place] (Transfers) maximum assist (25% patient effort);2 person assist   Assistive Device (Sit-Stand Transfers) walker, front-wheeled   Gait/Stairs (Locomotion)   [place] Level (Gait) minimum assist (75% patient effort);2 person assist;verbal cues;nonverbal cues (demo/gesture)   Assistive Device (Gait) walker, front-wheeled   Distance in Feet (Gait) 5'   Pattern (Gait) step-to   Deviations/Abnormal Patterns (Gait) antalgic;gait speed decreased;weight shifting decreased;stride length decreased;geraldo decreased   Comment, (Gait/Stairs) stairs not assessed per high pt fatigue with 5' gait   Balance   Balance " other (describe)   Balance Comments Needs FWW to maintain balance in standing minAx2. sitting balance SBA-CGA, most impacted by SOB   Clinical Impression   Criteria for Skilled Therapeutic Intervention Yes, treatment indicated   PT Diagnosis (PT) impaired functional mobility   Influenced by the following impairments impaired functional strength, balance, activity tolerance; pain; SOB   Functional limitations due to impairments impaired transfers, gait, bed mobility, endurance   Clinical Presentation (PT Evaluation Complexity) stable   Clinical Presentation Rationale clinical judgment   Clinical Decision Making (Complexity) moderate complexity   Planned Therapy Interventions (PT) bed mobility training;balance training;gait training;home exercise program;strengthening;transfer training;patient/family education;ROM (range of motion);stair training;stretching;progressive activity/exercise;home program guidelines   Risk & Benefits of therapy have been explained evaluation/treatment results reviewed;care plan/treatment goals reviewed;risks/benefits reviewed;participants included;patient;daughter   PT Total Evaluation Time   PT Eval, Moderate Complexity Minutes (60813) 10   Physical Therapy Goals   PT Frequency 5x/week   PT Predicted Duration/Target Date for Goal Attainment 06/30/24   PT Goals Bed Mobility;Transfers;Gait;Stairs   PT: Bed Mobility Supine to/from sit;Supervision/stand-by assist   PT: Transfers Supervision/stand-by assist;Sit to/from stand;Bed to/from chair;Assistive device   PT: Gait Supervision/stand-by assist;Rolling walker;150 feet   PT: Stairs Minimal assist;7 stairs;Rail on right   Interventions   Interventions Quick Adds Therapeutic Activity;Neuromuscular Re-ed   Therapeutic Activity   Therapeutic Activities: dynamic activities to improve functional performance Minutes (36120) 10   Treatment Detail/Skilled Intervention BP at beginning of sesssion 127/60, SpO2 94% on RA. During sitting balance,  instruction & education on PLB to facilitate improved O2 delivery. SpO2 monitored throughout, dipping lowest to 90% with activity, improving to 94-95% with therapist guiding pt through PLB. MaxAx2 sit<>stand fww<>EOB, lateral stepping Ange with FWW. Dep boost Ax2 for positioning in bed. Pt left supine with call light & all other needs in reach.   Neuromuscular Re-education   Neuromuscular Re-Education Minutes (90026) 10   Symptoms Noted During/After Treatment fatigue;shortness of breath   Treatment Detail/Skilled Intervention Eval completed, tx initiated. Facilitated sitting edge of chair cues for maintaining midline for sitting balance SBA with intermittent CGA-Ange per pt fatigue/SOB. Intermittent rest breaks relaxing against back of chair per fatigue/SOB. Increased challenge maintaining midline Ange with LAQ 2x10 B & ankle pf/df x10 B, per pt retro lean & SOB.   PT Discharge Planning   PT Plan prog bed mob, transf, gait, hip protocol ex   PT Discharge Recommendation (DC Rec) Transitional Care Facility   PT Rationale for DC Rec Pt mod-maxAx2 for bed mobility & sit<>stand at this time, only tolerating 5' gait with FWW Ange. Patient has 3 ALTAF & 7 stairs up to bedroom, unable to complete stairs at this time. Recommend TCU at discharge.   PT Brief overview of current status mod-maxAx2 bed mob, sit<>Stand & gait FWW   PT Equipment Needed at Discharge walker, rolling  (fww)   Total Session Time   Timed Code Treatment Minutes 20   Total Session Time (sum of timed and untimed services) 30

## 2024-06-24 NOTE — PROGRESS NOTES
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  Discharged [facility]/14/24 to [facility] and presents 6/20/24 with syncope and collapse and found to have acute PE.   Hospital course complicated by acute left groin hematoma in setting of therapeutic AC      Acute PE:  CTA shows acute pulmonary emboli within two segmental pulmonary arteries in the right lower lobe   US bilat LE negative for DVT  -- transitioned from IV heparin to eliquis 6/20  -- eliquis stopped 6/21 due to left groin hematoma   -- hematoma stable 6/22 so started low intensity heparin gtt without bolus    -- transitioned from IV heparin to eliquis today  -- follow Hgb q12 and hemodynamics   -- if there are further complications with AC then could consider IVC filter but would discuss further with heme. Alternative therapy with eliquis 2.5 mg BID could also be considered.         Left groin hematoma: likely related to recent fall and therapeutic AC  Incidentally found on US LLE that shows large complex fluid collection left inguinal region measuring up to 10.7 cm in diameter and most consistent with hematoma. Patient has associated 2G drop in Hgb overnight 6/20-6/21  -- follow Hgb q12 and hemodynamics   -- general surgery signed off      Acute anemia: due to left groin hematoma. Also noted to be iron deficient   -- transfused 2 U PRBC 6/21    -- started PO iron replacement however now holding po iron given poor appetite   -- Hgb slightly down again 6/23. Transfused additional 1U PRBC 6/23  -- follow Hgb q12H and transfuse as needed       Acute  hypoxemic respiratory failure: likely related to PE and chronic underlying lung disease and now acute anemia  Previously treated for pneumonia on last hospitalization  CT on admission shows few clusters of small irregular-shaped patchy and nodular opacities scattered within the periphery of both lungs that pulmonary feels are stable since 2022   Influenza/covid/rsv negative on admission  -- pulmonary recommended stopping IV abx started on admission  -- continue duonebs prn  -- SLP saw last hospitalization and had no concerns  -- wean oxygen as tolerated  -- already has outpatient pulmonary referral, no further workup needed per inpatient pulm consult 6/20/24  -- push IS  -- possible need for home oxygen at time of discharge       Leukocytosis: with mild procalcitonin elevation.   Leukocytosis now resolved  Lactic acidemia resolved  Blood cultures negative on admission  UA on admission not consistent with UTI  Pulmonary does not feel patient has active pneumonia  WBC now near normal  -- question if related to hematoma vs excoriated baldemar area however no evidence of purulent sores currently, does have some mild inflammation but not entirely consistent with cellulitis  -- WOC following for local skin cares  -- monitor WBC and fever curve for now which have improved  -- follow up blood cultures         BEBETO on CKD3: improved after PRBC transfusion  -- likely related to acute anemia and relative hypotension  -- BP's better, resumed home coreg   -- hold home hydralazine and if BP's stay stable ok to resume in the AM       Increased fatigue 6/24: states she's had several nights of poor sleep related to frequent lab draws while on heparin gtt.   -- checked UA that's negative for infection  -- will check TSH to ensure stability  -- stop po iron to see if this helps appetite  -- transitioned to eliquis so now heparin gtt off so hopefully will get better sleep      Urinary retention:   -- UA negative  -- continue straight cath  "as needed, place toney if needed        Right femoral neck fracture, sustained after a fall prior to last hospitalization  -- s/p right hip hemiarthroplasty 6/6/21  -- ortho following, wound does not appear infected, has non-purulent drainage so will leave staples in place another week  -- needs TCU placement again however patient and family basically refusing and going to push for home with home care           HTN: continue home coreg and then resume hydralazine pending BP trend       Hypothyroidism: continue home replacement, check TSH to ensure stability given increased fatigue         History of RA: continue home plaquenil        Ischemic optic neuropathy of the left eye:  -- continue home statin and currently holding home baby ASA now that she is on therapeutic anticoagulation        Occipital neuralgia: continue tylenol         GERD: continue home PPI        Mood disorder: continue home lexapro         Dispo: possible discharge 1-2 days if Hgb stable after restarting eliquis without concern for worsening groin hematoma.        Diet: Combination Diet Regular Diet Adult  Snacks/Supplements Adult: Magic Cup; With Meals    DVT Prophylaxis:DOAC  Toney Catheter: Not present  Lines: None     Cardiac Monitoring: None  Code Status: Full Code      Clinically Significant Risk Factors              # Hypoalbuminemia: Lowest albumin = 2.7 g/dL at 6/20/2024  1:21 AM, will monitor as appropriate     # Hypertension: Noted on problem list               #Precipitous drop in Hgb/Hct: Lowest Hgb this hospitalization: 5.6 g/dL. Will continue to monitor and treat/transfuse as appropriate.     # Obesity: Estimated body mass index is 35.46 kg/m  as calculated from the following:    Height as of this encounter: 1.626 m (5' 4\").    Weight as of this encounter: 93.7 kg (206 lb 9.1 oz).      # Financial/Environmental Concerns: none         Disposition Plan   Medically Ready for Discharge: Anticipated Tomorrow      Ted Lara, " DO  Hospitalist Service  [facility]  Securely message with [employer] (more info)  Text page via [employer] Paging/Directory   ______________________________________________________________________    Interval History   NAD. Denies any complaints besides poor sleep and increased fatigue, feels left groin no longer painful    Physical Exam   Vital Signs: Temp: 97.9  F (36.6  C) Temp src: Oral BP: (!) 147/65 Pulse: 77   Resp: 16 SpO2: 97 % O2 Device: Nasal cannula Oxygen Delivery: 2 LPM  Weight: 206 lbs 9.14 oz  General: NAD  RESPIRATORY: Breathing nonlabored  CARDIOVASCULAR: 1+ le edema bilat.   NEUROLOGIC: Motor and sensory intact, speech clear         >45 MINUTES SPENT BY ME on the date of service doing chart review, history, exam, documentation & further activities per the note.  Data

## 2024-06-24 NOTE — PLAN OF CARE
Problem: Adult Inpatient Plan of Care  Goal: Plan of Care Review  Description: The Plan of Care Review/Shift note should be completed every shift.  The Outcome Evaluation is a brief statement about your assessment that the patient is improving, declining, or no change.  This information will be displayed automatically on your shift  note.  6/23/2024 2315 by Omid Lagunas RN  Outcome: Progressing  Flowsheets (Taken 6/23/2024 2315)  Outcome Evaluation: A&Ox4.  Reports little to no pain when laying still but severe pain with movement - PRN acetaminophen given.  Tolerating room air when inactive.  Placed on 6 LPM O2 via NC when ambulating to bathroom due to severe dyspnea with exertion (tachypnic, labored breathing).  Reduced to 2 LPM O2 once back in bed.  At beginning of shift staff used rolando lift to move patient from chair to bed because of her dyspena plus patient reported being too weak to stand.  Around 1930 staff got patient up to bathroom 2 person assist, gait belt, walker.  Patient stood and ambulated to bathroom very well with the supplemental oxygen and lots of encouragement.  Patient did not void urine during evening shift.  Bladder scans showing approx. 230 mL in bladder.  Intake is very poor.  PRN Senna given for likely constipation.  Plan of Care Reviewed With: patient  Overall Patient Progress: improving  6/23/2024 2314 by Omid Laguans RN  Outcome: Progressing   Goal Outcome Evaluation:    Problem: Adult Inpatient Plan of Care  Goal: Optimal Comfort and Wellbeing  Intervention: Monitor Pain and Promote Comfort  Recent Flowsheet Documentation  Taken 6/23/2024 2205 by Omid Lagunas RN  Pain Management Interventions: medication (see MAR)     Problem: Orthopaedic Fracture  Goal: Optimal Functional Ability  Intervention: Optimize Functional Ability  Recent Flowsheet Documentation  Taken 6/23/2024 1925 by Omid Lagunas RN  Activity Management: ambulated to bathroom

## 2024-06-24 NOTE — PROGRESS NOTES
"Orthopedic Progress Note      Assessment:    2 weeks status post right hip hemiarthroplasty with some persistent serous drainage from the right hip incision.  No purulence or concern for postoperative infection of the right hip at this time.  No signs of hematoma in the right hip. she has shortness of breath and found to have a pulmonary embolism, lower extremities negative for DVT but hematoma found in the left groin     Plan:   -Compression dressing in place to the right hip.  Will maintain dressing 1 more day and plan to remove tomorrow and replace with a simple dressing.  -Anticoagulation per primary team.  Transition to Eliquis  -PT/OT  -Follow-up with Dr. Garnett in 2 weeks following discharge      Subjective:    Patient reports minimal pain regarding her right hip today.  She is much more drowsy and feeling very weak compared to when I saw her on Friday.  Discussed with her daughter at bedside who also feels that weakness is much more significant today.  All questions/concerns answered.      Objective:  BP (!) 154/72 (BP Location: Right arm, Patient Position: Semi-Wahl's, Cuff Size: Adult Regular)   Pulse 82   Temp 97.5  F (36.4  C)   Resp 20   Ht 1.626 m (5' 4\")   Wt 93.7 kg (206 lb 9.1 oz)   LMP  (LMP Unknown)   SpO2 97%   BMI 35.46 kg/m      Patient is drowsy, lying in bed  Calves without tenderness, neg Jolanta's  Brisk capillary refill in the toes.   Palpable Right dorsalis pedis pulse. Right foot warm & well-perfused.  Sensation is intact to light touch & equal bilaterally in the femoral, DP, SP & tibial nerve distributions.  Compression dressing to the right hip is clean, dry, and intact    Contralateral side= Full range of motion, Negative joint instability findings, 5/5 motor groups about the joint, Non-tender.       Pertinent Labs   Lab Results: personally reviewed.   Lab Results   Component Value Date    INR 1.04 06/06/2024     Lab Results   Component Value Date    WBC 9.6 06/24/2024    HGB " 8.6 (L) 06/24/2024    HCT 21.9 (L) 06/23/2024    MCV 92 06/23/2024     06/24/2024     Lab Results   Component Value Date     06/24/2024    CO2 23 06/24/2024         Report completed by:  RAYNE JOHNSON PA-C  Date: 06/24/2024  [facility]

## 2024-06-24 NOTE — PLAN OF CARE
Problem: Adult Inpatient Plan of Care  Goal: Plan of Care Review  Description: The Plan of Care Review/Shift note should be completed every shift.  The Outcome Evaluation is a brief statement about your assessment that the patient is improving, declining, or no change.  This information will be displayed automatically on your shift  note.  Outcome: Progressing     Problem: Pneumonia  Goal: Fluid Balance  Outcome: Progressing     Problem: Pneumonia  Goal: Effective Oxygenation and Ventilation  Outcome: Progressing  Intervention: Optimize Oxygenation and Ventilation  Recent Flowsheet Documentation  Taken 6/24/2024 0420 by Alonzo Lewis RN  Head of Bed (HOB) Positioning: HOB at 30 degrees  Taken 6/24/2024 0144 by Alonzo Lewis RN  Head of Bed ([facility]) Positioning: HOB at 30-45 degrees  Taken 6/24/2024 0024 by Alonzo Lewis RN  Head of Bed ([facility]) Positioning: HOB at 30 degrees     Problem: Orthopaedic Fracture  Goal: Absence of Infection Signs and Symptoms  Outcome: Progressing   Goal Outcome Evaluation:       Pt is alert and oriented. On 2 L of Oxygen. PIV Heparin infusing. Denies pain this shift. Regular diet. Assist x2 with walker and gait belt. Bladder scanner 649 . Straight cath performed and 600 ml output. Slept well the night.

## 2024-06-24 NOTE — PROGRESS NOTES
PRN duoneb x1 given. BBS crackles pre and post treatment. Patient remains on 2 lpm nasal cannula. Patient is sleepy at this time. (Family members would like more information/education about PE per RN.)     Scarlet Schafer, RT

## 2024-06-25 NOTE — PROGRESS NOTES
"Orthopedic Progress Note      Assessment:    2 weeks status post right hip hemiarthroplasty with some persistent serous drainage from the right hip incision.  No purulence or concern for postoperative infection of the right hip at this time.  No signs of hematoma in the right hip. she has shortness of breath and found to have a pulmonary embolism, lower extremities negative for DVT but hematoma found in the left groin     Plan:   -Compression dressing removed today from right hip and replaced with prima pore dressing. We will continue to monitor the incision and dressing for further drainage in the next 24 hours pending discharge.  -Anticoagulation per primary team.  Transition to Eliquis  -PT/OT  -Follow-up with Dr. Garnett in 1 week following discharge      Subjective:    Patient reports minimal pain regarding her right hip today.  She is less drowsy today but states she is still feeling weak.  Discussed with her daughter at bedside who also feels that drowsiness and weakness has improved from yesterday.  All questions/concerns answered.      Objective:  BP (!) 152/68 (BP Location: Right arm)   Pulse 74   Temp 98  F (36.7  C) (Oral)   Resp 18   Ht 1.626 m (5' 4\")   Wt 93.7 kg (206 lb 9.1 oz)   LMP  (LMP Unknown)   SpO2 93%   BMI 35.46 kg/m      Patient is alert and orientated, lying in bed  Calves without tenderness, neg Jolanta's  Brisk capillary refill in the toes.   Palpable Right dorsalis pedis pulse. Right foot warm & well-perfused.  Sensation is intact to light touch & equal bilaterally in the femoral, DP, SP & tibial nerve distributions.  Compression dressing to the right hip is clean, dry, and intact. Incision appeared well approximated with only a small area of bleeding noted.     Contralateral side= Full range of motion, Negative joint instability findings, 5/5 motor groups about the joint, Non-tender.       Pertinent Labs   Lab Results: personally reviewed.   Lab Results   Component Value Date    " INR 1.04 06/06/2024     Lab Results   Component Value Date    WBC 10.4 06/25/2024    HGB 9.6 (L) 06/25/2024    HCT 29.3 (L) 06/25/2024    MCV 94 06/25/2024     06/25/2024     Lab Results   Component Value Date     06/25/2024    CO2 22 06/25/2024         Report completed by:  BALJIT ELIAS PA-C  Date: 06/25/2024  [facility]

## 2024-06-25 NOTE — PROGRESS NOTES
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  Discharged 6/14/24 to [facility] and presented on 6/20/24 with syncope and collapse and found to have acute PE.   Hospital course complicated by acute left groin hematoma in setting of therapeutic AC      Acute PE:  CTA shows acute pulmonary emboli within two segmental pulmonary arteries in the right lower lobe   US bilat LE negative for DVT  --Transitioned from IV heparin to eliquis 6/20  --Eliquis stopped 6/21 due to left groin hematoma   --Hematoma stable 6/22 so started low intensity heparin gtt without bolus    --Transitioned from IV heparin to eliquis 6/24  --Follow Hgb q12 and hemodynamics   --If there are further complications with AC then could consider IVC filter but would discuss further with heme. Alternative therapy with eliquis 2.5 mg BID could also be considered.         Left groin hematoma: likely related to recent fall and therapeutic AC  Incidentally found on US LLE that shows large complex fluid collection left inguinal region measuring up to 10.7 cm in diameter and most consistent with hematoma. Patient has associated 2G drop in Hgb overnight 6/20-6/21  --Follow Hgb q12 and hemodynamics   --General surgery signed off      Acute anemia: due to left groin hematoma. Also noted to be iron deficient   --Transfused total 3 U PRBC this stay  --Started PO iron replacement however now holding po iron given poor appetite   --Follow Hgb q12H and transfuse as needed       Acute hypoxemic respiratory failure: likely related to PE and chronic  underlying lung disease and now acute anemia  Previously treated for pneumonia on last hospitalization  CT on admission shows few clusters of small irregular-shaped patchy and nodular opacities scattered within the periphery of both lungs that pulmonary feels are stable since 2022   Influenza/covid/rsv negative on admission  -- pulmonary recommended stopping IV abx started on admission  -- continue duonebs prn  -- SLP saw last hospitalization and had no concerns  -- wean oxygen as tolerated  -- already has outpatient pulmonary referral, no further workup needed per inpatient pulm consult 6/20/24  -- push IS  -- possible need for home oxygen at time of discharge       Leukocytosis: with mild procalcitonin elevation.   Leukocytosis now resolved  Lactic acidemia resolved  Blood cultures negative on admission  UA on admission not consistent with UTI  Pulmonary does not feel patient has active pneumonia  WBC now near normal  -- question if related to hematoma vs excoriated baldemar area however no evidence of purulent sores currently, does have some mild inflammation but not entirely consistent with cellulitis  -- WOC following for local skin cares  -- monitor WBC and fever curve for now which have improved  --Blood cx negative         BEBETO on CKD3: improved after PRBC transfusion  -- likely related to acute anemia and relative hypotension  -- BP's better       Increased fatigue 6/24: states she's had several nights of poor sleep related to frequent lab draws while on heparin gtt.   -- checked UA that's negative for infection  -- TSH WNL  -- Stopped po iron to see if this helps appetite  -- transitioned to eliquis so now heparin gtt off so hopefully will get better sleep      Urinary retention:   --UA negative  --Garcia placed 6/24       Right femoral neck fracture, sustained after a fall prior to last hospitalization  --S/p right hip hemiarthroplasty 6/6/21  --Ortho following, appreciate input  --Needs TCU placement again  "however patient and family basically refusing and going to push for home with home care  --Follow up with ortho in 1 week after discharge           HTN: Continue home coreg and resumed PTA hydralazine as well.        Hypothyroidism: Continue home replacement,repeat TSH WNL.         History of RA: Continue home plaquenil        Ischemic optic neuropathy of the left eye:  --Continue home statin and currently holding home baby ASA now that she is on therapeutic anticoagulation        Occipital neuralgia: Continue tylenol         GERD:Continue home PPI        Mood disorder: Continue home lexapro         Dispo: Possible discharge 1-2 days if Hgb stable after restarting eliquis without concern for worsening groin hematoma.        Diet: Combination Diet Regular Diet Adult  Snacks/Supplements Adult: Magic Cup; With Meals    DVT Prophylaxis:DOAC  Garcia Catheter: PRESENT, indication: Acute retention or obstruction  Lines: None     Cardiac Monitoring: None  Code Status: Full Code      Clinically Significant Risk Factors              # Hypoalbuminemia: Lowest albumin = 2.7 g/dL at 6/20/2024  1:21 AM, will monitor as appropriate     # Hypertension: Noted on problem list               #Precipitous drop in Hgb/Hct: Lowest Hgb this hospitalization: 5.6 g/dL. Will continue to monitor and treat/transfuse as appropriate.     # Obesity: Estimated body mass index is 35.46 kg/m  as calculated from the following:    Height as of this encounter: 1.626 m (5' 4\").    Weight as of this encounter: 93.7 kg (206 lb 9.1 oz).      # Financial/Environmental Concerns: none         Disposition Plan   Medically Ready for Discharge: Anticipated Tomorrow      Michaeal Pompa MD  Hospitalist Service  [facility]  Securely message with [employer] (more info)  Text page via [facility] Paging/Directory   ______________________________________________________________________    Interval History   Patient is new to me. Chart reviewed and events " noted.  Patient seen and examined.   Reports leg pain b/l. No abd pain, N/V. No much appetite. Still reports poor sleep.   Son ok for TCU but pt wants home. Pt lives alone and son was planning to move in with her, he wants to discuss with his sister prior to making further decisions.     Physical Exam   Vital Signs: Temp: 98  F (36.7  C) Temp src: Oral BP: (!) 152/68 Pulse: 74   Resp: 18 SpO2: 93 % O2 Device: None (Room air) Oxygen Delivery: 2 LPM  Weight: 206 lbs 9.14 oz    General: Elderly female in NAD  RESPIRATORY: Breathing nonlabored  CARDIOVASCULAR: RRR, LE trace edema  NEUROLOGIC: Motor and sensory intact, speech clear       >50 MINUTES SPENT BY ME on the date of service doing chart review, history, exam, documentation & further activities per the note.    Plan d/w patient, son Edward over the phone.       Data

## 2024-06-25 NOTE — PLAN OF CARE
Problem: Adult Inpatient Plan of Care  Goal: Plan of Care Review  Outcome: Progressing  Flowsheets (Taken 6/25/2024 0429)  Plan of Care Reviewed With: patient  Overall Patient Progress: improving  Goal: Optimal Comfort and Wellbeing  Outcome: Progressing     Problem: Skin Injury Risk Increased  Goal: Skin Health and Integrity  Outcome: Progressing  Intervention: Plan: Nurse Driven Intervention: Moisture Management  Recent Flowsheet Documentation  Taken 6/25/2024 0218 by Chelsy Quijano RN  Moisture Interventions: Encourage regular toileting  Intervention: Plan: Nurse Driven Intervention: Friction and Shear  Recent Flowsheet Documentation  Taken 6/25/2024 0218 by Chelsy Quijano RN  Friction/Shear Interventions: HOB 30 degrees or less  Intervention: Optimize Skin Protection  Recent Flowsheet Documentation  Taken 6/25/2024 0218 by Chelsy Quijano RN  Pressure Reduction Techniques: heels elevated off bed  Skin Protection: incontinence pads utilized  Activity Management:   activity adjusted per tolerance   bedrest   patient refuses activity  Head of Bed (HOB) Positioning: HOB at 30 degrees       Goal Outcome Evaluation:      Plan of Care Reviewed With: patient    Overall Patient Progress: improvingOverall Patient Progress: improving    Pt A/Ox4. VSS. Ax1 turn/reposition, Ax2 for cares and transfers. Patient makes needs known. Pt slept through the night, expresses that she is able to sleep fine and is 50/50 comfortable. Denies pain at rest, only has pain with movement. Turned and repositioned every two hours, compliant. Bladder scanned 367 ml. No output noted. Patient has redness to perineum, skin barrier cream applied to affected areas. Ice pack to R hip, effective. Will continue to monitor.    Chelsy Quijano RN

## 2024-06-25 NOTE — PLAN OF CARE
"Shift 0700 to 1930-      Problem: Skin Injury Risk Increased  Goal: Skin Health and Integrity  Outcome: Progressing  Intervention: Plan: Nurse Driven Intervention: Moisture Management  Recent Flowsheet Documentation  Taken 6/24/2024 1602 by Yanely Beckwith RN  Moisture Interventions: Encourage regular toileting  Taken 6/24/2024 0823 by Yanely Beckwith RN  Moisture Interventions: Encourage regular toileting  Intervention: Plan: Nurse Driven Intervention: Friction and Shear  Recent Flowsheet Documentation  Taken 6/24/2024 1602 by Yanely Beckwith RN  Friction/Shear Interventions: HOB 30 degrees or less  Taken 6/24/2024 0823 by Yanely Beckwith RN  Friction/Shear Interventions: HOB 30 degrees or less  Intervention: Optimize Skin Protection  Recent Flowsheet Documentation  Taken 6/24/2024 1602 by Yanely Beckwith RN  Pressure Reduction Techniques: heels elevated off bed  Skin Protection: incontinence pads utilized  Activity Management:   activity adjusted per tolerance   bedrest   patient refuses activity  Head of Bed (HOB) Positioning: HOB at 30 degrees  Taken 6/24/2024 1400 by Yanely Beckwith RN  Activity Management: patient refuses activity  Taken 6/24/2024 1235 by Yanely Beckwith RN  Activity Management: patient refuses activity  Taken 6/24/2024 1130 by Yanely Beckwith RN  Activity Management: patient refuses activity  Taken 6/24/2024 0823 by Yanely Beckwith RN  Pressure Reduction Techniques: heels elevated off bed  Skin Protection: incontinence pads utilized  Activity Management: activity adjusted per tolerance  Head of Bed (HOB) Positioning: HOB at 30 degrees     Problem: Urinary Retention  Goal: Effective Urinary Elimination  Outcome: Progressing       Goal Outcome Evaluation:    Patient up in chair today until 1115; Ate small bites of a yogurt. Refused rest of breakfast. Stated she was \"up all night\" and wanted to sleep. Refused part of therapy. Up to commode with 2 assist. Voided then bladder scan was 148 after " "voiding 348ml . Updated MD.   Later around 1500, pt scanned for  441ml and cathed for 450ml. UA sent.     Refused dinner and lunch. \"Not hungry.\" Sleepier today than yesterday, open eyes when you talk to her and talks pills without any difficulty.   Weaned from 2 liters to room air and tolerated. When asked if anything was bothering her, she stated \"I am just sleepy and want to sleep.\" Pt is more lethargic today but arousable.    Family with unrealistic expectations. Stated, \"my mother can never die\" \"we can't make it without her.\" \"Is she dying today.\" Family would weep in front of patient and beg her not to die. Pt would cough and they would ask if she was breathing still. One would start crying then it would trigger all of them to breakout in a cry in front of her with them all asking her to live \"forever.\" Then pt would wake up and say \"am I dying so why is everyone crying.\" Tried to encourage and listen to family while supporting patient who was obviously feeling stressed from them doing this all day.   Maybe need a palliative consult.      Hgb was 8.6 this am then 9.2 the evening. No signs of active bleeding. Hgb every 12hrs.    Heparin gtt turned off this am after Eliquis dose given.     Right hip very tender with movement. Pressure dressing in place per ortho until tomorrow. Calmoseptine applied to rash in perineum.                         "

## 2024-06-25 NOTE — PROGRESS NOTES
"CLINICAL NUTRITION SERVICES - REASSESSMENT NOTE     Nutrition Prescription    RECOMMENDATIONS FOR MDs/PROVIDERS TO ORDER:    Malnutrition Status:    Moderate malnutrition  In Context of:  Acute illness or injury    Recommendations already ordered by Registered Dietitian (RD):  ONS- Ensure enlive daily, Magic cup daily     Future/Additional Recommendations:  Monitor po, weight, supplement thi., I/Os.      EVALUATION OF THE PROGRESS TOWARD GOALS   Diet: Regular  Nutrition Supplement: Magic Cup daily   Intake:     6/20: 10% x 3 meals, 50% x 1 meal   6/21: 75% Louis crepes  6/22: no po documented   6/23: 100% yogurt + fruit cup, [employer] french fries   6/24: 50% breakfast, refused lunch, refused dinner     Pt dislikes facility food.      NEW FINDINGS   Met with pt and pt family at bedside this afternoon. Pt with decreased oral intakes for x3 weeks r/t changes in taste. Pt notes okay po this afternoon, has Corn potatoes and some chicken from [employer], noted to be increased po from the last few weeks. Pt reports dislike for hospital food, family providing food. Pt does not liking Yogurt and agreeable to try ONS Ensure enlive. Pt reports not trying Magic Cup, agreeable to try tomorrow 6/26.   FA     ANTHROPOMETRICS  Height: 162.6 cm (5' 4\")  Most Recent Weight: 93.7 kg (206 lb 9.1 oz)    Weight History: Current weight up, likely r/t fluid status   Wt Readings from Last 10 Encounters:   06/24/24 93.7 kg (206 lb 9.1 oz)   06/19/24 93.7 kg (206 lb 9.6 oz)   06/17/24 93.7 kg (206 lb 9.6 oz)   06/12/24 90.5 kg (199 lb 8.3 oz)   04/19/24 82.6 kg (182 lb)   03/26/24 82.6 kg (182 lb)   03/18/24 82.6 kg (182 lb)   05/13/23 82.6 kg (182 lb)   05/10/23 83.9 kg (185 lb)   04/04/22 83.9 kg (185 lb)      Dosing Weight: 64.3 kg, adjusted     ASSESSED NUTRITION NEEDS  Estimated Energy Needs: 1600 kcals/day (25 kcal/kg)  Justification: Obese, increased needs  Estimated Protein Needs: 65 grams protein/day (1 " gm/kg)  Justification: Increased needs  Estimated Fluid Needs: 1600 mL/day (25 mL/kg), or per provider  Justification: Maintenance    PHYSICAL FINDINGS/GI CONCERNS  See malnutrition section below.  Per Flowhseets:  GI: Rounded abd  BM: last noted x1 6/24  Skin: Surgical site 6/6  Edema: Mild hips, BLE  Oral Health: Changes in taste     LABS  Reviewed  Creat 1.04(H)   BG 69(L)     MEDICATIONS  Reviewed  Scheduled coreg, synthroid, protonix     MALNUTRITION  % Weight Loss: None noted (does have fluid accumulation)   % Intake:  <75% for > 7 days (moderate malnutrition)  Subcutaneous Fat Loss:  None observed  Muscle Loss:  Temporal region Mild depletion and Clavicle bone region Mild depletion  Fluid Retention:  Mild hips, BLE    Malnutrition Diagnosis: Moderate malnutrition  In Context of:  Acute illness or injury    CURRENT NUTRITION DIAGNOSIS  Malnutrition related to poor appetite as evidenced by decreased oral intakes, muscle loss.       Increased nutrient needs related to healing as evidenced by hip fracture earlier this month    Evaluation: Ongoing     INTERVENTIONS  Implementation  ONS- Ensure enlive daily, Magic cup daily     Goals  Patient to consume % of nutritionally adequate meals three times per day, or the equivalent with supplements/snacks - Not Met    Monitoring/Evaluation  Progress toward goals will be monitored and evaluated per protocol.

## 2024-06-25 NOTE — PLAN OF CARE
Problem: Adult Inpatient Plan of Care  Goal: Optimal Comfort and Wellbeing  Outcome: Progressing   Goal Outcome Evaluation:       Pt. Denies need for prn pian medications, working with therapy, Hg this am 9.6, tolerating regular foot,  bladder scan today 527 MD updated, toney placed d/t multiple bladder scans requiring straight cath, family in room and updated, will cont to monitor.

## 2024-06-25 NOTE — PROGRESS NOTES
Care Management Follow Up    Length of Stay (days): 5    Expected Discharge Date: 06/25/2024    Anticipated Discharge Plan:  Home Care, Home    Transportation: TBD    PT Recommendations: home with assist, home with home care physical therapy  OT Recommendations:  Transitional Care Facility     Barriers to Discharge: medical stability    Prior Living Situation: house with alone    Advanced Directive on File: no concerns identified     Patient/Spokesperson Updated: No    Additional Information:  Chart Reviewed.     Plan to discharge home with Homecare via [facility] - RN PT OT services.     Social HX: Lives in house, recently d.c to TCU.     CM will continue to follow care progression and aide in discharge planning as needed.     2:19 PM - RNCM met with patient and son at bedside. Discussed discharge plan. Son requesting education on how to support patient at home. RNCM connected with PT to schedule a education session so that son is able to assist patient at home.     3:04 PM - Discussed with PT, after PT met with patient/ family would like to consider TCU at [facility]. Referrals sent.     Guadalupe Vidal RN

## 2024-06-26 NOTE — PLAN OF CARE
Problem: Adult Inpatient Plan of Care  Goal: Optimal Comfort and Wellbeing  Outcome: Progressing     Problem: Comorbidity Management  Goal: Blood Pressure in Desired Range  Outcome: Progressing  Intervention: Maintain Blood Pressure Management  Recent Flowsheet Documentation  Taken 6/26/2024 0200 by Chelsy Quijano RN  Medication Review/Management: medications reviewed     Problem: Skin Injury Risk Increased  Goal: Skin Health and Integrity  Outcome: Progressing  Intervention: Plan: Nurse Driven Intervention: Moisture Management  Recent Flowsheet Documentation  Taken 6/26/2024 0200 by Chelsy Quijano RN  Moisture Interventions:   No brief in bed   Urinary collection device   Barrier ointment (CriticAid, Triad paste)  Intervention: Plan: Nurse Driven Intervention: Friction and Shear  Recent Flowsheet Documentation  Taken 6/26/2024 0200 by Chelsy Quijano RN  Friction/Shear Interventions: HOB 30 degrees or less  Intervention: Optimize Skin Protection  Recent Flowsheet Documentation  Taken 6/26/2024 0200 by Chelsy Quijano RN  Pressure Reduction Techniques: heels elevated off bed  Activity Management: activity adjusted per tolerance  Taken 6/26/2024 0144 by Chelsy Quijano RN  Head of Bed (HOB) Positioning: HOB at 30-45 degrees  Taken 6/25/2024 2344 by Chelsy Quijano RN  Head of Bed (HOB) Positioning: HOB at 30-45 degrees     Problem: Urinary Retention  Goal: Effective Urinary Elimination  Outcome: Progressing       Goal Outcome Evaluation:      Plan of Care Reviewed With: patient    Overall Patient Progress: improvingOverall Patient Progress: improving    Patient A&O per usual. VSS. Ax2 with rolando lift for transfers, pt can assist in turning and repositioning with encouragement. Makes needs known. C/o discomfort in bed, repositioned to patient's comfort. Patient was able to sleep through the night. Redness to perineum noted, has barrier cream at bedside and used during cares. Soft, formed, smear bm. Garcia catheter in place,  patent and draining, dark yellow/cullen clear urine. Denies pain.    Chelsy Quijano, RN

## 2024-06-26 NOTE — CONSULTS
SPIRITUAL HEALTH SERVICES CONSULT NOTE  [facility]; P2    Saw pt Ruthy Man per Spiritual Care Consult (due to LOS)    Patient/Family Understanding of Illness and Goals of Care - Virginia was resting, but engaged in brief conversation. She shares that she does not feel ready for discharge, due to having a catheter. She also notes that last time she discharged from the hospital, she ended up being re-admitted shortly afterwards and she would like to avoid that this time if possible. She is waiting to hear from a hospitalist when she is ready for discharge.     Distress and Loss - Concerned about the possibility of readmission    Strengths, Coping, and Resources - N/A    Meaning, Beliefs, and Spirituality - Virginia is Druze. She states that her [employer] has closed, but that the congregants still have an active prayer chain. She has already notified this group of her admission. She welcomed prayer .    Plan of Care: Spiritual care staff will remain available for further follow-up as requested by patient, family or staff.      Krissy Garay M.Div.      Office: [phone] (for non-urgent requests)  Please [place] or page through [facility] for time-sensitive requests

## 2024-06-26 NOTE — PROGRESS NOTES
"Orthopedic Progress Note      Assessment:    2 weeks status post right hip hemiarthroplasty with some persistent serous drainage from the right hip incision.  No purulence or concern for postoperative infection of the right hip at this time.  No signs of hematoma in the right hip. she has shortness of breath and found to have a pulmonary embolism, lower extremities negative for DVT but hematoma found in the left groin     Plan:   -Keep dressing dry and intact, change as needed if >50% saturated  - Staples to be removed prior to discharge  -Anticoagulation per primary team.    -PT/OT  - WBAT RLE  -Follow-up with Dr. Garnett 6 weeks post op following discharge      Subjective:    Ongoing right hip pain, but fairly manageable at this point. No new concerns.  All questions/concerns answered.      Objective:  /60 (BP Location: Right arm)   Pulse 75   Temp 98.3  F (36.8  C) (Oral)   Resp 20   Ht 1.626 m (5' 4\")   Wt 93.7 kg (206 lb 9.1 oz)   LMP  (LMP Unknown)   SpO2 91%   BMI 35.46 kg/m      Patient is alert and orientated, lying in bed  Calves without tenderness, neg Jolanta's  Brisk capillary refill in the toes.   Palpable Right dorsalis pedis pulse. Right foot warm & well-perfused.  Sensation is intact to light touch & equal bilaterally in the femoral, DP, SP & tibial nerve distributions.  Dressing with small amount of serous drainage.     Contralateral side= Full range of motion, Negative joint instability findings, 5/5 motor groups about the joint, Non-tender.       Pertinent Labs   Lab Results: personally reviewed.   Lab Results   Component Value Date    INR 1.04 06/06/2024     Lab Results   Component Value Date    WBC 10.4 06/25/2024    HGB 9.0 (L) 06/26/2024    HCT 29.3 (L) 06/25/2024    MCV 94 06/25/2024     06/25/2024     Lab Results   Component Value Date     06/26/2024    CO2 21 (L) 06/26/2024         RAYNE JOHNSON PA-C  Date: 06/26/2024  [facility]    " Handoff taken on the following patient from prior Attending Physician:    Pt Name: Renetta Butler    PCP:  No primary care provider on file. Attestation    I was available and discussed any additional care issues that arose and coordinated the management plans with the resident(s) caring for the patient during my duty period. Any areas of disagreement with residents documentation of care or procedures are noted on the chart. I was personally present for the key portions of any/all procedures during my duty period. I have documented in the chart those procedures where I was not present during the key portions.     The pt having abdominal pain awaiting admission bed at this time      Zenia Moore, DO  06/20/21 3907

## 2024-06-26 NOTE — PLAN OF CARE
Problem: Adult Inpatient Plan of Care  Goal: Absence of Hospital-Acquired Illness or Injury  Outcome: Progressing  Intervention: Identify and Manage Fall Risk  Recent Flowsheet Documentation  Taken 6/26/2024 0950 by Latonia Abbott RN  Safety Promotion/Fall Prevention:   activity supervised   patient and family education   supervised activity   room door open  Intervention: Prevent Skin Injury  Recent Flowsheet Documentation  Taken 6/26/2024 1451 by Latonia Abbott RN  Body Position: weight shifting  Intervention: Prevent and Manage VTE (Venous Thromboembolism) Risk  Recent Flowsheet Documentation  Taken 6/26/2024 0950 by Latonia Abbott RN  VTE Prevention/Management: SCDs (sequential compression devices) on  Intervention: Prevent Infection  Recent Flowsheet Documentation  Taken 6/26/2024 0950 by Latonia Abbott RN  Infection Prevention: rest/sleep promoted  Goal: Optimal Comfort and Wellbeing  Outcome: Progressing  Intervention: Monitor Pain and Promote Comfort  Recent Flowsheet Documentation  Taken 6/26/2024 1451 by Latonia Abbott RN  Pain Management Interventions: (pain 0 at resst) rest  Goal: Readiness for Transition of Care  Outcome: Progressing     Problem: Risk for Delirium  Goal: Optimal Coping  Outcome: Progressing  Intervention: Optimize Psychosocial Adjustment to Delirium  Recent Flowsheet Documentation  Taken 6/26/2024 0950 by Latonia Abbott RN  Supportive Measures:   active listening utilized   self-care encouraged  Goal: Improved Behavioral Control  Outcome: Progressing  Intervention: Prevent and Manage Agitation  Recent Flowsheet Documentation  Taken 6/26/2024 0950 by Latonia Abbott RN  Environment Familiarity/Consistency: daily routine followed  Intervention: Minimize Safety Risk  Recent Flowsheet Documentation  Taken 6/26/2024 0950 by Latonia Abbott RN  Communication Enhancement Strategies: call light answered in person  Enhanced Safety Measures: room near unit station  Goal: Improved  Attention and Thought Clarity  Outcome: Progressing  Intervention: Maximize Cognitive Function  Recent Flowsheet Documentation  Taken 6/26/2024 0950 by Latonia Abbott RN  Sensory Stimulation Regulation: quiet environment promoted  Reorientation Measures: clock in view  Goal: Improved Sleep  Outcome: Progressing     Problem: VTE (Venous Thromboembolism)  Goal: Tissue Perfusion  Outcome: Progressing  Intervention: Optimize Tissue Perfusion  Recent Flowsheet Documentation  Taken 6/26/2024 0950 by Latonia Abbott RN  VTE Prevention/Management: SCDs (sequential compression devices) on     Problem: Pneumonia  Goal: Resolution of Infection Signs and Symptoms  Outcome: Progressing  Goal: Effective Oxygenation and Ventilation  Outcome: Progressing  Intervention: Promote Airway Secretion Clearance  Recent Flowsheet Documentation  Taken 6/26/2024 0950 by Latonia Abbott RN  Cough And Deep Breathing: done with encouragement     Problem: Orthopaedic Fracture  Goal: Optimal Functional Ability  Outcome: Progressing  Intervention: Optimize Functional Ability  Recent Flowsheet Documentation  Taken 6/26/2024 1451 by Latonia Abbott RN  Activity Management: back to bed  Taken 6/26/2024 0950 by Latonia Abbott RN  Activity Management: up in chair  Goal: Optimal Pain Control and Function  Outcome: Progressing  Intervention: Manage Acute Orthopaedic-Related Pain  Recent Flowsheet Documentation  Taken 6/26/2024 1451 by Latonia Abbott RN  Pain Management Interventions: (pain 0 at resst) rest     Problem: Comorbidity Management  Goal: Blood Pressure in Desired Range  Outcome: Progressing  Intervention: Maintain Blood Pressure Management  Recent Flowsheet Documentation  Taken 6/26/2024 0950 by Latonia Abbott RN  Medication Review/Management: medications reviewed     Problem: Skin Injury Risk Increased  Goal: Skin Health and Integrity  Outcome: Progressing  Intervention: Plan: Nurse Driven Intervention: Moisture Management  Recent  Flowsheet Documentation  Taken 6/26/2024 0950 by Latonia Abbott RN  Moisture Interventions:   No brief in bed   Urinary collection device   Barrier ointment (CriticAid, Triad paste)   Incontinence pad  Intervention: Plan: Nurse Driven Intervention: Friction and Shear  Recent Flowsheet Documentation  Taken 6/26/2024 0950 by Latonia Abbott RN  Friction/Shear Interventions: HOB 30 degrees or less  Intervention: Optimize Skin Protection  Recent Flowsheet Documentation  Taken 6/26/2024 1451 by Latonia Abbott RN  Activity Management: back to bed  Taken 6/26/2024 0950 by Latonia Abbott RN  Pressure Reduction Techniques: heels elevated off bed  Activity Management: up in chair     Problem: Urinary Retention  Goal: Effective Urinary Elimination  Outcome: Progressing   Goal Outcome Evaluation:         Pt a/o x4, cooperative. Wanted to get up in chair this morning, up w/ 1 assist w/ walker and gait belt. In chair most of shift. Denied pain, except when  walking  said her legs were sore. Declined prn meds. Family visited in room this afternoon. Jose moncada. Pt said care manager told her she Is going to tcu tomorrow. Reminded pt to use call light for needs. Continue to monitor pt.

## 2024-06-26 NOTE — PLAN OF CARE
Problem: Skin Injury Risk Increased  Goal: Skin Health and Integrity  Outcome: Progressing  Intervention: Plan: Nurse Driven Intervention: Moisture Management  Recent Flowsheet Documentation  Taken 6/25/2024 1953 by Bobbi Zhou RN  Moisture Interventions:   No brief in bed   Urinary collection device  Taken 6/25/2024 1700 by Bobbi Zhou RN  Moisture Interventions:   No brief in bed   Urinary collection device   Barrier ointment (CriticAid, Triad paste)  Intervention: Plan: Nurse Driven Intervention: Friction and Shear  Recent Flowsheet Documentation  Taken 6/25/2024 1700 by Bobbi Zhou RN  Friction/Shear Interventions: HOB 30 degrees or less  Intervention: Optimize Skin Protection  Recent Flowsheet Documentation  Taken 6/25/2024 2144 by Bobbi Zhou RN  Head of Bed (HOB) Positioning: HOB at 30-45 degrees  Taken 6/25/2024 1944 by Bobbi Zhou RN  Head of Bed (HOB) Positioning: HOB at 20-30 degrees  Taken 6/25/2024 1700 by Bobbi Zhou RN  Pressure Reduction Techniques: heels elevated off bed  Taken 6/25/2024 1544 by Bobbi Zhou RN  Head of Bed (HOB) Positioning: HOB at 20-30 degrees   Goal Outcome Evaluation:  Patient alert and oriented X4. Make needs known.   Denies pain.   Dressing to the right hip is intact.  White patches on the lower extremities.  Garcia intact draining 175 cullen urine.   Loose bm X2.   Denuded buttock Calmoseptine applied.  Heavy assist of 2.  Refused supper stated she has large meal for lunch.  Will continue to monitor

## 2024-06-26 NOTE — PROGRESS NOTES
Patient has been restarted on Eliquis.  Her hemoglobin continues to be stable.  Continue to follow.  I will have a low threshold for decreasing the dose to 2.5 mg twice daily if there is a significant drop in hemoglobin again.  Please feel free to reach out to hematology if there are any further questions about her management.  Otis Rocha MD  Hem/Onc

## 2024-06-26 NOTE — PROGRESS NOTES
"Care Management Follow Up    Length of Stay (days): 6    Expected Discharge Date: 06/27/2024    Anticipated Discharge Plan:  Home Care, Home    Transportation: TBD    PT Recommendations: home with assist, home with home care physical therapy  OT Recommendations:  Transitional Care Facility     Barriers to Discharge: medical stability    Prior Living Situation: house with alone    Advanced Directive on File: no concerns identified     Patient/Spokesperson Updated: No    Additional Information:  Discussed with MD. Patient now requesting TCU placement after difficulty noted with PT and family session.     TCU referrals discussed with patient at bedside. Referrals submitted.     Social HX: Lives in house, recently d.c to TCU.     CM will continue to follow care progression and aide in discharge planning as needed.     1:41 PM - [facility]. Discussed with MD, plan for discharge tomorrow AM. RNCM discussed transport with patient, agreeable to wheelchair transport. RNCM left voicemail with patient's son Mat, provided update on discharge and to call with any questions.     Wheelchair transport arranged, [id number].    2:10 PM - RNCM received return phone call from son Mat and daughter Jaquelin was also in the background of the phone call. Mat stated he would [facility] not [facility] and that he is currently visiting places that he would like patient to discharge to. RNCM explained discharge planning and medical readiness and alternatives if they did not want patient to be placed to [facility], ie if they wanted to return home with homecare or what goal for care for patient was. Mat stated that CM \"sprung all of this on me just now\" and that he didn't know what to do. RNCM explained if patient was medically ready and there was an accepting facility that patient would have a safe discharge plan and patient/ family could " "continue to search for other alternatives after discharge also, that we are unable to hold patient in the hospital for a \"preferred\" facility. RNCM explained that patient stated to RNCM that her concern was to remain close to home and did not want to discharge to [facility]. Request for referral to be sent to [facility], RNCM submitted, explained that would send this referral as patient was not ready, however explained that once patient was ready CM would not continue to search.     2:37 PM - Accepted to [facility]. Discussed with Son, agreeable to [facility] TCU bed placement. RNCM placed call to Liaison to provide update/ insurance auth, left voicemail. Transport rearranged for [facility] TCU and previous TCU notified of cancel.     2:44 PM - Per New Facility needing to re-arrange transport time. Set for after 1pm; [phone].    Guadalupe Vidal RN      "

## 2024-06-26 NOTE — PROGRESS NOTES
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  Discharged 6/14/24 to TCU and presented on 6/20/24 with syncope and collapse and found to have acute PE.   Hospital course complicated by acute left groin hematoma in setting of therapeutic AC      Acute PE:  CTA shows acute pulmonary emboli within two segmental pulmonary arteries in the right lower lobe   US bilat LE negative for DVT  --Transitioned from IV heparin to eliquis 6/20  --Eliquis stopped 6/21 due to left groin hematoma   --Hematoma stable 6/22 so started low intensity heparin gtt without bolus    --Transitioned from IV heparin to eliquis 6/24  --Follow Hgb q12 and hemodynamics , Hb stable so far  --If there are further complications with AC then could consider IVC filter but would discuss further with heme. Alternative therapy with eliquis 2.5 mg BID could also be considered.         Left groin hematoma: likely related to recent fall and therapeutic AC  Incidentally found on US LLE that shows large complex fluid collection left inguinal region measuring up to 10.7 cm in diameter and most consistent with hematoma. Patient has associated 2G drop in Hgb overnight 6/20-6/21  --Follow Hgb q12 and hemodynamics , stop checking tomorrow if Hb stable  --General surgery signed off      Acute anemia: due to left groin hematoma. Also noted to be iron deficient   --Transfused total 3 U PRBC this stay  --Started PO iron replacement however now holding po iron given poor appetite   --Follow Hgb q12H and transfuse as needed, can stop Hb checks lukas if  remains stable  --Hb so far in range of 9       Acute hypoxemic respiratory failure: likely related to PE and chronic underlying lung disease and now acute anemia  Previously treated for pneumonia on last hospitalization  CT on admission shows few clusters of small irregular-shaped patchy and nodular opacities scattered within the periphery of both lungs that pulmonary feels are stable since 2022   Influenza/covid/rsv negative on admission  -- pulmonary recommended stopping IV abx started on admission  -- continue duonebs prn  -- SLP saw last hospitalization and had no concerns  -- wean oxygen as tolerated, on room air currently  -- already has outpatient pulmonary referral, no further workup needed per inpatient pulm consult 6/20/24  -- push IS  -- possible need for home oxygen at time of discharge       Leukocytosis: with mild procalcitonin elevation.   Leukocytosis now resolved  Lactic acidemia resolved  Blood cultures negative on admission  UA on admission not consistent with UTI  Pulmonary does not feel patient has active pneumonia  WBC now near normal  --Question if related to hematoma vs excoriated baldemar area however no evidence of purulent sores currently, does have some mild inflammation but not entirely consistent with cellulitis  --WOC following for local skin cares  --Monitor WBC and fever curve for now which have improved  --Blood cx negative         BEBETO on CKD3: improved after PRBC transfusion  -- likely related to acute anemia and relative hypotension  -- BP's better       Increased fatigue 6/24: states she's had several nights of poor sleep related to frequent lab draws while on heparin gtt.   --UA neg for UTI  --TSH WNL  --Stopped po iron to see if this helps appetite  --Transitioned to eliquis so now heparin gtt off so hopefully will get better sleep      Urinary retention:   --UA negative  --Garcia placed 6/24       Right femoral neck fracture, sustained after a fall prior to last  "hospitalization  --S/p right hip hemiarthroplasty 6/6/21  --Ortho following, appreciate input, will remove staples on the day of discharge  --Needs TCU placement , patient and family now agreeable.  --Follow up with ortho in 1 week after discharge           HTN: Continue home coreg and  hydralazine.       Hypothyroidism: Continue home replacement,repeat TSH WNL.         History of RA: Continue home plaquenil        Ischemic optic neuropathy of the left eye:  --Continue home statin and currently holding home baby ASA now that she is on therapeutic anticoagulation        Occipital neuralgia: Continue tylenol         GERD:Continue home PPI        Mood disorder: Continue home lexapro         Dispo: Possible discharge 1-2 days if Hgb stable after restarting eliquis without concern for worsening groin hematoma.        Diet: Combination Diet Regular Diet Adult  Snacks/Supplements Adult: Magic Cup; With Meals  Snacks/Supplements Adult: Ensure Enlive; With Meals    DVT Prophylaxis:DOAC  Garcia Catheter: PRESENT, indication: Acute retention or obstruction  Lines: None     Cardiac Monitoring: None  Code Status: Full Code      Clinically Significant Risk Factors              # Hypoalbuminemia: Lowest albumin = 2.7 g/dL at 6/20/2024  1:21 AM, will monitor as appropriate     # Hypertension: Noted on problem list               #Precipitous drop in Hgb/Hct: Lowest Hgb this hospitalization: 5.6 g/dL. Will continue to monitor and treat/transfuse as appropriate.     # Obesity: Estimated body mass index is 35.46 kg/m  as calculated from the following:    Height as of this encounter: 1.626 m (5' 4\").    Weight as of this encounter: 93.7 kg (206 lb 9.1 oz).   # Moderate Malnutrition: based on nutrition assessment    # Financial/Environmental Concerns: none         Disposition Plan   Medically Ready for Discharge: Anticipated Tomorrow      Michaela Pompa MD  Hospitalist Service  [facility]  Securely message with " Gaurav (more info)  Text page via [facility] Paging/Directory   ______________________________________________________________________    Interval History   Chart reviewed and events noted.  Patient seen and examined.   Patient states has no lower extremity pain when not moving.  But experiences pain only with movement.  Does report work with therapy and is needing to assistance.  Denies any chest pain or shortness of breath.  Discussed with her about options for going to TCU and the risks involved going home, patient is now agreeable to TCU on discharge.    Physical Exam   Vital Signs: Temp: 98.3  F (36.8  C) Temp src: Oral BP: 130/60 Pulse: 75   Resp: 20 SpO2: 91 % O2 Device: None (Room air)    Weight: 206 lbs 9.14 oz    General: Elderly female in NAD  RESPIRATORY: Breathing nonlabored  CARDIOVASCULAR: RRR, LE trace edema  NEUROLOGIC: Motor and sensory intact, speech clear       >50 MINUTES SPENT BY ME on the date of service doing chart review, history, exam, documentation & further activities per the note.    Plan d/w patient, bedside RN, SW/SHRADDHA    Last updated son Edward on 6/24.       Data

## 2024-06-27 NOTE — PLAN OF CARE
Problem: Urinary Retention  Goal: Effective Urinary Elimination  Outcome: Progressing   Goal Outcome Evaluation:  Patient denies pain.  Refused to get out of bed  evening supper  Garcia intact and draining cullen urine of 150 mls.  Right hip dressing intact.   BM X3,  buttom denuded Calmoseptine applied with each incontinent.

## 2024-06-27 NOTE — PLAN OF CARE
Physical Therapy Discharge Summary    Reason for therapy discharge:    Discharged to transitional care facility.    Progress towards therapy goal(s). See goals on Care Plan in [facility] electronic health record for goal details.  Goals not met.  Barriers to achieving goals:   limited tolerance for therapy and discharge from facility.    Therapy recommendation(s):    Recommend continued therapies to improve overall strength, balance and mobility.       Patti Nuñez, PT, DPT  6/27/2024

## 2024-06-27 NOTE — PROGRESS NOTES
"Orthopedic Progress Note      Assessment:    2 weeks status post right hip hemiarthroplasty with some persistent serous drainage from the right hip incision.  No purulence or concern for postoperative infection of the right hip at this time.    Plan:   -Keep dressing dry and intact, change as needed if >50% saturated  - Staples removed  -Anticoagulation per primary team.    -PT/OT  - WBAT RLE  -Follow-up with Dr. Garnett 6 weeks post op following discharge.  Orthopedically stable for discharge at this time.      Subjective:    Ongoing right hip pain, but fairly manageable at this point. No new concerns.  All questions/concerns answered.      Objective:  /65 (BP Location: Right arm)   Pulse 82   Temp 98.6  F (37  C) (Oral)   Resp 18   Ht 1.626 m (5' 4\")   Wt 93.7 kg (206 lb 9.1 oz)   LMP  (LMP Unknown)   SpO2 93%   BMI 35.46 kg/m      Patient is alert and orientated, lying in bed  Calves without tenderness, neg Jolanta's  Brisk capillary refill in the toes.   Palpable Right dorsalis pedis pulse. Right foot warm & well-perfused.  Sensation is intact to light touch & equal bilaterally in the femoral, DP, SP & tibial nerve distributions.  Dressing with small amount of serous drainage.   Incision healing well, some mild serous drainage but not purulence, no active bleeding.  No surrounding erythema  She does have quite a bit of dependent edema to the RLE    Contralateral side= Full range of motion, Negative joint instability findings, 5/5 motor groups about the joint, Non-tender.       Pertinent Labs   Lab Results: personally reviewed.   Lab Results   Component Value Date    INR 1.04 06/06/2024     Lab Results   Component Value Date    WBC 10.4 06/25/2024    HGB 9.0 (L) 06/27/2024    HCT 29.3 (L) 06/25/2024    MCV 94 06/25/2024     06/27/2024     Lab Results   Component Value Date     06/27/2024    CO2 23 06/27/2024         RAYNE JOHNSON PA-C  Date: 06/27/2024  [facility]    "

## 2024-06-27 NOTE — DISCHARGE SUMMARY
[facility] MEDICINE  DISCHARGE SUMMARY     Primary Care Physician: Selma Meneses  Admission Date: 6/20/2024   Discharge Provider: Michaela Pompa MD Discharge Date: 6/27/2024   Diet:   Active Diet and Nourishment Order   Procedures    Snacks/Supplements Adult: Magic Cup; With Meals    Snacks/Supplements Adult: Ensure Enlive; With Meals    Combination Diet Regular Diet Adult    Diet       Code Status: Full Code   Activity: DCACTIVITY: Activity as tolerated        Condition at Discharge: Stable     REASON FOR PRESENTATION(See Admission Note for Details)     Acute PE, right femoral fracture, blood loss anemia    PRINCIPAL & ACTIVE DISCHARGE DIAGNOSES     Principal Problem:    Single subsegmental pulmonary embolism without acute cor pulmonale (H)  Active Problems:    Acute respiratory failure with hypoxia (H)    Sepsis, due to unspecified organism, unspecified whether acute organ dysfunction present (H)      PENDING LABS     Unresulted Labs Ordered in the Past 30 Days of this Admission       No orders found from 5/21/2024 to 6/21/2024.              PROCEDURES ( this hospitalization only)          RECOMMENDATIONS TO OUTPATIENT PROVIDER FOR F/U VISIT     Follow-up Appointments     Follow Up Care      Please follow-up with Dr. Tripp's team in 2 weeks at [facility].   Call our scheduling line at [phone] to make an appointment, if you do   not already have one scheduled.        Follow Up and recommended labs and tests      Follow up with [facility] physician.  The following labs/tests are   recommended: CBC in 2-3 days  Follow up with [facility],  in 6 week.              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  Discharged 6/14/24 to TCU and presented on 6/20/24 with syncope and collapse and found to have acute PE.   Hospital course complicated by acute left groin hematoma in setting of therapeutic AC        Acute PE:  CTA shows acute pulmonary emboli within two segmental pulmonary arteries in the right lower lobe   US bilat LE negative for DVT  --Transitioned from IV heparin to eliquis 6/20  --Eliquis stopped 6/21 due to left groin hematoma   --Hematoma stable 6/22 so started low intensity heparin gtt without bolus    --Transitioned from IV heparin to eliquis 6/24 which she has been tolerating well with stable hemoglobin.  --If there are further complications with AC then could consider IVC filter.Alternative therapy with eliquis 2.5 mg BID could also be considered.  Appreciate hematology recommendation.           Left groin hematoma: likely related to recent fall and therapeutic AC  Incidentally found on US LLE that shows large complex fluid collection left inguinal region measuring up to 10.7 cm in diameter and most consistent with hematoma. Patient has associated 2G drop in Hgb overnight 6/20-6/21  --Hb has remained stable so far.  --General surgery signed off        Acute anemia: due to left groin hematoma. Also noted to be iron deficient   --Transfused total 3 U PRBC this stay  --Started PO iron replacement however now holding po iron given poor appetite /gastric upset  --Hb so far in range of 9        Acute hypoxemic respiratory failure: likely related to PE and chronic underlying lung disease and now acute anemia  Previously treated for pneumonia on last hospitalization  CT on admission shows few clusters of small irregular-shaped patchy and nodular opacities scattered within the periphery of both lungs that pulmonary feels are stable since 2022   Influenza/covid/rsv negative on admission  --  pulmonary recommended stopping IV abx started on admission  -- continue duonebs prn  -- SLP saw last hospitalization and had no concerns  -- Patient weaned off of oxygen prior to discharge.  -- already has outpatient pulmonary referral, no further workup needed per inpatient pulm consult 6/20/24        Leukocytosis: with mild procalcitonin elevation.   Leukocytosis now resolved  Lactic acidemia resolved  Blood cultures negative on admission  UA on admission not consistent with UTI  Pulmonary does not feel patient has active pneumonia  WBC now near normal  --Question if related to hematoma vs excoriated baldemar area however no evidence of purulent sores currently, does have some mild inflammation but not entirely consistent with cellulitis  --WOC following for local skin cares  --Monitor WBC and fever curve for now which have improved  --Blood cx negative           BEBETO on CKD3: improved after PRBC transfusion  -- likely related to acute anemia and relative hypotension  -- BP's better        Increased fatigue 6/24: states she's had several nights of poor sleep related to frequent lab draws while on heparin gtt.   --UA neg for UTI  --TSH WNL  --Stopped po iron to see if this helps appetite        Urinary retention:   --UA negative  --Garcia placed 6/24, to be discharged with Garcia in place with plan of voiding trial at the TCU.        Right femoral neck fracture, sustained after a fall prior to last hospitalization  --S/p right hip hemiarthroplasty 6/6/21  --Ortho following, appreciate input, staples removed on the day of discharge  --Discharging to TCU today  --Follow up with ortho in 1 week after discharge           HTN: Continue home coreg and  hydralazine.        Hypothyroidism: Continue home replacement,repeat TSH WNL.         History of RA: Continue home plaquenil        Ischemic optic neuropathy of the left eye:  --Continue home statin and stopped baby ASA now that she is on therapeutic anticoagulation         Occipital neuralgia: Continue tylenol         GERD:Continue home PPI        Mood disorder: Continue home lexapro     .Patient stable to be discharged to TCU today. Please refer to discharge medications and instructions for more details.           Discharge Medications with Med changes:     Current Discharge Medication List        START taking these medications    Details   apixaban ANTICOAGULANT (ELIQUIS) 5 MG tablet Take 1 tablet (5 mg) by mouth 2 times daily for 30 days  Qty: 60 tablet, Refills: 0    Associated Diagnoses: Pulmonary embolism, other, unspecified chronicity, unspecified whether acute cor pulmonale present (H)           CONTINUE these medications which have CHANGED    Details   oxyCODONE (ROXICODONE) 5 MG tablet Take 0.5 tablets (2.5 mg) by mouth every 6 hours as needed for severe pain  Qty: 5 tablet, Refills: 0    Associated Diagnoses: Closed displaced fracture of right femoral neck (H)           CONTINUE these medications which have NOT CHANGED    Details   acetaminophen (TYLENOL) 325 MG tablet Take 2 tablets (650 mg) by mouth every 4 hours as needed for other (For optimal non-opioid multimodal pain management to improve pain control.)  Qty: 100 tablet, Refills: 0    Associated Diagnoses: Closed displaced fracture of right femoral neck (H); History of right hip hemiarthroplasty      atorvastatin (LIPITOR) 40 MG tablet Take 40 mg by mouth every morning      carvedilol (COREG) 6.25 MG tablet Take 1 tablet (6.25 mg) by mouth 2 times daily (with meals)  Qty: 180 tablet, Refills: 3    Associated Diagnoses: Primary hypertension      cholecalciferol, vitamin D3, (VITAMIN D3) 1,000 unit capsule [CHOLECALCIFEROL, VITAMIN D3, (VITAMIN D3) 1,000 UNIT CAPSULE] Take 1,000 Units by mouth daily.      clobetasol (TEMOVATE) 0.05 % external ointment Apply topically daily as needed (AS NEEDED)      cyanocobalamin (VITAMIN B-12) 500 MCG tablet Take 500 mcg by mouth every morning      DOCOSAHEXANOIC ACID/EPA (FISH OIL  ORAL) Take 1,200 mg by mouth every morning      escitalopram (LEXAPRO) 10 MG tablet Take 1 tablet (10 mg) by mouth daily  Qty: 90 tablet, Refills: 3    Associated Diagnoses: MADDY (generalized anxiety disorder)      hydrALAZINE (APRESOLINE) 25 MG tablet Take 1 tablet (25 mg) by mouth every 8 hours    Associated Diagnoses: Essential hypertension      hydroxychloroquine (PLAQUENIL) 200 MG tablet Take 1 tablet (200 mg) by mouth daily  Qty: 90 tablet, Refills: 1    Associated Diagnoses: Palindromic rheumatism involving multiple sites      pantoprazole (PROTONIX) 20 MG EC tablet Take 40 mg by mouth at bedtime      senna-docusate (SENOKOT-S/PERICOLACE) 8.6-50 MG tablet Take 2 tablets by mouth 2 times daily      levothyroxine (SYNTHROID/LEVOTHROID) 75 MCG tablet Take 1 tablet (75 mcg) by mouth See Admin Instructions Take 75mcg daily on Mon, Tue, Wed, Thur, and Sat. Skip Friday and Sunday.  Qty: 90 tablet, Refills: 11    Associated Diagnoses: Acquired hypothyroidism           STOP taking these medications       aspirin 81 MG EC tablet Comments:   Reason for Stopping:                     Rationale for medication changes:      See med rec        Consults       PHARMACY TO DOSE VANCO  PHARMACY IP CONSULT  PULMONARY IP CONSULT  PHARMACY IP CONSULT  PHYSICAL THERAPY ADULT IP CONSULT  OCCUPATIONAL THERAPY ADULT IP CONSULT  ORTHOPEDIC SURGERY IP CONSULT  HEMATOLOGY & ONCOLOGY IP CONSULT  SURGERY GENERAL IP CONSULT  WOUND OSTOMY CONTINENCE NURSE  IP CONSULT  VASCULAR ACCESS ADULT IP CONSULT  NUTRITION SERVICES ADULT IP CONSULT  PHARMACY IP CONSULT  PHARMACY IP CONSULT  PHARMACY IP CONSULT  SPIRITUAL HEALTH SERVICES IP CONSULT  PHYSICAL THERAPY ADULT IP CONSULT  OCCUPATIONAL THERAPY ADULT IP CONSULT    Immunizations given this encounter     Most Recent Immunizations   Administered Date(s) Administered    COVID-19 MONOVALENT 12+ (Pfizer) 11/23/2021    Flu 65+ Years 12/15/2018    Flu, Unspecified 01/06/2014    Influenza (High Dose) 3  "valent vaccine 11/04/2020    Influenza (IIV3) PF 12/12/2014    Influenza Vaccine 65+ (Fluzone HD) 12/09/2022    Influenza, seasonal, injectable, PF 01/08/2016    Pneumo Conj 13-V (2010&after) 02/25/2015    Pneumococcal 23 valent 05/23/2001    Td (Adult), Adsorbed 01/01/1930    Td,adult,historic,unspecified 01/01/1930    Zoster vaccine, live 05/05/2010           Anticoagulation Information      Recent INR results: No results for input(s): \"INR\" in the last 168 hours.  Warfarin doses (if applicable) or name of other anticoagulant: On Eliquis      SIGNIFICANT IMAGING FINDINGS     Results for orders placed or performed during the hospital encounter of 06/20/24   CT Chest Pulmonary Embolism w Contrast   Result Value Ref Range    Radiologist flags New diagnosis of pulmonary embolism (AA)     Impression    IMPRESSION:   1.  Acute pulmonary emboli within two segmental pulmonary arteries in the right lower lobe.  2.  A few clusters of small irregular-shaped patchy and nodular opacities scattered within the periphery of both lungs. These are nonspecific, but unchanged since 6/6/2024.       [Critical Result: New diagnosis of pulmonary embolism]    Finding was identified on 6/20/2024 2:37 AM CDT.     Dr. Frazier was contacted by me on 6/20/2024 2:47 AM CDT and verbalized understanding of the critical result.    US Lower Extremity Venous Duplex Bilateral    Impression    IMPRESSION:  1.  No deep venous thrombosis in the bilateral lower extremities.  2.  Large complex fluid collection left inguinal region measuring up to 10.7 cm in diameter is new since 6/6/2024, most consistent with hematoma.   Echocardiogram Complete   Result Value Ref Range    LVEF  60-65%        SIGNIFICANT LABORATORY FINDINGS       Discharge Orders        Primary Care - Care Coordination Referral      General info for SNF    Length of Stay Estimate: Short Term Care: Estimated # of Days <30  Condition at Discharge: Stable  Level of care:skilled "   Rehabilitation Potential: Good  Admission H&P remains valid and up-to-date: Yes  Recent Chemotherapy: N/A  Use Nursing Home Standing Orders: Yes     Follow Up and recommended labs and tests    Follow up with retirement physician.  The following labs/tests are recommended: CBC in 2-3 days  Follow up with [facility],  in 6 week.     Reason for your hospital stay    Acute PE,blood loss anemia, R groin hematoma,R femoral neck fracture     Activity - Up ad pacheco     Follow Up Care    Please follow-up with Dr. Tripp's team in 2 weeks at [facility]. Call our scheduling line at [phone] to make an appointment, if you do not already have one scheduled.     Activity     Weight bearing as tolerated    Weight bearing as tolerated on your operative extremity.     Physical Therapy Adult Consult    Evaluate and treat as clinically indicated.    Reason:  Weakness     Occupational Therapy Adult Consult    Evaluate and treat as clinically indicated.    Reason:  Weakness     Fall precautions     Diet    Follow this diet upon discharge: Orders Placed This Encounter      Snacks/Supplements Adult: Magic Cup; With Meals      Snacks/Supplements Adult: Ensure Enlive; With Meals      Combination Diet Regular Diet Adult       Examination   Physical Exam   Temp:  [98.6  F (37  C)-98.7  F (37.1  C)] 98.6  F (37  C)  Pulse:  [65-82] 82  Resp:  [18-20] 18  BP: (112-170)/(55-73) 138/65  SpO2:  [92 %-93 %] 93 %  Wt Readings from Last 1 Encounters:   06/27/24 83.1 kg (183 lb 2 oz)       General: Elderly female in NAD  RESPIRATORY: Breathing nonlabored  CARDIOVASCULAR: RRR, LE  edema +  NEUROLOGIC: Motor and sensory intact, speech clear      Please see EMR for more detailed significant labs, imaging, consultant notes etc.    Michaela HERNANDEZ MD, personally saw the patient today and spent greater than 30 minutes discharging this patient.    Michaela Pompa MD  [facility]    CC:Gideon  Selma

## 2024-06-27 NOTE — PLAN OF CARE
Problem: Adult Inpatient Plan of Care  Goal: Plan of Care Review  Description: The Plan of Care Review/Shift note should be completed every shift.  The Outcome Evaluation is a brief statement about your assessment that the patient is improving, declining, or no change.  This information will be displayed automatically on your shift  note.  Outcome: Progressing     Problem: Pneumonia  Goal: Resolution of Infection Signs and Symptoms  Outcome: Progressing   Goal Outcome Evaluation:             Pt is alert and oriented. Room air. Denies pain this shift. Has toney catheter, 200 ml outputs. VSS. Hip dressing id dry and intact. Slept well the night. Assist x1. Regular diet.

## 2024-06-27 NOTE — PROGRESS NOTES
Care Management Discharge Note    Discharge Date: 06/27/2024       Discharge Disposition: Transitional Care    Discharge Services: None    Discharge DME: None    Discharge Transportation: family or friend will provide (vs stretcher/w/c transport)    Private pay costs discussed: transportation costs    Does the patient's insurance plan have a 3 day qualifying hospital stay waiver?  No    PAS Confirmation Code: [id number]  Patient/family educated on Medicare website which has current facility and service quality ratings: yes    Education Provided on the Discharge Plan: Yes  Persons Notified of Discharge Plans: Patient/ Family/ Care Team/ Facility  Patient/Family in Agreement with the Plan: yes    Handoff Referral Completed: Yes    Additional Information:  Patient to discharge to [facility] transport [phone]. Orders faxed via EPIC.     Guadalupe Vidal RN

## 2024-06-27 NOTE — PLAN OF CARE
Occupational Therapy Discharge Summary    Reason for therapy discharge:    Discharged to transitional care facility.    Progress towards therapy goal(s). See goals on Care Plan in [facility] electronic health record for goal details.  Goals not met.  Barriers to achieving goals:   discharge from facility.    Therapy recommendation(s):    Continued therapy is recommended.  Rationale/Recommendations:  recommend continued OT services at TCU.    Patti Nieves OTR/CIRILO 6/27/24

## 2024-06-28 NOTE — PROGRESS NOTES
Clinic Care Coordination Contact  Care Coordination Transition Communication    Clinical Data: Patient was hospitalized at       [facility] MEDICINE  DISCHARGE SUMMARY      Primary Care Physician: Selma Meneses                                                                  Admission Date: 6/20/2024   Discharge Provider: Michaela Pompa MD Discharge Date: 6/27/2024        REASON FOR PRESENTATION(See Admission Note for Details)      Acute PE, right femoral fracture, blood loss anemia        Assessment: Patient has transitioned to TCU/ARU for short term rehabilitation:    Facility Name: [facility]  Transition Communication:  Notified facility of Primary Care- Care Coordination support via Epic fax.    Plan: Care Coordinator will await notification from facility staff informing of patient's discharge plans/needs. Care Coordinator will review chart and outreach to facility staff every 4 weeks and as needed.     Ca Leach,   [facility]  [phone]

## 2024-06-28 NOTE — LETTER
6/28/2024      Ruthy Man  [address] [facility] [address]         HEALTH GERIATRIC SERVICES  Chief Complaint   Patient presents with     Hospital F/U     [place] Medical Record Number:  [id number]  Place of Service where encounter took place:  [facility] ([facility]) [[id number]]  Code Status:  Full Code     HISTORY:      HPI:  Ruthy Man  is 89 year old (1/1/1930) undergoing physical and occupational therapy.   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 Discharged 6/14/24 to TCU and presented on 6/20/24 with syncope and collapse and found to have acute PE. Hospital course complicated by acute left groin hematoma in setting of therapeutic AC.--Transitioned from IV heparin to eliquis 6/20  Eliquis stopped 6/21 due to left groin hematoma and resumed on 6/24/2024, tolerating well with stable hemoglobin.  She did receive 3 units of packed red blood cells during her hospitalization    Today she is seen to review vital signs, labs, routine visit establish care.  She denies chest pain shortness of breath, reports of dry cough or congestion.  Staples were removed yesterday from right hip however when seen this morning she had a significant amount of drainage from the incision.  The dressing was taken down and redone.  Also noted to have significant swelling above the incision site lateral lower abdomen and flank area.  She will follow-up with [facility] in 6 weeks.  Also with a large amount of bruising left thigh.  She reports she lives independently.  Hemoglobin stable at 9.6 up from 9.0.    ALLERGIES:Ace inhibitors, Amlodipine, Clarithromycin, Diltiazem hcl [diltiazem], Doxazosin, Penicillins,  and Tetanus toxoid    PAST MEDICAL HISTORY:   Past Medical History:   Diagnosis Date     Hypertension      Senile osteoporosis 2/21/2019       PAST SURGICAL HISTORY:   has a past surgical history that includes TOTAL ABDOM HYSTERECTOMY; REMOVAL GALLBLADDER; and Open reduction internal fixation hip bipolar (Right, 6/6/2024).    FAMILY HISTORY: family history includes ALS in her mother; Other - See Comments in her brother and daughter; Pulmonary Embolism in her father.    SOCIAL HISTORY:  reports that she has never smoked. She has never used smokeless tobacco. She reports that she does not drink alcohol and does not use drugs.    ROS:  Constitutional: Negative for activity change, appetite change, fatigue and fever.   HENT: Negative for congestion.  optic neuropathy left eye   Respiratory: Negative for cough, shortness of breath and wheezing.    Cardiovascular: Negative for chest pain and leg swelling.   Gastrointestinal: Negative for abdominal distention, abdominal pain, constipation, diarrhea and nausea.   Genitourinary: Negative for dysuria.   Musculoskeletal: Negative for arthralgia. Negative for back pain.   Skin: Negative for color change and wound. Surgical incision right hip  Neurological: Negative for dizziness.   Psychiatric/Behavioral: Negative for agitation, behavioral problems and confusion.     Physical Exam:  Constitutional:       Appearance: Patient is well-developed.   HENT:      Head: Normocephalic.   Eyes:      Conjunctiva/sclera: Conjunctivae normal.   Neck:      Musculoskeletal: Normal range of motion.   Cardiovascular:      Rate and Rhythm: Normal rate and regular rhythm.      Heart sounds: Normal heart sounds. No murmur.   Pulmonary:      Effort: No respiratory distress.      Breath sounds: Normal breath sounds. No wheezing or rales.   Abdominal:      General: Bowel sounds are normal. There is no distension.      Palpations: Abdomen is soft.      Tenderness: There is no abdominal tenderness.  "  Musculoskeletal:       Normal range of motion.   Surgical incision right hip with moderate amount of drainage.  Staples recently removed in the clinic and she is with Steri-Strips  Skin:General:        Skin is warm.  Significant swelling right flank with blistering, large bruise left thigh skin   Neurological:         Mental Status: Patient is alert and oriented to person, place, and time.   Psychiatric:         Behavior: Behavior normal.     Vitals:/52   Pulse 77   Temp (!) 96.3  F (35.7  C)   Resp 24   Ht 1.626 m (5' 4\")   Wt 92.5 kg (204 lb)   LMP  (LMP Unknown)   SpO2 93%   BMI 35.02 kg/m   and Body mass index is 35.02 kg/m .    Lab/Diagnostic data:   Recent Results (from the past 240 hour(s))   Extra Blue Top Tube    Collection Time: 06/20/24  1:21 AM   Result Value Ref Range    Hold Specimen JIC    Extra Green Top (Lithium Heparin) Tube    Collection Time: 06/20/24  1:21 AM   Result Value Ref Range    Hold Specimen JIC    Extra Purple Top Tube    Collection Time: 06/20/24  1:21 AM   Result Value Ref Range    Hold Specimen JIC    Extra Heparinized Syringe    Collection Time: 06/20/24  1:21 AM   Result Value Ref Range    Hold Specimen     Comprehensive metabolic panel    Collection Time: 06/20/24  1:21 AM   Result Value Ref Range    Sodium 132 (L) 135 - 145 mmol/L    Potassium 4.5 3.4 - 5.3 mmol/L    Carbon Dioxide (CO2) 17 (L) 22 - 29 mmol/L    Anion Gap 18 (H) 7 - 15 mmol/L    Urea Nitrogen 15.2 8.0 - 23.0 mg/dL    Creatinine 1.49 (H) 0.51 - 0.95 mg/dL    GFR Estimate 32 (L) >60 mL/min/1.73m2    Calcium 8.1 (L) 8.2 - 9.6 mg/dL    Chloride 97 (L) 98 - 107 mmol/L    Glucose 246 (H) 70 - 99 mg/dL    Alkaline Phosphatase 127 40 - 150 U/L    AST 29 0 - 45 U/L    ALT 10 0 - 50 U/L    Protein Total 5.2 (L) 6.4 - 8.3 g/dL    Albumin 2.7 (L) 3.5 - 5.2 g/dL    Bilirubin Total 0.5 <=1.2 mg/dL   Troponin T, High Sensitivity    Collection Time: 06/20/24  1:21 AM   Result Value Ref Range    Troponin T, High " Sensitivity 51 (H) <=14 ng/L   Nt probnp inpatient (BNP)    Collection Time: 06/20/24  1:21 AM   Result Value Ref Range    N terminal Pro BNP Inpatient 523 0 - 1,800 pg/mL   Lactic acid whole blood with 1x repeat in 2 hr when >2    Collection Time: 06/20/24  1:21 AM   Result Value Ref Range    Lactic Acid, Initial 6.5 (HH) 0.7 - 2.0 mmol/L   Blood gas venous    Collection Time: 06/20/24  1:21 AM   Result Value Ref Range    pH Venous 7.39 7.32 - 7.43    pCO2 Venous 31 (L) 40 - 50 mm Hg    pO2 Venous 49 (H) 25 - 47 mm Hg    Bicarbonate Venous 19 (L) 21 - 28 mmol/L    Base Excess/Deficit Venous -6.5 (L) -3.0 - 3.0 mmol/L    FIO2 28     Oxyhemoglobin Venous 82 (H) 70 - 75 %    O2 Sat, Venous 83.3 (H) 70.0 - 75.0 %   CBC with platelets and differential    Collection Time: 06/20/24  1:21 AM   Result Value Ref Range    WBC Count 25.6 (H) 4.0 - 11.0 10e3/uL    RBC Count 2.98 (L) 3.80 - 5.20 10e6/uL    Hemoglobin 8.8 (L) 11.7 - 15.7 g/dL    Hematocrit 27.6 (L) 35.0 - 47.0 %    MCV 93 78 - 100 fL    MCH 29.5 26.5 - 33.0 pg    MCHC 31.9 31.5 - 36.5 g/dL    RDW 14.9 10.0 - 15.0 %    Platelet Count 444 150 - 450 10e3/uL    % Neutrophils 77 %    % Lymphocytes 14 %    % Monocytes 5 %    % Eosinophils 3 %    % Basophils 0 %    % Immature Granulocytes 2 %    NRBCs per 100 WBC 0 <1 /100    Absolute Neutrophils 19.7 (H) 1.6 - 8.3 10e3/uL    Absolute Lymphocytes 3.5 0.8 - 5.3 10e3/uL    Absolute Monocytes 1.3 0.0 - 1.3 10e3/uL    Absolute Eosinophils 0.7 0.0 - 0.7 10e3/uL    Absolute Basophils 0.1 0.0 - 0.2 10e3/uL    Absolute Immature Granulocytes 0.4 <=0.4 10e3/uL    Absolute NRBCs 0.0 10e3/uL   ECG 12-LEAD WITH MUSE (LHE)    Collection Time: 06/20/24  1:31 AM   Result Value Ref Range    Systolic Blood Pressure 86 mmHg    Diastolic Blood Pressure 49 mmHg    Ventricular Rate 103 BPM    Atrial Rate 103 BPM    NY Interval 150 ms    QRS Duration 116 ms     ms    QTc 492 ms    P Axis 24 degrees    R AXIS 261 degrees    T Axis 28  degrees    Interpretation ECG       Sinus tachycardia  Incomplete right bundle branch block  Right ventricular hypertrophy  Anterolateral infarct , age undetermined  Abnormal ECG  When compared with ECG of 06-JUN-2024 06:40,  Premature atrial complexes are no longer Present  Anterior infarct is now Present  Anterolateral infarct is now Present  Confirmed by SEE ED PROVIDER NOTE FOR, ECG INTERPRETATION ([id number]),  RYAN UNGER ([id number]) on 6/22/2024 6:53:23 AM     Blood Culture Peripheral Blood    Collection Time: 06/20/24  1:47 AM    Specimen: Peripheral Blood   Result Value Ref Range    Culture No Growth    UA with Microscopic reflex to Culture    Collection Time: 06/20/24  1:53 AM    Specimen: Urine, Midstream   Result Value Ref Range    Color Urine Yellow Colorless, Straw, Light Yellow, Yellow    Appearance Urine Clear Clear    Glucose Urine Negative Negative mg/dL    Bilirubin Urine Negative Negative    Ketones Urine Negative Negative mg/dL    Specific Gravity Urine 1.019 1.001 - 1.030    Blood Urine Negative Negative    pH Urine 5.5 5.0 - 7.0    Protein Albumin Urine 30 (A) Negative mg/dL    Urobilinogen Urine <2.0 <2.0 mg/dL    Nitrite Urine Negative Negative    Leukocyte Esterase Urine 75 Mulu/uL (A) Negative    Bacteria Urine Few (A) None Seen /HPF    Mucus Urine Present (A) None Seen /LPF    RBC Urine <1 <=2 /HPF    WBC Urine 6 (H) <=5 /HPF   Blood Culture Peripheral Blood    Collection Time: 06/20/24  2:00 AM    Specimen: Peripheral Blood   Result Value Ref Range    Culture No Growth    CT Chest Pulmonary Embolism w Contrast    Collection Time: 06/20/24  2:34 AM   Result Value Ref Range    Radiologist flags New diagnosis of pulmonary embolism (AA)    Lactic acid whole blood    Collection Time: 06/20/24  3:36 AM   Result Value Ref Range    Lactic Acid 2.6 (H) 0.7 - 2.0 mmol/L   Troponin T, High Sensitivity    Collection Time: 06/20/24  3:36 AM   Result Value Ref Range    Troponin T, High Sensitivity 48  (H) <=14 ng/L   Basic metabolic panel    Collection Time: 06/20/24  8:40 AM   Result Value Ref Range    Sodium 134 (L) 135 - 145 mmol/L    Potassium 4.2 3.4 - 5.3 mmol/L    Chloride 103 98 - 107 mmol/L    Carbon Dioxide (CO2) 20 (L) 22 - 29 mmol/L    Anion Gap 11 7 - 15 mmol/L    Urea Nitrogen 16.8 8.0 - 23.0 mg/dL    Creatinine 1.52 (H) 0.51 - 0.95 mg/dL    GFR Estimate 31 (L) >60 mL/min/1.73m2    Calcium 7.3 (L) 8.2 - 9.6 mg/dL    Glucose 146 (H) 70 - 99 mg/dL   CBC with platelets    Collection Time: 06/20/24  8:40 AM   Result Value Ref Range    WBC Count 19.4 (H) 4.0 - 11.0 10e3/uL    RBC Count 2.50 (L) 3.80 - 5.20 10e6/uL    Hemoglobin 7.2 (L) 11.7 - 15.7 g/dL    Hematocrit 23.0 (L) 35.0 - 47.0 %    MCV 92 78 - 100 fL    MCH 28.8 26.5 - 33.0 pg    MCHC 31.3 (L) 31.5 - 36.5 g/dL    RDW 14.9 10.0 - 15.0 %    Platelet Count 358 150 - 450 10e3/uL   Heparin Unfractionated Anti Xa Level    Collection Time: 06/20/24  9:42 AM   Result Value Ref Range    Anti Xa Unfractionated Heparin >1.10 (HH) For Reference Range, See Comment IU/mL   Echocardiogram Complete    Collection Time: 06/20/24 10:20 AM   Result Value Ref Range    LVEF  60-65%    Extra Green Top (Lithium Heparin) Tube    Collection Time: 06/20/24  3:49 PM   Result Value Ref Range    Hold Specimen JIC    Heparin Unfractionated Anti Xa Level    Collection Time: 06/20/24  5:01 PM   Result Value Ref Range    Anti Xa Unfractionated Heparin >1.10 (HH) For Reference Range, See Comment IU/mL   Symptomatic Influenza A/B, RSV, & SARS-CoV2 PCR (COVID-19) Nose    Collection Time: 06/20/24  5:59 PM    Specimen: Nose; Swab   Result Value Ref Range    Influenza A PCR Negative Negative    Influenza B PCR Negative Negative    RSV PCR Negative Negative    SARS CoV2 PCR Negative Negative   Lactic acid whole blood    Collection Time: 06/21/24  6:37 AM   Result Value Ref Range    Lactic Acid 1.6 0.7 - 2.0 mmol/L   CBC with platelets    Collection Time: 06/21/24  6:37 AM   Result  Value Ref Range    WBC Count 21.7 (H) 4.0 - 11.0 10e3/uL    RBC Count 1.88 (L) 3.80 - 5.20 10e6/uL    Hemoglobin 5.6 (LL) 11.7 - 15.7 g/dL    Hematocrit 17.5 (L) 35.0 - 47.0 %    MCV 93 78 - 100 fL    MCH 29.8 26.5 - 33.0 pg    MCHC 32.0 31.5 - 36.5 g/dL    RDW 15.3 (H) 10.0 - 15.0 %    Platelet Count 339 150 - 450 10e3/uL   Basic metabolic panel    Collection Time: 06/21/24  6:37 AM   Result Value Ref Range    Sodium 134 (L) 135 - 145 mmol/L    Potassium 4.2 3.4 - 5.3 mmol/L    Chloride 102 98 - 107 mmol/L    Carbon Dioxide (CO2) 20 (L) 22 - 29 mmol/L    Anion Gap 12 7 - 15 mmol/L    Urea Nitrogen 20.2 8.0 - 23.0 mg/dL    Creatinine 1.90 (H) 0.51 - 0.95 mg/dL    GFR Estimate 24 (L) >60 mL/min/1.73m2    Calcium 7.5 (L) 8.2 - 9.6 mg/dL    Glucose 122 (H) 70 - 99 mg/dL   Procalcitonin    Collection Time: 06/21/24  6:37 AM   Result Value Ref Range    Procalcitonin 0.57 (H) <0.50 ng/mL   Iron and iron binding capacity    Collection Time: 06/21/24  6:37 AM   Result Value Ref Range    Iron 17 (L) 37 - 145 ug/dL    Iron Binding Capacity 144 (L) 240 - 430 ug/dL    Iron Sat Index 12 (L) 15 - 46 %   Adult Type and Screen    Collection Time: 06/21/24  6:37 AM   Result Value Ref Range    ABO/RH(D) O POS     Antibody Screen Negative Negative    SPECIMEN EXPIRATION DATE [id number]    Prepare red blood cells (unit)    Collection Time: 06/21/24  7:36 AM   Result Value Ref Range    Blood Component Type Red Blood Cells     Product Code J9468M36     Unit Status Transfused     Unit Number [id number]     CROSSMATCH Compatible     CODING SYSTEM [id number]     ISSUE DATE AND TIME [id number]     UNIT ABO/RH O+     UNIT TYPE ISBT 5100    Prepare red blood cells (unit)    Collection Time: 06/21/24  7:36 AM   Result Value Ref Range    Blood Component Type Red Blood Cells     Product Code Q3965Y15     Unit Status Transfused     Unit Number [id number]     CROSSMATCH Compatible     CODING SYSTEM [id number]     ISSUE DATE AND TIME  [id number]     UNIT ABO/RH O+     UNIT TYPE ISBT 5100    Hemoglobin    Collection Time: 06/21/24  7:55 AM   Result Value Ref Range    Hemoglobin 5.8 (LL) 11.7 - 15.7 g/dL   Extra Blue Top Tube (LAB USE ONLY)    Collection Time: 06/21/24  7:55 AM   Result Value Ref Range    Hold Specimen JIC    Extra Green Top Tube (LAB USE ONLY)    Collection Time: 06/21/24  7:55 AM   Result Value Ref Range    Hold Specimen JIC    CBC with platelets    Collection Time: 06/21/24  3:22 PM   Result Value Ref Range    WBC Count 18.9 (H) 4.0 - 11.0 10e3/uL    RBC Count 2.86 (L) 3.80 - 5.20 10e6/uL    Hemoglobin 8.8 (L) 11.7 - 15.7 g/dL    Hematocrit 25.9 (L) 35.0 - 47.0 %    MCV 91 78 - 100 fL    MCH 30.8 26.5 - 33.0 pg    MCHC 34.0 31.5 - 36.5 g/dL    RDW 14.6 10.0 - 15.0 %    Platelet Count 279 150 - 450 10e3/uL   Hemoglobin    Collection Time: 06/21/24  9:59 PM   Result Value Ref Range    Hemoglobin 8.1 (L) 11.7 - 15.7 g/dL   Lactic Acid Whole Blood w/ 1x repeat in 2 hrs when >2    Collection Time: 06/21/24 11:54 PM   Result Value Ref Range    Lactic Acid, Initial 1.2 0.7 - 2.0 mmol/L   Basic metabolic panel    Collection Time: 06/22/24  6:16 AM   Result Value Ref Range    Sodium 134 (L) 135 - 145 mmol/L    Potassium 3.8 3.4 - 5.3 mmol/L    Chloride 104 98 - 107 mmol/L    Carbon Dioxide (CO2) 21 (L) 22 - 29 mmol/L    Anion Gap 9 7 - 15 mmol/L    Urea Nitrogen 20.6 8.0 - 23.0 mg/dL    Creatinine 1.53 (H) 0.51 - 0.95 mg/dL    GFR Estimate 31 (L) >60 mL/min/1.73m2    Calcium 7.3 (L) 8.2 - 9.6 mg/dL    Glucose 93 70 - 99 mg/dL   CBC with platelets    Collection Time: 06/22/24  6:16 AM   Result Value Ref Range    WBC Count 14.7 (H) 4.0 - 11.0 10e3/uL    RBC Count 2.40 (L) 3.80 - 5.20 10e6/uL    Hemoglobin 7.4 (L) 11.7 - 15.7 g/dL    Hematocrit 22.1 (L) 35.0 - 47.0 %    MCV 92 78 - 100 fL    MCH 30.8 26.5 - 33.0 pg    MCHC 33.5 31.5 - 36.5 g/dL    RDW 14.9 10.0 - 15.0 %    Platelet Count 255 150 - 450 10e3/uL   CBC with platelets     Collection Time: 06/22/24 10:15 AM   Result Value Ref Range    WBC Count 15.6 (H) 4.0 - 11.0 10e3/uL    RBC Count 2.77 (L) 3.80 - 5.20 10e6/uL    Hemoglobin 8.3 (L) 11.7 - 15.7 g/dL    Hematocrit 25.7 (L) 35.0 - 47.0 %    MCV 93 78 - 100 fL    MCH 30.0 26.5 - 33.0 pg    MCHC 32.3 31.5 - 36.5 g/dL    RDW 15.1 (H) 10.0 - 15.0 %    Platelet Count 265 150 - 450 10e3/uL   Partial thromboplastin time    Collection Time: 06/22/24  4:54 PM   Result Value Ref Range    aPTT 176 (HH) 22 - 38 Seconds   Hemoglobin    Collection Time: 06/22/24  4:54 PM   Result Value Ref Range    Hemoglobin 7.5 (L) 11.7 - 15.7 g/dL   Lactic Acid Whole Blood w/ 1x repeat in 2 hrs when >2    Collection Time: 06/22/24  5:09 PM   Result Value Ref Range    Lactic Acid, Initial 0.9 0.7 - 2.0 mmol/L   Hemoglobin    Collection Time: 06/23/24 12:27 AM   Result Value Ref Range    Hemoglobin 7.3 (L) 11.7 - 15.7 g/dL   Partial thromboplastin time    Collection Time: 06/23/24 12:27 AM   Result Value Ref Range    aPTT 71 (H) 22 - 38 Seconds   Basic metabolic panel    Collection Time: 06/23/24  6:24 AM   Result Value Ref Range    Sodium 135 135 - 145 mmol/L    Potassium 4.0 3.4 - 5.3 mmol/L    Chloride 103 98 - 107 mmol/L    Carbon Dioxide (CO2) 21 (L) 22 - 29 mmol/L    Anion Gap 11 7 - 15 mmol/L    Urea Nitrogen 16.5 8.0 - 23.0 mg/dL    Creatinine 1.31 (H) 0.51 - 0.95 mg/dL    GFR Estimate 38 (L) >60 mL/min/1.73m2    Calcium 7.3 (L) 8.2 - 9.6 mg/dL    Glucose 86 70 - 99 mg/dL   CBC with platelets    Collection Time: 06/23/24  6:24 AM   Result Value Ref Range    WBC Count 11.4 (H) 4.0 - 11.0 10e3/uL    RBC Count 2.37 (L) 3.80 - 5.20 10e6/uL    Hemoglobin 7.2 (L) 11.7 - 15.7 g/dL    Hematocrit 21.9 (L) 35.0 - 47.0 %    MCV 92 78 - 100 fL    MCH 30.4 26.5 - 33.0 pg    MCHC 32.9 31.5 - 36.5 g/dL    RDW 15.0 10.0 - 15.0 %    Platelet Count 254 150 - 450 10e3/uL   Partial thromboplastin time    Collection Time: 06/23/24  6:24 AM   Result Value Ref Range    aPTT 71  (H) 22 - 38 Seconds   Prepare red blood cells (unit)    Collection Time: 06/23/24  8:56 AM   Result Value Ref Range    Blood Component Type Red Blood Cells     Product Code K8327F74     Unit Status Transfused     Unit Number [id number]     CROSSMATCH Compatible     CODING SYSTEM [id number]     ISSUE DATE AND TIME [id number]     UNIT ABO/RH O+     UNIT TYPE ISBT 5100    Hemoglobin    Collection Time: 06/23/24  1:53 PM   Result Value Ref Range    Hemoglobin 9.4 (L) 11.7 - 15.7 g/dL   Hemoglobin    Collection Time: 06/23/24  9:55 PM   Result Value Ref Range    Hemoglobin 8.8 (L) 11.7 - 15.7 g/dL   Basic metabolic panel    Collection Time: 06/24/24  6:13 AM   Result Value Ref Range    Sodium 135 135 - 145 mmol/L    Potassium 3.9 3.4 - 5.3 mmol/L    Chloride 103 98 - 107 mmol/L    Carbon Dioxide (CO2) 23 22 - 29 mmol/L    Anion Gap 9 7 - 15 mmol/L    Urea Nitrogen 13.6 8.0 - 23.0 mg/dL    Creatinine 1.18 (H) 0.51 - 0.95 mg/dL    GFR Estimate 43 (L) >60 mL/min/1.73m2    Calcium 7.7 (L) 8.2 - 9.6 mg/dL    Glucose 81 70 - 99 mg/dL   Platelet count    Collection Time: 06/24/24  6:13 AM   Result Value Ref Range    Platelet Count 258 150 - 450 10e3/uL   Hemoglobin    Collection Time: 06/24/24  6:13 AM   Result Value Ref Range    Hemoglobin 8.6 (L) 11.7 - 15.7 g/dL   Partial thromboplastin time    Collection Time: 06/24/24  6:13 AM   Result Value Ref Range    aPTT 40 (H) 22 - 38 Seconds   WBC count    Collection Time: 06/24/24  6:13 AM   Result Value Ref Range    WBC Count 9.6 4.0 - 11.0 10e3/uL   TSH with free T4 reflex    Collection Time: 06/24/24  6:13 AM   Result Value Ref Range    TSH 3.57 0.30 - 4.20 uIU/mL   UA Macroscopic with reflex to Microscopic and Culture    Collection Time: 06/24/24  3:03 PM    Specimen: Urine, Catheter   Result Value Ref Range    Color Urine Light Yellow Colorless, Straw, Light Yellow, Yellow    Appearance Urine Clear Clear    Glucose Urine Negative Negative mg/dL    Bilirubin Urine Negative  Negative    Ketones Urine Negative Negative mg/dL    Specific Gravity Urine 1.009 1.001 - 1.030    Blood Urine Negative Negative    pH Urine 5.5 5.0 - 7.0    Protein Albumin Urine 10 (A) Negative mg/dL    Urobilinogen Urine <2.0 <2.0 mg/dL    Nitrite Urine Negative Negative    Leukocyte Esterase Urine Negative Negative    Bacteria Urine Few (A) None Seen /HPF    Budding Yeast Urine Few (A) None Seen /HPF    Hyphal Yeast Urine Few (A) None Seen /HPF    Mucus Urine Present (A) None Seen /LPF    RBC Urine 1 <=2 /HPF    WBC Urine 6 (H) <=5 /HPF    Squamous Epithelials Urine <1 <=1 /HPF   Hemoglobin    Collection Time: 06/24/24  6:29 PM   Result Value Ref Range    Hemoglobin 9.2 (L) 11.7 - 15.7 g/dL   Basic metabolic panel    Collection Time: 06/25/24  8:14 AM   Result Value Ref Range    Sodium 136 135 - 145 mmol/L    Potassium 4.1 3.4 - 5.3 mmol/L    Chloride 104 98 - 107 mmol/L    Carbon Dioxide (CO2) 22 22 - 29 mmol/L    Anion Gap 10 7 - 15 mmol/L    Urea Nitrogen 12.5 8.0 - 23.0 mg/dL    Creatinine 1.04 (H) 0.51 - 0.95 mg/dL    GFR Estimate 50 (L) >60 mL/min/1.73m2    Calcium 8.0 (L) 8.2 - 9.6 mg/dL    Glucose 69 (L) 70 - 99 mg/dL   CBC with platelets    Collection Time: 06/25/24  8:14 AM   Result Value Ref Range    WBC Count 10.4 4.0 - 11.0 10e3/uL    RBC Count 3.13 (L) 3.80 - 5.20 10e6/uL    Hemoglobin 9.6 (L) 11.7 - 15.7 g/dL    Hematocrit 29.3 (L) 35.0 - 47.0 %    MCV 94 78 - 100 fL    MCH 30.7 26.5 - 33.0 pg    MCHC 32.8 31.5 - 36.5 g/dL    RDW 15.3 (H) 10.0 - 15.0 %    Platelet Count 276 150 - 450 10e3/uL   Hemoglobin    Collection Time: 06/25/24  6:03 PM   Result Value Ref Range    Hemoglobin 9.2 (L) 11.7 - 15.7 g/dL   Basic metabolic panel    Collection Time: 06/26/24  6:21 AM   Result Value Ref Range    Sodium 136 135 - 145 mmol/L    Potassium 4.0 3.4 - 5.3 mmol/L    Chloride 105 98 - 107 mmol/L    Carbon Dioxide (CO2) 21 (L) 22 - 29 mmol/L    Anion Gap 10 7 - 15 mmol/L    Urea Nitrogen 14.6 8.0 - 23.0 mg/dL     Creatinine 1.08 (H) 0.51 - 0.95 mg/dL    GFR Estimate 47 (L) >60 mL/min/1.73m2    Calcium 7.9 (L) 8.2 - 9.6 mg/dL    Glucose 93 70 - 99 mg/dL   Hemoglobin    Collection Time: 06/26/24  6:21 AM   Result Value Ref Range    Hemoglobin 9.0 (L) 11.7 - 15.7 g/dL   Hemoglobin    Collection Time: 06/26/24  5:29 PM   Result Value Ref Range    Hemoglobin 9.7 (L) 11.7 - 15.7 g/dL   Basic metabolic panel    Collection Time: 06/27/24  7:03 AM   Result Value Ref Range    Sodium 136 135 - 145 mmol/L    Potassium 3.8 3.4 - 5.3 mmol/L    Chloride 104 98 - 107 mmol/L    Carbon Dioxide (CO2) 23 22 - 29 mmol/L    Anion Gap 9 7 - 15 mmol/L    Urea Nitrogen 14.7 8.0 - 23.0 mg/dL    Creatinine 1.02 (H) 0.51 - 0.95 mg/dL    GFR Estimate 51 (L) >60 mL/min/1.73m2    Calcium 7.8 (L) 8.2 - 9.6 mg/dL    Glucose 97 70 - 99 mg/dL   Platelet count    Collection Time: 06/27/24  7:03 AM   Result Value Ref Range    Platelet Count 274 150 - 450 10e3/uL   Hemoglobin    Collection Time: 06/27/24  7:03 AM   Result Value Ref Range    Hemoglobin 9.0 (L) 11.7 - 15.7 g/dL   CBC with platelets and differential    Collection Time: 06/28/24  8:50 AM   Result Value Ref Range    WBC Count 8.6 4.0 - 11.0 10e3/uL    RBC Count 3.10 (L) 3.80 - 5.20 10e6/uL    Hemoglobin 9.7 (L) 11.7 - 15.7 g/dL    Hematocrit 29.6 (L) 35.0 - 47.0 %    MCV 96 78 - 100 fL    MCH 31.3 26.5 - 33.0 pg    MCHC 32.8 31.5 - 36.5 g/dL    RDW 16.1 (H) 10.0 - 15.0 %    Platelet Count 294 150 - 450 10e3/uL    % Neutrophils 68 %    % Lymphocytes 16 %    % Monocytes 8 %    % Eosinophils 6 %    % Basophils 0 %    % Immature Granulocytes 2 %    NRBCs per 100 WBC 0 <1 /100    Absolute Neutrophils 5.9 1.6 - 8.3 10e3/uL    Absolute Lymphocytes 1.3 0.8 - 5.3 10e3/uL    Absolute Monocytes 0.7 0.0 - 1.3 10e3/uL    Absolute Eosinophils 0.5 0.0 - 0.7 10e3/uL    Absolute Basophils 0.0 0.0 - 0.2 10e3/uL    Absolute Immature Granulocytes 0.1 <=0.4 10e3/uL    Absolute NRBCs 0.0 10e3/uL         MEDICATIONS:  MED REC REQUIRED  Post Medication Reconciliation Status: discharge medications reconciled, continue medications without change          Review of your medicines            Accurate as of June 28, 2024  1:47 PM. If you have any questions, ask your nurse or doctor.                CONTINUE these medicines which have NOT CHANGED        Dose / Directions   acetaminophen 325 MG tablet  Commonly known as: TYLENOL  Used for: Closed displaced fracture of right femoral neck (H), History of right hip hemiarthroplasty      Dose: 650 mg  Take 2 tablets (650 mg) by mouth every 4 hours as needed for other (For optimal non-opioid multimodal pain management to improve pain control.)  Quantity: 100 tablet  Refills: 0     apixaban ANTICOAGULANT 5 MG tablet  Commonly known as: ELIQUIS  Indication: DVT-PE Treatment  Used for: Pulmonary embolism, other, unspecified chronicity, unspecified whether acute cor pulmonale present (H)      Dose: 5 mg  Take 1 tablet (5 mg) by mouth 2 times daily for 30 days  Quantity: 60 tablet  Refills: 0     atorvastatin 40 MG tablet  Commonly known as: LIPITOR      Dose: 40 mg  Take 40 mg by mouth every morning  Refills: 0     carvedilol 6.25 MG tablet  Commonly known as: COREG  Used for: Primary hypertension      Dose: 6.25 mg  Take 1 tablet (6.25 mg) by mouth 2 times daily (with meals)  Quantity: 180 tablet  Refills: 3     cholecalciferol 25 mcg (1000 units) capsule  Commonly known as: VITAMIN D3      Dose: 1,000 Units  [CHOLECALCIFEROL, VITAMIN D3, (VITAMIN D3) 1,000 UNIT CAPSULE] Take 1,000 Units by mouth daily.  Refills: 0     clobetasol 0.05 % external ointment  Commonly known as: TEMOVATE      Apply topically daily as needed (AS NEEDED)  Refills: 0     escitalopram 10 MG tablet  Commonly known as: LEXAPRO  Used for: MADDY (generalized anxiety disorder)      Dose: 10 mg  Take 1 tablet (10 mg) by mouth daily  Quantity: 90 tablet  Refills: 3     FISH OIL CONCENTRATE PO      Dose: 1,200  mg  Take 1,200 mg by mouth every morning  Refills: 0     hydrALAZINE 25 MG tablet  Commonly known as: APRESOLINE  Used for: Essential hypertension      Dose: 25 mg  Take 1 tablet (25 mg) by mouth every 8 hours  Refills: 0     hydroxychloroquine 200 MG tablet  Commonly known as: PLAQUENIL  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 Skip Friday and Sunday.  Quantity: 90 tablet  Refills: 11     oxyCODONE 5 MG tablet  Commonly known as: ROXICODONE  Used for: Closed displaced fracture of right femoral neck (H)      Dose: 2.5 mg  Take 0.5 tablets (2.5 mg) by mouth every 6 hours as needed for severe pain  Quantity: 5 tablet  Refills: 0     pantoprazole 20 MG EC tablet  Commonly known as: PROTONIX      Dose: 40 mg  Take 40 mg by mouth at bedtime  Refills: 0     senna-docusate 8.6-50 MG tablet  Commonly known as: SENOKOT-S/PERICOLACE      Dose: 2 tablet  Take 2 tablets by mouth 2 times daily  Refills: 0     vitamin B-12 500 MCG tablet  Commonly known as: CYANOCOBALAMIN      Dose: 500 mcg  Take 500 mcg by mouth every morning  Refills: 0              ASSESSMENT/PLAN  Encounter Diagnoses   Name Primary?     Pain management Yes     Closed displaced fracture of right femoral neck (H)      Community acquired pneumonia of right upper lobe of lung      Physical deconditioning      Pain management as needed Tylenol every as needed oxycodone    Pulmonary embolism apixaban 5 mg  twice daily    HDL on atorvastatin    Hypertension continue carvedilol, hydralazine 25 mg every 8 hours    Shortness of breath/wheezing DuoNeb every 4 hours as needed     hypothyroidism continue levothyroxine    GERD on Protonix    Generalized anxiety disorder on Lexapro    Physical deconditioning PT OT    Right flank swelling ice 3 times daily    Lower extremity edema compression on during the day off at night    Electronically signed by: Itzel Jeffrey CNP       Sincerely,        Itzel Jeffrey CNP

## 2024-06-28 NOTE — TELEPHONE ENCOUNTER
[facility] Triage Nurse Telephone Encounter    Provider: DENVER Hendricks  Facility: [facility]  Facility Type:  TCU    Allergies:    Allergies   Allergen Reactions    Ace Inhibitors Cough    Amlodipine Unknown     Edema      Clarithromycin Nausea    Diltiazem Hcl [Diltiazem] Itching    Doxazosin Nausea    Penicillins Swelling and Rash    Tetanus Toxoid Other (See Comments)     Local allergic reaction       Verbal Order/Direction given by Provider: Remove stop date on Eliquis.     Provider giving Order:  DENVER Hendricks    Verbal Order given to: Afa by Kelsie Kuo RN on 6/28/24    Claire Weldon RN

## 2024-06-28 NOTE — PROGRESS NOTES
[facility] GERIATRIC SERVICES  Chief Complaint   Patient presents with    [facility] F/U     [place] Medical Record Number:  [id number]  Place of Service where encounter took place:  [facility] () [[id number]]  Code Status:  Full Code     HISTORY:      HPI:  Ruthy Man  is 89 year old (1/1/1930) undergoing physical and occupational therapy.   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 Discharged 6/14/24 to TCU and presented on 6/20/24 with syncope and collapse and found to have acute PE. Hospital course complicated by acute left groin hematoma in setting of therapeutic AC.--Transitioned from IV heparin to eliquis 6/20  Eliquis stopped 6/21 due to left groin hematoma and resumed on 6/24/2024, tolerating well with stable hemoglobin.  She did receive 3 units of packed red blood cells during her hospitalization    Today she is seen to review vital signs, labs, routine visit establish care.  She denies chest pain shortness of breath, reports of dry cough or congestion.  Staples were removed yesterday from right hip however when seen this morning she had a significant amount of drainage from the incision.  The dressing was taken down and redone.  Also noted to have significant swelling above the incision site lateral lower abdomen and flank area.  She will follow-up with [place] [facility] in 6 weeks.  Also with a large amount of bruising left thigh.  She reports she lives independently.  Hemoglobin stable at 9.6 up from 9.0.    ALLERGIES:Ace inhibitors, Amlodipine, Clarithromycin, Diltiazem hcl [diltiazem], Doxazosin, Penicillins, and Tetanus toxoid    PAST MEDICAL HISTORY:   Past Medical History:   Diagnosis  Date    Hypertension     Senile osteoporosis 2/21/2019       PAST SURGICAL HISTORY:   has a past surgical history that includes TOTAL ABDOM HYSTERECTOMY; REMOVAL GALLBLADDER; and Open reduction internal fixation hip bipolar (Right, 6/6/2024).    FAMILY HISTORY: family history includes ALS in her mother; Other - See Comments in her brother and daughter; Pulmonary Embolism in her father.    SOCIAL HISTORY:  reports that she has never smoked. She has never used smokeless tobacco. She reports that she does not drink alcohol and does not use drugs.    ROS:  Constitutional: Negative for activity change, appetite change, fatigue and fever.   HENT: Negative for congestion.  optic neuropathy left eye   Respiratory: Negative for cough, shortness of breath and wheezing.    Cardiovascular: Negative for chest pain and leg swelling.   Gastrointestinal: Negative for abdominal distention, abdominal pain, constipation, diarrhea and nausea.   Genitourinary: Negative for dysuria.   Musculoskeletal: Negative for arthralgia. Negative for back pain.   Skin: Negative for color change and wound. Surgical incision right hip  Neurological: Negative for dizziness.   Psychiatric/Behavioral: Negative for agitation, behavioral problems and confusion.     Physical Exam:  Constitutional:       Appearance: Patient is well-developed.   HENT:      Head: Normocephalic.   Eyes:      Conjunctiva/sclera: Conjunctivae normal.   Neck:      Musculoskeletal: Normal range of motion.   Cardiovascular:      Rate and Rhythm: Normal rate and regular rhythm.      Heart sounds: Normal heart sounds. No murmur.   Pulmonary:      Effort: No respiratory distress.      Breath sounds: Normal breath sounds. No wheezing or rales.   Abdominal:      General: Bowel sounds are normal. There is no distension.      Palpations: Abdomen is soft.      Tenderness: There is no abdominal tenderness.   Musculoskeletal:       Normal range of motion.   Surgical incision right hip with  "moderate amount of drainage.  Staples recently removed in the clinic and she is with Steri-Strips  Skin:General:        Skin is warm.  Significant swelling right flank with blistering, large bruise left thigh skin   Neurological:         Mental Status: Patient is alert and oriented to person, place, and time.   Psychiatric:         Behavior: Behavior normal.     Vitals:/52   Pulse 77   Temp (!) 96.3  F (35.7  C)   Resp 24   Ht 1.626 m (5' 4\")   Wt 92.5 kg (204 lb)   LMP  (LMP Unknown)   SpO2 93%   BMI 35.02 kg/m   and Body mass index is 35.02 kg/m .    Lab/Diagnostic data:   Recent Results (from the past 240 hour(s))   Extra Blue Top Tube    Collection Time: 06/20/24  1:21 AM   Result Value Ref Range    Hold Specimen JIC    Extra Green Top (Lithium Heparin) Tube    Collection Time: 06/20/24  1:21 AM   Result Value Ref Range    Hold Specimen JIC    Extra Purple Top Tube    Collection Time: 06/20/24  1:21 AM   Result Value Ref Range    Hold Specimen JIC    Extra Heparinized Syringe    Collection Time: 06/20/24  1:21 AM   Result Value Ref Range    Hold Specimen     Comprehensive metabolic panel    Collection Time: 06/20/24  1:21 AM   Result Value Ref Range    Sodium 132 (L) 135 - 145 mmol/L    Potassium 4.5 3.4 - 5.3 mmol/L    Carbon Dioxide (CO2) 17 (L) 22 - 29 mmol/L    Anion Gap 18 (H) 7 - 15 mmol/L    Urea Nitrogen 15.2 8.0 - 23.0 mg/dL    Creatinine 1.49 (H) 0.51 - 0.95 mg/dL    GFR Estimate 32 (L) >60 mL/min/1.73m2    Calcium 8.1 (L) 8.2 - 9.6 mg/dL    Chloride 97 (L) 98 - 107 mmol/L    Glucose 246 (H) 70 - 99 mg/dL    Alkaline Phosphatase 127 40 - 150 U/L    AST 29 0 - 45 U/L    ALT 10 0 - 50 U/L    Protein Total 5.2 (L) 6.4 - 8.3 g/dL    Albumin 2.7 (L) 3.5 - 5.2 g/dL    Bilirubin Total 0.5 <=1.2 mg/dL   Troponin T, High Sensitivity    Collection Time: 06/20/24  1:21 AM   Result Value Ref Range    Troponin T, High Sensitivity 51 (H) <=14 ng/L   Nt probnp inpatient (BNP)    Collection Time: " 06/20/24  1:21 AM   Result Value Ref Range    N terminal Pro BNP Inpatient 523 0 - 1,800 pg/mL   Lactic acid whole blood with 1x repeat in 2 hr when >2    Collection Time: 06/20/24  1:21 AM   Result Value Ref Range    Lactic Acid, Initial 6.5 (HH) 0.7 - 2.0 mmol/L   Blood gas venous    Collection Time: 06/20/24  1:21 AM   Result Value Ref Range    pH Venous 7.39 7.32 - 7.43    pCO2 Venous 31 (L) 40 - 50 mm Hg    pO2 Venous 49 (H) 25 - 47 mm Hg    Bicarbonate Venous 19 (L) 21 - 28 mmol/L    Base Excess/Deficit Venous -6.5 (L) -3.0 - 3.0 mmol/L    FIO2 28     Oxyhemoglobin Venous 82 (H) 70 - 75 %    O2 Sat, Venous 83.3 (H) 70.0 - 75.0 %   CBC with platelets and differential    Collection Time: 06/20/24  1:21 AM   Result Value Ref Range    WBC Count 25.6 (H) 4.0 - 11.0 10e3/uL    RBC Count 2.98 (L) 3.80 - 5.20 10e6/uL    Hemoglobin 8.8 (L) 11.7 - 15.7 g/dL    Hematocrit 27.6 (L) 35.0 - 47.0 %    MCV 93 78 - 100 fL    MCH 29.5 26.5 - 33.0 pg    MCHC 31.9 31.5 - 36.5 g/dL    RDW 14.9 10.0 - 15.0 %    Platelet Count 444 150 - 450 10e3/uL    % Neutrophils 77 %    % Lymphocytes 14 %    % Monocytes 5 %    % Eosinophils 3 %    % Basophils 0 %    % Immature Granulocytes 2 %    NRBCs per 100 WBC 0 <1 /100    Absolute Neutrophils 19.7 (H) 1.6 - 8.3 10e3/uL    Absolute Lymphocytes 3.5 0.8 - 5.3 10e3/uL    Absolute Monocytes 1.3 0.0 - 1.3 10e3/uL    Absolute Eosinophils 0.7 0.0 - 0.7 10e3/uL    Absolute Basophils 0.1 0.0 - 0.2 10e3/uL    Absolute Immature Granulocytes 0.4 <=0.4 10e3/uL    Absolute NRBCs 0.0 10e3/uL   ECG 12-LEAD WITH MUSE (LHE)    Collection Time: 06/20/24  1:31 AM   Result Value Ref Range    Systolic Blood Pressure 86 mmHg    Diastolic Blood Pressure 49 mmHg    Ventricular Rate 103 BPM    Atrial Rate 103 BPM    ME Interval 150 ms    QRS Duration 116 ms     ms    QTc 492 ms    P Axis 24 degrees    R AXIS 261 degrees    T Axis 28 degrees    Interpretation ECG       Sinus tachycardia  Incomplete right bundle  branch block  Right ventricular hypertrophy  Anterolateral infarct , age undetermined  Abnormal ECG  When compared with ECG of 06-JUN-2024 06:40,  Premature atrial complexes are no longer Present  Anterior infarct is now Present  Anterolateral infarct is now Present  Confirmed by SEE ED PROVIDER NOTE FOR, ECG INTERPRETATION ([id number]),  YRAN UNGER ([id number]) on 6/22/2024 6:53:23 AM     Blood Culture Peripheral Blood    Collection Time: 06/20/24  1:47 AM    Specimen: Peripheral Blood   Result Value Ref Range    Culture No Growth    UA with Microscopic reflex to Culture    Collection Time: 06/20/24  1:53 AM    Specimen: Urine, Midstream   Result Value Ref Range    Color Urine Yellow Colorless, Straw, Light Yellow, Yellow    Appearance Urine Clear Clear    Glucose Urine Negative Negative mg/dL    Bilirubin Urine Negative Negative    Ketones Urine Negative Negative mg/dL    Specific Gravity Urine 1.019 1.001 - 1.030    Blood Urine Negative Negative    pH Urine 5.5 5.0 - 7.0    Protein Albumin Urine 30 (A) Negative mg/dL    Urobilinogen Urine <2.0 <2.0 mg/dL    Nitrite Urine Negative Negative    Leukocyte Esterase Urine 75 Mulu/uL (A) Negative    Bacteria Urine Few (A) None Seen /HPF    Mucus Urine Present (A) None Seen /LPF    RBC Urine <1 <=2 /HPF    WBC Urine 6 (H) <=5 /HPF   Blood Culture Peripheral Blood    Collection Time: 06/20/24  2:00 AM    Specimen: Peripheral Blood   Result Value Ref Range    Culture No Growth    CT Chest Pulmonary Embolism w Contrast    Collection Time: 06/20/24  2:34 AM   Result Value Ref Range    Radiologist flags New diagnosis of pulmonary embolism (AA)    Lactic acid whole blood    Collection Time: 06/20/24  3:36 AM   Result Value Ref Range    Lactic Acid 2.6 (H) 0.7 - 2.0 mmol/L   Troponin T, High Sensitivity    Collection Time: 06/20/24  3:36 AM   Result Value Ref Range    Troponin T, High Sensitivity 48 (H) <=14 ng/L   Basic metabolic panel    Collection Time: 06/20/24  8:40 AM    Result Value Ref Range    Sodium 134 (L) 135 - 145 mmol/L    Potassium 4.2 3.4 - 5.3 mmol/L    Chloride 103 98 - 107 mmol/L    Carbon Dioxide (CO2) 20 (L) 22 - 29 mmol/L    Anion Gap 11 7 - 15 mmol/L    Urea Nitrogen 16.8 8.0 - 23.0 mg/dL    Creatinine 1.52 (H) 0.51 - 0.95 mg/dL    GFR Estimate 31 (L) >60 mL/min/1.73m2    Calcium 7.3 (L) 8.2 - 9.6 mg/dL    Glucose 146 (H) 70 - 99 mg/dL   CBC with platelets    Collection Time: 06/20/24  8:40 AM   Result Value Ref Range    WBC Count 19.4 (H) 4.0 - 11.0 10e3/uL    RBC Count 2.50 (L) 3.80 - 5.20 10e6/uL    Hemoglobin 7.2 (L) 11.7 - 15.7 g/dL    Hematocrit 23.0 (L) 35.0 - 47.0 %    MCV 92 78 - 100 fL    MCH 28.8 26.5 - 33.0 pg    MCHC 31.3 (L) 31.5 - 36.5 g/dL    RDW 14.9 10.0 - 15.0 %    Platelet Count 358 150 - 450 10e3/uL   Heparin Unfractionated Anti Xa Level    Collection Time: 06/20/24  9:42 AM   Result Value Ref Range    Anti Xa Unfractionated Heparin >1.10 (HH) For Reference Range, See Comment IU/mL   Echocardiogram Complete    Collection Time: 06/20/24 10:20 AM   Result Value Ref Range    LVEF  60-65%    Extra Green Top (Lithium Heparin) Tube    Collection Time: 06/20/24  3:49 PM   Result Value Ref Range    Hold Specimen JIC    Heparin Unfractionated Anti Xa Level    Collection Time: 06/20/24  5:01 PM   Result Value Ref Range    Anti Xa Unfractionated Heparin >1.10 (HH) For Reference Range, See Comment IU/mL   Symptomatic Influenza A/B, RSV, & SARS-CoV2 PCR (COVID-19) Nose    Collection Time: 06/20/24  5:59 PM    Specimen: Nose; Swab   Result Value Ref Range    Influenza A PCR Negative Negative    Influenza B PCR Negative Negative    RSV PCR Negative Negative    SARS CoV2 PCR Negative Negative   Lactic acid whole blood    Collection Time: 06/21/24  6:37 AM   Result Value Ref Range    Lactic Acid 1.6 0.7 - 2.0 mmol/L   CBC with platelets    Collection Time: 06/21/24  6:37 AM   Result Value Ref Range    WBC Count 21.7 (H) 4.0 - 11.0 10e3/uL    RBC Count 1.88 (L)  3.80 - 5.20 10e6/uL    Hemoglobin 5.6 (LL) 11.7 - 15.7 g/dL    Hematocrit 17.5 (L) 35.0 - 47.0 %    MCV 93 78 - 100 fL    MCH 29.8 26.5 - 33.0 pg    MCHC 32.0 31.5 - 36.5 g/dL    RDW 15.3 (H) 10.0 - 15.0 %    Platelet Count 339 150 - 450 10e3/uL   Basic metabolic panel    Collection Time: 06/21/24  6:37 AM   Result Value Ref Range    Sodium 134 (L) 135 - 145 mmol/L    Potassium 4.2 3.4 - 5.3 mmol/L    Chloride 102 98 - 107 mmol/L    Carbon Dioxide (CO2) 20 (L) 22 - 29 mmol/L    Anion Gap 12 7 - 15 mmol/L    Urea Nitrogen 20.2 8.0 - 23.0 mg/dL    Creatinine 1.90 (H) 0.51 - 0.95 mg/dL    GFR Estimate 24 (L) >60 mL/min/1.73m2    Calcium 7.5 (L) 8.2 - 9.6 mg/dL    Glucose 122 (H) 70 - 99 mg/dL   Procalcitonin    Collection Time: 06/21/24  6:37 AM   Result Value Ref Range    Procalcitonin 0.57 (H) <0.50 ng/mL   Iron and iron binding capacity    Collection Time: 06/21/24  6:37 AM   Result Value Ref Range    Iron 17 (L) 37 - 145 ug/dL    Iron Binding Capacity 144 (L) 240 - 430 ug/dL    Iron Sat Index 12 (L) 15 - 46 %   Adult Type and Screen    Collection Time: 06/21/24  6:37 AM   Result Value Ref Range    ABO/RH(D) O POS     Antibody Screen Negative Negative    SPECIMEN EXPIRATION DATE [id number]    Prepare red blood cells (unit)    Collection Time: 06/21/24  7:36 AM   Result Value Ref Range    Blood Component Type Red Blood Cells     Product Code B3718W35     Unit Status Transfused     Unit Number [id number]     CROSSMATCH Compatible     CODING SYSTEM [id number]     ISSUE DATE AND TIME 53916578576955     UNIT ABO/RH O+     UNIT TYPE ISBT 5100    Prepare red blood cells (unit)    Collection Time: 06/21/24  7:36 AM   Result Value Ref Range    Blood Component Type Red Blood Cells     Product Code R3885L02     Unit Status Transfused     Unit Number [id number]     CROSSMATCH Compatible     CODING SYSTEM [id number]     ISSUE DATE AND TIME 93706307346069     UNIT ABO/RH O+     UNIT TYPE ISBT 5100    Hemoglobin    Collection  Time: 06/21/24  7:55 AM   Result Value Ref Range    Hemoglobin 5.8 (LL) 11.7 - 15.7 g/dL   Extra Blue Top Tube (LAB USE ONLY)    Collection Time: 06/21/24  7:55 AM   Result Value Ref Range    Hold Specimen JIC    Extra Green Top Tube (LAB USE ONLY)    Collection Time: 06/21/24  7:55 AM   Result Value Ref Range    Hold Specimen JIC    CBC with platelets    Collection Time: 06/21/24  3:22 PM   Result Value Ref Range    WBC Count 18.9 (H) 4.0 - 11.0 10e3/uL    RBC Count 2.86 (L) 3.80 - 5.20 10e6/uL    Hemoglobin 8.8 (L) 11.7 - 15.7 g/dL    Hematocrit 25.9 (L) 35.0 - 47.0 %    MCV 91 78 - 100 fL    MCH 30.8 26.5 - 33.0 pg    MCHC 34.0 31.5 - 36.5 g/dL    RDW 14.6 10.0 - 15.0 %    Platelet Count 279 150 - 450 10e3/uL   Hemoglobin    Collection Time: 06/21/24  9:59 PM   Result Value Ref Range    Hemoglobin 8.1 (L) 11.7 - 15.7 g/dL   Lactic Acid Whole Blood w/ 1x repeat in 2 hrs when >2    Collection Time: 06/21/24 11:54 PM   Result Value Ref Range    Lactic Acid, Initial 1.2 0.7 - 2.0 mmol/L   Basic metabolic panel    Collection Time: 06/22/24  6:16 AM   Result Value Ref Range    Sodium 134 (L) 135 - 145 mmol/L    Potassium 3.8 3.4 - 5.3 mmol/L    Chloride 104 98 - 107 mmol/L    Carbon Dioxide (CO2) 21 (L) 22 - 29 mmol/L    Anion Gap 9 7 - 15 mmol/L    Urea Nitrogen 20.6 8.0 - 23.0 mg/dL    Creatinine 1.53 (H) 0.51 - 0.95 mg/dL    GFR Estimate 31 (L) >60 mL/min/1.73m2    Calcium 7.3 (L) 8.2 - 9.6 mg/dL    Glucose 93 70 - 99 mg/dL   CBC with platelets    Collection Time: 06/22/24  6:16 AM   Result Value Ref Range    WBC Count 14.7 (H) 4.0 - 11.0 10e3/uL    RBC Count 2.40 (L) 3.80 - 5.20 10e6/uL    Hemoglobin 7.4 (L) 11.7 - 15.7 g/dL    Hematocrit 22.1 (L) 35.0 - 47.0 %    MCV 92 78 - 100 fL    MCH 30.8 26.5 - 33.0 pg    MCHC 33.5 31.5 - 36.5 g/dL    RDW 14.9 10.0 - 15.0 %    Platelet Count 255 150 - 450 10e3/uL   CBC with platelets    Collection Time: 06/22/24 10:15 AM   Result Value Ref Range    WBC Count 15.6 (H) 4.0 -  11.0 10e3/uL    RBC Count 2.77 (L) 3.80 - 5.20 10e6/uL    Hemoglobin 8.3 (L) 11.7 - 15.7 g/dL    Hematocrit 25.7 (L) 35.0 - 47.0 %    MCV 93 78 - 100 fL    MCH 30.0 26.5 - 33.0 pg    MCHC 32.3 31.5 - 36.5 g/dL    RDW 15.1 (H) 10.0 - 15.0 %    Platelet Count 265 150 - 450 10e3/uL   Partial thromboplastin time    Collection Time: 06/22/24  4:54 PM   Result Value Ref Range    aPTT 176 (HH) 22 - 38 Seconds   Hemoglobin    Collection Time: 06/22/24  4:54 PM   Result Value Ref Range    Hemoglobin 7.5 (L) 11.7 - 15.7 g/dL   Lactic Acid Whole Blood w/ 1x repeat in 2 hrs when >2    Collection Time: 06/22/24  5:09 PM   Result Value Ref Range    Lactic Acid, Initial 0.9 0.7 - 2.0 mmol/L   Hemoglobin    Collection Time: 06/23/24 12:27 AM   Result Value Ref Range    Hemoglobin 7.3 (L) 11.7 - 15.7 g/dL   Partial thromboplastin time    Collection Time: 06/23/24 12:27 AM   Result Value Ref Range    aPTT 71 (H) 22 - 38 Seconds   Basic metabolic panel    Collection Time: 06/23/24  6:24 AM   Result Value Ref Range    Sodium 135 135 - 145 mmol/L    Potassium 4.0 3.4 - 5.3 mmol/L    Chloride 103 98 - 107 mmol/L    Carbon Dioxide (CO2) 21 (L) 22 - 29 mmol/L    Anion Gap 11 7 - 15 mmol/L    Urea Nitrogen 16.5 8.0 - 23.0 mg/dL    Creatinine 1.31 (H) 0.51 - 0.95 mg/dL    GFR Estimate 38 (L) >60 mL/min/1.73m2    Calcium 7.3 (L) 8.2 - 9.6 mg/dL    Glucose 86 70 - 99 mg/dL   CBC with platelets    Collection Time: 06/23/24  6:24 AM   Result Value Ref Range    WBC Count 11.4 (H) 4.0 - 11.0 10e3/uL    RBC Count 2.37 (L) 3.80 - 5.20 10e6/uL    Hemoglobin 7.2 (L) 11.7 - 15.7 g/dL    Hematocrit 21.9 (L) 35.0 - 47.0 %    MCV 92 78 - 100 fL    MCH 30.4 26.5 - 33.0 pg    MCHC 32.9 31.5 - 36.5 g/dL    RDW 15.0 10.0 - 15.0 %    Platelet Count 254 150 - 450 10e3/uL   Partial thromboplastin time    Collection Time: 06/23/24  6:24 AM   Result Value Ref Range    aPTT 71 (H) 22 - 38 Seconds   Prepare red blood cells (unit)    Collection Time: 06/23/24  8:56  AM   Result Value Ref Range    Blood Component Type Red Blood Cells     Product Code L6282A84     Unit Status Transfused     Unit Number [id number]     CROSSMATCH Compatible     CODING SYSTEM [id number]     ISSUE DATE AND TIME [id number]     UNIT ABO/RH O+     UNIT TYPE ISBT 5100    Hemoglobin    Collection Time: 06/23/24  1:53 PM   Result Value Ref Range    Hemoglobin 9.4 (L) 11.7 - 15.7 g/dL   Hemoglobin    Collection Time: 06/23/24  9:55 PM   Result Value Ref Range    Hemoglobin 8.8 (L) 11.7 - 15.7 g/dL   Basic metabolic panel    Collection Time: 06/24/24  6:13 AM   Result Value Ref Range    Sodium 135 135 - 145 mmol/L    Potassium 3.9 3.4 - 5.3 mmol/L    Chloride 103 98 - 107 mmol/L    Carbon Dioxide (CO2) 23 22 - 29 mmol/L    Anion Gap 9 7 - 15 mmol/L    Urea Nitrogen 13.6 8.0 - 23.0 mg/dL    Creatinine 1.18 (H) 0.51 - 0.95 mg/dL    GFR Estimate 43 (L) >60 mL/min/1.73m2    Calcium 7.7 (L) 8.2 - 9.6 mg/dL    Glucose 81 70 - 99 mg/dL   Platelet count    Collection Time: 06/24/24  6:13 AM   Result Value Ref Range    Platelet Count 258 150 - 450 10e3/uL   Hemoglobin    Collection Time: 06/24/24  6:13 AM   Result Value Ref Range    Hemoglobin 8.6 (L) 11.7 - 15.7 g/dL   Partial thromboplastin time    Collection Time: 06/24/24  6:13 AM   Result Value Ref Range    aPTT 40 (H) 22 - 38 Seconds   WBC count    Collection Time: 06/24/24  6:13 AM   Result Value Ref Range    WBC Count 9.6 4.0 - 11.0 10e3/uL   TSH with free T4 reflex    Collection Time: 06/24/24  6:13 AM   Result Value Ref Range    TSH 3.57 0.30 - 4.20 uIU/mL   UA Macroscopic with reflex to Microscopic and Culture    Collection Time: 06/24/24  3:03 PM    Specimen: Urine, Catheter   Result Value Ref Range    Color Urine Light Yellow Colorless, Straw, Light Yellow, Yellow    Appearance Urine Clear Clear    Glucose Urine Negative Negative mg/dL    Bilirubin Urine Negative Negative    Ketones Urine Negative Negative mg/dL    Specific Gravity Urine 1.009 1.001  - 1.030    Blood Urine Negative Negative    pH Urine 5.5 5.0 - 7.0    Protein Albumin Urine 10 (A) Negative mg/dL    Urobilinogen Urine <2.0 <2.0 mg/dL    Nitrite Urine Negative Negative    Leukocyte Esterase Urine Negative Negative    Bacteria Urine Few (A) None Seen /HPF    Budding Yeast Urine Few (A) None Seen /HPF    Hyphal Yeast Urine Few (A) None Seen /HPF    Mucus Urine Present (A) None Seen /LPF    RBC Urine 1 <=2 /HPF    WBC Urine 6 (H) <=5 /HPF    Squamous Epithelials Urine <1 <=1 /HPF   Hemoglobin    Collection Time: 06/24/24  6:29 PM   Result Value Ref Range    Hemoglobin 9.2 (L) 11.7 - 15.7 g/dL   Basic metabolic panel    Collection Time: 06/25/24  8:14 AM   Result Value Ref Range    Sodium 136 135 - 145 mmol/L    Potassium 4.1 3.4 - 5.3 mmol/L    Chloride 104 98 - 107 mmol/L    Carbon Dioxide (CO2) 22 22 - 29 mmol/L    Anion Gap 10 7 - 15 mmol/L    Urea Nitrogen 12.5 8.0 - 23.0 mg/dL    Creatinine 1.04 (H) 0.51 - 0.95 mg/dL    GFR Estimate 50 (L) >60 mL/min/1.73m2    Calcium 8.0 (L) 8.2 - 9.6 mg/dL    Glucose 69 (L) 70 - 99 mg/dL   CBC with platelets    Collection Time: 06/25/24  8:14 AM   Result Value Ref Range    WBC Count 10.4 4.0 - 11.0 10e3/uL    RBC Count 3.13 (L) 3.80 - 5.20 10e6/uL    Hemoglobin 9.6 (L) 11.7 - 15.7 g/dL    Hematocrit 29.3 (L) 35.0 - 47.0 %    MCV 94 78 - 100 fL    MCH 30.7 26.5 - 33.0 pg    MCHC 32.8 31.5 - 36.5 g/dL    RDW 15.3 (H) 10.0 - 15.0 %    Platelet Count 276 150 - 450 10e3/uL   Hemoglobin    Collection Time: 06/25/24  6:03 PM   Result Value Ref Range    Hemoglobin 9.2 (L) 11.7 - 15.7 g/dL   Basic metabolic panel    Collection Time: 06/26/24  6:21 AM   Result Value Ref Range    Sodium 136 135 - 145 mmol/L    Potassium 4.0 3.4 - 5.3 mmol/L    Chloride 105 98 - 107 mmol/L    Carbon Dioxide (CO2) 21 (L) 22 - 29 mmol/L    Anion Gap 10 7 - 15 mmol/L    Urea Nitrogen 14.6 8.0 - 23.0 mg/dL    Creatinine 1.08 (H) 0.51 - 0.95 mg/dL    GFR Estimate 47 (L) >60 mL/min/1.73m2     Calcium 7.9 (L) 8.2 - 9.6 mg/dL    Glucose 93 70 - 99 mg/dL   Hemoglobin    Collection Time: 06/26/24  6:21 AM   Result Value Ref Range    Hemoglobin 9.0 (L) 11.7 - 15.7 g/dL   Hemoglobin    Collection Time: 06/26/24  5:29 PM   Result Value Ref Range    Hemoglobin 9.7 (L) 11.7 - 15.7 g/dL   Basic metabolic panel    Collection Time: 06/27/24  7:03 AM   Result Value Ref Range    Sodium 136 135 - 145 mmol/L    Potassium 3.8 3.4 - 5.3 mmol/L    Chloride 104 98 - 107 mmol/L    Carbon Dioxide (CO2) 23 22 - 29 mmol/L    Anion Gap 9 7 - 15 mmol/L    Urea Nitrogen 14.7 8.0 - 23.0 mg/dL    Creatinine 1.02 (H) 0.51 - 0.95 mg/dL    GFR Estimate 51 (L) >60 mL/min/1.73m2    Calcium 7.8 (L) 8.2 - 9.6 mg/dL    Glucose 97 70 - 99 mg/dL   Platelet count    Collection Time: 06/27/24  7:03 AM   Result Value Ref Range    Platelet Count 274 150 - 450 10e3/uL   Hemoglobin    Collection Time: 06/27/24  7:03 AM   Result Value Ref Range    Hemoglobin 9.0 (L) 11.7 - 15.7 g/dL   CBC with platelets and differential    Collection Time: 06/28/24  8:50 AM   Result Value Ref Range    WBC Count 8.6 4.0 - 11.0 10e3/uL    RBC Count 3.10 (L) 3.80 - 5.20 10e6/uL    Hemoglobin 9.7 (L) 11.7 - 15.7 g/dL    Hematocrit 29.6 (L) 35.0 - 47.0 %    MCV 96 78 - 100 fL    MCH 31.3 26.5 - 33.0 pg    MCHC 32.8 31.5 - 36.5 g/dL    RDW 16.1 (H) 10.0 - 15.0 %    Platelet Count 294 150 - 450 10e3/uL    % Neutrophils 68 %    % Lymphocytes 16 %    % Monocytes 8 %    % Eosinophils 6 %    % Basophils 0 %    % Immature Granulocytes 2 %    NRBCs per 100 WBC 0 <1 /100    Absolute Neutrophils 5.9 1.6 - 8.3 10e3/uL    Absolute Lymphocytes 1.3 0.8 - 5.3 10e3/uL    Absolute Monocytes 0.7 0.0 - 1.3 10e3/uL    Absolute Eosinophils 0.5 0.0 - 0.7 10e3/uL    Absolute Basophils 0.0 0.0 - 0.2 10e3/uL    Absolute Immature Granulocytes 0.1 <=0.4 10e3/uL    Absolute NRBCs 0.0 10e3/uL        MEDICATIONS:  MED REC REQUIRED  Post Medication Reconciliation Status: discharge medications  reconciled, continue medications without change          Review of your medicines            Accurate as of June 28, 2024  1:47 PM. If you have any questions, ask your nurse or doctor.                CONTINUE these medicines which have NOT CHANGED        Dose / Directions   acetaminophen 325 MG tablet  Commonly known as: TYLENOL  Used for: Closed displaced fracture of right femoral neck (H), History of right hip hemiarthroplasty      Dose: 650 mg  Take 2 tablets (650 mg) by mouth every 4 hours as needed for other (For optimal non-opioid multimodal pain management to improve pain control.)  Quantity: 100 tablet  Refills: 0     apixaban ANTICOAGULANT 5 MG tablet  Commonly known as: ELIQUIS  Indication: DVT-PE Treatment  Used for: Pulmonary embolism, other, unspecified chronicity, unspecified whether acute cor pulmonale present (H)      Dose: 5 mg  Take 1 tablet (5 mg) by mouth 2 times daily for 30 days  Quantity: 60 tablet  Refills: 0     atorvastatin 40 MG tablet  Commonly known as: LIPITOR      Dose: 40 mg  Take 40 mg by mouth every morning  Refills: 0     carvedilol 6.25 MG tablet  Commonly known as: COREG  Used for: Primary hypertension      Dose: 6.25 mg  Take 1 tablet (6.25 mg) by mouth 2 times daily (with meals)  Quantity: 180 tablet  Refills: 3     cholecalciferol 25 mcg (1000 units) capsule  Commonly known as: VITAMIN D3      Dose: 1,000 Units  [CHOLECALCIFEROL, VITAMIN D3, (VITAMIN D3) 1,000 UNIT CAPSULE] Take 1,000 Units by mouth daily.  Refills: 0     clobetasol 0.05 % external ointment  Commonly known as: TEMOVATE      Apply topically daily as needed (AS NEEDED)  Refills: 0     escitalopram 10 MG tablet  Commonly known as: LEXAPRO  Used for: MADDY (generalized anxiety disorder)      Dose: 10 mg  Take 1 tablet (10 mg) by mouth daily  Quantity: 90 tablet  Refills: 3     FISH OIL CONCENTRATE PO      Dose: 1,200 mg  Take 1,200 mg by mouth every morning  Refills: 0     hydrALAZINE 25 MG tablet  Commonly known  as: APRESOLINE  Used for: Essential hypertension      Dose: 25 mg  Take 1 tablet (25 mg) by mouth every 8 hours  Refills: 0     hydroxychloroquine 200 MG tablet  Commonly known as: PLAQUENIL  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 Skip Friday and Sunday.  Quantity: 90 tablet  Refills: 11     oxyCODONE 5 MG tablet  Commonly known as: ROXICODONE  Used for: Closed displaced fracture of right femoral neck (H)      Dose: 2.5 mg  Take 0.5 tablets (2.5 mg) by mouth every 6 hours as needed for severe pain  Quantity: 5 tablet  Refills: 0     pantoprazole 20 MG EC tablet  Commonly known as: PROTONIX      Dose: 40 mg  Take 40 mg by mouth at bedtime  Refills: 0     senna-docusate 8.6-50 MG tablet  Commonly known as: SENOKOT-S/PERICOLACE      Dose: 2 tablet  Take 2 tablets by mouth 2 times daily  Refills: 0     vitamin B-12 500 MCG tablet  Commonly known as: CYANOCOBALAMIN      Dose: 500 mcg  Take 500 mcg by mouth every morning  Refills: 0              ASSESSMENT/PLAN  Encounter Diagnoses   Name Primary?    Pain management Yes    Closed displaced fracture of right femoral neck (H)     Community acquired pneumonia of right upper lobe of lung     Physical deconditioning      Pain management as needed Tylenol every as needed oxycodone    Pulmonary embolism apixaban 5 mg twice daily    HDL on atorvastatin    Hypertension continue carvedilol, hydralazine 25 mg every 8  hours    Shortness of breath/wheezing DuoNeb every 4 hours as needed     hypothyroidism continue levothyroxine    GERD on Protonix    Generalized anxiety disorder on Lexapro    Physical deconditioning PT OT    Right flank swelling ice 3 times daily    Lower extremity edema compression on during the day off at night    Electronically signed by: Itzel Jeffrey CNP

## 2024-06-28 NOTE — LETTER
[facility]       To:  [facility]            Please give to facility      From:   Ca RAO  Care Coordinator   [facility]   P: [phone]  [email or web address]        Patient Name:    Ruthy Man   YOB: 1930   Admit date:   6-        Information Needed:  Please contact me when the patient will discharge (or if they will move to long term care)- include the discharge date, disposition, and main diagnosis       If the patient is discharged with home care services, please provide the name of the agency    Also- Please inform me if a care conference is being held.     Phone or Email with information                              Thank you

## 2024-06-30 NOTE — PROGRESS NOTES
Received page from nurse, patient coded and paramedics were called, patient brought to the hospital.  Emely Smith, CNP

## 2024-07-02 NOTE — PROGRESS NOTES
Notice from care team patient has discharged from care facility and MTM referral to be placed for medication review.   Discharge date: TCU to home on 6/30     Primary Provider Clinic Location:  [facility]  [address] [place] [address]  [phone]  PCP Listed: Selma Meneses MD    Action:  MTM referral placed    Ebony Arce, Pharm.D, BCACP  Medication Therapy Management Pharmacist  [phone]

## 2024-07-03 NOTE — PROGRESS NOTES
Contact   Chart Review     Situation: Patient chart reviewed by .    Background: following for discharge from TCU.      Assessment: Received notification of discharge. Did chart review and patient looked to return to hospital. Was not able to pull into Care Everywhere any admission. Called TCU and she did return to hospital, but they do not know which hospital.      Plan/Recommendations: Will try to contact patient if notified of discharge on external list.  Will do chart review in one week.     Ca Leach,   [facility]  [phone]

## 2024-07-12 ENCOUNTER — PATIENT OUTREACH (OUTPATIENT)
Dept: CARE COORDINATION | Facility: CLINIC | Age: 89
End: 2024-07-12

## 2024-07-12 NOTE — Clinical Note
She  this past Monday at the hospital. They took her off life support and she lived another five days. Ca

## 2024-07-12 NOTE — PROGRESS NOTES
Contact   Chart Review     Situation: Patient chart reviewed by .    Background: following for discharge from TCU.     Assessment: Talked to son Mat, on consent to communicate. Mom  on 2024 in the hospital.  She was in TCU when her heart stopped and she went to hospital.  Provided empathy.     Plan/Recommendations: notified PCP.     Ca Leach,   [facility]  [phone]

## (undated) DEVICE — SUCTION MANIFOLD NEPTUNE 2 SYS 1 PORT 702-025-000

## (undated) DEVICE — SUCTION IRR SYSTEM W/O TIP INTERPULSE HANDPIECE 0210-100-000

## (undated) DEVICE — GOWN IMPERVIOUS BREATHABLE 2XL/XLONG

## (undated) DEVICE — SUTURE VICRYL+ 2-0 27IN CT-1 UND VCP259H

## (undated) DEVICE — STPL SKIN PROXIMATE 35 WIDE PMW35

## (undated) DEVICE — SUTURE VICRYL+ 0 27IN CT-1 UND VCP260H

## (undated) DEVICE — POSITIONER ABDUCTION PILLOW FOAM MED FP-ABDUCTM

## (undated) DEVICE — GLOVE BIOGEL PI ULTRATOUCH G SZ 6.5 42165

## (undated) DEVICE — GLOVE BIOGEL PI ULTRATOUCH G SZ 7.0 42170

## (undated) DEVICE — GOWN IMPERVIOUS BREATHABLE SMART LG 89015

## (undated) DEVICE — PAD HIP POSITIONING MCGUIRE 707-CPM

## (undated) DEVICE — SOL NACL 0.9% INJ 1000ML BAG 2B1324X

## (undated) DEVICE — BONE CLEANING TIP INTERPULSE FEMORAL CANAL 0210-008-000

## (undated) DEVICE — SU ETHIBOND 1 CT-1 30" X425H

## (undated) DEVICE — SOL WATER IRRIG 1000ML BOTTLE 2F7114

## (undated) DEVICE — SOL NACL 0.9% IRRIG 1000ML BOTTLE 2F7124

## (undated) DEVICE — GLOVE BIOGEL PI ULTRATOUCH G SZ 8.5 42185

## (undated) DEVICE — ESU GROUND PAD ADULT REM W/15' CORD E7507DB

## (undated) DEVICE — GLOVE UNDER INDICATOR PI SZ 7.0 LF 41670

## (undated) DEVICE — GLOVE UNDER INDICATOR PI SZ 8.5 LF 41685

## (undated) DEVICE — HOLDER LIMB VELCRO OR 0814-1533

## (undated) DEVICE — SUTURE VICRYL+ 1 27IN CT-1 UND VCP261H

## (undated) DEVICE — SU ETHIBOND 2 V-37 4X30" MX69G

## (undated) DEVICE — RETRIVER SUTURE LASSO HOFFEE BLUE 710000

## (undated) DEVICE — BONE CEMENT MIXEVAC HI VAC W/CARTRIDGE 0306-563-000

## (undated) DEVICE — DRESSING MEPILEX ADH 4INX10IN STRL LF 496455

## (undated) DEVICE — CUSTOM PACK TOTAL HIP SOP5BTHHEA

## (undated) DEVICE — SPONGE LAP 8X4IN 12 PLY RADOPQ DERMACEA STRL BLU WHT

## (undated) DEVICE — BONE CLEANING TIP INTERPULSE  0210-010-000

## (undated) DEVICE — BLADE SAW SAGITTAL STRK WIDE 25.4X85X1.2MM 2108-151-000

## (undated) RX ORDER — GLYCOPYRROLATE 0.2 MG/ML
INJECTION, SOLUTION INTRAMUSCULAR; INTRAVENOUS
Status: DISPENSED
Start: 2024-01-01